# Patient Record
Sex: MALE | Race: WHITE | NOT HISPANIC OR LATINO | Employment: OTHER | URBAN - METROPOLITAN AREA
[De-identification: names, ages, dates, MRNs, and addresses within clinical notes are randomized per-mention and may not be internally consistent; named-entity substitution may affect disease eponyms.]

---

## 2017-03-20 ENCOUNTER — HOSPITAL ENCOUNTER (OUTPATIENT)
Dept: OTHER | Facility: HOSPITAL | Age: 57
Discharge: HOME | End: 2017-03-20
Attending: NURSE PRACTITIONER | Admitting: NURSE PRACTITIONER

## 2018-11-14 ENCOUNTER — HOSPITAL ENCOUNTER (OUTPATIENT)
Dept: CARDIOLOGY | Facility: HOSPITAL | Age: 58
Discharge: HOME | End: 2018-11-14
Attending: NURSE PRACTITIONER | Admitting: NURSE PRACTITIONER

## 2019-10-08 NOTE — PROGRESS NOTES
Cardiology Office Visit      Encounter Date:  10/10/2019    Patient ID:   Chinedu Lopez is a 58 y.o. male.    Reason For Followup:  Paroxysmal atrial fibrillation  Coronary artery disease  Coagulopathy    Brief Clinical History:  Dear Nhung Pillai APRN    I had the pleasure of seeing Chinedu Lopez today. As you are well aware, this is a 58 y.o. male with a known history of ischemic heart disease. He additionally has a history of paroxysmal atrial fibrillation, dyslipidemia, hypertension with hypertensive cardiovascular disease,  tobacco abuse disorder and anti-platelet/anti coagulation therapy. He presents today for followup on the above conditions.    Interval History:  He denies chest pain, pressure, heaviness or tightness.  He denies any shortness of breath out of character.  He denies any PND, orthopnea or palpitations.  He denies any syncope or near-syncope.  He reports feeling quite well from a cardiac perspective.      His only complaint today is bruising and bleeding from scratches.  He works as a  and sustains scratches and abrasions on a regular basis.  We discussed options and he is interested in discussing the watchman further with electrophysiology.  He reports his blood pressure is better controlled at home.  Pressure today is better than it has been on previous visits. Lipids are managed in your office.      Assessment & Plan    Impressions:  Coronary artery disease status post coronary arterial bypass grafting and subsequent PCI and stenting.      Most recent catheterization in May of 2016 demonstrating no disease amenable to percutaneous or surgical revascularization.  Paroxysmal atrial fibrillation  Coagulopathy secondary to warfarin.     High risk employment situation  Hypertension with hypertensive cardiovascular disease.  Dyslipidemia.   Antiplatelet therapy.  Tobacco abuse disorder  Right neck mass       Recommendations:  Continuation of his current medical regimen at  the present time.  Follow-up with EP for further discussions regarding watchman device  Followup in 6 months time sooner should there be difficulties  Smoking cessation.    Objective:    Vitals:  Vitals:    10/10/19 1049   BP: 143/78   Pulse: 67   Resp: 18   SpO2: 97%   Weight: 96.4 kg (212 lb 9.6 oz)       Physical Exam:    General: Alert, cooperative, no distress, appears stated age  Head:  Normocephalic, atraumatic, mucous membranes moist  Eyes:  Conjunctiva/corneas clear, EOM's intact     Neck:  Supple,  no adenopathy;      Lungs: Clear to auscultation bilaterally, no wheezes rhonchi rales are noted  Chest wall: No tenderness  Heart::  Regular rate and rhythm, S1 and S2 normal, no murmur, rub or gallop  Abdomen: Soft, non-tender, nondistended bowel sounds active  Extremities: No cyanosis, clubbing, or edema  Pulses: 2+ and symmetric all extremities  Skin:  Multiple scratches and abrasions and bruises on upper extremities.  Neuro/psych: A&O x3. CN II through XII are grossly intact with appropriate affect      Allergies:  No Known Allergies    Medication Review:     Current Outpatient Medications:   •  amLODIPine (NORVASC) 10 MG tablet, Take 10 mg by mouth every night at bedtime., Disp: , Rfl: 2  •  aspirin 81 MG tablet, Take 1 tablet by mouth Every Other Day., Disp: , Rfl:   •  Cholecalciferol (VITAMIN D3) 5000 units capsule capsule, Take 1 capsule by mouth Daily., Disp: , Rfl:   •  doxazosin (CARDURA) 2 MG tablet, Take 1 tablet by mouth Daily., Disp: , Rfl:   •  isosorbide mononitrate (IMDUR) 60 MG 24 hr tablet, Take 0.5 tablets by mouth Daily., Disp: , Rfl: 6  •  lisinopril (PRINIVIL,ZESTRIL) 20 MG tablet, Take 20 mg by mouth every night at bedtime., Disp: , Rfl: 2  •  nebivolol (BYSTOLIC) 20 MG tablet, Take 1 tablet by mouth Daily., Disp: , Rfl:   •  nitroglycerin (NITROSTAT) 0.4 MG SL tablet, Place 1 tablet under the tongue Take As Directed., Disp: , Rfl:   •  prasugrel (EFFIENT) 10 MG tablet, Take 10 mg by  mouth Every Morning., Disp: , Rfl: 6  •  ranolazine (RANEXA) 500 MG 12 hr tablet, Take 500 mg by mouth 2 (Two) Times a Day., Disp: , Rfl: 6  •  rosuvastatin (CRESTOR) 40 MG tablet, Take 40 mg by mouth every night at bedtime., Disp: , Rfl: 4  •  triamterene-hydrochlorothiazide (MAXZIDE) 75-50 MG per tablet, Take 1 tablet by mouth Every Morning., Disp: , Rfl: 2  •  warfarin (COUMADIN) 1 MG tablet, Take 1 tablet by mouth Take As Directed., Disp: , Rfl:   •  XIGDUO XR 5-1000 MG tablet, Take 1 tablet by mouth 2 (Two) Times a Day., Disp: , Rfl:     Family History:  Family History   Problem Relation Age of Onset   • Diabetes Mother    • Heart attack Father    • Diabetes Father    • Other Sister         MRSA   • Heart disease Other         Positive for Ischemic   • Cancer Other    • Hypertension Other        Past Medical History:  Past Medical History:   Diagnosis Date   • Atrial fibrillation (CMS/[facility])     June, [facility]. Abstracted from [employer].   • CAD (coronary artery disease)     S/P CABG and PCI. Abstracted from [employer].   • Hyperlipidemia    • Hypertension    • Myocardial infarction (CMS/[facility]) 2006    Non-ST elevation MI. Abstracted from [employer].   • Obstructive sleep apnea on CPAP     At . Abstracted from [employer].   • Polycythemia vera (CMS/[facility])    • TIA (transient ischemic attack) 11/19/2013    Possible TIA- MRI shows small tumor/head. Abstracted from [employer].       Past surgical History:  Past Surgical History:   Procedure Laterality Date   • ANGIOPLASTY  2006    3 srents were sequentially placed in promimal and mid body of the saphennous vein graft to obtuse marginal branch. Abstracted from [employer].   • CARDIAC CATHETERIZATION  2000 2003, 2006, 2012. Abstracted from [facility].   • CORONARY ARTERY BYPASS GRAFT  2000    Triple. Abstracted from [facility].   • MUSCLE REPAIR      Tendons and nerves in right lower extremity. Abstracted from [employer].   • OTHER SURGICAL HISTORY  08/2012    RCA mid  and proximal- Xience stent x3. Abstracted from [facility].   • OTHER SURGICAL HISTORY  09/2016    Head trauma/bleed at [facility]. Abstracted from [employer].       Social History:  Social History     Socioeconomic History   • Marital status:      Spouse name: Not on file   • Number of children: Not on file   • Years of education: Not on file   • Highest education level: Not on file   Tobacco Use   • Smoking status: Current Every Day Smoker   • Smokeless tobacco: Never Used   Substance and Sexual Activity   • Alcohol use: Yes     Frequency: 4 or more times a week   • Drug use: No       Review of Systems:  The following systems were reviewed as they relate to the cardiovascular system: Constitutional, Eyes, ENT, Cardiovascular, Respiratory, Gastrointestinal, Integumentary, Neurological, Psychiatric, Hematologic, Endocrine, Musculoskeletal, and Genitourinary. The pertinent cardiovascular findings are reported above with all other cardiovascular points within those systems being negative.    Diagnostic Study Review:     Current Electrocardiogram:    ECG 12 Lead  Date/Time: 10/10/2019 9:59 PM  Performed by: Renny Mosher DO  Authorized by: Renny Mosher DO   Comparison: not compared with previous ECG   Previous ECG: no previous ECG available  Comments: Normal sinus rhythm with a ventricular rate of 76 bpm.  Probable old anterior septal MI.  Normal QT and QTc intervals.  Right axis deviation.              NOTE: The following portions of the patient's history were reviewed and updated this visit as appropriate: allergies, current medications, past family history, past medical history, past social history, past surgical history and problem list.

## 2019-10-10 ENCOUNTER — OFFICE VISIT (OUTPATIENT)
Dept: CARDIOLOGY | Facility: CLINIC | Age: 59
End: 2019-10-10

## 2019-10-10 VITALS
RESPIRATION RATE: 18 BRPM | SYSTOLIC BLOOD PRESSURE: 143 MMHG | DIASTOLIC BLOOD PRESSURE: 78 MMHG | BODY MASS INDEX: 28.83 KG/M2 | OXYGEN SATURATION: 97 % | HEART RATE: 67 BPM | WEIGHT: 212.6 LBS

## 2019-10-10 DIAGNOSIS — I10 ESSENTIAL HYPERTENSION: ICD-10-CM

## 2019-10-10 DIAGNOSIS — Z79.01 LONG TERM CURRENT USE OF ANTICOAGULANT THERAPY: ICD-10-CM

## 2019-10-10 DIAGNOSIS — I25.10 CORONARY ARTERY DISEASE INVOLVING NATIVE CORONARY ARTERY OF NATIVE HEART WITHOUT ANGINA PECTORIS: ICD-10-CM

## 2019-10-10 DIAGNOSIS — I48.0 PAROXYSMAL ATRIAL FIBRILLATION (HCC): Primary | ICD-10-CM

## 2019-10-10 PROCEDURE — 99214 OFFICE O/P EST MOD 30 MIN: CPT | Performed by: INTERNAL MEDICINE

## 2019-10-10 PROCEDURE — 93000 ELECTROCARDIOGRAM COMPLETE: CPT | Performed by: INTERNAL MEDICINE

## 2019-10-10 RX ORDER — DOXAZOSIN 2 MG/1
1 TABLET ORAL DAILY
COMMUNITY
Start: 2019-07-29 | End: 2019-12-16

## 2019-10-10 RX ORDER — AMLODIPINE BESYLATE 10 MG/1
10 TABLET ORAL
Refills: 2 | COMMUNITY
Start: 2019-09-24 | End: 2019-12-16

## 2019-10-10 RX ORDER — LISINOPRIL 20 MG/1
20 TABLET ORAL
Refills: 2 | COMMUNITY
Start: 2019-08-20 | End: 2019-12-16

## 2019-10-10 RX ORDER — NEBIVOLOL 20 MG/1
1 TABLET ORAL 2 TIMES DAILY
COMMUNITY

## 2019-10-10 RX ORDER — PRASUGREL 10 MG/1
10 TABLET, FILM COATED ORAL EVERY MORNING
Refills: 6 | COMMUNITY
Start: 2019-09-24 | End: 2019-12-06

## 2019-10-10 RX ORDER — NITROGLYCERIN 0.4 MG/1
1 TABLET SUBLINGUAL TAKE AS DIRECTED
COMMUNITY
Start: 2016-03-16 | End: 2019-12-16

## 2019-10-10 RX ORDER — WARFARIN SODIUM 1 MG/1
1 TABLET ORAL TAKE AS DIRECTED
COMMUNITY
Start: 2016-09-14 | End: 2019-12-06

## 2019-10-10 RX ORDER — TRIAMTERENE AND HYDROCHLOROTHIAZIDE 75; 50 MG/1; MG/1
1 TABLET ORAL EVERY MORNING
Refills: 2 | COMMUNITY
Start: 2019-09-24 | End: 2019-12-16

## 2019-10-10 RX ORDER — RANOLAZINE 500 MG/1
500 TABLET, EXTENDED RELEASE ORAL 2 TIMES DAILY
Refills: 6 | COMMUNITY
Start: 2019-10-01

## 2019-10-10 RX ORDER — ISOSORBIDE MONONITRATE 60 MG/1
0.5 TABLET, EXTENDED RELEASE ORAL DAILY
Refills: 6 | COMMUNITY
Start: 2019-09-30 | End: 2020-01-31 | Stop reason: HOSPADM

## 2019-10-10 RX ORDER — DAPAGLIFLOZIN AND METFORMIN HYDROCHLORIDE 5; 1000 MG/1; MG/1
1 TABLET, FILM COATED, EXTENDED RELEASE ORAL 2 TIMES DAILY
COMMUNITY
Start: 2019-08-05 | End: 2019-12-16

## 2019-10-10 RX ORDER — ROSUVASTATIN CALCIUM 40 MG/1
40 TABLET, COATED ORAL
Refills: 4 | COMMUNITY
Start: 2019-07-18 | End: 2021-01-25

## 2019-10-10 NOTE — PATIENT INSTRUCTIONS
Dr Willams referral for Watchman evaluation  Follow-up in 6 months time, sooner should there be difficulties.

## 2019-10-12 PROBLEM — I10 HYPERTENSION: Status: ACTIVE | Noted: 2019-10-12

## 2019-10-12 PROBLEM — R22.1 NECK MASS: Status: ACTIVE | Noted: 2019-01-07

## 2019-10-12 PROBLEM — I25.10 CORONARY HEART DISEASE: Status: ACTIVE | Noted: 2019-10-12

## 2019-11-07 ENCOUNTER — LAB REQUISITION (OUTPATIENT)
Dept: LAB | Facility: HOSPITAL | Age: 59
End: 2019-11-07

## 2019-11-07 DIAGNOSIS — Z00.00 ENCOUNTER FOR GENERAL ADULT MEDICAL EXAMINATION WITHOUT ABNORMAL FINDINGS: ICD-10-CM

## 2019-11-07 LAB
ANION GAP SERPL CALCULATED.3IONS-SCNC: 12 MMOL/L (ref 5–15)
BUN BLD-MCNC: 10 MG/DL (ref 6–20)
BUN/CREAT SERPL: 14.7 (ref 7–25)
CALCIUM SPEC-SCNC: 9.1 MG/DL (ref 8.6–10.5)
CHLORIDE SERPL-SCNC: 112 MMOL/L (ref 98–107)
CO2 SERPL-SCNC: 25 MMOL/L (ref 22–29)
CREAT BLD-MCNC: 0.68 MG/DL (ref 0.76–1.27)
DEPRECATED RDW RBC AUTO: 44.2 FL (ref 37–54)
EOSINOPHIL # BLD MANUAL: 0.4 10*3/MM3 (ref 0–0.4)
EOSINOPHIL NFR BLD MANUAL: 6 % (ref 0.3–6.2)
ERYTHROCYTE [DISTWIDTH] IN BLOOD BY AUTOMATED COUNT: 12.6 % (ref 12.3–15.4)
GFR SERPL CREATININE-BSD FRML MDRD: 121 ML/MIN/1.73
GIANT PLATELETS: ABNORMAL
GLUCOSE BLD-MCNC: 133 MG/DL (ref 65–99)
HCT VFR BLD AUTO: 45.4 % (ref 37.5–51)
HGB BLD-MCNC: 14.9 G/DL (ref 13–17.7)
LYMPHOCYTES # BLD MANUAL: 0.94 10*3/MM3 (ref 0.7–3.1)
LYMPHOCYTES NFR BLD MANUAL: 14 % (ref 19.6–45.3)
LYMPHOCYTES NFR BLD MANUAL: 4 % (ref 5–12)
MCH RBC QN AUTO: 32.4 PG (ref 26.6–33)
MCHC RBC AUTO-ENTMCNC: 32.8 G/DL (ref 31.5–35.7)
MCV RBC AUTO: 98.6 FL (ref 79–97)
MONOCYTES # BLD AUTO: 0.27 10*3/MM3 (ref 0.1–0.9)
NEUTROPHILS # BLD AUTO: 5.09 10*3/MM3 (ref 1.7–7)
NEUTROPHILS NFR BLD MANUAL: 75 % (ref 42.7–76)
NEUTS BAND NFR BLD MANUAL: 1 % (ref 0–5)
NEUTS VAC BLD QL SMEAR: ABNORMAL
PLATELET # BLD AUTO: 163 10*3/MM3 (ref 140–450)
PMV BLD AUTO: 10.9 FL (ref 6–12)
POTASSIUM BLD-SCNC: 3 MMOL/L (ref 3.5–5.2)
RBC # BLD AUTO: 4.61 10*6/MM3 (ref 4.14–5.8)
RBC MORPH BLD: NORMAL
SCAN SLIDE: NORMAL
SODIUM BLD-SCNC: 149 MMOL/L (ref 136–145)
WBC NRBC COR # BLD: 6.7 10*3/MM3 (ref 3.4–10.8)

## 2019-11-07 PROCEDURE — 85025 COMPLETE CBC W/AUTO DIFF WBC: CPT | Performed by: PHYSICAL MEDICINE & REHABILITATION

## 2019-11-07 PROCEDURE — 80048 BASIC METABOLIC PNL TOTAL CA: CPT | Performed by: PHYSICAL MEDICINE & REHABILITATION

## 2019-11-08 ENCOUNTER — LAB REQUISITION (OUTPATIENT)
Dept: LAB | Facility: HOSPITAL | Age: 59
End: 2019-11-08

## 2019-11-08 DIAGNOSIS — Z00.00 ENCOUNTER FOR GENERAL ADULT MEDICAL EXAMINATION WITHOUT ABNORMAL FINDINGS: ICD-10-CM

## 2019-11-08 LAB
ANION GAP SERPL CALCULATED.3IONS-SCNC: 11 MMOL/L (ref 5–15)
BUN BLD-MCNC: 14 MG/DL (ref 6–20)
BUN/CREAT SERPL: 17.5 (ref 7–25)
CALCIUM SPEC-SCNC: 9.5 MG/DL (ref 8.6–10.5)
CHLORIDE SERPL-SCNC: 107 MMOL/L (ref 98–107)
CO2 SERPL-SCNC: 29 MMOL/L (ref 22–29)
CREAT BLD-MCNC: 0.8 MG/DL (ref 0.76–1.27)
GFR SERPL CREATININE-BSD FRML MDRD: 100 ML/MIN/1.73
GLUCOSE BLD-MCNC: 66 MG/DL (ref 65–99)
POTASSIUM BLD-SCNC: 3.2 MMOL/L (ref 3.5–5.2)
SODIUM BLD-SCNC: 147 MMOL/L (ref 136–145)

## 2019-11-08 PROCEDURE — 80048 BASIC METABOLIC PNL TOTAL CA: CPT

## 2019-11-09 ENCOUNTER — LAB REQUISITION (OUTPATIENT)
Dept: LAB | Facility: HOSPITAL | Age: 59
End: 2019-11-09

## 2019-11-09 DIAGNOSIS — Z00.00 ENCOUNTER FOR GENERAL ADULT MEDICAL EXAMINATION WITHOUT ABNORMAL FINDINGS: ICD-10-CM

## 2019-11-09 LAB
ANION GAP SERPL CALCULATED.3IONS-SCNC: 11 MMOL/L (ref 5–15)
BUN BLD-MCNC: 11 MG/DL (ref 6–20)
BUN/CREAT SERPL: 19 (ref 7–25)
CALCIUM SPEC-SCNC: 8.9 MG/DL (ref 8.6–10.5)
CHLORIDE SERPL-SCNC: 107 MMOL/L (ref 98–107)
CO2 SERPL-SCNC: 27 MMOL/L (ref 22–29)
CREAT BLD-MCNC: 0.58 MG/DL (ref 0.76–1.27)
GFR SERPL CREATININE-BSD FRML MDRD: 145 ML/MIN/1.73
GLUCOSE BLD-MCNC: 118 MG/DL (ref 65–99)
POTASSIUM BLD-SCNC: 3 MMOL/L (ref 3.5–5.2)
SODIUM BLD-SCNC: 145 MMOL/L (ref 136–145)

## 2019-11-09 PROCEDURE — 80048 BASIC METABOLIC PNL TOTAL CA: CPT | Performed by: PHYSICAL MEDICINE & REHABILITATION

## 2019-11-12 ENCOUNTER — LAB REQUISITION (OUTPATIENT)
Dept: LAB | Facility: HOSPITAL | Age: 59
End: 2019-11-12

## 2019-11-12 DIAGNOSIS — Z00.00 ENCOUNTER FOR GENERAL ADULT MEDICAL EXAMINATION WITHOUT ABNORMAL FINDINGS: ICD-10-CM

## 2019-11-12 LAB
ANION GAP SERPL CALCULATED.3IONS-SCNC: 13 MMOL/L (ref 5–15)
BUN BLD-MCNC: 10 MG/DL (ref 6–20)
BUN/CREAT SERPL: 16.7 (ref 7–25)
CALCIUM SPEC-SCNC: 9.4 MG/DL (ref 8.6–10.5)
CHLORIDE SERPL-SCNC: 101 MMOL/L (ref 98–107)
CO2 SERPL-SCNC: 26 MMOL/L (ref 22–29)
CREAT BLD-MCNC: 0.6 MG/DL (ref 0.76–1.27)
GFR SERPL CREATININE-BSD FRML MDRD: 139 ML/MIN/1.73
GLUCOSE BLD-MCNC: 64 MG/DL (ref 65–99)
MAGNESIUM SERPL-MCNC: 1.9 MG/DL (ref 1.6–2.6)
PHOSPHATE SERPL-MCNC: 2.8 MG/DL (ref 2.5–4.5)
POTASSIUM BLD-SCNC: 4.2 MMOL/L (ref 3.5–5.2)
SODIUM BLD-SCNC: 140 MMOL/L (ref 136–145)

## 2019-11-12 PROCEDURE — 80048 BASIC METABOLIC PNL TOTAL CA: CPT

## 2019-11-12 PROCEDURE — 84100 ASSAY OF PHOSPHORUS: CPT

## 2019-11-12 PROCEDURE — 83735 ASSAY OF MAGNESIUM: CPT

## 2019-11-14 ENCOUNTER — LAB REQUISITION (OUTPATIENT)
Dept: LAB | Facility: HOSPITAL | Age: 59
End: 2019-11-14

## 2019-11-14 DIAGNOSIS — Z00.00 ENCOUNTER FOR GENERAL ADULT MEDICAL EXAMINATION WITHOUT ABNORMAL FINDINGS: ICD-10-CM

## 2019-11-14 LAB
ALBUMIN SERPL-MCNC: 2.9 G/DL (ref 3.5–5.2)
ALBUMIN/GLOB SERPL: 0.9 G/DL
ALP SERPL-CCNC: 39 U/L (ref 39–117)
ALT SERPL W P-5'-P-CCNC: 34 U/L (ref 1–41)
ANION GAP SERPL CALCULATED.3IONS-SCNC: 9 MMOL/L (ref 5–15)
AST SERPL-CCNC: 24 U/L (ref 1–40)
BILIRUB SERPL-MCNC: 0.4 MG/DL (ref 0.2–1.2)
BUN BLD-MCNC: 11 MG/DL (ref 6–20)
BUN/CREAT SERPL: 13.4 (ref 7–25)
CALCIUM SPEC-SCNC: 9.1 MG/DL (ref 8.6–10.5)
CHLORIDE SERPL-SCNC: 101 MMOL/L (ref 98–107)
CO2 SERPL-SCNC: 30 MMOL/L (ref 22–29)
CREAT BLD-MCNC: 0.82 MG/DL (ref 0.76–1.27)
GFR SERPL CREATININE-BSD FRML MDRD: 97 ML/MIN/1.73
GLOBULIN UR ELPH-MCNC: 3.3 GM/DL
GLUCOSE BLD-MCNC: 76 MG/DL (ref 65–99)
POTASSIUM BLD-SCNC: 4.7 MMOL/L (ref 3.5–5.2)
PROT SERPL-MCNC: 6.2 G/DL (ref 6–8.5)
SODIUM BLD-SCNC: 140 MMOL/L (ref 136–145)
VALPROATE SERPL-MCNC: 17.8 MCG/ML (ref 50–125)

## 2019-11-14 PROCEDURE — 80053 COMPREHEN METABOLIC PANEL: CPT

## 2019-11-14 PROCEDURE — 80164 ASSAY DIPROPYLACETIC ACD TOT: CPT

## 2019-11-17 ENCOUNTER — LAB REQUISITION (OUTPATIENT)
Dept: LAB | Facility: HOSPITAL | Age: 59
End: 2019-11-17

## 2019-11-17 DIAGNOSIS — Z00.00 ENCOUNTER FOR GENERAL ADULT MEDICAL EXAMINATION WITHOUT ABNORMAL FINDINGS: ICD-10-CM

## 2019-11-17 LAB
ANION GAP SERPL CALCULATED.3IONS-SCNC: 11 MMOL/L (ref 5–15)
BACTERIA UR QL AUTO: ABNORMAL /HPF
BASOPHILS # BLD AUTO: 0 10*3/MM3 (ref 0–0.2)
BASOPHILS NFR BLD AUTO: 0.4 % (ref 0–1.5)
BILIRUB UR QL STRIP: NEGATIVE
BUN BLD-MCNC: 9 MG/DL (ref 6–20)
BUN/CREAT SERPL: 14.5 (ref 7–25)
CALCIUM SPEC-SCNC: 9.6 MG/DL (ref 8.6–10.5)
CHLORIDE SERPL-SCNC: 98 MMOL/L (ref 98–107)
CLARITY UR: CLEAR
CO2 SERPL-SCNC: 30 MMOL/L (ref 22–29)
COLOR UR: YELLOW
CREAT BLD-MCNC: 0.62 MG/DL (ref 0.76–1.27)
DEPRECATED RDW RBC AUTO: 45.5 FL (ref 37–54)
EOSINOPHIL # BLD AUTO: 0.1 10*3/MM3 (ref 0–0.4)
EOSINOPHIL NFR BLD AUTO: 0.9 % (ref 0.3–6.2)
ERYTHROCYTE [DISTWIDTH] IN BLOOD BY AUTOMATED COUNT: 13.2 % (ref 12.3–15.4)
GFR SERPL CREATININE-BSD FRML MDRD: 134 ML/MIN/1.73
GLUCOSE BLD-MCNC: 103 MG/DL (ref 65–99)
GLUCOSE UR STRIP-MCNC: NEGATIVE MG/DL
HCT VFR BLD AUTO: 38.3 % (ref 37.5–51)
HGB BLD-MCNC: 13.4 G/DL (ref 13–17.7)
HGB UR QL STRIP.AUTO: ABNORMAL
HYALINE CASTS UR QL AUTO: ABNORMAL /LPF
KETONES UR QL STRIP: NEGATIVE
LEUKOCYTE ESTERASE UR QL STRIP.AUTO: ABNORMAL
LYMPHOCYTES # BLD AUTO: 1.2 10*3/MM3 (ref 0.7–3.1)
LYMPHOCYTES NFR BLD AUTO: 11.7 % (ref 19.6–45.3)
MCH RBC QN AUTO: 34 PG (ref 26.6–33)
MCHC RBC AUTO-ENTMCNC: 35.1 G/DL (ref 31.5–35.7)
MCV RBC AUTO: 96.7 FL (ref 79–97)
MONOCYTES # BLD AUTO: 0.7 10*3/MM3 (ref 0.1–0.9)
MONOCYTES NFR BLD AUTO: 7.2 % (ref 5–12)
NEUTROPHILS # BLD AUTO: 7.9 10*3/MM3 (ref 1.7–7)
NEUTROPHILS NFR BLD AUTO: 79.8 % (ref 42.7–76)
NITRITE UR QL STRIP: NEGATIVE
NRBC BLD AUTO-RTO: 0 /100 WBC (ref 0–0.2)
PH UR STRIP.AUTO: 7.5 [PH] (ref 5–8)
PLATELET # BLD AUTO: 257 10*3/MM3 (ref 140–450)
PMV BLD AUTO: 8.7 FL (ref 6–12)
POTASSIUM BLD-SCNC: 4.7 MMOL/L (ref 3.5–5.2)
PROT UR QL STRIP: NEGATIVE
RBC # BLD AUTO: 3.96 10*6/MM3 (ref 4.14–5.8)
RBC # UR: ABNORMAL /HPF
REF LAB TEST METHOD: ABNORMAL
SODIUM BLD-SCNC: 139 MMOL/L (ref 136–145)
SP GR UR STRIP: <=1.005 (ref 1–1.03)
SQUAMOUS #/AREA URNS HPF: ABNORMAL /HPF
TRANS CELLS #/AREA URNS HPF: ABNORMAL /HPF
UROBILINOGEN UR QL STRIP: ABNORMAL
WBC NRBC COR # BLD: 9.9 10*3/MM3 (ref 3.4–10.8)
WBC UR QL AUTO: ABNORMAL /HPF

## 2019-11-17 PROCEDURE — 87086 URINE CULTURE/COLONY COUNT: CPT

## 2019-11-17 PROCEDURE — 80048 BASIC METABOLIC PNL TOTAL CA: CPT

## 2019-11-17 PROCEDURE — 81001 URINALYSIS AUTO W/SCOPE: CPT

## 2019-11-17 PROCEDURE — 87186 SC STD MICRODIL/AGAR DIL: CPT

## 2019-11-17 PROCEDURE — 85025 COMPLETE CBC W/AUTO DIFF WBC: CPT

## 2019-11-19 LAB — BACTERIA SPEC AEROBE CULT: ABNORMAL

## 2019-12-06 ENCOUNTER — CONSULT (OUTPATIENT)
Dept: CARDIOLOGY | Facility: CLINIC | Age: 59
End: 2019-12-06

## 2019-12-06 ENCOUNTER — PREP FOR SURGERY (OUTPATIENT)
Dept: OTHER | Facility: HOSPITAL | Age: 59
End: 2019-12-06

## 2019-12-06 VITALS — WEIGHT: 197 LBS | BODY MASS INDEX: 26.72 KG/M2

## 2019-12-06 DIAGNOSIS — I25.10 CORONARY ARTERY DISEASE INVOLVING NATIVE CORONARY ARTERY OF NATIVE HEART WITHOUT ANGINA PECTORIS: ICD-10-CM

## 2019-12-06 DIAGNOSIS — I48.0 PAROXYSMAL ATRIAL FIBRILLATION (HCC): Primary | ICD-10-CM

## 2019-12-06 DIAGNOSIS — I62.9 INTRACRANIAL BLEED (HCC): ICD-10-CM

## 2019-12-06 DIAGNOSIS — I10 ESSENTIAL HYPERTENSION: ICD-10-CM

## 2019-12-06 PROCEDURE — 99215 OFFICE O/P EST HI 40 MIN: CPT | Performed by: INTERNAL MEDICINE

## 2019-12-06 RX ORDER — SODIUM CHLORIDE 9 MG/ML
80 INJECTION, SOLUTION INTRAVENOUS CONTINUOUS
Status: CANCELLED | OUTPATIENT
Start: 2019-12-06

## 2019-12-06 NOTE — PROGRESS NOTES
CC--stroke with intracranial bleed    Sub--  59-year-old unfortunate male patient on antiplatelet agents and anticoagulation had stroke with intracranial bleed around 6 to 8 weeks ago and was admitted to [facility] and conservative management was done and subsequently was discharged a month ago after prolonged hospitalization and left on aspirin  Patient was seen by Dr. Mosher and referred for evaluation for possible watchman device  Patient has known history of ischemic heart disease with paroxysmal atrial fibrillation, dyslipidemia and hypertension with history of tobacco abuse  He has prior history of bypass surgery with subsequent PCI and stenting and most recent cardiac catheterization was in 2016 left to medical management  Patient is recovering slowly neurologically and is currently in rehab and is awaiting to see the neurologist next week        Past Medical History:   Diagnosis Date   • Atrial fibrillation (CMS/[facility])     June, [facility]. Abstracted from [employer].   • CAD (coronary artery disease)     S/P CABG and PCI. Abstracted from [facility].   • Hyperlipidemia    • Hypertension    • Myocardial infarction (CMS/[facility]) 2006    Non-ST elevation MI. Abstracted from [facility].   • Obstructive sleep apnea on CPAP     At [facility]. Abstracted from [facility].   • Polycythemia vera (CMS/[facility])    • TIA (transient ischemic attack) 11/19/2013    Possible TIA- MRI shows small tumor/head. Abstracted from [facility].     Past Surgical History:   Procedure Laterality Date   • ANGIOPLASTY  2006    3 srents were sequentially placed in promimal and mid body of the saphennous vein graft to obtuse marginal branch. Abstracted from [facility].   • CARDIAC CATHETERIZATION  2000 2003, 2006, 2012. Abstracted from [facility].   • CORONARY ARTERY BYPASS GRAFT  2000    Triple. Abstracted from [facility].   • MUSCLE REPAIR      Tendons and nerves in right lower extremity. Abstracted from [facility].   • OTHER SURGICAL HISTORY   08/2012    RCA mid and proximal- Xience stent x3. Abstracted from [employer].   • OTHER SURGICAL HISTORY  09/2016    Head trauma/bleed at [facility]. Abstracted from [employer].     Family History   Problem Relation Age of Onset   • Diabetes Mother    • Heart attack Father    • Diabetes Father    • Other Sister         MRSA   • Heart disease Other         Positive for Ischemic   • Cancer Other    • Hypertension Other      Social History     Tobacco Use   • Smoking status: Current Every Day Smoker   • Smokeless tobacco: Never Used   Substance Use Topics   • Alcohol use: Yes     Frequency: 4 or more times a week   • Drug use: No       (Not in a hospital admission)  Allergies:  Patient has no known allergies.    Review of Systems   General:  positive for fatigue and tiredness  Eyes: No redness  Cardiovascular: No chest pain, no palpitations  Respiratory: Denies any shortness of breath  Gastrointestinal: No nausea or vomiting, bleeding  Genitourinary: no hematuria or dysuria  Musculoskeletal: No arthralgia or myalgia  Skin: No rash  Neurologic: No numbness, tingling, syncope  Hematologic/Lymphatic: No abnormal bleeding      Physical Exam  VITALS REVIEWED--heart rate of 60 patient is afebrile respiration 12 times a minute blood pressure 140/70    General:      well developed, well nourished, in no acute distress.    Head:      normocephalic and atraumatic.    Eyes:      PERRL/EOM intact, conjunctiva and sclera clear with out nystagmus.    Neck:      no masses, thyromegaly, trachea central with normal respiratory effort  Lungs:      clear bilaterally to auscultation.    Heart:      non-displaced PMI, chest non-tender; regular rate and rhythm, S1, S2 without murmurs, rubs, or gallops  Skin:      intact without lesions or rashes.    Psych:      alert and cooperative; normal mood and affect; normal attention span and concentration.      Extremities with trace ankle edema without any cyanosis or clubbing    Muscular skeletal  patient walks with a normal gait  with mild kyphosis    Neurological no focal deficits and alert oriented x3    Abdomen soft nontender no hepatomegaly       chads vasc score--- hypertension, vascular disease, stroke--4  HASBLED--4    Assessment plan    Unfortunate male patient with high risk for stroke with intracranial bleed needing prolonged hospitalization recently  Records from [facility] are requested and pending  Patient to discuss with the neurologist with the next visit about appropriate short-term usage of anticoagulation for watchman device implantation  WILD scheduled to evaluate left atrial appendage anatomy for  watchman device implantation  Coronary artery disease stable  Hypertension controlled  We will also discuss with neurology of [facility] for shared decision making  Information on watchman given to the patient  Close follow-up    Electronically signed by Angel Willams MD, 12/06/19, 6:14 PM.

## 2019-12-06 NOTE — H&P (VIEW-ONLY)
CC--stroke with intracranial bleed    Sub--  59-year-old unfortunate male patient on antiplatelet agents and anticoagulation had stroke with intracranial bleed around 6 to 8 weeks ago and was admitted to [facility] and conservative management was done and subsequently was discharged a month ago after prolonged hospitalization and left on aspirin  Patient was seen by Dr. Mosher and referred for evaluation for possible watchman device  Patient has known history of ischemic heart disease with paroxysmal atrial fibrillation, dyslipidemia and hypertension with history of tobacco abuse  He has prior history of bypass surgery with subsequent PCI and stenting and most recent cardiac catheterization was in 2016 left to medical management  Patient is recovering slowly neurologically and is currently in rehab and is awaiting to see the neurologist next week        Past Medical History:   Diagnosis Date   • Atrial fibrillation (CMS/[facility])     June, [facility]. Abstracted from [employer].   • CAD (coronary artery disease)     S/P CABG and PCI. Abstracted from [employer].   • Hyperlipidemia    • Hypertension    • Myocardial infarction (CMS/[facility]) 2006    Non-ST elevation MI. Abstracted from [employer].   • Obstructive sleep apnea on CPAP     At [facility]. Abstracted from [employer].   • Polycythemia vera (CMS/[facility])    • TIA (transient ischemic attack) 11/19/2013    Possible TIA- MRI shows small tumor/head. Abstracted from [employer].     Past Surgical History:   Procedure Laterality Date   • ANGIOPLASTY  2006    3 srents were sequentially placed in promimal and mid body of the saphennous vein graft to obtuse marginal branch. Abstracted from [employer].   • CARDIAC CATHETERIZATION  2000 2003, 2006, 2012. Abstracted from [employer].   • CORONARY ARTERY BYPASS GRAFT  2000    Triple. Abstracted from [employer].   • MUSCLE REPAIR      Tendons and nerves in right lower extremity. Abstracted from [employer].   • OTHER SURGICAL HISTORY   08/2012    RCA mid and proximal- Xience stent x3. Abstracted from [facility].   • OTHER SURGICAL HISTORY  09/2016    Head trauma/bleed at [facility]. Abstracted from [facility].     Family History   Problem Relation Age of Onset   • Diabetes Mother    • Heart attack Father    • Diabetes Father    • Other Sister         MRSA   • Heart disease Other         Positive for Ischemic   • Cancer Other    • Hypertension Other      Social History     Tobacco Use   • Smoking status: Current Every Day Smoker   • Smokeless tobacco: Never Used   Substance Use Topics   • Alcohol use: Yes     Frequency: 4 or more times a week   • Drug use: No       (Not in a hospital admission)  Allergies:  Patient has no known allergies.    Review of Systems   General:  positive for fatigue and tiredness  Eyes: No redness  Cardiovascular: No chest pain, no palpitations  Respiratory: Denies any shortness of breath  Gastrointestinal: No nausea or vomiting, bleeding  Genitourinary: no hematuria or dysuria  Musculoskeletal: No arthralgia or myalgia  Skin: No rash  Neurologic: No numbness, tingling, syncope  Hematologic/Lymphatic: No abnormal bleeding      Physical Exam  VITALS REVIEWED--heart rate of 60 patient is afebrile respiration 12 times a minute blood pressure 140/70    General:      well developed, well nourished, in no acute distress.    Head:      normocephalic and atraumatic.    Eyes:      PERRL/EOM intact, conjunctiva and sclera clear with out nystagmus.    Neck:      no masses, thyromegaly, trachea central with normal respiratory effort  Lungs:      clear bilaterally to auscultation.    Heart:      non-displaced PMI, chest non-tender; regular rate and rhythm, S1, S2 without murmurs, rubs, or gallops  Skin:      intact without lesions or rashes.    Psych:      alert and cooperative; normal mood and affect; normal attention span and concentration.      Extremities with trace ankle edema without any cyanosis or clubbing    Muscular skeletal  patient walks with a normal gait  with mild kyphosis    Neurological no focal deficits and alert oriented x3    Abdomen soft nontender no hepatomegaly       chads vasc score--- hypertension, vascular disease, stroke--4  HASBLED--4    Assessment plan    Unfortunate male patient with high risk for stroke with intracranial bleed needing prolonged hospitalization recently  Records from [facility] are requested and pending  Patient to discuss with the neurologist with the next visit about appropriate short-term usage of anticoagulation for watchman device implantation  WILD scheduled to evaluate left atrial appendage anatomy for  watchman device implantation  Coronary artery disease stable  Hypertension controlled  We will also discuss with neurology of [facility] for shared decision making  Information on watchman given to the patient  Close follow-up    Electronically signed by Angel Willams MD, 12/06/19, 6:14 PM.

## 2019-12-09 ENCOUNTER — OFFICE (OUTPATIENT)
Dept: URBAN - METROPOLITAN AREA CLINIC 64 | Facility: CLINIC | Age: 59
End: 2019-12-09

## 2019-12-09 VITALS
HEART RATE: 64 BPM | SYSTOLIC BLOOD PRESSURE: 128 MMHG | HEIGHT: 72 IN | DIASTOLIC BLOOD PRESSURE: 76 MMHG | WEIGHT: 202 LBS

## 2019-12-09 DIAGNOSIS — Z43.1 ENCOUNTER FOR ATTENTION TO GASTROSTOMY: ICD-10-CM

## 2019-12-09 PROCEDURE — 43762 RPLC GTUBE NO REVJ TRC: CPT | Performed by: NURSE PRACTITIONER

## 2019-12-10 ENCOUNTER — TELEPHONE (OUTPATIENT)
Dept: CARDIOLOGY | Facility: CLINIC | Age: 59
End: 2019-12-10

## 2019-12-12 ENCOUNTER — TELEPHONE (OUTPATIENT)
Dept: CARDIOLOGY | Facility: CLINIC | Age: 59
End: 2019-12-12

## 2019-12-16 RX ORDER — VALPROIC ACID 250 MG/1
250 CAPSULE, LIQUID FILLED ORAL 2 TIMES DAILY
COMMUNITY
End: 2020-06-01

## 2019-12-16 RX ORDER — QUETIAPINE FUMARATE 25 MG/1
25 TABLET, FILM COATED ORAL DAILY
COMMUNITY

## 2019-12-16 RX ORDER — BACLOFEN 10 MG/1
5 TABLET ORAL 2 TIMES DAILY
Status: ON HOLD | COMMUNITY
End: 2020-01-30

## 2019-12-16 RX ORDER — FOLIC ACID 1 MG/1
1 TABLET ORAL DAILY
COMMUNITY

## 2019-12-16 RX ORDER — THIAMINE MONONITRATE (VIT B1) 100 MG
100 TABLET ORAL DAILY
COMMUNITY

## 2019-12-16 RX ORDER — TAMSULOSIN HYDROCHLORIDE 0.4 MG/1
2 CAPSULE ORAL DAILY
COMMUNITY
End: 2020-01-17

## 2019-12-16 RX ORDER — AMANTADINE HYDROCHLORIDE 100 MG/1
100 CAPSULE, GELATIN COATED ORAL DAILY
COMMUNITY

## 2019-12-16 RX ORDER — POTASSIUM CHLORIDE 20 MEQ/1
20 TABLET, EXTENDED RELEASE ORAL 2 TIMES DAILY
COMMUNITY

## 2019-12-17 ENCOUNTER — HOSPITAL ENCOUNTER (OUTPATIENT)
Dept: CARDIOLOGY | Facility: HOSPITAL | Age: 59
Discharge: HOME | End: 2019-12-17
Admitting: INTERNAL MEDICINE

## 2019-12-17 ENCOUNTER — ANESTHESIA (OUTPATIENT)
Dept: CARDIOLOGY | Facility: HOSPITAL | Age: 59
End: 2019-12-17

## 2019-12-17 ENCOUNTER — ANESTHESIA EVENT (OUTPATIENT)
Dept: CARDIOLOGY | Facility: HOSPITAL | Age: 59
End: 2019-12-17

## 2019-12-17 VITALS
TEMPERATURE: 99.4 F | BODY MASS INDEX: 27.84 KG/M2 | HEIGHT: 71 IN | OXYGEN SATURATION: 96 % | SYSTOLIC BLOOD PRESSURE: 139 MMHG | RESPIRATION RATE: 16 BRPM | DIASTOLIC BLOOD PRESSURE: 78 MMHG | WEIGHT: 198.85 LBS | HEART RATE: 59 BPM

## 2019-12-17 VITALS — HEART RATE: 62 BPM | OXYGEN SATURATION: 98 % | SYSTOLIC BLOOD PRESSURE: 135 MMHG | DIASTOLIC BLOOD PRESSURE: 76 MMHG

## 2019-12-17 DIAGNOSIS — I62.9 INTRACRANIAL BLEED (HCC): ICD-10-CM

## 2019-12-17 DIAGNOSIS — I48.0 PAROXYSMAL ATRIAL FIBRILLATION (HCC): ICD-10-CM

## 2019-12-17 LAB
BH CV ECHO MEAS - EF(MOD-BP): 60 %
LV EF 2D ECHO EST: 60 %

## 2019-12-17 PROCEDURE — 93325 DOPPLER ECHO COLOR FLOW MAPG: CPT | Performed by: INTERNAL MEDICINE

## 2019-12-17 PROCEDURE — 93312 ECHO TRANSESOPHAGEAL: CPT

## 2019-12-17 PROCEDURE — 93320 DOPPLER ECHO COMPLETE: CPT | Performed by: INTERNAL MEDICINE

## 2019-12-17 PROCEDURE — 93325 DOPPLER ECHO COLOR FLOW MAPG: CPT

## 2019-12-17 PROCEDURE — 93320 DOPPLER ECHO COMPLETE: CPT

## 2019-12-17 PROCEDURE — 93312 ECHO TRANSESOPHAGEAL: CPT | Performed by: INTERNAL MEDICINE

## 2019-12-17 PROCEDURE — 25010000002 PROPOFOL 10 MG/ML EMULSION: Performed by: ANESTHESIOLOGY

## 2019-12-17 RX ORDER — SODIUM CHLORIDE 9 MG/ML
80 INJECTION, SOLUTION INTRAVENOUS CONTINUOUS
Status: DISCONTINUED | OUTPATIENT
Start: 2019-12-17 | End: 2019-12-19 | Stop reason: HOSPADM

## 2019-12-17 RX ORDER — PROPOFOL 10 MG/ML
VIAL (ML) INTRAVENOUS AS NEEDED
Status: DISCONTINUED | OUTPATIENT
Start: 2019-12-17 | End: 2019-12-17 | Stop reason: SURG

## 2019-12-17 RX ADMIN — PROPOFOL 25 MG: 10 INJECTION, EMULSION INTRAVENOUS at 14:08

## 2019-12-17 RX ADMIN — SODIUM CHLORIDE 80 ML/HR: 900 INJECTION, SOLUTION INTRAVENOUS at 13:28

## 2019-12-17 RX ADMIN — PROPOFOL 100 MG: 10 INJECTION, EMULSION INTRAVENOUS at 14:05

## 2019-12-17 NOTE — ANESTHESIA PREPROCEDURE EVALUATION
Anesthesia Evaluation     Patient summary reviewed and Nursing notes reviewed   NPO Solid Status: > 8 hours  NPO Liquid Status: > 8 hours           Airway   Mallampati: II  TM distance: >3 FB  Neck ROM: full  No difficulty expected  Dental - normal exam   (+) poor dentition    Pulmonary - normal exam   (+) a smoker, sleep apnea,   Cardiovascular - normal exam    ECG reviewed    (+) hypertension, past MI , CAD, CABG, dysrhythmias Atrial Fib, hyperlipidemia,       Neuro/Psych  (+) TIA,     GI/Hepatic/Renal/Endo      Musculoskeletal     Abdominal  - normal exam    Bowel sounds: normal.   Substance History      OB/GYN          Other   blood dyscrasia,   history of cancer        Phys Exam Other: Missing no loose               Anesthesia Plan    ASA 4     MAC     intravenous induction     Anesthetic plan, all risks, benefits, and alternatives have been provided, discussed and informed consent has been obtained with: patient.

## 2019-12-17 NOTE — ANESTHESIA POSTPROCEDURE EVALUATION
Patient: Chinedu Lopez    Procedure Summary     Date:  12/17/19 Room / Location:  [facility]    Anesthesia Start:  1359 Anesthesia Stop:  1416    Procedure:  ADULT TRANSESOPHAGEAL ECHO (WILD) W/ CONT IF NECESSARY PER PROTOCOL Diagnosis:       Paroxysmal atrial fibrillation (CMS/HCC)      Intracranial bleed (CMS/HCC)      (Arrhythmia)    Scheduled Providers:  Angel Willams MD Provider:  Ambrosio Bellamy MD    Anesthesia Type:  MAC ASA Status:  4          Anesthesia Type: MAC    Vitals  Vitals Value Taken Time   /87 12/17/2019  3:01 PM   Temp     Pulse 61 12/17/2019  3:11 PM   Resp     SpO2 97 % 12/17/2019  3:11 PM   Vitals shown include unvalidated device data.        Post Anesthesia Care and Evaluation    Patient location during evaluation: PACU  Patient participation: complete - patient participated  Level of consciousness: awake  Pain scale: See nurse's notes for pain score.  Pain management: adequate  Airway patency: patent  Anesthetic complications: No anesthetic complications  PONV Status: none  Cardiovascular status: acceptable  Respiratory status: acceptable  Hydration status: acceptable    Comments: Patient seen and examined postoperatively; vital signs stable; SpO2 greater than or equal to 90%; cardiopulmonary status stable; nausea/vomiting adequately controlled; pain adequately controlled; no apparent anesthesia complications; patient discharged from anesthesia care when discharge criteria were met

## 2019-12-17 NOTE — DISCHARGE INSTR - ACTIVITY
WILD DC Instructions    1) The medication, which was used to put the patient to sleep, will be acting in your body for the next twenty-four (24) hours, so you might feel a little sleepy; this feeling will slowly wear off.  Because the medicine is still in your system for the next twenty-four (24) hours, the adult patient SHOULD NOT:    a. Drive a car, operate machinery, or power tools  b. Drink any alcoholic drinks (not even beer)  c. Make any important decisions such as to sign important papers      2) We strongly suggest that a responsible adult be with the patient the rest of the day.    3) Any problems with:    a. EXCESSIVE MUCOUS  b. SPITTING UP BLOOD  c. SORE THROAT AT MORE THAN 72 HOURS    NOTIFY DR. Willams   [phone]   OR CALL THE [facility] [phone].

## 2019-12-18 ENCOUNTER — TELEPHONE (OUTPATIENT)
Dept: CARDIOLOGY | Facility: CLINIC | Age: 59
End: 2019-12-18

## 2019-12-18 NOTE — TELEPHONE ENCOUNTER
Called to request recent office note and CT report from Dr. Sparkle Patino U [facility] neurology.  I spoke to Pricila and then left a message on Tiffany Rodriguez's voicemail.

## 2019-12-19 ENCOUNTER — TELEPHONE (OUTPATIENT)
Dept: CARDIOLOGY | Facility: CLINIC | Age: 59
End: 2019-12-19

## 2019-12-19 NOTE — TELEPHONE ENCOUNTER
Spoke with Merly at [facility] neuro requested records on pt.  She stated she will send records asap.

## 2019-12-30 ENCOUNTER — TELEPHONE (OUTPATIENT)
Dept: CARDIOLOGY | Facility: CLINIC | Age: 59
End: 2019-12-30

## 2020-01-17 ENCOUNTER — PREP FOR SURGERY (OUTPATIENT)
Dept: OTHER | Facility: HOSPITAL | Age: 60
End: 2020-01-17

## 2020-01-17 ENCOUNTER — OFFICE VISIT (OUTPATIENT)
Dept: CARDIOLOGY | Facility: CLINIC | Age: 60
End: 2020-01-17

## 2020-01-17 VITALS
SYSTOLIC BLOOD PRESSURE: 120 MMHG | BODY MASS INDEX: 29.43 KG/M2 | HEART RATE: 67 BPM | DIASTOLIC BLOOD PRESSURE: 70 MMHG | WEIGHT: 211 LBS

## 2020-01-17 DIAGNOSIS — I10 ESSENTIAL HYPERTENSION: ICD-10-CM

## 2020-01-17 DIAGNOSIS — I48.0 PAROXYSMAL ATRIAL FIBRILLATION (HCC): Primary | ICD-10-CM

## 2020-01-17 DIAGNOSIS — I63.9 CEREBROVASCULAR ACCIDENT (CVA), UNSPECIFIED MECHANISM (HCC): ICD-10-CM

## 2020-01-17 PROCEDURE — 93000 ELECTROCARDIOGRAM COMPLETE: CPT | Performed by: NURSE PRACTITIONER

## 2020-01-17 PROCEDURE — 99215 OFFICE O/P EST HI 40 MIN: CPT | Performed by: NURSE PRACTITIONER

## 2020-01-17 RX ORDER — AMLODIPINE BESYLATE 5 MG/1
5 TABLET ORAL DAILY
COMMUNITY
End: 2020-10-28

## 2020-01-17 RX ORDER — SODIUM CHLORIDE 0.9 % (FLUSH) 0.9 %
10 SYRINGE (ML) INJECTION AS NEEDED
Status: CANCELLED | OUTPATIENT
Start: 2020-01-17

## 2020-01-17 RX ORDER — SODIUM CHLORIDE 0.9 % (FLUSH) 0.9 %
3 SYRINGE (ML) INJECTION EVERY 12 HOURS SCHEDULED
Status: CANCELLED | OUTPATIENT
Start: 2020-01-17

## 2020-01-17 RX ORDER — SODIUM CHLORIDE 9 MG/ML
20 INJECTION, SOLUTION INTRAVENOUS CONTINUOUS
Status: CANCELLED | OUTPATIENT
Start: 2020-01-17

## 2020-01-17 RX ORDER — WARFARIN SODIUM 5 MG/1
5 TABLET ORAL DAILY
COMMUNITY
Start: 2019-12-28 | End: 2020-03-17 | Stop reason: HOSPADM

## 2020-01-17 NOTE — PROGRESS NOTES
Cardiology Office Follow Up Visit      Primary Care Provider:  Nhung Clark APRN    Reason for f/u: Repeat evaluation for watchman       Nhung Clark APRN    History of Present Illness     It was nice to see Chinedu Lopez in follow-up for paroxysmal atrial fibrillation, watchman evaluation originally referred by Dr. Mosher.  He is a 59 y.o. male with a history of stroke with intracranial bleed in October while on antiplatelets and anticoagulation, managed acutely at [facility], currently in [facility] rehab for continued physical, occupational, and cognitive therapy.  Medical history includes ischemic heart disease with prior bypass surgery and subsequent PCI x2, most recent cardiac cath in 2016 indicated medical management, hypertension, dyslipidemia, diabetes mellitus, DEISY- patient states treatment discontinued by sleep physician.  The patient was last evaluated in December by Dr. Willams, interim discussion with his neurosurgeon indicates patient is appropriate for watchman placement, preferably soon in order to ultimately discontinue his anticoagulation with his high risk for repeat intracranial bleeding.  WILD demonstrates appropriate anatomy for watchman OLESYA closure device.  He was recently evaluated by his primary care nurse practitioner, laboratory studies include lipid tests, CMP, and HbA1C are reviewed without significant abnormalities with the exception of low B12 levels at 401.  He comes in today for routine follow-up for discussion and planning for watchman.  He denies shortness of breath, palpitations, chest/back pain, syncopal or near syncopal events, he states that he is getting stronger but is still having cognitive issues and intermittent headaches.      Social history: Smokes 1/2 to 1 pack/day (decreased from 2 packs/day) after a 60-pack-year history, denies EtOH, denies illicits, denies herbal use      Past Medical History:   Diagnosis Date   • Atrial  fibrillation (CMS/HCC)     June, [facility]. Abstracted from [facility].   • Brain bleed (CMS/[facility]) 2019 October 14th fell - Hospital on 17th for 3 weeks   • CAD (coronary artery disease)     S/P CABG and PCI. Abstracted from [facility].   • Hyperlipidemia    • Hypertension    • Myocardial infarction (CMS/[facility]) 2006    Non-ST elevation MI. Abstracted from [facility].   • Obstructive sleep apnea on CPAP     At . Abstracted from [facility].   • Polycythemia vera (CMS/[facility])    • TIA (transient ischemic attack) 11/19/2013    Possible TIA- MRI shows small tumor/head. Abstracted from [facility].       Past Surgical History:   Procedure Laterality Date   • ANGIOPLASTY  2006    3 srents were sequentially placed in promimal and mid body of the saphennous vein graft to obtuse marginal branch. Abstracted from [facility].   • CARDIAC CATHETERIZATION  2000 2003, 2006, 2012. Abstracted from [facility].   • CORONARY ARTERY BYPASS GRAFT  2000    Triple. Abstracted from [facility].   • MUSCLE REPAIR      Tendons and nerves in right lower extremity. Abstracted from [facility].   • OTHER SURGICAL HISTORY  08/2012    RCA mid and proximal- Xience stent x3. Abstracted from [facility].   • OTHER SURGICAL HISTORY  09/2016    Head trauma/bleed at [facility]. Abstracted from [facility].         Current Outpatient Medications:   •  amantadine (SYMMETREL) 100 MG capsule, Take 100 mg by mouth Daily., Disp: , Rfl:   •  amLODIPine (NORVASC) 5 MG tablet, Take 5 mg by mouth Daily., Disp: , Rfl:   •  aspirin 81 MG tablet, Take 1 tablet by mouth Daily., Disp: , Rfl:   •  folic acid (FOLVITE) 1 MG tablet, Take 1 mg by mouth Daily., Disp: , Rfl:   •  metFORMIN (GLUCOPHAGE) 1000 MG tablet, Take 1,000 mg by mouth 2 (Two) Times a Day With Meals., Disp: , Rfl:   •  nebivolol (BYSTOLIC) 20 MG tablet, Take 1 tablet by mouth Daily., Disp: , Rfl:   •  potassium chloride (K-DUR,KLOR-CON) 20 MEQ CR tablet, Take 20 mEq by mouth 2 (Two) Times a Day., Disp: , Rfl:   •   QUEtiapine (SEROquel) 25 MG tablet, Take 25 mg by mouth Daily., Disp: , Rfl:   •  ranolazine (RANEXA) 500 MG 12 hr tablet, Take 500 mg by mouth 2 (Two) Times a Day., Disp: , Rfl: 6  •  rosuvastatin (CRESTOR) 40 MG tablet, Take 40 mg by mouth every night at bedtime., Disp: , Rfl: 4  •  thiamine (VITAMIN B-1) 100 MG tablet, Take 100 mg by mouth Daily., Disp: , Rfl:   •  valproic acid (DEPAKENE) 250 MG capsule, Take 250 mg by mouth 2 (Two) Times a Day., Disp: , Rfl:   •  warfarin (COUMADIN) 5 MG tablet, Take 5 mg by mouth Daily., Disp: , Rfl:   •  baclofen (LIORESAL) 10 MG tablet, Take 5 mg by mouth 2 (Two) Times a Day., Disp: , Rfl:   •  isosorbide mononitrate (IMDUR) 60 MG 24 hr tablet, Take 0.5 tablets by mouth Daily., Disp: , Rfl: 6    Social History     Socioeconomic History   • Marital status:      Spouse name: Not on file   • Number of children: Not on file   • Years of education: Not on file   • Highest education level: Not on file   Tobacco Use   • Smoking status: Current Every Day Smoker     Packs/day: 0.25   • Smokeless tobacco: Never Used   Substance and Sexual Activity   • Alcohol use: Not Currently     Frequency: 4 or more times a week   • Drug use: No   • Sexual activity: Defer       Family History   Problem Relation Age of Onset   • Diabetes Mother    • Heart attack Father    • Diabetes Father    • Other Sister         MRSA   • Heart disease Other         Positive for Ischemic   • Cancer Other    • Hypertension Other        The following portions of the patient's history were reviewed and updated as appropriate: allergies, current medications, past medical history, past social history, past surgical history and problem list.        Review of Systems   Constitution: Negative for diaphoresis, malaise/fatigue, weight gain and weight loss.   Cardiovascular: Negative for chest pain, dyspnea on exertion, leg swelling, near-syncope, orthopnea, palpitations and syncope.   Respiratory: Negative for  hemoptysis, shortness of breath, sputum production and wheezing.    Skin: Negative for rash.   Gastrointestinal: Negative for abdominal pain, hematemesis, hematochezia, nausea and vomiting.   Neurological: Positive for headaches. Negative for dizziness, light-headedness, numbness and seizures.   Psychiatric/Behavioral: Positive for memory loss.   All other systems reviewed and are negative.    /70   Pulse 67   Wt 95.7 kg (211 lb)   BMI 29.43 kg/m² .  Objective     Physical Exam   Constitutional: He is oriented to person, place, and time. He appears well-developed. He is cooperative.   Frail appearing   Neck: Normal carotid pulses and no JVD present. Carotid bruit is not present.   Cardiovascular: Normal rate, regular rhythm, normal heart sounds and intact distal pulses. Exam reveals no gallop and no friction rub.   No murmur heard.  Pulses:       Carotid pulses are 2+ on the right side, and 2+ on the left side.       Radial pulses are 2+ on the right side, and 2+ on the left side.        Posterior tibial pulses are 2+ on the right side, and 2+ on the left side.   Pulmonary/Chest: Effort normal and breath sounds normal. He has no decreased breath sounds. He has no wheezes. He has no rhonchi. He has no rales.   Abdominal: Soft. Bowel sounds are normal. He exhibits no distension and no mass. There is no hepatosplenomegaly. There is no tenderness. There is no guarding.   Musculoskeletal: He exhibits no edema (compression hose in place).   Neurological: He is alert and oriented to person, place, and time.   Skin: Skin is warm and dry. No rash noted. No erythema. No pallor.   Psychiatric: He has a normal mood and affect. His speech is normal and behavior is normal. Judgment and thought content normal.   Appropriate conversation skill           ECG 12 Lead  Date/Time: 1/17/2020 10:09 AM  Performed by: Karlie Palacio APRN  Authorized by: Karlie Palacio APRN   Comparison: not compared with previous ECG   Rhythm:  sinus rhythm  Comments: Artifact on EKG, but no appreciable ST abnormalities        BHARTI-VASC score: 4   HAS-BLED score: 4    Assessment/Plan:    1.  CVA with intracranial bleeding while on anticoagulation and antiplatelets and prolonged hospitalization----currently in rehab  2.  Paroxysmal atrial fibrillation----currently normal sinus rhythm, on Coumadin  3.  CAD, history CABG, subsequent PCI-----on low-dose aspirin  4.  Hypertension-----controlled  5.  Hyperlipidemia----on statin  6.  Diabetes mellitus type 2------ on oral hypoglycemics  7.  Tobacco abuse  8.  G-tube site drainage-----no erythema, foul odor, fluctuance or purulence noted  9.  Headaches-----expected with continued resolution of ICH    Made appointment with Dr. Carrillo for Wednesday, 1/22/2020 for shared decision making, patient to follow-up with me next week for reevaluation of G-tube site.  If no infectious concerns and Dr. Carrillo agrees, tentative date for watchman placement 1/30/2020.  Called patient's spouse and discussed at length watchman procedure, risks, and current plan.  Will call patient's primary care provider and discuss any potential concerns that she may have as well.    AMAYA Cruz  EP cardiology  **All problems new to this practitioner  Note: greater than 40 minutes was spent today evaluating patient including history and physical, hospital records, medications, discussion with spouse and sister, coordinating care and greater than 50% of this time was spent discussing patient's diagnoses, prognoses, and therapeutic plans/options and making recommendations.  Very complex patient justifying [id number] level of service.

## 2020-01-18 PROBLEM — I63.9 CEREBROVASCULAR ACCIDENT (CVA) (HCC): Status: ACTIVE | Noted: 2020-01-18

## 2020-01-22 ENCOUNTER — OFFICE VISIT (OUTPATIENT)
Dept: FAMILY MEDICINE CLINIC | Facility: CLINIC | Age: 60
End: 2020-01-22

## 2020-01-22 VITALS
OXYGEN SATURATION: 96 % | RESPIRATION RATE: 20 BRPM | TEMPERATURE: 98.7 F | BODY MASS INDEX: 32.98 KG/M2 | DIASTOLIC BLOOD PRESSURE: 81 MMHG | SYSTOLIC BLOOD PRESSURE: 144 MMHG | HEIGHT: 71 IN | HEART RATE: 64 BPM | WEIGHT: 235.6 LBS

## 2020-01-22 DIAGNOSIS — I48.0 PAROXYSMAL ATRIAL FIBRILLATION (HCC): Primary | ICD-10-CM

## 2020-01-22 PROBLEM — E78.49 OTHER HYPERLIPIDEMIA: Status: ACTIVE | Noted: 2020-01-22

## 2020-01-22 PROBLEM — E11.9 TYPE 2 DIABETES MELLITUS WITHOUT COMPLICATION, WITHOUT LONG-TERM CURRENT USE OF INSULIN (HCC): Status: ACTIVE | Noted: 2020-01-22

## 2020-01-22 PROCEDURE — 99213 OFFICE O/P EST LOW 20 MIN: CPT | Performed by: FAMILY MEDICINE

## 2020-01-22 NOTE — PROGRESS NOTES
"Subjective   Chinedu Lopez is a 59 y.o. male.     Chief Complaint   Patient presents with   • Atrial Fibrillation     sahred decision making watchmans procedure       HPI  Chief complaint: Shared decision making for watchman procedure    The patient is a 59-year-old white male who has chronic nonvalvular atrial fibrillation who comes in for shared decision making for the watchman procedure.  The patient has chronic nonvalvular atrial fibrillation.  His BHARTI VASC score is 5 (he has hypertension diabetes mellitus previous stroke peripheral vascular disease) and is therefore a candidate for anticoagulation.  Patient however has a blood score of 5 (he has hypertension has had a previous stroke has history of bleeding and has history of alcohol use) which is a high risk for recurrent bleeds.  The patient actually had a cerebral hemorrhage in October 2019 from which she is really covering.    The patient and his wife for educated about the watchman procedure and the advantages of being able to come off of anticoagulation.  They wish to proceed with the watchman procedure.  Patient states that his neurologist has stated that it is safe to proceed with the procedure at this time.    The following portions of the patient's history were reviewed and updated as appropriate: allergies, current medications, past family history, past medical history, past social history, past surgical history and problem list.    Review of Systems    Objective     /81 (BP Location: Left arm, Patient Position: Sitting, Cuff Size: Large Adult)   Pulse 64   Temp 98.7 °F (37.1 °C) (Oral)   Resp 20   Ht 180.3 cm (71\")   Wt 107 kg (235 lb 9.6 oz)   SpO2 96%   BMI 32.86 kg/m²     Physical Exam   Constitutional: He is oriented to person, place, and time.   HENT:   Head: Normocephalic and atraumatic.   Eyes: Pupils are equal, round, and reactive to light. Conjunctivae and EOM are normal.   Neck: Normal range of motion. Neck supple. "   Cardiovascular: Normal rate, normal heart sounds and intact distal pulses. An irregularly irregular rhythm present.   Pulmonary/Chest: Effort normal and breath sounds normal.   Abdominal: Soft. Bowel sounds are normal.   Musculoskeletal: Normal range of motion.   Neurological: He is alert and oriented to person, place, and time.   Skin: Skin is warm and dry.   Psychiatric: He has a normal mood and affect. His behavior is normal.   Nursing note and vitals reviewed.        Assessment/Plan   Chinedu was seen today for atrial fibrillation.    Diagnoses and all orders for this visit:    Paroxysmal atrial fibrillation (CMS/HCC)      Patient Instructions   Continue your current medications and treatment.    Follow-up with your cardiologist to proceed with the watchman procedure.      Edu Carrillo Jr., MD    01/22/20

## 2020-01-22 NOTE — PATIENT INSTRUCTIONS
Continue your current medications and treatment.    Follow-up with your cardiologist to proceed with the watchman procedure.

## 2020-01-24 ENCOUNTER — PREP FOR SURGERY (OUTPATIENT)
Dept: OTHER | Facility: HOSPITAL | Age: 60
End: 2020-01-24

## 2020-01-24 ENCOUNTER — OFFICE VISIT (OUTPATIENT)
Dept: CARDIOLOGY | Facility: CLINIC | Age: 60
End: 2020-01-24

## 2020-01-24 VITALS
SYSTOLIC BLOOD PRESSURE: 141 MMHG | HEART RATE: 83 BPM | BODY MASS INDEX: 30.13 KG/M2 | OXYGEN SATURATION: 98 % | DIASTOLIC BLOOD PRESSURE: 77 MMHG | WEIGHT: 216 LBS

## 2020-01-24 DIAGNOSIS — I10 ESSENTIAL HYPERTENSION: ICD-10-CM

## 2020-01-24 DIAGNOSIS — I48.19 PERSISTENT ATRIAL FIBRILLATION (HCC): ICD-10-CM

## 2020-01-24 DIAGNOSIS — I62.9 INTRACRANIAL BLEED (HCC): Primary | ICD-10-CM

## 2020-01-24 DIAGNOSIS — I63.9 CEREBROVASCULAR ACCIDENT (CVA), UNSPECIFIED MECHANISM (HCC): ICD-10-CM

## 2020-01-24 PROCEDURE — 99214 OFFICE O/P EST MOD 30 MIN: CPT | Performed by: INTERNAL MEDICINE

## 2020-01-24 RX ORDER — SODIUM CHLORIDE 0.9 % (FLUSH) 0.9 %
3 SYRINGE (ML) INJECTION EVERY 12 HOURS SCHEDULED
Status: CANCELLED | OUTPATIENT
Start: 2020-01-24

## 2020-01-24 RX ORDER — SODIUM CHLORIDE 9 MG/ML
80 INJECTION, SOLUTION INTRAVENOUS CONTINUOUS
Status: CANCELLED | OUTPATIENT
Start: 2020-01-24

## 2020-01-24 RX ORDER — SODIUM CHLORIDE 0.9 % (FLUSH) 0.9 %
10 SYRINGE (ML) INJECTION AS NEEDED
Status: CANCELLED | OUTPATIENT
Start: 2020-01-24

## 2020-01-24 NOTE — PROGRESS NOTES
CC--stroke with intracranial bleed    Sub--  59-year-old unfortunate male patient on antiplatelet agents and anticoagulation had stroke with intracranial bleed several weeks ago and was admitted to [facility] and conservative management was done and subsequently was discharged after prolonged hospitalization and left on aspirin--he was evaluated by neurosurgery and restarted back on anticoagulation and underwent WILD to evaluate for watchman device implantation and comes in for evaluation prior to watchman implantation  He has progressively improved with his cognition and main complaint is right lower extremity weakness and denies any chest pain or dyspnea  Patient has known history of ischemic heart disease with paroxysmal atrial fibrillation, dyslipidemia and hypertension with history of tobacco abuse  He has prior history of bypass surgery with subsequent PCI and stenting and most recent cardiac catheterization was in 2016 left to medical management        Past Medical History:   Diagnosis Date   • Atrial fibrillation (CMS/[facility])     June, [facility]. Abstracted from [facility].   • Brain bleed (CMS/[facility]) 2019 October 14th Count includes the [facility] - Hospital on 17th for 3 weeks   • CAD (coronary artery disease)     S/P CABG and PCI. Abstracted from [facility].   • Hyperlipidemia    • Hypertension    • Myocardial infarction (CMS/[facility]) 2006    Non-ST elevation MI. Abstracted from [facility].   • Obstructive sleep apnea on CPAP     At . Abstracted from [facility].   • Polycythemia vera (CMS/[facility])    • TIA (transient ischemic attack) 11/19/2013    Possible TIA- MRI shows small tumor/head. Abstracted from [facility].     Past Surgical History:   Procedure Laterality Date   • ANGIOPLASTY  2006    3 srents were sequentially placed in promimal and mid body of the saphennous vein graft to obtuse marginal branch. Abstracted from [facility].   • CARDIAC CATHETERIZATION  2000 2003, 2006, 2012. Abstracted from [facility].   • CORONARY  ARTERY BYPASS GRAFT  2000    Triple. Abstracted from [facility].   • MUSCLE REPAIR      Tendons and nerves in right lower extremity. Abstracted from [facility].   • OTHER SURGICAL HISTORY  08/2012    RCA mid and proximal- Xience stent x3. Abstracted from [facility].   • OTHER SURGICAL HISTORY  09/2016    Head trauma/bleed at [facility]. Abstracted from [facility].     Family History   Problem Relation Age of Onset   • Diabetes Mother    • Heart attack Father    • Diabetes Father    • Other Sister         MRSA   • Heart disease Other         Positive for Ischemic   • Cancer Other    • Hypertension Other      Social History     Tobacco Use   • Smoking status: Current Every Day Smoker     Packs/day: 0.25   • Smokeless tobacco: Never Used   Substance Use Topics   • Alcohol use: Not Currently     Frequency: 4 or more times a week   • Drug use: No       (Not in a hospital admission)  Allergies:  Patient has no known allergies.    Review of Systems   General:  positive for fatigue and tiredness  Eyes: No redness  Cardiovascular: No chest pain, no palpitations  Respiratory: Denies any shortness of breath  Gastrointestinal: No nausea or vomiting, bleeding  Genitourinary: no hematuria or dysuria  Musculoskeletal: No arthralgia or myalgia  Skin: No rash  Neurologic: No numbness, tingling, syncope  Hematologic/Lymphatic: No abnormal bleeding      Physical Exam  VITALS REVIEWED--heart rate of 83 patient is afebrile respiration 12 times a minute blood pressure 140/77    General:      well developed, well nourished, in no acute distress.    Head:      normocephalic and atraumatic.    Eyes:      PERRL/EOM intact, conjunctiva and sclera clear with out nystagmus.    Neck:      no masses, thyromegaly, trachea central with normal respiratory effort  Lungs:      clear bilaterally to auscultation.    Heart:      non-displaced PMI, chest non-tender; regular rate and rhythm, S1, S2 without murmurs, rubs, or gallops  Skin:      intact without  lesions or rashes.    Psych:      alert and cooperative; normal mood and affect; normal attention span and concentration.      Extremities with trace ankle edema without any cyanosis or clubbing    Muscular skeletal patient walks with a normal gait  with mild kyphosis    Neurological no focal deficits and alert oriented x3    Abdomen soft nontender no hepatomegaly       chads vasc score--- diabetes ,hypertension, vascular disease, stroke--5  HASBLED--4    Assessment plan    Unfortunate male patient with high risk for stroke with intracranial bleed needing prolonged hospitalization recently  Coronary artery disease stable  Hypertension controlled  Diabetes and hyperlipidemia  Patient scheduled for watchman device implantation after  discussing with neurosurgery team and [facility]  Risks and benefits and outcomes educated to the patient and family and scheduled as an outpatient and orders placed    Electronically signed by Angel Willams MD, 01/24/20, 3:14 PM.

## 2020-01-29 ENCOUNTER — LAB (OUTPATIENT)
Dept: LAB | Facility: HOSPITAL | Age: 60
End: 2020-01-29

## 2020-01-29 ENCOUNTER — ANESTHESIA EVENT (OUTPATIENT)
Dept: CARDIOLOGY | Facility: HOSPITAL | Age: 60
End: 2020-01-29

## 2020-01-29 DIAGNOSIS — I48.19 PERSISTENT ATRIAL FIBRILLATION (HCC): ICD-10-CM

## 2020-01-29 DIAGNOSIS — I62.9 INTRACRANIAL BLEED (HCC): ICD-10-CM

## 2020-01-29 LAB
ALBUMIN SERPL-MCNC: 4.6 G/DL (ref 3.5–5.2)
ALBUMIN/GLOB SERPL: 1.6 G/DL
ALP SERPL-CCNC: 34 U/L (ref 39–117)
ALT SERPL W P-5'-P-CCNC: 17 U/L (ref 1–41)
ANION GAP SERPL CALCULATED.3IONS-SCNC: 13.4 MMOL/L (ref 5–15)
AST SERPL-CCNC: 12 U/L (ref 1–40)
BASOPHILS # BLD AUTO: 0.05 10*3/MM3 (ref 0–0.2)
BASOPHILS NFR BLD AUTO: 0.6 % (ref 0–1.5)
BILIRUB SERPL-MCNC: 0.6 MG/DL (ref 0.2–1.2)
BUN BLD-MCNC: 10 MG/DL (ref 6–20)
BUN/CREAT SERPL: 11.8 (ref 7–25)
CALCIUM SPEC-SCNC: 9.6 MG/DL (ref 8.6–10.5)
CHLORIDE SERPL-SCNC: 101 MMOL/L (ref 98–107)
CO2 SERPL-SCNC: 23.6 MMOL/L (ref 22–29)
CREAT BLD-MCNC: 0.85 MG/DL (ref 0.76–1.27)
DEPRECATED RDW RBC AUTO: 40.2 FL (ref 37–54)
EOSINOPHIL # BLD AUTO: 0.25 10*3/MM3 (ref 0–0.4)
EOSINOPHIL NFR BLD AUTO: 3.1 % (ref 0.3–6.2)
ERYTHROCYTE [DISTWIDTH] IN BLOOD BY AUTOMATED COUNT: 11.8 % (ref 12.3–15.4)
GFR SERPL CREATININE-BSD FRML MDRD: 93 ML/MIN/1.73
GLOBULIN UR ELPH-MCNC: 2.8 GM/DL
GLUCOSE BLD-MCNC: 91 MG/DL (ref 65–99)
HCT VFR BLD AUTO: 49.5 % (ref 37.5–51)
HGB BLD-MCNC: 16.7 G/DL (ref 13–17.7)
IMM GRANULOCYTES # BLD AUTO: 0.03 10*3/MM3 (ref 0–0.05)
IMM GRANULOCYTES NFR BLD AUTO: 0.4 % (ref 0–0.5)
INR PPP: 1.71 (ref 2–3)
LYMPHOCYTES # BLD AUTO: 1.66 10*3/MM3 (ref 0.7–3.1)
LYMPHOCYTES NFR BLD AUTO: 20.5 % (ref 19.6–45.3)
MAGNESIUM SERPL-MCNC: 2.1 MG/DL (ref 1.6–2.6)
MCH RBC QN AUTO: 31.2 PG (ref 26.6–33)
MCHC RBC AUTO-ENTMCNC: 33.7 G/DL (ref 31.5–35.7)
MCV RBC AUTO: 92.4 FL (ref 79–97)
MONOCYTES # BLD AUTO: 0.65 10*3/MM3 (ref 0.1–0.9)
MONOCYTES NFR BLD AUTO: 8 % (ref 5–12)
NEUTROPHILS # BLD AUTO: 5.45 10*3/MM3 (ref 1.7–7)
NEUTROPHILS NFR BLD AUTO: 67.4 % (ref 42.7–76)
NRBC BLD AUTO-RTO: 0 /100 WBC (ref 0–0.2)
PLATELET # BLD AUTO: 170 10*3/MM3 (ref 140–450)
PMV BLD AUTO: 9.8 FL (ref 6–12)
POTASSIUM BLD-SCNC: 4.6 MMOL/L (ref 3.5–5.2)
PROT SERPL-MCNC: 7.4 G/DL (ref 6–8.5)
PROTHROMBIN TIME: 16.6 SECONDS (ref 19.4–28.5)
RBC # BLD AUTO: 5.36 10*6/MM3 (ref 4.14–5.8)
SODIUM BLD-SCNC: 138 MMOL/L (ref 136–145)
WBC NRBC COR # BLD: 8.09 10*3/MM3 (ref 3.4–10.8)

## 2020-01-29 PROCEDURE — 85610 PROTHROMBIN TIME: CPT

## 2020-01-29 PROCEDURE — 85025 COMPLETE CBC W/AUTO DIFF WBC: CPT

## 2020-01-29 PROCEDURE — 80053 COMPREHEN METABOLIC PANEL: CPT

## 2020-01-29 PROCEDURE — 36415 COLL VENOUS BLD VENIPUNCTURE: CPT

## 2020-01-29 PROCEDURE — 83735 ASSAY OF MAGNESIUM: CPT

## 2020-01-29 NOTE — ANESTHESIA PREPROCEDURE EVALUATION
Anesthesia Evaluation     Patient summary reviewed and Nursing notes reviewed   NPO Solid Status: > 8 hours  NPO Liquid Status: > 8 hours           Airway   Mallampati: II  TM distance: >3 FB  Neck ROM: full  No difficulty expected  Dental - normal exam   (+) poor dentition and partials    Pulmonary - normal exam   (+) a smoker Current Smoked day of surgery, sleep apnea,   Cardiovascular - normal exam    ECG reviewed    (+) hypertension, past MI , CAD, CABG >6 Months, dysrhythmias Paroxysmal Atrial Fib, hyperlipidemia,       Neuro/Psych  (+) TIA, CVA,     GI/Hepatic/Renal/Endo    (+)   diabetes mellitus,     Musculoskeletal     Abdominal  - normal exam    Bowel sounds: normal.   Substance History      OB/GYN          Other   blood dyscrasia,   history of cancer                  Anesthesia Plan    ASA 4     general     intravenous induction     Anesthetic plan, all risks, benefits, and alternatives have been provided, discussed and informed consent has been obtained with: patient.

## 2020-01-30 ENCOUNTER — HOSPITAL ENCOUNTER (OUTPATIENT)
Dept: CARDIOLOGY | Facility: HOSPITAL | Age: 60
Discharge: HOME | End: 2020-01-30

## 2020-01-30 ENCOUNTER — ANESTHESIA (OUTPATIENT)
Dept: CARDIOLOGY | Facility: HOSPITAL | Age: 60
End: 2020-01-30

## 2020-01-30 ENCOUNTER — HOSPITAL ENCOUNTER (INPATIENT)
Facility: HOSPITAL | Age: 60
LOS: 1 days | Discharge: HOME | End: 2020-01-31
Attending: INTERNAL MEDICINE | Admitting: INTERNAL MEDICINE

## 2020-01-30 DIAGNOSIS — I48.0 PAROXYSMAL ATRIAL FIBRILLATION (HCC): ICD-10-CM

## 2020-01-30 DIAGNOSIS — I48.19 PERSISTENT ATRIAL FIBRILLATION (HCC): ICD-10-CM

## 2020-01-30 DIAGNOSIS — I62.9 INTRACRANIAL BLEED (HCC): ICD-10-CM

## 2020-01-30 LAB
ACT BLD: 142 SECONDS (ref 89–137)
ACT BLD: 279 SECONDS (ref 89–137)
GLUCOSE BLDC GLUCOMTR-MCNC: 103 MG/DL (ref 70–105)
GLUCOSE BLDC GLUCOMTR-MCNC: 119 MG/DL (ref 70–105)
GLUCOSE BLDC GLUCOMTR-MCNC: 131 MG/DL (ref 70–105)
GLUCOSE BLDC GLUCOMTR-MCNC: 252 MG/DL (ref 70–105)
GLUCOSE BLDC GLUCOMTR-MCNC: 262 MG/DL (ref 70–105)

## 2020-01-30 PROCEDURE — 25010000002 MIDAZOLAM PER 1 MG: Performed by: ANESTHESIOLOGY

## 2020-01-30 PROCEDURE — 25010000002 PROTAMINE SULFATE PER 10 MG: Performed by: ANESTHESIOLOGY

## 2020-01-30 PROCEDURE — C1769 GUIDE WIRE: HCPCS | Performed by: INTERNAL MEDICINE

## 2020-01-30 PROCEDURE — 25010000002 DEXAMETHASONE PER 1 MG: Performed by: ANESTHESIOLOGY

## 2020-01-30 PROCEDURE — C1894 INTRO/SHEATH, NON-LASER: HCPCS | Performed by: INTERNAL MEDICINE

## 2020-01-30 PROCEDURE — 93355 ECHO TRANSESOPHAGEAL (TEE): CPT | Performed by: INTERNAL MEDICINE

## 2020-01-30 PROCEDURE — 25010000002 ONDANSETRON PER 1 MG: Performed by: ANESTHESIOLOGY

## 2020-01-30 PROCEDURE — C1893 INTRO/SHEATH, FIXED,NON-PEEL: HCPCS | Performed by: INTERNAL MEDICINE

## 2020-01-30 PROCEDURE — 93325 DOPPLER ECHO COLOR FLOW MAPG: CPT

## 2020-01-30 PROCEDURE — 93355 ECHO TRANSESOPHAGEAL (TEE): CPT

## 2020-01-30 PROCEDURE — 25010000002 PROPOFOL 10 MG/ML EMULSION: Performed by: ANESTHESIOLOGY

## 2020-01-30 PROCEDURE — 33340 PERQ CLSR TCAT L ATR APNDGE: CPT | Performed by: INTERNAL MEDICINE

## 2020-01-30 PROCEDURE — 4A023N7 MEASUREMENT OF CARDIAC SAMPLING AND PRESSURE, LEFT HEART, PERCUTANEOUS APPROACH: ICD-10-PCS | Performed by: INTERNAL MEDICINE

## 2020-01-30 PROCEDURE — 25010000002 HEPARIN (PORCINE) PER 1000 UNITS: Performed by: ANESTHESIOLOGY

## 2020-01-30 PROCEDURE — 82962 GLUCOSE BLOOD TEST: CPT

## 2020-01-30 PROCEDURE — 0 IOPAMIDOL PER 1 ML: Performed by: INTERNAL MEDICINE

## 2020-01-30 PROCEDURE — B245ZZ4 ULTRASONOGRAPHY OF LEFT HEART, TRANSESOPHAGEAL: ICD-10-PCS | Performed by: INTERNAL MEDICINE

## 2020-01-30 PROCEDURE — 93320 DOPPLER ECHO COMPLETE: CPT

## 2020-01-30 PROCEDURE — 25010000002 FENTANYL CITRATE (PF) 100 MCG/2ML SOLUTION: Performed by: ANESTHESIOLOGY

## 2020-01-30 PROCEDURE — 85347 COAGULATION TIME ACTIVATED: CPT

## 2020-01-30 PROCEDURE — 02L73DK OCCLUSION OF LEFT ATRIAL APPENDAGE WITH INTRALUMINAL DEVICE, PERCUTANEOUS APPROACH: ICD-10-PCS | Performed by: INTERNAL MEDICINE

## 2020-01-30 DEVICE — CAP DEV PROC WATCHMAN LAA: Type: IMPLANTABLE DEVICE | Status: FUNCTIONAL

## 2020-01-30 DEVICE — LEFT ATRIAL APPENDAGE CLOSURE DEVICE WITH DELIVERY SYSTEM
Type: IMPLANTABLE DEVICE | Status: FUNCTIONAL
Brand: WATCHMAN®

## 2020-01-30 RX ORDER — GLYCOPYRROLATE 1 MG/5 ML
SYRINGE (ML) INTRAVENOUS AS NEEDED
Status: DISCONTINUED | OUTPATIENT
Start: 2020-01-30 | End: 2020-01-30 | Stop reason: SURG

## 2020-01-30 RX ORDER — QUETIAPINE FUMARATE 25 MG/1
25 TABLET, FILM COATED ORAL NIGHTLY
Status: DISCONTINUED | OUTPATIENT
Start: 2020-01-30 | End: 2020-01-31 | Stop reason: HOSPADM

## 2020-01-30 RX ORDER — AMLODIPINE BESYLATE 5 MG/1
5 TABLET ORAL
Status: DISCONTINUED | OUTPATIENT
Start: 2020-01-31 | End: 2020-01-31 | Stop reason: HOSPADM

## 2020-01-30 RX ORDER — ASPIRIN 81 MG/1
81 TABLET ORAL DAILY
Status: DISCONTINUED | OUTPATIENT
Start: 2020-01-31 | End: 2020-01-31 | Stop reason: HOSPADM

## 2020-01-30 RX ORDER — HYDROCODONE BITARTRATE AND ACETAMINOPHEN 5; 325 MG/1; MG/1
1 TABLET ORAL EVERY 4 HOURS PRN
Status: DISCONTINUED | OUTPATIENT
Start: 2020-01-30 | End: 2020-01-31 | Stop reason: HOSPADM

## 2020-01-30 RX ORDER — NEOSTIGMINE METHYLSULFATE 5 MG/5 ML
SYRINGE (ML) INTRAVENOUS AS NEEDED
Status: DISCONTINUED | OUTPATIENT
Start: 2020-01-30 | End: 2020-01-30 | Stop reason: SURG

## 2020-01-30 RX ORDER — GUAIFENESIN 600 MG/1
600 TABLET, EXTENDED RELEASE ORAL DAILY
Status: DISCONTINUED | OUTPATIENT
Start: 2020-01-31 | End: 2020-01-31 | Stop reason: HOSPADM

## 2020-01-30 RX ORDER — FOLIC ACID 1 MG/1
1 TABLET ORAL DAILY
Status: DISCONTINUED | OUTPATIENT
Start: 2020-01-31 | End: 2020-01-31 | Stop reason: HOSPADM

## 2020-01-30 RX ORDER — MORPHINE SULFATE 4 MG/ML
2 INJECTION, SOLUTION INTRAMUSCULAR; INTRAVENOUS
Status: DISCONTINUED | OUTPATIENT
Start: 2020-01-30 | End: 2020-01-30 | Stop reason: HOSPADM

## 2020-01-30 RX ORDER — SODIUM CHLORIDE 0.9 % (FLUSH) 0.9 %
3 SYRINGE (ML) INJECTION EVERY 12 HOURS SCHEDULED
Status: DISCONTINUED | OUTPATIENT
Start: 2020-01-30 | End: 2020-01-31 | Stop reason: HOSPADM

## 2020-01-30 RX ORDER — SODIUM CHLORIDE 0.9 % (FLUSH) 0.9 %
10 SYRINGE (ML) INJECTION AS NEEDED
Status: DISCONTINUED | OUTPATIENT
Start: 2020-01-30 | End: 2020-01-30 | Stop reason: HOSPADM

## 2020-01-30 RX ORDER — EPHEDRINE SULFATE/0.9% NACL/PF 25 MG/5 ML
SYRINGE (ML) INTRAVENOUS AS NEEDED
Status: DISCONTINUED | OUTPATIENT
Start: 2020-01-30 | End: 2020-01-30 | Stop reason: SURG

## 2020-01-30 RX ORDER — NALOXONE HCL 0.4 MG/ML
0.4 VIAL (ML) INJECTION
Status: DISCONTINUED | OUTPATIENT
Start: 2020-01-30 | End: 2020-01-31 | Stop reason: HOSPADM

## 2020-01-30 RX ORDER — MORPHINE SULFATE 4 MG/ML
1 INJECTION, SOLUTION INTRAMUSCULAR; INTRAVENOUS EVERY 4 HOURS PRN
Status: ACTIVE | OUTPATIENT
Start: 2020-01-30 | End: 2020-01-31

## 2020-01-30 RX ORDER — ONDANSETRON 2 MG/ML
4 INJECTION INTRAMUSCULAR; INTRAVENOUS EVERY 6 HOURS PRN
Status: DISCONTINUED | OUTPATIENT
Start: 2020-01-30 | End: 2020-01-31 | Stop reason: HOSPADM

## 2020-01-30 RX ORDER — MIDAZOLAM HYDROCHLORIDE 1 MG/ML
INJECTION INTRAMUSCULAR; INTRAVENOUS AS NEEDED
Status: DISCONTINUED | OUTPATIENT
Start: 2020-01-30 | End: 2020-01-30 | Stop reason: SURG

## 2020-01-30 RX ORDER — PROPOFOL 10 MG/ML
VIAL (ML) INTRAVENOUS AS NEEDED
Status: DISCONTINUED | OUTPATIENT
Start: 2020-01-30 | End: 2020-01-30 | Stop reason: SURG

## 2020-01-30 RX ORDER — RANOLAZINE 500 MG/1
500 TABLET, EXTENDED RELEASE ORAL 2 TIMES DAILY
Status: DISCONTINUED | OUTPATIENT
Start: 2020-01-30 | End: 2020-01-31 | Stop reason: HOSPADM

## 2020-01-30 RX ORDER — PROTAMINE SULFATE 10 MG/ML
INJECTION, SOLUTION INTRAVENOUS AS NEEDED
Status: DISCONTINUED | OUTPATIENT
Start: 2020-01-30 | End: 2020-01-30 | Stop reason: SURG

## 2020-01-30 RX ORDER — LIDOCAINE HYDROCHLORIDE 20 MG/ML
INJECTION, SOLUTION INFILTRATION; PERINEURAL AS NEEDED
Status: DISCONTINUED | OUTPATIENT
Start: 2020-01-30 | End: 2020-01-30 | Stop reason: HOSPADM

## 2020-01-30 RX ORDER — HEPARIN SODIUM 1000 [USP'U]/ML
INJECTION, SOLUTION INTRAVENOUS; SUBCUTANEOUS AS NEEDED
Status: DISCONTINUED | OUTPATIENT
Start: 2020-01-30 | End: 2020-01-30 | Stop reason: SURG

## 2020-01-30 RX ORDER — DEXAMETHASONE SODIUM PHOSPHATE 4 MG/ML
INJECTION, SOLUTION INTRA-ARTICULAR; INTRALESIONAL; INTRAMUSCULAR; INTRAVENOUS; SOFT TISSUE AS NEEDED
Status: DISCONTINUED | OUTPATIENT
Start: 2020-01-30 | End: 2020-01-30 | Stop reason: SURG

## 2020-01-30 RX ORDER — ACETAMINOPHEN 325 MG/1
650 TABLET ORAL EVERY 4 HOURS PRN
Status: DISCONTINUED | OUTPATIENT
Start: 2020-01-30 | End: 2020-01-31 | Stop reason: HOSPADM

## 2020-01-30 RX ORDER — MEPERIDINE HYDROCHLORIDE 25 MG/ML
12.5 INJECTION INTRAMUSCULAR; INTRAVENOUS; SUBCUTANEOUS
Status: DISCONTINUED | OUTPATIENT
Start: 2020-01-30 | End: 2020-01-30 | Stop reason: HOSPADM

## 2020-01-30 RX ORDER — VALPROIC ACID 250 MG/1
250 CAPSULE, LIQUID FILLED ORAL 2 TIMES DAILY
Status: DISCONTINUED | OUTPATIENT
Start: 2020-01-30 | End: 2020-01-31 | Stop reason: HOSPADM

## 2020-01-30 RX ORDER — ONDANSETRON 2 MG/ML
INJECTION INTRAMUSCULAR; INTRAVENOUS AS NEEDED
Status: DISCONTINUED | OUTPATIENT
Start: 2020-01-30 | End: 2020-01-30 | Stop reason: SURG

## 2020-01-30 RX ORDER — SODIUM CHLORIDE 9 MG/ML
30 INJECTION, SOLUTION INTRAVENOUS CONTINUOUS
Status: DISCONTINUED | OUTPATIENT
Start: 2020-01-30 | End: 2020-01-31 | Stop reason: HOSPADM

## 2020-01-30 RX ORDER — WARFARIN SODIUM 5 MG/1
5 TABLET ORAL
Status: DISCONTINUED | OUTPATIENT
Start: 2020-01-30 | End: 2020-01-31 | Stop reason: HOSPADM

## 2020-01-30 RX ORDER — ISOSORBIDE MONONITRATE 30 MG/1
30 TABLET, EXTENDED RELEASE ORAL DAILY
Status: DISCONTINUED | OUTPATIENT
Start: 2020-01-30 | End: 2020-01-31 | Stop reason: HOSPADM

## 2020-01-30 RX ORDER — LIDOCAINE HYDROCHLORIDE 10 MG/ML
INJECTION, SOLUTION EPIDURAL; INFILTRATION; INTRACAUDAL; PERINEURAL AS NEEDED
Status: DISCONTINUED | OUTPATIENT
Start: 2020-01-30 | End: 2020-01-30 | Stop reason: SURG

## 2020-01-30 RX ORDER — POTASSIUM CHLORIDE 20 MEQ/1
20 TABLET, EXTENDED RELEASE ORAL 2 TIMES DAILY WITH MEALS
Status: DISCONTINUED | OUTPATIENT
Start: 2020-01-30 | End: 2020-01-31 | Stop reason: HOSPADM

## 2020-01-30 RX ORDER — SODIUM CHLORIDE 0.9 % (FLUSH) 0.9 %
10 SYRINGE (ML) INJECTION AS NEEDED
Status: DISCONTINUED | OUTPATIENT
Start: 2020-01-30 | End: 2020-01-31 | Stop reason: HOSPADM

## 2020-01-30 RX ORDER — ACETAMINOPHEN 650 MG/1
650 SUPPOSITORY RECTAL EVERY 4 HOURS PRN
Status: DISCONTINUED | OUTPATIENT
Start: 2020-01-30 | End: 2020-01-31 | Stop reason: HOSPADM

## 2020-01-30 RX ORDER — ROSUVASTATIN CALCIUM 10 MG/1
40 TABLET, COATED ORAL NIGHTLY
Status: DISCONTINUED | OUTPATIENT
Start: 2020-01-30 | End: 2020-01-31 | Stop reason: HOSPADM

## 2020-01-30 RX ORDER — ROCURONIUM BROMIDE 10 MG/ML
INJECTION, SOLUTION INTRAVENOUS AS NEEDED
Status: DISCONTINUED | OUTPATIENT
Start: 2020-01-30 | End: 2020-01-30 | Stop reason: SURG

## 2020-01-30 RX ORDER — NEBIVOLOL 10 MG/1
20 TABLET ORAL DAILY
Status: DISCONTINUED | OUTPATIENT
Start: 2020-01-30 | End: 2020-01-31 | Stop reason: HOSPADM

## 2020-01-30 RX ORDER — HYDROCODONE BITARTRATE AND ACETAMINOPHEN 7.5; 325 MG/1; MG/1
1 TABLET ORAL ONCE AS NEEDED
Status: DISCONTINUED | OUTPATIENT
Start: 2020-01-30 | End: 2020-01-30 | Stop reason: HOSPADM

## 2020-01-30 RX ORDER — SODIUM CHLORIDE 0.9 % (FLUSH) 0.9 %
3 SYRINGE (ML) INJECTION EVERY 12 HOURS SCHEDULED
Status: DISCONTINUED | OUTPATIENT
Start: 2020-01-30 | End: 2020-01-30 | Stop reason: HOSPADM

## 2020-01-30 RX ORDER — THIAMINE HCL 100 MG
100 TABLET ORAL DAILY
Status: DISCONTINUED | OUTPATIENT
Start: 2020-01-31 | End: 2020-01-31 | Stop reason: HOSPADM

## 2020-01-30 RX ORDER — AMANTADINE HYDROCHLORIDE 100 MG/1
100 CAPSULE, GELATIN COATED ORAL DAILY
Status: DISCONTINUED | OUTPATIENT
Start: 2020-01-31 | End: 2020-01-31 | Stop reason: HOSPADM

## 2020-01-30 RX ORDER — FENTANYL CITRATE 50 UG/ML
INJECTION, SOLUTION INTRAMUSCULAR; INTRAVENOUS AS NEEDED
Status: DISCONTINUED | OUTPATIENT
Start: 2020-01-30 | End: 2020-01-30 | Stop reason: SURG

## 2020-01-30 RX ORDER — ONDANSETRON 2 MG/ML
4 INJECTION INTRAMUSCULAR; INTRAVENOUS ONCE AS NEEDED
Status: DISCONTINUED | OUTPATIENT
Start: 2020-01-30 | End: 2020-01-30 | Stop reason: HOSPADM

## 2020-01-30 RX ORDER — GUAIFENESIN 600 MG/1
600 TABLET, EXTENDED RELEASE ORAL DAILY
COMMUNITY

## 2020-01-30 RX ORDER — PHENYLEPHRINE HCL IN 0.9% NACL 0.5 MG/5ML
SYRINGE (ML) INTRAVENOUS AS NEEDED
Status: DISCONTINUED | OUTPATIENT
Start: 2020-01-30 | End: 2020-01-30 | Stop reason: SURG

## 2020-01-30 RX ADMIN — Medication 4 MG: at 09:05

## 2020-01-30 RX ADMIN — PHENYLEPHRINE HYDROCHLORIDE 150 MCG: 10 INJECTION INTRAVENOUS at 08:48

## 2020-01-30 RX ADMIN — SODIUM CHLORIDE: 900 INJECTION, SOLUTION INTRAVENOUS at 09:06

## 2020-01-30 RX ADMIN — Medication 10 MG: at 08:58

## 2020-01-30 RX ADMIN — QUETIAPINE 25 MG: 25 TABLET, FILM COATED ORAL at 20:42

## 2020-01-30 RX ADMIN — Medication 5 MG: at 09:01

## 2020-01-30 RX ADMIN — POTASSIUM CHLORIDE 20 MEQ: 1500 TABLET, EXTENDED RELEASE ORAL at 17:38

## 2020-01-30 RX ADMIN — VALPROIC ACID 250 MG: 250 CAPSULE, LIQUID FILLED ORAL at 20:42

## 2020-01-30 RX ADMIN — Medication 0.4 MG: at 09:05

## 2020-01-30 RX ADMIN — LIDOCAINE HYDROCHLORIDE 40 MG: 10 INJECTION, SOLUTION EPIDURAL; INFILTRATION; INTRACAUDAL; PERINEURAL at 08:28

## 2020-01-30 RX ADMIN — DEXAMETHASONE SODIUM PHOSPHATE 8 MG: 4 INJECTION, SOLUTION INTRAMUSCULAR; INTRAVENOUS at 08:28

## 2020-01-30 RX ADMIN — PROTAMINE SULFATE 70 MG: 10 INJECTION, SOLUTION INTRAVENOUS at 09:02

## 2020-01-30 RX ADMIN — ONDANSETRON 4 MG: 2 INJECTION INTRAMUSCULAR; INTRAVENOUS at 09:05

## 2020-01-30 RX ADMIN — ROCURONIUM BROMIDE 50 MG: 10 INJECTION, SOLUTION INTRAVENOUS at 08:28

## 2020-01-30 RX ADMIN — PHENYLEPHRINE HYDROCHLORIDE 100 MCG: 10 INJECTION INTRAVENOUS at 08:43

## 2020-01-30 RX ADMIN — Medication 3 ML: at 20:41

## 2020-01-30 RX ADMIN — WARFARIN SODIUM 5 MG: 5 TABLET ORAL at 17:38

## 2020-01-30 RX ADMIN — MIDAZOLAM 2 MG: 1 INJECTION INTRAMUSCULAR; INTRAVENOUS at 08:26

## 2020-01-30 RX ADMIN — FENTANYL CITRATE 100 MCG: 50 INJECTION, SOLUTION INTRAMUSCULAR; INTRAVENOUS at 08:28

## 2020-01-30 RX ADMIN — PROPOFOL 180 MG: 10 INJECTION, EMULSION INTRAVENOUS at 08:28

## 2020-01-30 RX ADMIN — PHENYLEPHRINE HYDROCHLORIDE 100 MCG: 10 INJECTION INTRAVENOUS at 08:58

## 2020-01-30 RX ADMIN — ROSUVASTATIN CALCIUM 40 MG: 10 TABLET, FILM COATED ORAL at 20:42

## 2020-01-30 RX ADMIN — HEPARIN SODIUM 5000 UNITS: 1000 INJECTION, SOLUTION INTRAVENOUS; SUBCUTANEOUS at 08:48

## 2020-01-30 RX ADMIN — RANOLAZINE 500 MG: 500 TABLET, FILM COATED, EXTENDED RELEASE ORAL at 20:49

## 2020-01-30 RX ADMIN — SODIUM CHLORIDE 30 ML/HR: 900 INJECTION, SOLUTION INTRAVENOUS at 06:43

## 2020-01-30 RX ADMIN — HEPARIN SODIUM 3000 UNITS: 1000 INJECTION, SOLUTION INTRAVENOUS; SUBCUTANEOUS at 08:50

## 2020-01-30 NOTE — ANESTHESIA PROCEDURE NOTES
Airway  Urgency: elective    Date/Time: 1/30/2020 8:34 AM  Airway not difficult    General Information and Staff    Patient location during procedure: OR  Anesthesiologist: Ambrosio Bellamy MD    Indications and Patient Condition  Indications for airway management: airway protection    Preoxygenated: yes  MILS not maintained throughout  Mask difficulty assessment: 1 - vent by mask    Final Airway Details  Final airway type: endotracheal airway      Successful airway: ETT  Cuffed: yes   Successful intubation technique: direct laryngoscopy  Endotracheal tube insertion site: oral  Blade: Viviana  Blade size: 4  ETT size (mm): 7.0  Cormack-Lehane Classification: grade I - full view of glottis  Placement verified by: capnometry and palpation of cuff   Measured from: lips  ETT/EBT  to lips (cm): 21  Number of attempts at approach: 1  Assessment: lips, teeth, and gum same as pre-op and atraumatic intubation    Additional Comments  ASA monitors applied; preoxygenated with 100% FiO2 via anesthesia face mask; induction of general anesthesia; bag-mask ventilation; patient's position optimized; laryngoscopy; cuffed ETT placed; cuff inflated to seal; atraumatic/dentition in preoperative condition; ETT secured in place; correct placement confirmed by bilateral chest rise, tube condensation, and return of EtCO2 > 30 mmHg x3

## 2020-01-30 NOTE — ANESTHESIA POSTPROCEDURE EVALUATION
Patient: Chinedu Lopez    Procedure Summary     Date:  01/30/20 Room / Location:  [place] CATH LAB 3 / [facility] CATH INVASIVE LOCATION    Anesthesia Start:  0819 Anesthesia Stop:  0930    Procedures:       Atrial Appendage Occlusion (N/A )      Atrial Appendage Occlusion (N/A ) Diagnosis:       Paroxysmal atrial fibrillation (CMS/HCC)      Bleeding, intracranial (CMS/HCC)      (Post watchman device implantation without complications)    Provider:  Angel Willams MD; John Sheridan MD Provider:  Ambrosio Bellamy MD    Anesthesia Type:  general ASA Status:  4          Anesthesia Type: general    Vitals  Vitals Value Taken Time   /59 1/30/2020  9:47 AM   Temp     Pulse 57 1/30/2020  9:50 AM   Resp     SpO2 95 % 1/30/2020  9:50 AM   Vitals shown include unvalidated device data.        Post Anesthesia Care and Evaluation    Patient location during evaluation: PACU  Patient participation: complete - patient participated  Level of consciousness: awake  Pain score: 1 (See nurse's notes for pain score)  Pain management: adequate  Airway patency: patent  Anesthetic complications: No anesthetic complications  PONV Status: none  Cardiovascular status: acceptable  Respiratory status: acceptable  Hydration status: acceptable    Comments: Patient seen and examined postoperatively; vital signs stable; SpO2 greater than or equal to 90%; cardiopulmonary status stable; nausea/vomiting adequately controlled; pain adequately controlled; no apparent anesthesia complications; patient discharged from anesthesia care when discharge criteria were met

## 2020-01-31 VITALS
SYSTOLIC BLOOD PRESSURE: 128 MMHG | DIASTOLIC BLOOD PRESSURE: 70 MMHG | HEART RATE: 73 BPM | WEIGHT: 211.42 LBS | HEIGHT: 71 IN | OXYGEN SATURATION: 99 % | BODY MASS INDEX: 29.6 KG/M2 | RESPIRATION RATE: 16 BRPM | TEMPERATURE: 97.8 F

## 2020-01-31 PROBLEM — Z95.818 PRESENCE OF WATCHMAN LEFT ATRIAL APPENDAGE CLOSURE DEVICE: Status: ACTIVE | Noted: 2020-01-31

## 2020-01-31 PROBLEM — I10 HYPERTENSION: Chronic | Status: ACTIVE | Noted: 2019-10-12

## 2020-01-31 PROBLEM — Z95.818 PRESENCE OF WATCHMAN LEFT ATRIAL APPENDAGE CLOSURE DEVICE: Chronic | Status: ACTIVE | Noted: 2020-01-31

## 2020-01-31 PROBLEM — I25.10 CORONARY HEART DISEASE: Chronic | Status: ACTIVE | Noted: 2019-10-12

## 2020-01-31 PROBLEM — E11.9 TYPE 2 DIABETES MELLITUS WITHOUT COMPLICATION, WITHOUT LONG-TERM CURRENT USE OF INSULIN (HCC): Chronic | Status: ACTIVE | Noted: 2020-01-22

## 2020-01-31 PROBLEM — I63.9 CEREBROVASCULAR ACCIDENT (CVA): Chronic | Status: ACTIVE | Noted: 2020-01-18

## 2020-01-31 LAB
GLUCOSE BLDC GLUCOMTR-MCNC: 157 MG/DL (ref 70–105)
INR PPP: 1.73 (ref 2–3)
PROTHROMBIN TIME: 16.8 SECONDS (ref 19.4–28.5)

## 2020-01-31 PROCEDURE — 85610 PROTHROMBIN TIME: CPT | Performed by: INTERNAL MEDICINE

## 2020-01-31 PROCEDURE — 99238 HOSP IP/OBS DSCHRG MGMT 30/<: CPT | Performed by: INTERNAL MEDICINE

## 2020-01-31 PROCEDURE — 82962 GLUCOSE BLOOD TEST: CPT

## 2020-01-31 RX ORDER — ISOSORBIDE MONONITRATE 30 MG/1
30 TABLET, EXTENDED RELEASE ORAL DAILY
Qty: 30 TABLET | Refills: 3 | Status: SHIPPED | OUTPATIENT
Start: 2020-01-31 | End: 2020-05-29 | Stop reason: SDUPTHER

## 2020-01-31 RX ADMIN — Medication 100 MG: at 09:28

## 2020-01-31 RX ADMIN — AMANTADINE HYDROCHLORIDE 100 MG: 100 CAPSULE ORAL at 09:28

## 2020-01-31 RX ADMIN — ISOSORBIDE MONONITRATE 30 MG: 30 TABLET, EXTENDED RELEASE ORAL at 09:28

## 2020-01-31 RX ADMIN — VALPROIC ACID 250 MG: 250 CAPSULE, LIQUID FILLED ORAL at 09:29

## 2020-01-31 RX ADMIN — FOLIC ACID 1 MG: 1 TABLET ORAL at 09:28

## 2020-01-31 RX ADMIN — ASPIRIN 81 MG: 81 TABLET, DELAYED RELEASE ORAL at 09:28

## 2020-01-31 RX ADMIN — GUAIFENESIN 600 MG: 600 TABLET, EXTENDED RELEASE ORAL at 09:28

## 2020-01-31 RX ADMIN — AMLODIPINE BESYLATE 5 MG: 5 TABLET ORAL at 09:28

## 2020-01-31 RX ADMIN — Medication 3 ML: at 09:29

## 2020-01-31 RX ADMIN — POTASSIUM CHLORIDE 20 MEQ: 1500 TABLET, EXTENDED RELEASE ORAL at 09:28

## 2020-01-31 RX ADMIN — RANOLAZINE 500 MG: 500 TABLET, FILM COATED, EXTENDED RELEASE ORAL at 09:28

## 2020-01-31 RX ADMIN — NEBIVOLOL HYDROCHLORIDE 20 MG: 10 TABLET ORAL at 09:28

## 2020-02-01 ENCOUNTER — READMISSION MANAGEMENT (OUTPATIENT)
Dept: CALL CENTER | Facility: HOSPITAL | Age: 60
End: 2020-02-01

## 2020-02-01 NOTE — OUTREACH NOTE
Prep Survey      Responses   Facility patient discharged from?  [facility]   Is patient eligible?  Yes   Discharge diagnosis  Atrial Appendage Occlusion  Paroxysmal atrial fibrillation    Does the patient have one of the following disease processes/diagnoses(primary or secondary)?  General Surgery   Does the patient have Home health ordered?  No   Is there a DME ordered?  No   Prep survey completed?  Yes          Zahira Jang RN

## 2020-02-03 ENCOUNTER — READMISSION MANAGEMENT (OUTPATIENT)
Dept: CALL CENTER | Facility: HOSPITAL | Age: 60
End: 2020-02-03

## 2020-02-03 NOTE — OUTREACH NOTE
General Surgery Week 1 Survey      Responses   Facility patient discharged from?  [facility]   Does the patient have one of the following disease processes/diagnoses(primary or secondary)?  General Surgery   Is there a successful TCM telephone encounter documented?  No   Week 1 attempt successful?  Yes   Call start time  1734   Call end time  1736   Discharge diagnosis  Atrial Appendage Occlusion  Paroxysmal atrial fibrillation    Is the patient having any side effects they believe may be caused by any medication additions or changes?  No   Does the patient have all medications related to this admission filled (includes all antibiotics, pain medications, etc.)  N/A   Is the patient taking all medications as directed (includes completed medication regime)?  Yes   Does the patient have a follow up appointment scheduled with their surgeon?  Yes   Has the patient kept scheduled appointments due by today?  N/A   Has home health visited the patient within 72 hours of discharge?  N/A   Did the patient receive a copy of their discharge instructions?  Yes   Nursing interventions  Reviewed instructions with patient   What is the patient's perception of their health status since discharge?  Improving   Nursing interventions  Nurse provided patient education   Is the patient /caregiver able to teach back basic post-op care?  Drive as instructed by MD in discharge instructions, Take showers only when approved by MD-sponge bathe until then, Keep incision areas clean,dry and protected, No tub bath, swimming, or hot tub until instructed by MD, Do not remove steri-strips, Lifting as instructed by MD in discharge instructions   Is the patient/caregiver able to teach back signs and symptoms of incisional infection?  Increased redness, swelling or pain at the incisonal site, Increased drainage or bleeding, Incisional warmth, Pus or odor from incision, Fever   Is the patient/caregiver able to teach back steps to recovery at home?  Set small,  achievable goals for return to baseline health, Rest and rebuild strength, gradually increase activity, Weigh daily, Practice good oral hygiene, Eat a well-balance diet, Make a list of questions for surgeon's appointment   Is the patient/caregiver able to teach back the hierarchy of who to call/visit for symptoms/problems? PCP, Specialist, Home health nurse, Urgent Care, ED, 911  Yes   Additional teach back comments  Patient states he went to therapy today and they took it easy on him.  He has appt with the nurse practioner Friday at Dr. Willams's office.  Site looking good    Week 1 call completed?  Yes   Wrap up additional comments  No questions or needs at this time          Yadira Silveira LPN

## 2020-02-05 ENCOUNTER — PREP FOR SURGERY (OUTPATIENT)
Dept: OTHER | Facility: HOSPITAL | Age: 60
End: 2020-02-05

## 2020-02-05 ENCOUNTER — OFFICE VISIT (OUTPATIENT)
Dept: CARDIOLOGY | Facility: CLINIC | Age: 60
End: 2020-02-05

## 2020-02-05 VITALS
SYSTOLIC BLOOD PRESSURE: 112 MMHG | HEART RATE: 60 BPM | BODY MASS INDEX: 30.35 KG/M2 | WEIGHT: 217.6 LBS | DIASTOLIC BLOOD PRESSURE: 68 MMHG

## 2020-02-05 DIAGNOSIS — Z95.818 PRESENCE OF WATCHMAN LEFT ATRIAL APPENDAGE CLOSURE DEVICE: Chronic | ICD-10-CM

## 2020-02-05 DIAGNOSIS — I48.0 PAROXYSMAL ATRIAL FIBRILLATION (HCC): Primary | ICD-10-CM

## 2020-02-05 PROCEDURE — 99213 OFFICE O/P EST LOW 20 MIN: CPT | Performed by: NURSE PRACTITIONER

## 2020-02-05 PROCEDURE — 93000 ELECTROCARDIOGRAM COMPLETE: CPT | Performed by: NURSE PRACTITIONER

## 2020-02-05 RX ORDER — SODIUM CHLORIDE 9 MG/ML
20 INJECTION, SOLUTION INTRAVENOUS CONTINUOUS
Status: CANCELLED | OUTPATIENT
Start: 2020-02-05

## 2020-02-05 NOTE — PROGRESS NOTES
Cardiology Office Follow Up Visit      Primary Care Provider:  Nhung Clark APRN    Reason for f/u: Atrial fibrillation status post watchman placement     Nhung Clark APRN    History of Present Illness     It was nice to see Chinedu Lopez in follow-up for paroxysmal atrial fibrillation status post watchman, originally referred by Dr. Mosher.  He is a 59 y.o. male with a history of stroke with intracranial bleed in October while on antiplatelets and anticoagulation, managed acutely at [facility], currently in [facility] rehab for continued physical, occupational, and cognitive therapy.  Medical history includes ischemic heart disease with prior bypass surgery and subsequent PCI x2, most recent cardiac cath in 2016 indicated medical management, hypertension, dyslipidemia, diabetes mellitus, DEISY- patient states treatment discontinued by sleep physician.  The patient was evaluated by Dr. Willams, interim discussion with his neurosurgeon indicates patient was appropriate for watchman placement, and he underwent successful watchman implant on 1/30/2020.  He comes in today for routine follow-up post procedure.  He denies shortness of breath, palpitations, syncopal or near syncopal events, he states that he did have one fleeting episode of chest discomfort a few days ago but has not had any recurrence.  He also states that he had recently has been having issues with starting his urinary stream, denies any signs or symptoms of urinary tract infection, and denies any previous issues with his prostate.  Otherwise he has been in his usual state of health since October, his right groin has bruising extending mid thigh, nickel sized hematoma under insertion site but no indication of ongoing bleeding.       Social history: Smokes 1/2 to 1 pack/day (decreased from 2 packs/day) after a 60-pack-year history, denies EtOH, denies illicits, denies herbal use     Past Medical History:    Diagnosis Date   • Atrial fibrillation (CMS/[facility])     June, [facility]. Abstracted from [facility].   • Brain bleed (CMS/[facility]ly [facility]) 2019 October 14th fell - Hospital on 17th for 3 weeks   • CAD (coronary artery disease)     S/P CABG and PCI. Abstracted from [facility].   • Hyperlipidemia    • Hypertension    • Myocardial infarction (CMS/[facility]ly [facility]) 2006    Non-ST elevation MI. Abstracted from [facility].   • Obstructive sleep apnea on CPAP     At . Abstracted from [facility].   • Polycythemia vera (CMS/[facility])    • Presence of Watchman left atrial appendage closure device 1/31/2020   • TIA (transient ischemic attack) 11/19/2013    Possible TIA- MRI shows small tumor/head. Abstracted from [facility].       Past Surgical History:   Procedure Laterality Date   • ANGIOPLASTY  2006    3 srents were sequentially placed in promimal and mid body of the saphennous vein graft to obtuse marginal branch. Abstracted from [facility].   • ATRIAL APPENDAGE EXCLUSION LEFT WITH TRANSESOPHAGEAL ECHOCARDIOGRAM N/A 1/30/2020    Procedure: Atrial Appendage Occlusion;  Surgeon: Angel Willams MD;  Location: [facility] CATH INVASIVE LOCATION;  Service: Cardiovascular   • ATRIAL APPENDAGE EXCLUSION LEFT WITH TRANSESOPHAGEAL ECHOCARDIOGRAM N/A 1/30/2020    Procedure: Atrial Appendage Occlusion;  Surgeon: John Sheridan MD;  Location: [facility] CATH INVASIVE LOCATION;  Service: Cardiology   • CARDIAC CATHETERIZATION  2000    2003, 2006, 2012. Abstracted from [facility].   • CORONARY ARTERY BYPASS GRAFT  2000    Triple. Abstracted from [facility].   • MUSCLE REPAIR      Tendons and nerves in right lower extremity. Abstracted from [facility].   • OTHER SURGICAL HISTORY  08/2012    RCA mid and proximal- Xience stent x3. Abstracted from [facility].   • OTHER SURGICAL HISTORY  09/2016    Head trauma/bleed at [facility]. Abstracted from [facility].         Current Outpatient Medications:   •  amantadine (SYMMETREL) 100 MG capsule, Take 100 mg by mouth Daily.,  Disp: , Rfl:   •  amLODIPine (NORVASC) 5 MG tablet, Take 2.5 mg by mouth 2 (Two) Times a Day., Disp: , Rfl:   •  aspirin 81 MG tablet, Take 1 tablet by mouth Daily., Disp: , Rfl:   •  folic acid (FOLVITE) 1 MG tablet, Take 1 mg by mouth Daily., Disp: , Rfl:   •  guaiFENesin (MUCINEX) 600 MG 12 hr tablet, Take 600 mg by mouth Daily., Disp: , Rfl:   •  isosorbide mononitrate (IMDUR) 30 MG 24 hr tablet, Take 1 tablet by mouth Daily., Disp: 30 tablet, Rfl: 3  •  metFORMIN (GLUCOPHAGE) 1000 MG tablet, Take 1 tablet by mouth 2 (Two) Times a Day With Meals. Hold for 48 hours post procedure, Disp: , Rfl:   •  nebivolol (BYSTOLIC) 20 MG tablet, Take 1 tablet by mouth Daily., Disp: , Rfl:   •  potassium chloride (K-DUR,KLOR-CON) 20 MEQ CR tablet, Take 20 mEq by mouth 2 (Two) Times a Day., Disp: , Rfl:   •  QUEtiapine (SEROquel) 25 MG tablet, Take 25 mg by mouth Daily., Disp: , Rfl:   •  ranolazine (RANEXA) 500 MG 12 hr tablet, Take 500 mg by mouth 2 (Two) Times a Day., Disp: , Rfl: 6  •  rosuvastatin (CRESTOR) 40 MG tablet, Take 40 mg by mouth every night at bedtime., Disp: , Rfl: 4  •  thiamine (VITAMIN B-1) 100 MG tablet, Take 100 mg by mouth Daily., Disp: , Rfl:   •  valproic acid (DEPAKENE) 250 MG capsule, Take 250 mg by mouth 2 (Two) Times a Day., Disp: , Rfl:   •  warfarin (COUMADIN) 5 MG tablet, Take 5 mg by mouth Daily., Disp: , Rfl:     Social History     Socioeconomic History   • Marital status:      Spouse name: Not on file   • Number of children: Not on file   • Years of education: Not on file   • Highest education level: Not on file   Tobacco Use   • Smoking status: Current Every Day Smoker     Packs/day: 1.00   • Smokeless tobacco: Never Used   Substance and Sexual Activity   • Alcohol use: Not Currently     Frequency: 4 or more times a week   • Drug use: No   • Sexual activity: Defer       Family History   Problem Relation Age of Onset   • Diabetes Mother    • Heart attack Father    • Diabetes Father     • Other Sister         MRSA   • Heart disease Other         Positive for Ischemic   • Cancer Other    • Hypertension Other        The following portions of the patient's history were reviewed and updated as appropriate: allergies, current medications, past family history, past medical history, past social history, past surgical history and problem list.        Review of Systems   Constitution: Negative for chills, diaphoresis, fever, malaise/fatigue, weight gain and weight loss.   Cardiovascular: Negative for chest pain, dyspnea on exertion, leg swelling, near-syncope, orthopnea, palpitations and syncope.   Respiratory: Negative for hemoptysis, shortness of breath, sputum production and wheezing.    Skin: Negative for rash.   Gastrointestinal: Negative for abdominal pain, hematemesis, hematochezia, nausea and vomiting.   Genitourinary: Positive for hesitancy. Negative for bladder incontinence, dysuria, flank pain, frequency, hematuria, pelvic pain and urgency.   Neurological: Positive for focal weakness and headaches. Negative for dizziness, light-headedness, numbness and seizures.   Psychiatric/Behavioral: Negative.    All other systems reviewed and are negative.    /68   Pulse 60   Wt 98.7 kg (217 lb 9.6 oz)   BMI 30.35 kg/m² .  Objective     Physical Exam   Constitutional: He is oriented to person, place, and time. He appears well-developed and well-nourished. He is cooperative.   Neck: Normal carotid pulses and no JVD present. Carotid bruit is not present.   Cardiovascular: Normal rate, regular rhythm, normal heart sounds and intact distal pulses. Exam reveals no gallop and no friction rub.   No murmur heard.  Pulses:       Carotid pulses are 2+ on the right side, and 2+ on the left side.       Radial pulses are 2+ on the right side, and 2+ on the left side.        Posterior tibial pulses are 2+ on the right side, and 2+ on the left side.   Pulmonary/Chest: Effort normal and breath sounds normal. He  has no decreased breath sounds. He has no wheezes. He has no rhonchi. He has no rales.   Abdominal: Soft. Bowel sounds are normal. He exhibits no distension and no mass. There is no hepatosplenomegaly. There is no tenderness. There is no guarding.   Musculoskeletal: He exhibits no edema.   Neurological: He is alert and oriented to person, place, and time.   Skin: Skin is warm and dry. No rash noted. No erythema. No pallor.   Psychiatric: He has a normal mood and affect. His speech is normal and behavior is normal. Judgment and thought content normal.           ECG 12 Lead  Date/Time: 2/5/2020 1:41 PM  Performed by: Karlie Palacio APRN  Authorized by: Karlie Palacio APRN   Comparison: not compared with previous ECG   Rhythm: sinus rhythm  Conduction: non-specific intraventricular conduction delay  Comments: Single PAC        Assessment/Plan:    1.  CVA with intracranial bleeding while on anticoagulation and antiplatelets with subsequent prolonged hospitalization----right leg weakness, spatial, and cognitive issues, currently in rehab  2.  Paroxysmal atrial fibrillation status post watchman placement 1/30/2020----currently normal sinus rhythm, continue Coumadin  3.  CAD, history CABG, subsequent PCI-----on low-dose aspirin  4.  Hypertension-----controlled  5.  Hyperlipidemia----on statin  6.  Diabetes mellitus type 2------ on oral hypoglycemics  7.  Tobacco abuse  8.  Urinary hesitancy-----no signs or symptoms of urinary tract infection  9.  Headaches-----expected with continued resolution of ICH    45-day WILD ordered and follow-up appointment made for 60 days post implant.  If urinary hesitancy continues he is to follow-up with either his primary care physician or urologist, patient and wife verbalized understanding.    AMAYA Cruz  [facility] cardiology

## 2020-02-06 LAB
BH CV ECHO MEAS - BSA(HAYCOCK): 2.2 M^2
BH CV ECHO MEAS - BSA: 2.2 M^2
BH CV ECHO MEAS - BZI_BMI: 29.6 KILOGRAMS/M^2
BH CV ECHO MEAS - BZI_METRIC_HEIGHT: 180.3 CM
BH CV ECHO MEAS - BZI_METRIC_WEIGHT: 96.2 KG
BH CV ECHO MEAS - LAA 0 DEGREE LENGTH: 13 CM
BH CV ECHO MEAS - LAA 0 DEGREE WIDTH: 20 CM
BH CV ECHO MEAS - LAA 135 DEGREE LENGTH: 15 CM
BH CV ECHO MEAS - LAA 135 DEGREE WIDTH: 25 CM
BH CV ECHO MEAS - LAA 45 DEGREE LENGTH: 10 CM
BH CV ECHO MEAS - LAA 45 DEGREE WIDTH: 18 CM
BH CV ECHO MEAS - LAA 90 DEGREE LENGTH: 12 CM
BH CV ECHO MEAS - LAA 90 DEGREE WIDTH: 23 CM
BH CV ECHO MEAS - MV E MAX VEL: 73.9 CM/SEC
BH CV ECHO MEAS - MV MAX PG: 3 MMHG
BH CV ECHO MEAS - MV MEAN PG: 0.86 MMHG
BH CV ECHO MEAS - MV V2 MAX: 86.7 CM/SEC
BH CV ECHO MEAS - MV V2 MEAN: 38.3 CM/SEC
BH CV ECHO MEAS - MV V2 VTI: 21.6 CM

## 2020-03-16 ENCOUNTER — ANESTHESIA EVENT (OUTPATIENT)
Dept: CARDIOLOGY | Facility: HOSPITAL | Age: 60
End: 2020-03-16

## 2020-03-17 ENCOUNTER — ANESTHESIA (OUTPATIENT)
Dept: CARDIOLOGY | Facility: HOSPITAL | Age: 60
End: 2020-03-17

## 2020-03-17 ENCOUNTER — HOSPITAL ENCOUNTER (OUTPATIENT)
Dept: CARDIOLOGY | Facility: HOSPITAL | Age: 60
Discharge: HOME | End: 2020-03-17
Admitting: INTERNAL MEDICINE

## 2020-03-17 VITALS — SYSTOLIC BLOOD PRESSURE: 126 MMHG | DIASTOLIC BLOOD PRESSURE: 71 MMHG | OXYGEN SATURATION: 95 %

## 2020-03-17 VITALS
BODY MASS INDEX: 31.36 KG/M2 | HEART RATE: 58 BPM | RESPIRATION RATE: 16 BRPM | OXYGEN SATURATION: 97 % | WEIGHT: 223.99 LBS | DIASTOLIC BLOOD PRESSURE: 81 MMHG | TEMPERATURE: 97.9 F | HEIGHT: 71 IN | SYSTOLIC BLOOD PRESSURE: 153 MMHG

## 2020-03-17 DIAGNOSIS — I48.19 PERSISTENT ATRIAL FIBRILLATION (HCC): ICD-10-CM

## 2020-03-17 DIAGNOSIS — I62.9 INTRACRANIAL BLEED (HCC): ICD-10-CM

## 2020-03-17 DIAGNOSIS — I48.0 PAROXYSMAL ATRIAL FIBRILLATION (HCC): ICD-10-CM

## 2020-03-17 LAB
BH CV ECHO MEAS - EF(MOD-BP): 60 %
LV EF 2D ECHO EST: 60 %

## 2020-03-17 PROCEDURE — 93325 DOPPLER ECHO COLOR FLOW MAPG: CPT | Performed by: INTERNAL MEDICINE

## 2020-03-17 PROCEDURE — 93312 ECHO TRANSESOPHAGEAL: CPT

## 2020-03-17 PROCEDURE — 25010000002 PROPOFOL 10 MG/ML EMULSION: Performed by: ANESTHESIOLOGY

## 2020-03-17 PROCEDURE — 93312 ECHO TRANSESOPHAGEAL: CPT | Performed by: INTERNAL MEDICINE

## 2020-03-17 PROCEDURE — 93325 DOPPLER ECHO COLOR FLOW MAPG: CPT

## 2020-03-17 PROCEDURE — 93320 DOPPLER ECHO COMPLETE: CPT

## 2020-03-17 PROCEDURE — 93320 DOPPLER ECHO COMPLETE: CPT | Performed by: INTERNAL MEDICINE

## 2020-03-17 RX ORDER — PROPOFOL 10 MG/ML
VIAL (ML) INTRAVENOUS AS NEEDED
Status: DISCONTINUED | OUTPATIENT
Start: 2020-03-17 | End: 2020-03-17 | Stop reason: SURG

## 2020-03-17 RX ORDER — SODIUM CHLORIDE 0.9 % (FLUSH) 0.9 %
10 SYRINGE (ML) INJECTION AS NEEDED
Status: DISCONTINUED | OUTPATIENT
Start: 2020-03-17 | End: 2020-03-18 | Stop reason: HOSPADM

## 2020-03-17 RX ORDER — PROPOFOL 10 MG/ML
VIAL (ML) INTRAVENOUS AS NEEDED
Status: DISCONTINUED | OUTPATIENT
Start: 2020-03-17 | End: 2020-03-17

## 2020-03-17 RX ORDER — SODIUM CHLORIDE 9 MG/ML
9 INJECTION, SOLUTION INTRAVENOUS CONTINUOUS PRN
Status: DISCONTINUED | OUTPATIENT
Start: 2020-03-17 | End: 2020-03-18 | Stop reason: HOSPADM

## 2020-03-17 RX ORDER — SODIUM CHLORIDE 9 MG/ML
20 INJECTION, SOLUTION INTRAVENOUS CONTINUOUS
Status: DISCONTINUED | OUTPATIENT
Start: 2020-03-17 | End: 2020-03-18 | Stop reason: HOSPADM

## 2020-03-17 RX ORDER — SODIUM CHLORIDE 9 MG/ML
80 INJECTION, SOLUTION INTRAVENOUS CONTINUOUS
Status: DISCONTINUED | OUTPATIENT
Start: 2020-03-17 | End: 2020-03-18 | Stop reason: HOSPADM

## 2020-03-17 RX ORDER — SODIUM CHLORIDE 0.9 % (FLUSH) 0.9 %
10 SYRINGE (ML) INJECTION EVERY 12 HOURS SCHEDULED
Status: DISCONTINUED | OUTPATIENT
Start: 2020-03-17 | End: 2020-03-18 | Stop reason: HOSPADM

## 2020-03-17 RX ORDER — CLOPIDOGREL BISULFATE 75 MG/1
75 TABLET ORAL DAILY
Qty: 30 TABLET | Refills: 4 | Status: SHIPPED | OUTPATIENT
Start: 2020-03-17 | End: 2020-06-01

## 2020-03-17 RX ADMIN — PROPOFOL 100 MG: 10 INJECTION, EMULSION INTRAVENOUS at 10:25

## 2020-03-17 RX ADMIN — PROPOFOL 50 MG: 10 INJECTION, EMULSION INTRAVENOUS at 10:27

## 2020-03-17 RX ADMIN — PROPOFOL 25 MG: 10 INJECTION, EMULSION INTRAVENOUS at 10:30

## 2020-03-17 NOTE — DISCHARGE INSTR - ACTIVITY
WILD DC Instructions    1) The medication, which was used to put the patient to sleep, will be acting in your body for the next twenty-four (24) hours, so you might feel a little sleepy; this feeling will slowly wear off.  Because the medicine is still in your system for the next twenty-four (24) hours, the adult patient SHOULD NOT:    a. Drive a car, operate machinery, or power tools  b. Drink any alcoholic drinks (not even beer)  c. Make any important decisions such as to sign important papers      2) We strongly suggest that a responsible adult be with the patient the rest of the day.    3) Any problems with:    a. EXCESSIVE MUCOUS  b. SPITTING UP BLOOD  c. SORE THROAT AT MORE THAN 72 HOURS    NOTIFY DR. Willams   [phone]   OR CALL THE [facility] -[phone].

## 2020-03-17 NOTE — H&P
Sub--59-year-old unfortunate male patient on antiplatelet agents and anticoagulation had stroke with intracranial bleed several weeks ago and was admitted to [facility] and conservative management was done and subsequently was discharged after prolonged hospitalization and left on aspirin--he was evaluated by neurosurgery and restarted back on anticoagulation and underwent WILD to evaluate for watchman device implantation and comes in for evaluation prior to watchman implantation  He has progressively improved with his cognition and main complaint is right lower extremity weakness and denies any chest pain or dyspnea  Patient has known history of ischemic heart disease with paroxysmal atrial fibrillation, dyslipidemia and hypertension with history of tobacco abuse  He has prior history of bypass surgery with subsequent PCI and stenting and most recent cardiac catheterization was in 2016 left to medical management  And underwent watchman device implantation several weeks ago and came for surveillance WILD to stop anticoagulation as per protocol        Past Medical History:   Diagnosis Date   • Atrial fibrillation (CMS/[facility])     June, H. Abstracted from [facility].   • Brain bleed (CMS/HCC) 2019 October 14th fell - Hospital on 17th for 3 weeks   • CAD (coronary artery disease)     S/P CABG and PCI. Abstracted from [facility].   • Hyperlipidemia    • Hypertension    • Myocardial infarction (CMS/[facility]) 2006    Non-ST elevation MI. Abstracted from [facility].   • Obstructive sleep apnea on CPAP     At . Abstracted from [facility].   • Polycythemia vera (CMS/[facility])    • Presence of Watchman left atrial appendage closure device 1/31/2020   • TIA (transient ischemic attack) 11/19/2013    Possible TIA- MRI shows small tumor/head. Abstracted from [facility].     Past Surgical History:   Procedure Laterality Date   • ANGIOPLASTY  2006    3 srents were sequentially placed in promimal and mid body of the  saphennous vein graft to obtuse marginal branch. Abstracted from [facility].   • ATRIAL APPENDAGE EXCLUSION LEFT WITH TRANSESOPHAGEAL ECHOCARDIOGRAM N/A 1/30/2020    Procedure: Atrial Appendage Occlusion;  Surgeon: Angel Willams MD;  Location: [facility] CATH INVASIVE LOCATION;  Service: Cardiovascular   • ATRIAL APPENDAGE EXCLUSION LEFT WITH TRANSESOPHAGEAL ECHOCARDIOGRAM N/A 1/30/2020    Procedure: Atrial Appendage Occlusion;  Surgeon: John Sheridan MD;  Location: [facility] CATH INVASIVE LOCATION;  Service: Cardiology   • CARDIAC CATHETERIZATION  2000    2003, 2006, 2012. Abstracted from [facility].   • CORONARY ARTERY BYPASS GRAFT  2000    Triple. Abstracted from [email or web address].   • MUSCLE REPAIR      Tendons and nerves in right lower extremity. Abstracted from [email or web address].   • OTHER SURGICAL HISTORY  08/2012    RCA mid and proximal- Xience stent x3. Abstracted from [facility].   • OTHER SURGICAL HISTORY  09/2016    Head trauma/bleed at [facility]. Abstracted from [facility].     Family History   Problem Relation Age of Onset   • Diabetes Mother    • Heart attack Father    • Diabetes Father    • Other Sister         MRSA   • Heart disease Other         Positive for Ischemic   • Cancer Other    • Hypertension Other      Social History     Tobacco Use   • Smoking status: Current Every Day Smoker     Packs/day: 1.00   • Smokeless tobacco: Never Used   Substance Use Topics   • Alcohol use: Not Currently     Frequency: 4 or more times a week   • Drug use: No       (Not in a hospital admission)  Allergies:  Patient has no known allergies.    Review of Systems   General:  positive for fatigue and tiredness  Eyes: No redness  Cardiovascular: No chest pain, no palpitations  Respiratory:   positive for class 2 shortness of breath  Gastrointestinal: No nausea or vomiting, bleeding  Genitourinary: no hematuria or dysuria  Musculoskeletal: No arthralgia or myalgia  Skin: No rash  Neurologic: No numbness, tingling,  syncope  Hematologic/Lymphatic: No abnormal bleeding      Physical Exam  VITALS REVIEWED--reviewed    General:      well developed, well nourished, in no acute distress.    Head:      normocephalic and atraumatic.    Eyes:      PERRL/EOM intact, conjunctiva and sclera clear with out nystagmus.    Neck:      no masses, thyromegaly,  trachea central with normal respiratory effort   Lungs:      clear bilaterally to auscultation.    Heart:       underlying  atrial fibrillation with irregularly irregular rhythm and  without any murmurs gallops or rubs  Msk:      no deformity or scoliosis noted of thoracic or lumbar spine.    Pulses:      pulses normal in all 4 extremities.    Extremities:       no cyanosis or clubbing--trace left pedal edema and trace right pedal edema.    Neurologic:      no focal deficits.   alert oriented x3  Skin:      intact without lesions or rashes.    Psych:      alert and cooperative; normal mood and affect; normal attention span and concentration.          Assessment and plan  Post watchman WILD today and recent benefits educated    Electronically signed by Angel Willams MD, 03/17/20, 9:11 AM.

## 2020-03-17 NOTE — ANESTHESIA PREPROCEDURE EVALUATION
Anesthesia Evaluation     Patient summary reviewed and Nursing notes reviewed   NPO Solid Status: > 8 hours  NPO Liquid Status: > 8 hours           Airway   Mallampati: II  TM distance: >3 FB  Neck ROM: full  No difficulty expected  Dental - normal exam     Pulmonary - normal exam   (+) sleep apnea,   Cardiovascular - normal exam    ECG reviewed    (+) hypertension, past MI , CAD, CABG, dysrhythmias, hyperlipidemia,       Neuro/Psych  (+) TIA, CVA,     GI/Hepatic/Renal/Endo    (+) obesity,   diabetes mellitus,     Musculoskeletal     Abdominal  - normal exam    Bowel sounds: normal.   Substance History      OB/GYN          Other   blood dyscrasia,   history of cancer                    Anesthesia Plan    ASA 4     MAC     intravenous induction     Anesthetic plan, all risks, benefits, and alternatives have been provided, discussed and informed consent has been obtained with: patient.

## 2020-03-20 ENCOUNTER — TELEPHONE (OUTPATIENT)
Dept: CARDIOLOGY | Facility: CLINIC | Age: 60
End: 2020-03-20

## 2020-03-20 NOTE — TELEPHONE ENCOUNTER
Attempted to call patient for 45 day WILD telephone follow up. Patient did not answer. Left V/M for patient to call back.

## 2020-04-20 ENCOUNTER — OFFICE VISIT (OUTPATIENT)
Dept: WOUND CARE | Facility: HOSPITAL | Age: 60
End: 2020-04-20

## 2020-04-20 PROCEDURE — G0463 HOSPITAL OUTPT CLINIC VISIT: HCPCS

## 2020-04-27 ENCOUNTER — OFFICE VISIT (OUTPATIENT)
Dept: WOUND CARE | Facility: HOSPITAL | Age: 60
End: 2020-04-27

## 2020-04-27 PROCEDURE — G0463 HOSPITAL OUTPT CLINIC VISIT: HCPCS

## 2020-04-28 ENCOUNTER — OFFICE VISIT (OUTPATIENT)
Dept: CARDIOLOGY | Facility: CLINIC | Age: 60
End: 2020-04-28

## 2020-04-28 VITALS
WEIGHT: 200 LBS | SYSTOLIC BLOOD PRESSURE: 134 MMHG | HEART RATE: 60 BPM | DIASTOLIC BLOOD PRESSURE: 83 MMHG | HEIGHT: 70 IN | BODY MASS INDEX: 28.63 KG/M2

## 2020-04-28 DIAGNOSIS — I25.10 CORONARY ARTERY DISEASE INVOLVING NATIVE CORONARY ARTERY OF NATIVE HEART WITHOUT ANGINA PECTORIS: Primary | Chronic | ICD-10-CM

## 2020-04-28 DIAGNOSIS — E11.9 TYPE 2 DIABETES MELLITUS WITHOUT COMPLICATION, WITHOUT LONG-TERM CURRENT USE OF INSULIN (HCC): Chronic | ICD-10-CM

## 2020-04-28 DIAGNOSIS — I10 ESSENTIAL HYPERTENSION: Chronic | ICD-10-CM

## 2020-04-28 DIAGNOSIS — E78.49 OTHER HYPERLIPIDEMIA: ICD-10-CM

## 2020-04-28 DIAGNOSIS — Z95.818 PRESENCE OF WATCHMAN LEFT ATRIAL APPENDAGE CLOSURE DEVICE: Chronic | ICD-10-CM

## 2020-04-28 DIAGNOSIS — Z79.01 LONG TERM CURRENT USE OF ANTICOAGULANT THERAPY: Chronic | ICD-10-CM

## 2020-04-28 DIAGNOSIS — I48.0 PAROXYSMAL ATRIAL FIBRILLATION (HCC): Chronic | ICD-10-CM

## 2020-04-28 PROCEDURE — 99442 PR PHYS/QHP TELEPHONE EVALUATION 11-20 MIN: CPT | Performed by: INTERNAL MEDICINE

## 2020-04-28 NOTE — PROGRESS NOTES
Cardiology Telephone Visit    Encounter Date:  04/28/2020    Patient ID:   Chinedu Lopez is a 59 y.o. male.    Reason For Followup:  Coronary artery disease  Atrial fibrillation    Brief Clinical History:  Dear Dr. Clark, AMAYA Shah    I had the pleasure of performing a telephone visit with Chinedu Lopez today. As you are well aware, this is a 59 y.o. male with a known history of ischemic heart disease. He additionally has a history of paroxysmal atrial fibrillation, dyslipidemia, hypertension with hypertensive cardiovascular disease,  tobacco abuse disorder and anti-platelet/anti coagulation therapy. He presents today for followup on the above conditions.     Interval History:  He denies chest pain, pressure, heaviness or tightness.  He denies any shortness of breath out of character.  He denies any PND, orthopnea or palpitations.  He denies any syncope or near-syncope.  He reports feeling quite well from a cardiac perspective.     He was able to get the watchman device placed.  He had his post procedure WILD which demonstrated good seal.  His anticoagulation has stopped.  He reports that he can now play with his dog without worrying about bleeding from scratches.  His biggest problem is the nonhealing site at the place of his feeding tube.  There is some supply chain issues with his Silvadene cream.  Wound care is working on this.     Assessment & Plan     Impressions:  Coronary artery disease status post coronary arterial bypass grafting and subsequent PCI and stenting.      Most recent catheterization in May of 2016 demonstrating no disease amenable to percutaneous or surgical revascularization.  Paroxysmal atrial fibrillation status post watchman implantation  Hypertension with hypertensive cardiovascular disease.  Dyslipidemia.   Antiplatelet therapy.  Tobacco abuse disorder     Recommendations:  Continuation of his current medical regimen at the present time.  Follow-up with EP for further  "discussions regarding watchman device  Followup in 6 months time sooner should there be difficulties  Smoking cessation.    Vitals:  Vitals:    04/28/20 0910   BP: 134/83   Pulse: 60   Weight: 90.7 kg (200 lb)   Height: 177.8 cm (70\")       Allergies:  No Known Allergies    Medication Review:     Current Outpatient Medications:   •  amantadine (SYMMETREL) 100 MG capsule, Take 100 mg by mouth Daily., Disp: , Rfl:   •  amLODIPine (NORVASC) 5 MG tablet, Take 5 mg by mouth Daily., Disp: , Rfl:   •  aspirin 81 MG tablet, Take 1 tablet by mouth Daily., Disp: , Rfl:   •  clopidogrel (PLAVIX) 75 MG tablet, Take 1 tablet by mouth Daily., Disp: 30 tablet, Rfl: 4  •  folic acid (FOLVITE) 1 MG tablet, Take 1 mg by mouth Daily., Disp: , Rfl:   •  guaiFENesin (MUCINEX) 600 MG 12 hr tablet, Take 600 mg by mouth Daily., Disp: , Rfl:   •  isosorbide mononitrate (IMDUR) 30 MG 24 hr tablet, Take 1 tablet by mouth Daily., Disp: 30 tablet, Rfl: 3  •  metFORMIN (GLUCOPHAGE) 1000 MG tablet, Take 1 tablet by mouth 2 (Two) Times a Day With Meals. Hold for 48 hours post procedure, Disp: , Rfl:   •  nebivolol (BYSTOLIC) 20 MG tablet, Take 1 tablet by mouth Daily., Disp: , Rfl:   •  potassium chloride (K-DUR,KLOR-CON) 20 MEQ CR tablet, Take 20 mEq by mouth 2 (Two) Times a Day., Disp: , Rfl:   •  QUEtiapine (SEROquel) 25 MG tablet, Take 25 mg by mouth Daily., Disp: , Rfl:   •  ranolazine (RANEXA) 500 MG 12 hr tablet, Take 500 mg by mouth 2 (Two) Times a Day., Disp: , Rfl: 6  •  rosuvastatin (CRESTOR) 40 MG tablet, Take 40 mg by mouth every night at bedtime., Disp: , Rfl: 4  •  thiamine (VITAMIN B-1) 100 MG tablet, Take 100 mg by mouth Daily., Disp: , Rfl:   •  valproic acid (DEPAKENE) 250 MG capsule, Take 250 mg by mouth 2 (Two) Times a Day., Disp: , Rfl:     Family History:  Family History   Problem Relation Age of Onset   • Diabetes Mother    • Heart attack Father    • Diabetes Father    • Other Sister         MRSA   • Heart disease Other     "     Positive for Ischemic   • Cancer Other    • Hypertension Other        Past Medical History:  Past Medical History:   Diagnosis Date   • Atrial fibrillation (CMS/[facility])     June, [facility]. Abstracted from [facility].   • Brain bleed (CMS/[facility]) 2019 October 14th fell - Hospital on 17th for 3 weeks   • CAD (coronary artery disease)     S/P CABG and PCI. Abstracted from [facility].   • Hyperlipidemia    • Hypertension    • Myocardial infarction (CMS/[facility]) 2006    Non-ST elevation MI. Abstracted from [facility].   • Obstructive sleep apnea on CPAP     At . Abstracted from [facility].   • Polycythemia vera (CMS/[facility])    • Presence of Watchman left atrial appendage closure device 1/31/2020   • TIA (transient ischemic attack) 11/19/2013    Possible TIA- MRI shows small tumor/head. Abstracted from [facility].       Past surgical History:  Past Surgical History:   Procedure Laterality Date   • ANGIOPLASTY  2006    3 srents were sequentially placed in promimal and mid body of the saphennous vein graft to obtuse marginal branch. Abstracted from [facility] [facility].   • ATRIAL APPENDAGE EXCLUSION LEFT WITH TRANSESOPHAGEAL ECHOCARDIOGRAM N/A 1/30/2020    Procedure: Atrial Appendage Occlusion;  Surgeon: Angel Willams MD;  Location: [facility] CATH INVASIVE LOCATION;  Service: Cardiovascular   • ATRIAL APPENDAGE EXCLUSION LEFT WITH TRANSESOPHAGEAL ECHOCARDIOGRAM N/A 1/30/2020    Procedure: Atrial Appendage Occlusion;  Surgeon: John Sheridan MD;  Location: [facility] CATH INVASIVE LOCATION;  Service: Cardiology   • CARDIAC CATHETERIZATION  2000 2003, 2006, 2012. Abstracted from [facility].   • CORONARY ARTERY BYPASS GRAFT  2000    Triple. Abstracted from [facility].   • MUSCLE REPAIR      Tendons and nerves in right lower extremity. Abstracted from [facility].   • OTHER SURGICAL HISTORY  08/2012    RCA mid and proximal- Xience stent x3. Abstracted from [facility].   • OTHER SURGICAL HISTORY  09/2016    [facility]ble[facility].  [facility]       Social History:  Social History     Socioeconomic History   • Marital status:      Spouse name: Not on file   • Number of children: Not on file   • Years of education: Not on file   • Highest education level: Not on file   Tobacco Use   • Smoking status: Current Every Day Smoker     Packs/day: 1.00   • Smokeless tobacco: Never Used   Substance and Sexual Activity   • Alcohol use: Not Currently     Frequency: 4 or more times a week   • Drug use: No   • Sexual activity: Defer       Review of Systems:  The following systems were reviewed as they relate to the cardiovascular system: Constitutional, Eyes, ENT, Cardiovascular, Respiratory, Gastrointestinal, Integumentary, Neurological, Psychiatric, Hematologic, Endocrine, Musculoskeletal, and Genitourinary. The pertinent cardiovascular findings are reported above with all other cardiovascular points within those systems being negative.    Diagnostic Study Review:     Current Electrocardiogram:  Procedures      NOTE: The following portions of the patient's history were reviewed and updated this visit as appropriate: allergies, current medications, past family history, past medical history, past social history, past surgical history and problem list.    A total of 15 minutes of medical discussion occurred during this encounter.      Novel Coronavirus (COVID-19) Disclaimer:     A proclamation declaring a national emergency concerning the Novel Coronavirus Disease (COVID-19) Outbreak was issued on March 13, 2020 at the direction of the .    This virtual visit was performed with the patient's consent in lieu of the patient's regularly scheduled appointment in order to provide the patient with the opportunity to maintain contact with their healthcare provider while also adhering to social distancing guidelines set forth by the CDC to reduce exposure to the Novel Coronavirus (COVID-19).  Any vital signs  obtained during this visit were provided by the patient.

## 2020-05-11 ENCOUNTER — OFFICE VISIT (OUTPATIENT)
Dept: WOUND CARE | Facility: HOSPITAL | Age: 60
End: 2020-05-11

## 2020-05-11 PROCEDURE — G0463 HOSPITAL OUTPT CLINIC VISIT: HCPCS

## 2020-05-29 RX ORDER — ISOSORBIDE MONONITRATE 30 MG/1
30 TABLET, EXTENDED RELEASE ORAL DAILY
Qty: 30 TABLET | Refills: 3 | Status: SHIPPED | OUTPATIENT
Start: 2020-05-29 | End: 2021-01-25

## 2020-06-01 ENCOUNTER — OFFICE VISIT (OUTPATIENT)
Dept: CARDIOLOGY | Facility: CLINIC | Age: 60
End: 2020-06-01

## 2020-06-01 ENCOUNTER — CLINICAL SUPPORT NO REQUIREMENTS (OUTPATIENT)
Dept: CARDIOLOGY | Facility: CLINIC | Age: 60
End: 2020-06-01

## 2020-06-01 VITALS
OXYGEN SATURATION: 96 % | HEART RATE: 68 BPM | BODY MASS INDEX: 34.01 KG/M2 | DIASTOLIC BLOOD PRESSURE: 99 MMHG | WEIGHT: 237 LBS | SYSTOLIC BLOOD PRESSURE: 165 MMHG

## 2020-06-01 DIAGNOSIS — I48.0 PAROXYSMAL ATRIAL FIBRILLATION (HCC): Primary | Chronic | ICD-10-CM

## 2020-06-01 DIAGNOSIS — I48.0 PAROXYSMAL ATRIAL FIBRILLATION (HCC): ICD-10-CM

## 2020-06-01 DIAGNOSIS — I10 ESSENTIAL HYPERTENSION: ICD-10-CM

## 2020-06-01 DIAGNOSIS — I25.10 CORONARY ARTERY DISEASE INVOLVING NATIVE CORONARY ARTERY OF NATIVE HEART WITHOUT ANGINA PECTORIS: ICD-10-CM

## 2020-06-01 DIAGNOSIS — Z95.818 PRESENCE OF WATCHMAN LEFT ATRIAL APPENDAGE CLOSURE DEVICE: Primary | ICD-10-CM

## 2020-06-01 PROCEDURE — 99214 OFFICE O/P EST MOD 30 MIN: CPT | Performed by: INTERNAL MEDICINE

## 2020-06-01 RX ORDER — PRASUGREL 10 MG/1
10 TABLET, FILM COATED ORAL DAILY
COMMUNITY
End: 2020-10-28

## 2020-06-01 RX ORDER — OLMESARTAN MEDOXOMIL 40 MG/1
40 TABLET ORAL DAILY
COMMUNITY
End: 2020-10-28

## 2020-06-01 RX ORDER — CYANOCOBALAMIN (VITAMIN B-12) 500 MCG
500 TABLET ORAL DAILY
COMMUNITY

## 2020-06-01 NOTE — PROGRESS NOTES
CC--stroke with intracranial bleed    Sub--59-year-old unfortunate male patient on antiplatelet agents and anticoagulation had stroke with intracranial bleed and further underwent watchman implantation and post watchman implantation a WILD was done with complete seal of the appendage and anticoagulation has been stopped and currently on dual antiplatelet agents doing well .  He continues to struggle with his nicotine dependence and currently not consuming any alcohol .He has progressively improved with his cognition and DENIES any chest pain or dyspnea  Patient has known history of ischemic heart disease with paroxysmal atrial fibrillation, dyslipidemia and hypertension with history of tobacco abuse  He has prior history of bypass surgery with subsequent PCI and stenting and most recent cardiac catheterization was in 2016 left to medical management    Past history is reviewed below and verified    Past Medical History:   Diagnosis Date   • Atrial fibrillation (CMS/[facility])     June, [facility]. Abstracted from [employer] [facility] [facility].   • Brain bleed (CMS/[facility]) 2019 October 14th fell - Hospital on 17th for 3 weeks   • CAD (coronary artery disease)     S/P CABG and PCI. Abstracted from [employer].   • Hyperlipidemia    • Hypertension    • Myocardial infarction (CMS/[facility]) 2006    Non-ST elevation MI. Abstracted from [employer].   • Obstructive sleep apnea on CPAP     At . Abstracted from [employer].   • Polycythemia vera (CMS/[facility])    • Presence of Watchman left atrial appendage closure device 1/31/2020   • TIA (transient ischemic attack) 11/19/2013    Possible TIA- MRI shows small tumor/head. Abstracted from [facility] [facility] [facility].     Past Surgical History:   Procedure Laterality Date   • ANGIOPLASTY  2006    3 srents were sequentially placed in promimal and mid body of the saphennous vein graft to obtuse marginal branch. Abstracted from [employer] [facility].   • ATRIAL APPENDAGE EXCLUSION LEFT WITH TRANSESOPHAGEAL ECHOCARDIOGRAM N/A 1/30/2020    Procedure:  Atrial Appendage Occlusion;  Surgeon: Angel Willams MD;  Location: [facility] CATH INVASIVE LOCATION;  Service: Cardiovascular   • ATRIAL APPENDAGE EXCLUSION LEFT WITH TRANSESOPHAGEAL ECHOCARDIOGRAM N/A 1/30/2020    Procedure: Atrial Appendage Occlusion;  Surgeon: John Sheridan MD;  Location: [facility] CATH INVASIVE LOCATION;  Service: Cardiology   • CARDIAC CATHETERIZATION  2000 2003, 2006, 2012. Abstracted from [facility].   • CORONARY ARTERY BYPASS GRAFT  2000    Triple. Abstracted from [facility] [facility].   • MUSCLE REPAIR      Tendons and nerves in right lower extremity. Abstracted from [employer] [facility].   • OTHER SURGICAL HISTORY  08/2012    RCA mid and proximal- Xience stent x3. Abstracted from [facility].   • OTHER SURGICAL HISTORY  09/2016    Head trauma/bleed at [facility]. Abstracted from [facility].     Family History   Problem Relation Age of Onset   • Diabetes Mother    • Heart attack Father    • Diabetes Father    • Other Sister         MRSA   • Heart disease Other         Positive for Ischemic   • Cancer Other    • Hypertension Other      Social History     Tobacco Use   • Smoking status: Current Every Day Smoker     Packs/day: 1.00   • Smokeless tobacco: Never Used   Substance Use Topics   • Alcohol use: Not Currently     Frequency: 4 or more times a week   • Drug use: No     Allergies:  Patient has no known allergies.    Review of Systems   General: NO fatigue and tiredness  Eyes: No redness  Cardiovascular: No chest pain, no palpitations  Respiratory: Denies any shortness of breath  Gastrointestinal: No nausea or vomiting, bleeding  Genitourinary: no hematuria or dysuria  Musculoskeletal: No arthralgia or myalgia  Skin: No rash  Neurologic: No numbness, tingling, syncope  Hematologic/Lymphatic: No abnormal bleeding      Physical Exam  VITALS REVIEWED--heart rate of 68 patient is afebrile respiration 12 times a minute blood pressure 165/99    General:      well developed, well nourished, in no acute  distress.    Head:      normocephalic and atraumatic.    Eyes:      PERRL/EOM intact, conjunctiva and sclera clear with out nystagmus.    Neck:      no masses, thyromegaly, trachea central with normal respiratory effort  Lungs:      clear bilaterally to auscultation.    Heart:      non-displaced PMI, chest non-tender; regular rate and rhythm, S1, S2 without murmurs, rubs, or gallops  Skin:      intact without lesions or rashes.    Psych:      alert and cooperative; normal mood and affect; normal attention span and concentration.      Extremities with trace ankle edema without any cyanosis or clubbing    Muscular skeletal patient walks with a normal gait  with mild kyphosis    Neurological no focal deficits and alert oriented x3    Abdomen soft nontender no hepatomegaly       chads vasc score--- diabetes ,hypertension, vascular disease, stroke--5  HASBLED--4    Assessment plan    Unfortunate male patient with high risk for stroke with intracranial bleed --- post watchman implantation doing well--- feels much better without anticoagulation without any further problems with bleeding--- patient currently on Prasugrel--- stop aspirin  Coronary artery disease stable  Hypertension controlled at home  Diabetes and hyperlipidemia  Follow-up after few months  Paroxysmal atrial fibrillation currently in sinus rhythm    Electronically signed by Angel Willams MD, 06/01/20, 4:19 PM.

## 2020-06-09 NOTE — PROGRESS NOTES
6 month watchman follow up with patient. See scanned reports. Patient off anticoagulation and aspirin now and confirmed only taking effient, Patient doing very well.

## 2020-08-26 RX ORDER — ISOSORBIDE MONONITRATE 30 MG/1
TABLET, EXTENDED RELEASE ORAL
Qty: 90 TABLET | Refills: 0 | OUTPATIENT
Start: 2020-08-26

## 2020-10-27 NOTE — PROGRESS NOTES
"Cardiology Office Visit      Encounter Date:  10/28/2020    Patient ID:   Chinedu Lopez is a 59 y.o. male.    Reason For Followup:  Coronary artery disease  Atrial fibrillation      Brief Clinical History:  Dear Nhung Pillai APRN    I had the pleasure of seeing Chinedu Lopez today. As you are well aware, this is a 59 y.o. male with a known history of ischemic heart disease. He additionally has a history of paroxysmal atrial fibrillation status post watchman placement, dyslipidemia, hypertension with hypertensive cardiovascular disease,  tobacco abuse disorder and antiplatelet therapy. He presents today for followup on the above conditions.     Interval History:  He denies chest pain, pressure, heaviness or tightness.  He denies any shortness of breath out of character.  He denies any PND, orthopnea or palpitations.  He denies any syncope or near-syncope.  He reports feeling quite well from a cardiac perspective.     He continues on Effient and lieu of any other anticoagulant or antiplatelet therapy due to the presence of coronary occlusive disease.  However, in review of his history it does appear that he has had a stroke in the past.  As such this may not be the best choice and antiplatelet therapy.  We will have him discontinue his Effient and go to a low-dose aspirin. He also reports that his PEG tube is now removed and his site has healed quite well.  He does have a \"funny feeling\" that occurs from time to time.  It does not last very long and goes away quickly.  It happens about once every 4 to 5 months.  He reports his legs are weak because of all the illnesses that he has had and he continues to struggle with conditioning.  His blood pressure is elevated today however he reports at home it was 134/78.    We were able to review his most recent laboratory data from June of this year.  No significant abnormalities were noted.  Renal function appeared normal.  Basic lipid panel was actually quite " "good.     Assessment & Plan     Impressions:  Coronary artery disease status post coronary arterial bypass grafting and subsequent PCI and stenting.      Most recent catheterization in May of 2016 demonstrating no disease amenable to percutaneous or surgical revascularization.  Paroxysmal atrial fibrillation status post watchman implantation  Hypertension with hypertensive cardiovascular disease.  Dyslipidemia.   Antiplatelet therapy.  Tobacco abuse disorder     Recommendations:  Discontinue Effient  Start low-dose aspirin daily  Followup in 6 months time sooner should there be difficulties  Smoking cessation.    Objective:    Vitals:  Vitals:    10/28/20 0917   BP: 147/87   BP Location: Left arm   Patient Position: Sitting   Cuff Size: Large Adult   Pulse: 61   Resp: 18   SpO2: 99%   Weight: 111 kg (244 lb)   Height: 182.9 cm (72\")       Physical Exam:    General: Alert, cooperative, no distress, appears stated age  Head:  Normocephalic, atraumatic, mucous membranes moist  Eyes:  Conjunctiva/corneas clear, EOM's intact     Neck:  Supple,  no bruit  Lungs: Clear to auscultation bilaterally, no wheezes rhonchi rales are noted  Chest wall: No tenderness  Heart::  Regular rate and rhythm, S1 and S2 normal, 1/6 holosystolic murmur.  No rub or gallop  Abdomen: Soft, non-tender, nondistended bowel sounds active  Extremities: No cyanosis, clubbing, or edema  Pulses: 2+ and symmetric all extremities  Skin:  No rashes or lesions  Neuro/psych: A&O x3. CN II through XII are grossly intact with appropriate affect      Allergies:  No Known Allergies    Medication Review:     Current Outpatient Medications:   •  amantadine (SYMMETREL) 100 MG capsule, Take 100 mg by mouth Daily., Disp: , Rfl:   •  amlodipine-olmesartan (BLAIR) 10-40 MG per tablet, Take 1 tablet by mouth every night at bedtime., Disp: , Rfl:   •  Cholecalciferol (VITAMIN D3) 125 MCG (5000 UT) capsule capsule, Take 5,000 Units by mouth Daily., Disp: , Rfl:   •  " Cyanocobalamin (VITAMIN B 12) 500 MCG tablet, Take 500 mcg by mouth Daily., Disp: , Rfl:   •  folic acid (FOLVITE) 1 MG tablet, Take 1 mg by mouth Daily., Disp: , Rfl:   •  guaiFENesin (MUCINEX) 600 MG 12 hr tablet, Take 600 mg by mouth Daily., Disp: , Rfl:   •  isosorbide mononitrate (IMDUR) 30 MG 24 hr tablet, Take 1 tablet by mouth Daily., Disp: 30 tablet, Rfl: 3  •  metFORMIN (GLUCOPHAGE) 1000 MG tablet, Take 1 tablet by mouth 2 (Two) Times a Day With Meals. Hold for 48 hours post procedure, Disp: , Rfl:   •  nebivolol (BYSTOLIC) 20 MG tablet, Take 1 tablet by mouth Daily., Disp: , Rfl:   •  potassium chloride (K-DUR,KLOR-CON) 20 MEQ CR tablet, Take 20 mEq by mouth 2 (Two) Times a Day., Disp: , Rfl:   •  prasugrel (EFFIENT) 10 MG tablet, Take 10 mg by mouth Daily., Disp: , Rfl:   •  QUEtiapine (SEROquel) 25 MG tablet, Take 25 mg by mouth Daily., Disp: , Rfl:   •  ranolazine (RANEXA) 500 MG 12 hr tablet, Take 500 mg by mouth 2 (Two) Times a Day., Disp: , Rfl: 6  •  rosuvastatin (CRESTOR) 40 MG tablet, Take 40 mg by mouth every night at bedtime., Disp: , Rfl: 4  •  thiamine (VITAMIN B-1) 100 MG tablet, Take 100 mg by mouth Daily., Disp: , Rfl:     Family History:  Family History   Problem Relation Age of Onset   • Diabetes Mother    • Heart attack Father    • Diabetes Father    • Other Sister         MRSA   • Heart disease Other         Positive for Ischemic   • Cancer Other    • Hypertension Other        Past Medical History:  Past Medical History:   Diagnosis Date   • Atrial fibrillation (CMS/[facility])     June, [facility]. Abstracted from [employer] [facility].   • Brain bleed (CMS/HCC) 2019 October 14th fell - Hospital on 17th for 3 weeks   • CAD (coronary artery disease)     S/P CABG and PCI. Abstracted from [employer].   • Hyperlipidemia    • Hypertension    • Myocardial infarction (CMS/HCC) 2006    Non-ST elevation MI. Abstracted from [employer].   • Obstructive sleep apnea on CPAP     At . Abstracted from [employer].   •  Polycythemia vera (CMS/HCC)    • Presence of Watchman left atrial appendage closure device 1/31/2020   • TIA (transient ischemic attack) 11/19/2013    Possible TIA- MRI shows small tumor/head. Abstracted from [facility].       Past surgical History:  Past Surgical History:   Procedure Laterality Date   • ANGIOPLASTY  2006    3 srents were sequentially placed in promimal and mid body of the saphennous vein graft to obtuse marginal branch. Abstracted from [facility].   • ATRIAL APPENDAGE EXCLUSION LEFT WITH TRANSESOPHAGEAL ECHOCARDIOGRAM N/A 1/30/2020    Procedure: Atrial Appendage Occlusion;  Surgeon: Angel Willams MD;  Location: [facility] CATH INVASIVE LOCATION;  Service: Cardiovascular   • ATRIAL APPENDAGE EXCLUSION LEFT WITH TRANSESOPHAGEAL ECHOCARDIOGRAM N/A 1/30/2020    Procedure: Atrial Appendage Occlusion;  Surgeon: John Sheridan MD;  Location: [facility] CATH INVASIVE LOCATION;  Service: Cardiology   • CARDIAC CATHETERIZATION  2000 2003, 2006, 2012. Abstracted from [facility].   • CORONARY ARTERY BYPASS GRAFT  2000    Triple. Abstracted from [facility].   • MUSCLE REPAIR      Tendons and nerves in right lower extremity. Abstracted from [facility].   • OTHER SURGICAL HISTORY  08/2012    RCA mid and proximal- Xience stent x3. Abstracted from [facility].   • OTHER SURGICAL HISTORY  09/2016    Head trauma/bleed at [facility]. Abstracted from [facility].       Social History:  Social History     Socioeconomic History   • Marital status:      Spouse name: Not on file   • Number of children: Not on file   • Years of education: Not on file   • Highest education level: Not on file   Tobacco Use   • Smoking status: Current Every Day Smoker     Packs/day: 1.00   • Smokeless tobacco: Never Used   Substance and Sexual Activity   • Alcohol use: Not Currently     Frequency: 4 or more times a week   • Drug use: No   • Sexual activity: Defer       Review of Systems:  The following systems were reviewed as they  relate to the cardiovascular system: Constitutional, Eyes, ENT, Cardiovascular, Respiratory, Gastrointestinal, Integumentary, Neurological, Psychiatric, Hematologic, Endocrine, Musculoskeletal, and Genitourinary. The pertinent cardiovascular findings are reported above with all other cardiovascular points within those systems being negative.    Diagnostic Study Review:     Current Electrocardiogram:    ECG 12 Lead    Date/Time: 10/28/2020 6:16 PM  Performed by: Renny Mosher DO  Authorized by: Renny Mosher DO   Comparison: not compared with previous ECG   Previous ECG: no previous ECG available  Comments: Normal sinus rhythm with a ventricular rate of 61 bpm.  Nonspecific interventricular conduction delay.  Consider old anteroseptal MI.  Normal QT and QTc intervals.              NOTE: The following portions of the patient's history were reviewed and updated this visit as appropriate: allergies, current medications, past family history, past medical history, past social history, past surgical history and problem list.

## 2020-10-28 ENCOUNTER — OFFICE VISIT (OUTPATIENT)
Dept: CARDIOLOGY | Facility: CLINIC | Age: 60
End: 2020-10-28

## 2020-10-28 VITALS
RESPIRATION RATE: 18 BRPM | SYSTOLIC BLOOD PRESSURE: 147 MMHG | HEART RATE: 61 BPM | HEIGHT: 72 IN | BODY MASS INDEX: 33.05 KG/M2 | OXYGEN SATURATION: 99 % | DIASTOLIC BLOOD PRESSURE: 87 MMHG | WEIGHT: 244 LBS

## 2020-10-28 DIAGNOSIS — Z95.818 PRESENCE OF WATCHMAN LEFT ATRIAL APPENDAGE CLOSURE DEVICE: Chronic | ICD-10-CM

## 2020-10-28 DIAGNOSIS — I63.9 CEREBROVASCULAR ACCIDENT (CVA), UNSPECIFIED MECHANISM (HCC): Chronic | ICD-10-CM

## 2020-10-28 DIAGNOSIS — I25.10 CORONARY ARTERY DISEASE INVOLVING NATIVE CORONARY ARTERY OF NATIVE HEART WITHOUT ANGINA PECTORIS: Primary | Chronic | ICD-10-CM

## 2020-10-28 DIAGNOSIS — Z79.02 LONG TERM (CURRENT) USE OF ANTITHROMBOTICS/ANTIPLATELETS: ICD-10-CM

## 2020-10-28 DIAGNOSIS — E78.49 OTHER HYPERLIPIDEMIA: ICD-10-CM

## 2020-10-28 DIAGNOSIS — E11.9 TYPE 2 DIABETES MELLITUS WITHOUT COMPLICATION, WITHOUT LONG-TERM CURRENT USE OF INSULIN (HCC): Chronic | ICD-10-CM

## 2020-10-28 DIAGNOSIS — I10 ESSENTIAL HYPERTENSION: Chronic | ICD-10-CM

## 2020-10-28 DIAGNOSIS — I48.0 PAROXYSMAL ATRIAL FIBRILLATION (HCC): Chronic | ICD-10-CM

## 2020-10-28 PROCEDURE — 99214 OFFICE O/P EST MOD 30 MIN: CPT | Performed by: INTERNAL MEDICINE

## 2020-10-28 PROCEDURE — 93000 ELECTROCARDIOGRAM COMPLETE: CPT | Performed by: INTERNAL MEDICINE

## 2020-10-28 RX ORDER — DOXAZOSIN MESYLATE 1 MG/1
TABLET ORAL
COMMUNITY
Start: 2020-10-04 | End: 2020-10-28

## 2020-10-28 RX ORDER — ASPIRIN 81 MG/1
81 TABLET, CHEWABLE ORAL ONCE
Status: DISCONTINUED | OUTPATIENT
Start: 2020-10-28 | End: 2021-01-25

## 2020-10-28 RX ORDER — AMLODIPINE AND OLMESARTAN MEDOXOMIL 10; 40 MG/1; MG/1
1 TABLET ORAL
COMMUNITY
Start: 2020-08-25 | End: 2022-04-28 | Stop reason: ALTCHOICE

## 2020-11-30 ENCOUNTER — OFFICE VISIT (OUTPATIENT)
Dept: CARDIOLOGY | Facility: CLINIC | Age: 60
End: 2020-11-30

## 2020-11-30 ENCOUNTER — PREP FOR SURGERY (OUTPATIENT)
Dept: OTHER | Facility: HOSPITAL | Age: 60
End: 2020-11-30

## 2020-11-30 VITALS
OXYGEN SATURATION: 96 % | HEART RATE: 54 BPM | WEIGHT: 244 LBS | BODY MASS INDEX: 33.09 KG/M2 | SYSTOLIC BLOOD PRESSURE: 161 MMHG | DIASTOLIC BLOOD PRESSURE: 95 MMHG

## 2020-11-30 DIAGNOSIS — I48.0 PAROXYSMAL ATRIAL FIBRILLATION (HCC): ICD-10-CM

## 2020-11-30 DIAGNOSIS — I62.9 INTRACRANIAL BLEED (HCC): Primary | ICD-10-CM

## 2020-11-30 DIAGNOSIS — Z95.818 PRESENCE OF WATCHMAN LEFT ATRIAL APPENDAGE CLOSURE DEVICE: ICD-10-CM

## 2020-11-30 DIAGNOSIS — I63.9 CEREBROVASCULAR ACCIDENT (CVA), UNSPECIFIED MECHANISM (HCC): ICD-10-CM

## 2020-11-30 PROCEDURE — 99214 OFFICE O/P EST MOD 30 MIN: CPT | Performed by: INTERNAL MEDICINE

## 2020-11-30 RX ORDER — ASPIRIN 81 MG/1
81 TABLET, CHEWABLE ORAL DAILY
COMMUNITY

## 2020-11-30 RX ORDER — SODIUM CHLORIDE 9 MG/ML
80 INJECTION, SOLUTION INTRAVENOUS CONTINUOUS
Status: CANCELLED | OUTPATIENT
Start: 2020-11-30

## 2020-11-30 NOTE — PROGRESS NOTES
CC--stroke with intracranial bleed    Sub--60-year-old unfortunate male patient on antiplatelet agents and anticoagulation had stroke with intracranial bleed and further underwent watchman implantation and post watchman implantation a WILD was done with complete seal of the appendage and anticoagulation has been stopped and currently on aspirin .  He continues to struggle with his nicotine dependence and currently not consuming any alcohol .He has progressively improved with his cognition and DENIES any chest pain or dyspnea  Patient has known history of ischemic heart disease with paroxysmal atrial fibrillation, dyslipidemia and hypertension with history of tobacco abuse  He has prior history of bypass surgery with subsequent PCI and stenting and most recent cardiac catheterization was in 2016 left to medical management    Past history is reviewed below and verified    Past Medical History:   Diagnosis Date   • Atrial fibrillation (CMS/[facility])     June, [facility]. Abstracted from [facility].   • Brain bleed (CMS/[facility]) 2019 October 14th fell - Hospital on 17th for 3 weeks   • CAD (coronary artery disease)     S/P CABG and PCI. Abstracted from [facility].   • Hyperlipidemia    • Hypertension    • Myocardial infarction (CMS/[facility]) 2006    Non-ST elevation MI. Abstracted from [facility].   • Obstructive sleep apnea on CPAP     At . Abstracted from [facility].   • Polycythemia vera (CMS/[facility])    • Presence of Watchman left atrial appendage closure device 1/31/2020   • TIA (transient ischemic attack) 11/19/2013    Possible TIA- MRI shows small tumor/head. Abstracted from [facility].     Past Surgical History:   Procedure Laterality Date   • ANGIOPLASTY  2006    3 srents were sequentially placed in promimal and mid body of the saphennous vein graft to obtuse marginal branch. Abstracted from [facility].   • ATRIAL APPENDAGE EXCLUSION LEFT WITH TRANSESOPHAGEAL ECHOCARDIOGRAM N/A 1/30/2020    Procedure: Atrial Appendage Occlusion;   Surgeon: Angel Willams MD;  Location: [facility] CATH INVASIVE LOCATION;  Service: Cardiovascular   • ATRIAL APPENDAGE EXCLUSION LEFT WITH TRANSESOPHAGEAL ECHOCARDIOGRAM N/A 1/30/2020    Procedure: Atrial Appendage Occlusion;  Surgeon: John Sheridan MD;  Location: [facility] CATH INVASIVE LOCATION;  Service: Cardiology   • CARDIAC CATHETERIZATION  2000 2003, 2006, 2012. Abstracted from [facility].   • CORONARY ARTERY BYPASS GRAFT  2000    Triple. Abstracted from [facility].   • MUSCLE REPAIR      Tendons and nerves in right lower extremity. Abstracted from [facility].   • OTHER SURGICAL HISTORY  08/2012    RCA mid and proximal- Xience stent x3. Abstracted from [facility].   • OTHER SURGICAL HISTORY  09/2016    Head trauma/bleed at [facility]. Abstracted from [facility].     Review of Systems   General: NO fatigue and tiredness  Eyes: No redness  Cardiovascular: No chest pain, no palpitations  Respiratory: Denies any shortness of breath  Gastrointestinal: No nausea or vomiting, bleeding  Genitourinary: no hematuria or dysuria  Musculoskeletal: No arthralgia or myalgia  Skin: No rash  Neurologic: No numbness, tingling, syncope  Hematologic/Lymphatic: No abnormal bleeding      Physical Exam  VITALS REVIEWED--heart rate of 68 patient is afebrile respiration 12 times a minute blood pressure 161/95    General:      well developed, well nourished, in no acute distress.    Head:      normocephalic and atraumatic.    Eyes:      PERRL/EOM intact, conjunctiva and sclera clear with out nystagmus.    Neck:      no masses, thyromegaly, trachea central with normal respiratory effort  Lungs:      clear bilaterally to auscultation.    Heart:      non-displaced PMI, chest non-tender; regular rate and rhythm, S1, S2 without murmurs, rubs, or gallops  Skin:      intact without lesions or rashes.    Psych:      alert and cooperative; normal mood and affect; normal attention span and concentration.      Extremities with trace  ankle edema without any cyanosis or clubbing    Muscular skeletal patient walks with a normal gait  with mild kyphosis    Neurological no focal deficits and alert oriented x3    Abdomen soft nontender no hepatomegaly       chads vasc score--- diabetes ,hypertension, vascular disease, stroke--5  HASBLED--4    Assessment plan    Unfortunate male patient with high risk for stroke with intracranial bleed --- post watchman implantation in 1/2020, doing  well--- feels much better without anticoagulation without any further problems with bleeding--- patient currently on  Aspirin--- surveillance WILD  ordered at 1 year as per protocol in January 2021  Coronary artery disease stable  Hypertension controlled at home  Diabetes and hyperlipidemia  Follow-up after few months  Paroxysmal atrial fibrillation currently in sinus rhythm      Electronically signed by Angel Willams MD, 11/30/20, 8:49 AM EST.

## 2021-01-23 ENCOUNTER — LAB (OUTPATIENT)
Dept: LAB | Facility: HOSPITAL | Age: 61
End: 2021-01-23

## 2021-01-23 DIAGNOSIS — Z01.818 PRE-OP EXAM: Primary | ICD-10-CM

## 2021-01-23 LAB — SARS-COV-2 ORF1AB RESP QL NAA+PROBE: NOT DETECTED

## 2021-01-23 PROCEDURE — C9803 HOPD COVID-19 SPEC COLLECT: HCPCS

## 2021-01-23 PROCEDURE — U0004 COV-19 TEST NON-CDC HGH THRU: HCPCS

## 2021-01-25 ENCOUNTER — ANESTHESIA EVENT (OUTPATIENT)
Dept: CARDIOLOGY | Facility: HOSPITAL | Age: 61
End: 2021-01-25

## 2021-01-25 RX ORDER — LIDOCAINE HYDROCHLORIDE 10 MG/ML
0.5 INJECTION, SOLUTION EPIDURAL; INFILTRATION; INTRACAUDAL; PERINEURAL ONCE AS NEEDED
Status: CANCELLED | OUTPATIENT
Start: 2021-01-25

## 2021-01-25 RX ORDER — ISOSORBIDE MONONITRATE 30 MG/1
30 TABLET, EXTENDED RELEASE ORAL DAILY
COMMUNITY

## 2021-01-25 RX ORDER — SODIUM CHLORIDE 0.9 % (FLUSH) 0.9 %
3-10 SYRINGE (ML) INJECTION AS NEEDED
Status: CANCELLED | OUTPATIENT
Start: 2021-01-25

## 2021-01-25 RX ORDER — SODIUM CHLORIDE 0.9 % (FLUSH) 0.9 %
3 SYRINGE (ML) INJECTION EVERY 12 HOURS SCHEDULED
Status: CANCELLED | OUTPATIENT
Start: 2021-01-25

## 2021-01-25 RX ORDER — ROSUVASTATIN CALCIUM 20 MG/1
40 TABLET, COATED ORAL NIGHTLY
COMMUNITY

## 2021-01-25 RX ORDER — SODIUM CHLORIDE 9 MG/ML
9 INJECTION, SOLUTION INTRAVENOUS CONTINUOUS PRN
Status: CANCELLED | OUTPATIENT
Start: 2021-01-25

## 2021-01-25 RX ORDER — NITROGLYCERIN 400 UG/1
1 SPRAY ORAL
COMMUNITY

## 2021-01-26 ENCOUNTER — HOSPITAL ENCOUNTER (OUTPATIENT)
Dept: CARDIOLOGY | Facility: HOSPITAL | Age: 61
Discharge: HOME | End: 2021-01-26

## 2021-01-26 ENCOUNTER — ANESTHESIA (OUTPATIENT)
Dept: CARDIOLOGY | Facility: HOSPITAL | Age: 61
End: 2021-01-26

## 2021-01-26 VITALS
OXYGEN SATURATION: 92 % | HEIGHT: 71 IN | HEART RATE: 61 BPM | RESPIRATION RATE: 15 BRPM | SYSTOLIC BLOOD PRESSURE: 147 MMHG | DIASTOLIC BLOOD PRESSURE: 77 MMHG | BODY MASS INDEX: 33.8 KG/M2 | WEIGHT: 241.4 LBS | TEMPERATURE: 97.3 F

## 2021-01-26 VITALS — SYSTOLIC BLOOD PRESSURE: 151 MMHG | DIASTOLIC BLOOD PRESSURE: 117 MMHG | OXYGEN SATURATION: 98 %

## 2021-01-26 DIAGNOSIS — I62.9 INTRACRANIAL BLEED (HCC): ICD-10-CM

## 2021-01-26 DIAGNOSIS — Z95.818 PRESENCE OF WATCHMAN LEFT ATRIAL APPENDAGE CLOSURE DEVICE: ICD-10-CM

## 2021-01-26 DIAGNOSIS — I48.0 PAROXYSMAL ATRIAL FIBRILLATION (HCC): ICD-10-CM

## 2021-01-26 LAB
BH CV ECHO MEAS - EF(MOD-BP): 60 %
GLUCOSE BLDC GLUCOMTR-MCNC: 96 MG/DL (ref 70–105)
LV EF 2D ECHO EST: 60 %

## 2021-01-26 PROCEDURE — 25010000002 PROPOFOL 10 MG/ML EMULSION: Performed by: ANESTHESIOLOGIST ASSISTANT

## 2021-01-26 PROCEDURE — 93325 DOPPLER ECHO COLOR FLOW MAPG: CPT | Performed by: INTERNAL MEDICINE

## 2021-01-26 PROCEDURE — 93312 ECHO TRANSESOPHAGEAL: CPT | Performed by: INTERNAL MEDICINE

## 2021-01-26 PROCEDURE — 93320 DOPPLER ECHO COMPLETE: CPT | Performed by: INTERNAL MEDICINE

## 2021-01-26 PROCEDURE — 82962 GLUCOSE BLOOD TEST: CPT

## 2021-01-26 PROCEDURE — 93312 ECHO TRANSESOPHAGEAL: CPT

## 2021-01-26 PROCEDURE — 93325 DOPPLER ECHO COLOR FLOW MAPG: CPT

## 2021-01-26 PROCEDURE — 93320 DOPPLER ECHO COMPLETE: CPT

## 2021-01-26 RX ORDER — IPRATROPIUM BROMIDE AND ALBUTEROL SULFATE 2.5; .5 MG/3ML; MG/3ML
3 SOLUTION RESPIRATORY (INHALATION) ONCE AS NEEDED
Status: CANCELLED | OUTPATIENT
Start: 2021-01-26

## 2021-01-26 RX ORDER — TRIAMTERENE AND HYDROCHLOROTHIAZIDE 37.5; 25 MG/1; MG/1
1 TABLET ORAL DAILY
COMMUNITY
End: 2021-12-06

## 2021-01-26 RX ORDER — PROPOFOL 10 MG/ML
VIAL (ML) INTRAVENOUS AS NEEDED
Status: DISCONTINUED | OUTPATIENT
Start: 2021-01-26 | End: 2021-01-26 | Stop reason: SURG

## 2021-01-26 RX ORDER — FAMOTIDINE 40 MG/1
40 TABLET, FILM COATED ORAL DAILY
COMMUNITY

## 2021-01-26 RX ORDER — SODIUM CHLORIDE 9 MG/ML
80 INJECTION, SOLUTION INTRAVENOUS CONTINUOUS
Status: DISCONTINUED | OUTPATIENT
Start: 2021-01-26 | End: 2021-01-27 | Stop reason: HOSPADM

## 2021-01-26 RX ORDER — PANTOPRAZOLE SODIUM 40 MG/1
40 TABLET, DELAYED RELEASE ORAL DAILY
COMMUNITY

## 2021-01-26 RX ADMIN — SODIUM CHLORIDE 80 ML/HR: 9 INJECTION, SOLUTION INTRAVENOUS at 11:35

## 2021-01-26 RX ADMIN — PROPOFOL 250 MG: 10 INJECTION, EMULSION INTRAVENOUS at 13:17

## 2021-01-26 NOTE — DISCHARGE INSTRUCTIONS
WILD DC Instructions    1) The medication, which was used to put the patient to sleep, will be acting in your body for the next twenty-four (24) hours, so you might feel a little sleepy; this feeling will slowly wear off.  Because the medicine is still in your system for the next twenty-four (24) hours, the adult patient SHOULD NOT:    a. Drive a car, operate machinery, or power tools  b. Drink any alcoholic drinks (not even beer)  c. Make any important decisions such as to sign important papers  2) We strongly suggest that a responsible adult be with the patient the rest of the day.    3) Any problems with:    a. EXCESSIVE MUCOUS  b. SPITTING UP BLOOD  c. SORE THROAT AT MORE THAN 72 HOURS    NOTIFY DR. Willams -[phone] OR CALL THE [facility] EMERGENCY [facility]ENTER -[phone].

## 2021-01-26 NOTE — ANESTHESIA PREPROCEDURE EVALUATION
Anesthesia Evaluation     Patient summary reviewed and Nursing notes reviewed   NPO Solid Status: > 8 hours  NPO Liquid Status: > 8 hours           Airway   Mallampati: II  TM distance: >3 FB  Neck ROM: full  No difficulty expected  Dental    (+) edentulous    Pulmonary    (+) a smoker Current Smoked day of surgery, sleep apnea,   Cardiovascular     (+) hypertension, CAD, CABG, dysrhythmias Atrial Fib, hyperlipidemia,       Neuro/Psych  (+) CVA residual symptoms,     GI/Hepatic/Renal/Endo    (+)   diabetes mellitus,     Musculoskeletal     Abdominal    Substance History      OB/GYN          Other        ROS/Med Hx Other: S/p watchman    · Left ventricular wall thickness is consistent with mild-to-moderate concentric hypertrophy.  · Estimated EF = 60%.  · Left ventricular systolic function is normal.  · Left atrial cavity size is moderately dilated.  · Mild mitral valve regurgitation is present                   Anesthesia Plan    ASA 3     MAC     intravenous induction     Anesthetic plan, all risks, benefits, and alternatives have been provided, discussed and informed consent has been obtained with: patient.    Plan discussed with CAA and CRNA.

## 2021-01-26 NOTE — ANESTHESIA POSTPROCEDURE EVALUATION
Patient: Chinedu Lopez    Procedure Summary     Date: 01/26/21 Room / Location: [facility]    Anesthesia Start: 1307 Anesthesia Stop: 1337    Procedure: ADULT TRANSESOPHAGEAL ECHO (WILD) W/ CONT IF NECESSARY PER PROTOCOL Diagnosis:       Intracranial bleed (CMS/HCC)      Presence of Watchman left atrial appendage closure device      Paroxysmal atrial fibrillation (CMS/HCC)      (Arrhythmia)    Scheduled Providers: Angel Willams MD Provider: Iftikhar Ramsey MD    Anesthesia Type: MAC ASA Status: 3          Anesthesia Type: MAC    Vitals  Vitals Value Taken Time   /79 01/26/21 1516   Temp     Pulse 61 01/26/21 1527   Resp     SpO2 93 % 01/26/21 1527   Vitals shown include unvalidated device data.        Post Anesthesia Care and Evaluation    Patient location during evaluation: bedside  Patient participation: complete - patient participated  Level of consciousness: awake  Pain scale: See nurse's notes for pain score.  Pain management: adequate  Airway patency: patent  Anesthetic complications: No anesthetic complications  PONV Status: none  Cardiovascular status: acceptable  Respiratory status: acceptable  Hydration status: acceptable    Comments: Patient seen and examined postoperatively; vital signs stable; SpO2 greater than or equal to 90%; cardiopulmonary status stable; nausea/vomiting adequately controlled; pain adequately controlled; no apparent anesthesia complications; patient discharged from anesthesia care when discharge criteria were met

## 2021-01-26 NOTE — H&P
CC--stroke with intracranial bleed     Sub--60-year-old unfortunate male patient on antiplatelet agents and anticoagulation had stroke with intracranial bleed and further underwent watchman implantation and post watchman implantation a WILD was done with complete seal of the appendage and anticoagulation has been stopped and currently on aspirin .  He continues to struggle with his nicotine dependence and currently not consuming any alcohol .He has progressively improved with his cognition and DENIES any chest pain or dyspnea  Patient has known history of ischemic heart disease with paroxysmal atrial fibrillation, dyslipidemia and hypertension with history of tobacco abuse  He has prior history of bypass surgery with subsequent PCI and stenting and most recent cardiac catheterization was in 2016 left to medical management     Past history is reviewed below and verified          Past Medical History:   Diagnosis Date   • Atrial fibrillation (CMS/[facility])       June, [facility]. Abstracted from [facility].   • Brain bleed (CMS/[facility]) 2019 October 14th fell - Hospital on 17th for 3 weeks   • CAD (coronary artery disease)       S/P CABG and PCI. Abstracted from [facility].   • Hyperlipidemia     • Hypertension     • Myocardial infarction (CMS/[facility]) 2006     Non-ST elevation MI. Abstracted from [facility].   • Obstructive sleep apnea on CPAP       At . Abstracted from [facility].   • Polycythemia vera (CMS/[facility])     • Presence of Watchman left atrial appendage closure device 1/31/2020   • TIA (transient ischemic attack) 11/19/2013     Possible TIA- MRI shows small tumor/head. Abstracted from [facility].            Past Surgical History:   Procedure Laterality Date   • ANGIOPLASTY   2006     3 srents were sequentially placed in promimal and mid body of the saphennous vein graft to obtuse marginal branch. Abstracted from [facility].   • ATRIAL APPENDAGE EXCLUSION LEFT WITH TRANSESOPHAGEAL ECHOCARDIOGRAM N/A 1/30/2020     Procedure:  Atrial Appendage Occlusion;  Surgeon: Angel Willams MD;  Location: [facility] CATH INVASIVE LOCATION;  Service: Cardiovascular   • ATRIAL APPENDAGE EXCLUSION LEFT WITH TRANSESOPHAGEAL ECHOCARDIOGRAM N/A 1/30/2020     Procedure: Atrial Appendage Occlusion;  Surgeon: John Sheridan MD;  Location: [facility] CATH INVASIVE LOCATION;  Service: Cardiology   • CARDIAC CATHETERIZATION   2000     2003, 2006, 2012. Abstracted from [facility].   • CORONARY ARTERY BYPASS GRAFT   2000     Triple. Abstracted from [facility].   • MUSCLE REPAIR         Tendons and nerves in right lower extremity. Abstracted from [facility].   • OTHER SURGICAL HISTORY   08/2012     RCA mid and proximal- Xience stent x3. Abstracted from [facility].   • OTHER SURGICAL HISTORY   09/2016     Head trauma/bleed at [facility]. Abstracted from [facility].      Review of Systems   General: NO fatigue and tiredness  Eyes: No redness  Cardiovascular: No chest pain, no palpitations  Respiratory: Denies any shortness of breath  Gastrointestinal: No nausea or vomiting, bleeding  Genitourinary: no hematuria or dysuria  Musculoskeletal: No arthralgia or myalgia  Skin: No rash  Neurologic: No numbness, tingling, syncope  Hematologic/Lymphatic: No abnormal bleeding        Physical Exam  VITALS REVIEWED--heart rate of 68 patient is afebrile respiration 12 times a minute blood pressure 161/95     General:      well developed, well nourished, in no acute distress.    Head:      normocephalic and atraumatic.    Eyes:      PERRL/EOM intact, conjunctiva and sclera clear with out nystagmus.    Neck:      no masses, thyromegaly, trachea central with normal respiratory effort  Lungs:      clear bilaterally to auscultation.    Heart:      non-displaced PMI, chest non-tender; regular rate and rhythm, S1, S2 without murmurs, rubs, or gallops  Skin:      intact without lesions or rashes.    Psych:      alert and cooperative; normal mood and affect; normal attention span and  concentration.       Extremities with trace ankle edema without any cyanosis or clubbing     Muscular skeletal patient walks with a normal gait  with mild kyphosis     Neurological no focal deficits and alert oriented x3     Abdomen soft nontender no hepatomegaly         chads vasc score--- diabetes ,hypertension, vascular disease, stroke--5  HASBLED--4     Assessment plan     Unfortunate male patient with high risk for stroke with intracranial bleed --- post watchman implantation in 1/2020, doing  well--- feels much better without anticoagulation without any further problems with bleeding--- patient currently on  Aspirin--- surveillance WILD  ordered at 1 year as per protocol in January 2021  Coronary artery disease stable  Hypertension controlled at home  Diabetes and hyperlipidemia  Follow-up after few months  Paroxysmal atrial fibrillation currently in sinus rhythm        Electronically signed by Angel Willams MD, 01/26/21, 1:31 PM EST.

## 2021-04-08 ENCOUNTER — ON CAMPUS - OUTPATIENT (OUTPATIENT)
Dept: URBAN - METROPOLITAN AREA HOSPITAL 77 | Facility: HOSPITAL | Age: 61
End: 2021-04-08

## 2021-04-08 DIAGNOSIS — K29.50 UNSPECIFIED CHRONIC GASTRITIS WITHOUT BLEEDING: ICD-10-CM

## 2021-04-08 DIAGNOSIS — K64.0 FIRST DEGREE HEMORRHOIDS: ICD-10-CM

## 2021-04-08 DIAGNOSIS — K22.70 BARRETT'S ESOPHAGUS WITHOUT DYSPLASIA: ICD-10-CM

## 2021-04-08 DIAGNOSIS — K63.5 POLYP OF COLON: ICD-10-CM

## 2021-04-08 DIAGNOSIS — K29.80 DUODENITIS WITHOUT BLEEDING: ICD-10-CM

## 2021-04-08 DIAGNOSIS — Z12.11 ENCOUNTER FOR SCREENING FOR MALIGNANT NEOPLASM OF COLON: ICD-10-CM

## 2021-04-08 DIAGNOSIS — K21.9 GASTRO-ESOPHAGEAL REFLUX DISEASE WITHOUT ESOPHAGITIS: ICD-10-CM

## 2021-04-08 PROCEDURE — 45385 COLONOSCOPY W/LESION REMOVAL: CPT | Mod: 33 | Performed by: INTERNAL MEDICINE

## 2021-04-08 PROCEDURE — 43239 EGD BIOPSY SINGLE/MULTIPLE: CPT | Performed by: INTERNAL MEDICINE

## 2021-04-27 NOTE — PROGRESS NOTES
Cardiology Office Visit      Encounter Date:  04/28/2021    Patient ID:   Chinedu Lopez is a 60 y.o. male.    Reason For Followup:  Coronary artery disease  Atrial fibrillation      Brief Clinical History:  Dear Nhung Pillai APRN    I had the pleasure of seeing Chinedu Lopez today. As you are well aware, this is a 60 y.o. male with a known history of ischemic heart disease. He additionally has a history of paroxysmal atrial fibrillation status post watchman placement, dyslipidemia, hypertension with hypertensive cardiovascular disease,  tobacco abuse disorder and antiplatelet therapy. He presents today for followup on the above conditions.     Interval History:  He denies chest pain, pressure, heaviness or tightness.  He denies any shortness of breath out of character.  He denies any PND, orthopnea or palpitations.  He denies any syncope or near-syncope.  He does report some lightheadedness from time to time but otherwise reports feeling quite well from a cardiac perspective.    He reports that there were some changes made to his antihypertensive regimen.  He has started Maxide.  His blood pressure is 101/77 but reports that it was 97/61 at home.  He reports that there is significant variations in his blood pressure and you are currently working to level this out.    He did have some questions regarding a supplement called Omega XL.  I advised him that substances such as these do not have any FDA indications or recommendations.    His most recent laboratory data on file is from June 2020.  He reports that he had some recent lab work performed.  Nonetheless review of his labs from June 2020 demonstrate normal hemoglobin and hematocrit, normal renal function, normal hepatic function, and normal cholesterol values.     Assessment & Plan     Impressions:  Coronary artery disease status post coronary arterial bypass grafting and subsequent PCI and stenting.      Most recent catheterization in May of 2016  "demonstrating no disease amenable to percutaneous or surgical revascularization.  Paroxysmal atrial fibrillation status post watchman implantation  Hypertension with hypertensive cardiovascular disease.  Dyslipidemia.   Antiplatelet therapy.  Tobacco abuse disorder     Recommendations:  Continuation of his current cardiovascular regimen at present time.  Blood pressure as per your discretion  We will obtain labs from your office for review  Follow-up in 6 months time sooner should there be difficulties  Smoking cessation.    Objective:    Vitals:  Vitals:    04/28/21 1007   BP: 101/77   BP Location: Left arm   Patient Position: Sitting   Cuff Size: Large Adult   Pulse: 92   Resp: 18   SpO2: 94%   Weight: 108 kg (239 lb)   Height: 180.3 cm (71\")       Physical Exam:    General: Alert, cooperative, no distress, appears stated age  Head:  Normocephalic, atraumatic, mucous membranes moist  Eyes:  Conjunctiva/corneas clear, EOM's intact     Neck:  Supple,  no bruit  Lungs: Clear to auscultation bilaterally, no wheezes rhonchi rales are noted  Chest wall: No tenderness  Heart::  Regular rate and rhythm, S1 and S2 normal, 1/6 holosystolic murmur.  No rub or gallop  Abdomen: Soft, non-tender, nondistended bowel sounds active  Extremities: No cyanosis, clubbing, or edema  Pulses: 2+ and symmetric all extremities  Skin:  No rashes or lesions  Neuro/psych: A&O x3. CN II through XII are grossly intact with appropriate affect      Allergies:  No Known Allergies    Medication Review:     Current Outpatient Medications:   •  amantadine (SYMMETREL) 100 MG capsule, Take 100 mg by mouth Daily., Disp: , Rfl:   •  amlodipine-olmesartan (BLAIR) 10-40 MG per tablet, Take 1 tablet by mouth every night at bedtime., Disp: , Rfl:   •  aspirin 81 MG chewable tablet, Chew 81 mg Daily., Disp: , Rfl:   •  Cholecalciferol (VITAMIN D3) 125 MCG (5000 UT) capsule capsule, Take 5,000 Units by mouth Daily., Disp: , Rfl:   •  Cyanocobalamin (VITAMIN B " 12) 500 MCG tablet, Take 500 mcg by mouth Daily., Disp: , Rfl:   •  famotidine (PEPCID) 40 MG tablet, Take 40 mg by mouth Daily., Disp: , Rfl:   •  folic acid (FOLVITE) 1 MG tablet, Take 1 mg by mouth Daily., Disp: , Rfl:   •  guaiFENesin (MUCINEX) 600 MG 12 hr tablet, Take 600 mg by mouth Daily., Disp: , Rfl:   •  isosorbide mononitrate (IMDUR) 30 MG 24 hr tablet, Take 30 mg by mouth Daily., Disp: , Rfl:   •  metFORMIN (GLUCOPHAGE) 1000 MG tablet, Take 1,000 mg by mouth 2 (Two) Times a Day With Meals., Disp: , Rfl:   •  nebivolol (BYSTOLIC) 20 MG tablet, Take 1 tablet by mouth Daily., Disp: , Rfl:   •  nitroglycerin (NITROLINGUAL) 0.4 MG/SPRAY spray, Place 1 spray under the tongue Every 5 (Five) Minutes As Needed for Chest Pain., Disp: , Rfl:   •  pantoprazole (PROTONIX) 40 MG EC tablet, Take 40 mg by mouth Daily., Disp: , Rfl:   •  potassium chloride (K-DUR,KLOR-CON) 20 MEQ CR tablet, Take 20 mEq by mouth 2 (Two) Times a Day., Disp: , Rfl:   •  QUEtiapine (SEROquel) 25 MG tablet, Take 25 mg by mouth Daily., Disp: , Rfl:   •  ranolazine (RANEXA) 500 MG 12 hr tablet, Take 500 mg by mouth 2 (Two) Times a Day., Disp: , Rfl: 6  •  rosuvastatin (CRESTOR) 20 MG tablet, Take 40 mg by mouth Every Night., Disp: , Rfl:   •  thiamine (VITAMIN B-1) 100 MG tablet, Take 100 mg by mouth Daily., Disp: , Rfl:   •  triamterene-hydrochlorothiazide (MAXZIDE-25) 37.5-25 MG per tablet, Take 1 tablet by mouth Daily., Disp: , Rfl:     Family History:  Family History   Problem Relation Age of Onset   • Diabetes Mother    • Heart attack Father    • Diabetes Father    • Other Sister         MRSA   • Heart disease Other         Positive for Ischemic   • Cancer Other    • Hypertension Other        Past Medical History:  Past Medical History:   Diagnosis Date   • Atrial fibrillation (CMS/HCC)     June, [facility]. Abstracted from [facility].   • Brain bleed (CMS/HCC) 2019 October 14th fell - Hospital on 17th for 3 weeks   • CAD (coronary artery  disease)     S/P CABG and PCI. Abstracted from [facility].   • Hyperlipidemia    • Hypertension    • Myocardial infarction (CMS/[facility]) 2006    Non-ST elevation MI. Abstracted from [facility].   • Obstructive sleep apnea on CPAP     At [facility]. Abstracted from [facility].   • Presence of Watchman left atrial appendage closure device 1/31/2020   • Stroke (CMS/[facility]) 2019   • TIA (transient ischemic attack) 11/19/2013    Possible TIA- MRI shows small tumor/head. Abstracted from "[facility].       Past surgical History:  Past Surgical History:   Procedure Laterality Date   • ANGIOPLASTY  2006    3 srents were sequentially placed in promimal and mid body of the saphennous vein graft to obtuse marginal branch. Abstracted from [facility].   • ATRIAL APPENDAGE EXCLUSION LEFT WITH TRANSESOPHAGEAL ECHOCARDIOGRAM N/A 1/30/2020    Procedure: Atrial Appendage Occlusion;  Surgeon: Angel Willams MD;  Location: [facility] CATH INVASIVE LOCATION;  Service: Cardiovascular   • ATRIAL APPENDAGE EXCLUSION LEFT WITH TRANSESOPHAGEAL ECHOCARDIOGRAM N/A 1/30/2020    Procedure: Atrial Appendage Occlusion;  Surgeon: John Sheridan MD;  Location: [facility] CATH INVASIVE LOCATION;  Service: Cardiology   • CARDIAC CATHETERIZATION  2000    2003, 2006, 2012. Abstracted from [facility].   • CORONARY ARTERY BYPASS GRAFT  2000    Triple. Abstracted from [facility].   • MUSCLE REPAIR      Tendons and nerves in right lower extremity. Abstracted from [facility].   • OTHER SURGICAL HISTORY  08/2012    RCA mid and proximal- Xience stent x3. Abstracted from "[facility].   • OTHER SURGICAL HISTORY  09/2016    Head trauma/bleed at [facility]. Abstracted from [facility].       Social History:  Social History     Socioeconomic History   • Marital status:      Spouse name: Not on file   • Number of children: Not on file   • Years of education: Not on file   • Highest education level: Not on file   Tobacco Use   • Smoking status: Current Every Day Smoker      Packs/day: 1.00   • Smokeless tobacco: Never Used   Vaping Use   • Vaping Use: Never used   Substance and Sexual Activity   • Alcohol use: Not Currently   • Drug use: No   • Sexual activity: Defer       Review of Systems:  The following systems were reviewed as they relate to the cardiovascular system: Constitutional, Eyes, ENT, Cardiovascular, Respiratory, Gastrointestinal, Integumentary, Neurological, Psychiatric, Hematologic, Endocrine, Musculoskeletal, and Genitourinary. The pertinent cardiovascular findings are reported above with all other cardiovascular points within those systems being negative.    Diagnostic Study Review:     Current Electrocardiogram:    ECG 12 Lead    Date/Time: 4/28/2021 1:04 PM  Performed by: Renny Mosher DO  Authorized by: Renny Mosher DO   Comparison: not compared with previous ECG   Previous ECG: no previous ECG available  Comments: Atrial fibrillation with a ventricular rate of 116 bpm.  Borderline right axis deviation.  Nonspecific repolarization changes consider ischemia.  Normal QT and QTc intervals.              NOTE: The following portions of the patient's note were reviewed, confirmed and/or updated this visit as appropriate: History of present illness/Interval history, physical examination, assessment & plan, allergies, current medications, past family history, past medical history, past social history, past surgical history and problem list.

## 2021-04-28 ENCOUNTER — OFFICE VISIT (OUTPATIENT)
Dept: CARDIOLOGY | Facility: CLINIC | Age: 61
End: 2021-04-28

## 2021-04-28 VITALS
OXYGEN SATURATION: 94 % | WEIGHT: 239 LBS | SYSTOLIC BLOOD PRESSURE: 101 MMHG | BODY MASS INDEX: 33.46 KG/M2 | HEIGHT: 71 IN | RESPIRATION RATE: 18 BRPM | HEART RATE: 92 BPM | DIASTOLIC BLOOD PRESSURE: 77 MMHG

## 2021-04-28 DIAGNOSIS — Z95.818 PRESENCE OF WATCHMAN LEFT ATRIAL APPENDAGE CLOSURE DEVICE: Chronic | ICD-10-CM

## 2021-04-28 DIAGNOSIS — I48.0 PAROXYSMAL ATRIAL FIBRILLATION (HCC): Chronic | ICD-10-CM

## 2021-04-28 DIAGNOSIS — Z79.02 LONG TERM (CURRENT) USE OF ANTITHROMBOTICS/ANTIPLATELETS: ICD-10-CM

## 2021-04-28 DIAGNOSIS — I10 ESSENTIAL HYPERTENSION: Chronic | ICD-10-CM

## 2021-04-28 DIAGNOSIS — I25.10 CORONARY ARTERY DISEASE INVOLVING NATIVE CORONARY ARTERY OF NATIVE HEART WITHOUT ANGINA PECTORIS: Primary | Chronic | ICD-10-CM

## 2021-04-28 DIAGNOSIS — E11.9 TYPE 2 DIABETES MELLITUS WITHOUT COMPLICATION, WITHOUT LONG-TERM CURRENT USE OF INSULIN (HCC): Chronic | ICD-10-CM

## 2021-04-28 PROCEDURE — 99214 OFFICE O/P EST MOD 30 MIN: CPT | Performed by: INTERNAL MEDICINE

## 2021-04-28 PROCEDURE — 93000 ELECTROCARDIOGRAM COMPLETE: CPT | Performed by: INTERNAL MEDICINE

## 2021-04-28 NOTE — PATIENT INSTRUCTIONS
Discuss BP medications and fatigue with Nhung at next visit  Get labs from Nhung's office for review  Follow-up in 6 months

## 2021-06-04 ENCOUNTER — OFFICE VISIT (OUTPATIENT)
Dept: CARDIOLOGY | Facility: CLINIC | Age: 61
End: 2021-06-04

## 2021-06-04 VITALS
OXYGEN SATURATION: 94 % | BODY MASS INDEX: 34.3 KG/M2 | HEART RATE: 96 BPM | SYSTOLIC BLOOD PRESSURE: 134 MMHG | WEIGHT: 245 LBS | HEIGHT: 71 IN | RESPIRATION RATE: 18 BRPM | DIASTOLIC BLOOD PRESSURE: 77 MMHG

## 2021-06-04 DIAGNOSIS — I62.9 INTRACRANIAL BLEED (HCC): ICD-10-CM

## 2021-06-04 DIAGNOSIS — I48.19 PERSISTENT ATRIAL FIBRILLATION (HCC): ICD-10-CM

## 2021-06-04 DIAGNOSIS — Z95.818 PRESENCE OF WATCHMAN LEFT ATRIAL APPENDAGE CLOSURE DEVICE: Primary | ICD-10-CM

## 2021-06-04 DIAGNOSIS — I63.9 CEREBROVASCULAR ACCIDENT (CVA), UNSPECIFIED MECHANISM (HCC): ICD-10-CM

## 2021-06-04 DIAGNOSIS — I10 ESSENTIAL HYPERTENSION: ICD-10-CM

## 2021-06-04 PROCEDURE — 99213 OFFICE O/P EST LOW 20 MIN: CPT | Performed by: INTERNAL MEDICINE

## 2021-06-04 RX ORDER — DOXAZOSIN MESYLATE 1 MG/1
1 TABLET ORAL
COMMUNITY
Start: 2021-05-26

## 2021-06-04 NOTE — PROGRESS NOTES
CC--stroke with intracranial bleed    Sub--60-year-old unfortunate male patient on antiplatelet agents and anticoagulation had stroke with intracranial bleed and further underwent watchman implantation and post watchman implantation a WILD was done with complete seal of the appendage and anticoagulation has been stopped and currently on aspirin .  He continues to struggle with his nicotine dependence and currently not consuming any alcohol .He has progressively improved with his cognition and DENIES any chest pain or dyspnea  Patient has known history of ischemic heart disease with paroxysmal atrial fibrillation, dyslipidemia and hypertension with history of tobacco abuse  He has prior history of bypass surgery with subsequent PCI and stenting and most recent cardiac catheterization was in 2016 left to medical management  Came for follow up without any symptoms  Complaints of intermittent dizziness        Past Medical History:   Diagnosis Date   • Atrial fibrillation (CMS/[facility])     June, [facility]. Abstracted from [facility].   • Brain bleed (CMS/[facility]) 2019 October 14th fell - Hospital on 17th for 3 weeks   • CAD (coronary artery disease)     S/P CABG and PCI. Abstracted from [facility].   • Hyperlipidemia    • Hypertension    • Myocardial infarction (CMS/[facility]) 2006    Non-ST elevation MI. Abstracted from [facility].   • Obstructive sleep apnea on CPAP     At . Abstracted from [facility].   • Polycythemia vera (CMS/[facility])    • Presence of Watchman left atrial appendage closure device 1/31/2020   • TIA (transient ischemic attack) 11/19/2013    Possible TIA- MRI shows small tumor/head. Abstracted from [facility].     Past Surgical History:   Procedure Laterality Date   • ANGIOPLASTY  2006    3 srents were sequentially placed in promimal and mid body of the saphennous vein graft to obtuse marginal branch. Abstracted from [facility].   • ATRIAL APPENDAGE EXCLUSION LEFT WITH TRANSESOPHAGEAL ECHOCARDIOGRAM N/A 1/30/2020     Procedure: Atrial Appendage Occlusion;  Surgeon: Angel Willams MD;  Location: [facility] CATH INVASIVE LOCATION;  Service: Cardiovascular   • ATRIAL APPENDAGE EXCLUSION LEFT WITH TRANSESOPHAGEAL ECHOCARDIOGRAM N/A 1/30/2020    Procedure: Atrial Appendage Occlusion;  Surgeon: John Sheridan MD;  Location: [facility] CATH INVASIVE LOCATION;  Service: Cardiology   • CARDIAC CATHETERIZATION  2000    2003, 2006, 2012. Abstracted from [facility].   • CORONARY ARTERY BYPASS GRAFT  2000    Triple. Abstracted from [employer].   • MUSCLE REPAIR      Tendons and nerves in right lower extremity. Abstracted from [facility].   • OTHER SURGICAL HISTORY  08/2012    RCA mid and proximal- Xience stent x3. Abstracted from [facility].   • OTHER SURGICAL HISTORY  09/2016    Head trauma/bleed at [facility]. Abstracted from [facility].     Review of Systems   General: NO fatigue and tiredness  Eyes: No redness  Cardiovascular: No chest pain, no palpitations      Physical Exam  VITALS REVIEWED    General:      well developed, well nourished, in no acute distress.    Head:      normocephalic and atraumatic.    Eyes:      PERRL/EOM intact, conjunctiva and sclera clear with out nystagmus.    Neck:      no masses, thyromegaly, trachea central with normal respiratory effort  Lungs:      clear bilaterally to auscultation.    Heart:       regular rate and rhythm, S1, S2 without murmurs, rubs, or gallops        Assessment plan  Last CR--1.1, k-4.4    Unfortunate male patient with high risk for stroke with intracranial bleed --- post watchman implantation in 1/2020, doing  well--- feels much better without anticoagulation without any further problems with bleeding--- patient currently on  Aspirin  Coronary artery disease stable  Hypertension controlled at home  Diabetes and hyperlipidemia  Follow-up after 6 months  Persistent AF--EKG in 2020 in sinus rhythm and currently in persistent AF and atrial arrhythmia can be contributing to dizziness and  will follow in 6 months and decide on therapy for AF which was discussed with the patient      Electronically signed by Angel Willams MD, 06/04/21, 8:44 AM EDT.  .

## 2021-06-04 NOTE — PROGRESS NOTES
CC--stroke with intracranial bleed    Sub--60-year-old unfortunate male patient on antiplatelet agents and anticoagulation had stroke with intracranial bleed and further underwent watchman implantation and post watchman implantation a WILD was done with complete seal of the appendage and anticoagulation has been stopped and currently on aspirin .  He continues to struggle with his nicotine dependence and currently not consuming any alcohol .He has progressively improved with his cognition and DENIES any chest pain or dyspnea  Patient has known history of ischemic heart disease with paroxysmal atrial fibrillation, dyslipidemia and hypertension with history of tobacco abuse  He has prior history of bypass surgery with subsequent PCI and stenting and most recent cardiac catheterization was in 2016 left to medical management    Past history is reviewed below and verified    Past Medical History:   Diagnosis Date   • Atrial fibrillation (CMS/[facility])     June, [facility]. Abstracted from [employer].   • Brain bleed (CMS/[facility]) 2019 October 14th fell - Hospital on 17th for 3 weeks   • CAD (coronary artery disease)     S/P CABG and PCI. Abstracted from [employer].   • Hyperlipidemia    • Hypertension    • Myocardial infarction (CMS/[facility]) 2006    Non-ST elevation MI. Abstracted from [employer].   • Obstructive sleep apnea on CPAP     At . Abstracted from [employer].   • Polycythemia vera (CMS/[facility])    • Presence of Watchman left atrial appendage closure device 1/31/2020   • TIA (transient ischemic attack) 11/19/2013    Possible TIA- MRI shows small tumor/head. Abstracted from [employer].     Past Surgical History:   Procedure Laterality Date   • ANGIOPLASTY  2006    3 srents were sequentially placed in promimal and mid body of the saphennous vein graft to obtuse marginal branch. Abstracted from [employer].   • ATRIAL APPENDAGE EXCLUSION LEFT WITH TRANSESOPHAGEAL ECHOCARDIOGRAM N/A 1/30/2020    Procedure: Atrial Appendage Occlusion;   Surgeon: Angel Willams MD;  Location: [facility] CATH INVASIVE LOCATION;  Service: Cardiovascular   • ATRIAL APPENDAGE EXCLUSION LEFT WITH TRANSESOPHAGEAL ECHOCARDIOGRAM N/A 1/30/2020    Procedure: Atrial Appendage Occlusion;  Surgeon: John Sheridan MD;  Location: [facility] CATH INVASIVE LOCATION;  Service: Cardiology   • CARDIAC CATHETERIZATION  2000 2003, 2006, 2012. Abstracted from [employer].   • CORONARY ARTERY BYPASS GRAFT  2000    Triple. Abstracted from [employer].   • MUSCLE REPAIR      Tendons and nerves in right lower extremity. Abstracted from [facility] [employer].   • OTHER SURGICAL HISTORY  08/2012    RCA mid and proximal- Xience stent x3. Abstracted from [employer].   • OTHER SURGICAL HISTORY  09/2016    Head trauma/bleed at [facility]. Abstracted from [employer].     Review of Systems   General: NO fatigue and tiredness  Eyes: No redness  Cardiovascular: No chest pain, no palpitations  Respiratory: Denies any shortness of breath  Gastrointestinal: No nausea or vomiting, bleeding  Genitourinary: no hematuria or dysuria  Musculoskeletal: No arthralgia or myalgia  Skin: No rash  Neurologic: No numbness, tingling, syncope  Hematologic/Lymphatic: No abnormal bleeding      Physical Exam  VITALS REVIEWED--heart rate of 68 patient is afebrile respiration 12 times a minute blood pressure 161/95    General:      well developed, well nourished, in no acute distress.    Head:      normocephalic and atraumatic.    Eyes:      PERRL/EOM intact, conjunctiva and sclera clear with out nystagmus.    Neck:      no masses, thyromegaly, trachea central with normal respiratory effort  Lungs:      clear bilaterally to auscultation.    Heart:      non-displaced PMI, chest non-tender; regular rate and rhythm, S1, S2 without murmurs, rubs, or gallops  Skin:      intact without lesions or rashes.    Psych:      alert and cooperative; normal mood and affect; normal attention span and concentration.      Extremities with trace  ankle edema without any cyanosis or clubbing    Muscular skeletal patient walks with a normal gait  with mild kyphosis    Neurological no focal deficits and alert oriented x3    Abdomen soft nontender no hepatomegaly       chads vasc score--- diabetes ,hypertension, vascular disease, stroke--5  HASBLED--4    Assessment plan    Unfortunate male patient with high risk for stroke with intracranial bleed --- post watchman implantation in 1/2020, doing  well--- feels much better without anticoagulation without any further problems with bleeding--- patient currently on  Aspirin--- surveillance WILD  ordered at 1 year as per protocol in January 2021  Coronary artery disease stable  Hypertension controlled at home  Diabetes and hyperlipidemia  Follow-up after few months  Paroxysmal atrial fibrillation currently in sinus rhythm      Electronically signed by Angel Willams MD, 11/30/20, 8:49 AM EST.

## 2021-08-31 NOTE — TELEPHONE ENCOUNTER
Attempted to contact patient in regards to scheduling shared decision making appointment for the Watchman LAAC procedure; left message.   Xeljanz Counseling: I discussed with the patient the risks of Xeljanz therapy including increased risk of infection, liver issues, headache, diarrhea, or cold symptoms. Live vaccines should be avoided. They were instructed to call if they have any problems.

## 2021-10-28 ENCOUNTER — OFFICE VISIT (OUTPATIENT)
Dept: CARDIOLOGY | Facility: CLINIC | Age: 61
End: 2021-10-28

## 2021-10-28 VITALS
WEIGHT: 243 LBS | BODY MASS INDEX: 34.02 KG/M2 | SYSTOLIC BLOOD PRESSURE: 142 MMHG | HEART RATE: 91 BPM | DIASTOLIC BLOOD PRESSURE: 92 MMHG | OXYGEN SATURATION: 95 % | HEIGHT: 71 IN

## 2021-10-28 DIAGNOSIS — I48.0 PAROXYSMAL ATRIAL FIBRILLATION (HCC): Chronic | ICD-10-CM

## 2021-10-28 DIAGNOSIS — Z95.818 PRESENCE OF WATCHMAN LEFT ATRIAL APPENDAGE CLOSURE DEVICE: Chronic | ICD-10-CM

## 2021-10-28 DIAGNOSIS — I25.10 CORONARY ARTERY DISEASE INVOLVING NATIVE CORONARY ARTERY OF NATIVE HEART WITHOUT ANGINA PECTORIS: Primary | ICD-10-CM

## 2021-10-28 DIAGNOSIS — I10 PRIMARY HYPERTENSION: Chronic | ICD-10-CM

## 2021-10-28 PROCEDURE — 93000 ELECTROCARDIOGRAM COMPLETE: CPT | Performed by: INTERNAL MEDICINE

## 2021-10-28 PROCEDURE — 99214 OFFICE O/P EST MOD 30 MIN: CPT | Performed by: INTERNAL MEDICINE

## 2021-10-28 NOTE — PROGRESS NOTES
Cardiology Office Visit      Encounter Date:  10/28/2021    Patient ID:   Chinedu Lopez is a 60 y.o. male.    Reason For Followup:  Coronary artery disease  Atrial fibrillation      Brief Clinical History:  Dear Dr. Clark, AMAYA Shah    I had the pleasure of seeing Chinedu Lopez today. As you are well aware, this is a 60 y.o. male with a known history of ischemic heart disease. He additionally has a history of paroxysmal atrial fibrillation status post watchman placement, dyslipidemia, hypertension with hypertensive cardiovascular disease,  tobacco abuse disorder and antiplatelet therapy. He presents today for followup on the above conditions.     Interval History:  He denies chest pain, pressure, heaviness or tightness.  He denies any shortness of breath out of character.  He denies any PND, orthopnea or palpitations.  He denies any syncope or near-syncope.  Reports feeling in his usual state of health.    He was started on Cardizem  mg daily because of poorly controlled atrial fibrillation.  This is done a great job of controlling his rate.  The patient reported that he had some dizziness and lightheadedness for about 3 days after initiation of the medicine but this has subsided.    His blood pressure is elevated today however he reports that his blood pressure was 119/81 with a pulse of 74 at home before coming to the office today.    His most recent laboratory data on file is from June 2020.  He reports that he had some recent lab work performed.  Nonetheless review of his labs from June 2020 demonstrate normal hemoglobin and hematocrit, normal renal function, normal hepatic function, and normal cholesterol values.     Assessment & Plan     Impressions:  Coronary artery disease status post coronary arterial bypass grafting and subsequent PCI and stenting.      Most recent catheterization in May of 2016 demonstrating no disease amenable to percutaneous or surgical revascularization.  Paroxysmal  "atrial fibrillation status post watchman implantation  Hypertension with hypertensive cardiovascular disease.  Dyslipidemia.   Antiplatelet therapy.  Tobacco abuse disorder     Recommendations:  Continuation of his current cardiovascular regimen at present time.  Blood pressure as per your discretion  We will obtain labs from your office for review  Follow-up in 6 months time sooner should there be difficulties  Smoking cessation.    Objective:    Vitals:  Vitals:    10/28/21 1051   BP: 142/92   BP Location: Left arm   Patient Position: Sitting   Cuff Size: Large Adult   Pulse: 91   SpO2: 95%   Weight: 110 kg (243 lb)   Height: 180.3 cm (71\")       Physical Exam:    General: Alert, cooperative, no distress, appears stated age  Head:  Normocephalic, atraumatic, mucous membranes moist  Eyes:  Conjunctiva/corneas clear, EOM's intact     Neck:  Supple,  no bruit  Lungs: Clear to auscultation bilaterally, no wheezes rhonchi rales are noted  Chest wall: No tenderness  Heart::  Regular rate and rhythm, S1 and S2 normal, 1/6 holosystolic murmur.  No rub or gallop  Abdomen: Soft, non-tender, nondistended bowel sounds active  Extremities: No cyanosis, clubbing, or edema  Pulses: 2+ and symmetric all extremities  Skin:  No rashes or lesions  Neuro/psych: A&O x3. CN II through XII are grossly intact with appropriate affect      Allergies:  No Known Allergies    Medication Review:     Current Outpatient Medications:   •  amantadine (SYMMETREL) 100 MG capsule, Take 100 mg by mouth Daily., Disp: , Rfl:   •  amlodipine-olmesartan (BLAIR) 10-40 MG per tablet, Take 1 tablet by mouth every night at bedtime., Disp: , Rfl:   •  aspirin 81 MG chewable tablet, Chew 81 mg Daily., Disp: , Rfl:   •  Cholecalciferol (VITAMIN D3) 125 MCG (5000 UT) capsule capsule, Take 5,000 Units by mouth Daily., Disp: , Rfl:   •  Cyanocobalamin (VITAMIN B 12) 500 MCG tablet, Take 500 mcg by mouth Daily., Disp: , Rfl:   •  doxazosin (CARDURA) 1 MG tablet, Take " 1 mg by mouth every night at bedtime., Disp: , Rfl:   •  famotidine (PEPCID) 40 MG tablet, Take 40 mg by mouth Daily., Disp: , Rfl:   •  folic acid (FOLVITE) 1 MG tablet, Take 1 mg by mouth Daily., Disp: , Rfl:   •  guaiFENesin (MUCINEX) 600 MG 12 hr tablet, Take 600 mg by mouth Daily., Disp: , Rfl:   •  isosorbide mononitrate (IMDUR) 30 MG 24 hr tablet, Take 30 mg by mouth Daily., Disp: , Rfl:   •  metFORMIN (GLUCOPHAGE) 1000 MG tablet, Take 1,000 mg by mouth 2 (Two) Times a Day With Meals., Disp: , Rfl:   •  nebivolol (BYSTOLIC) 20 MG tablet, Take 1 tablet by mouth Daily., Disp: , Rfl:   •  nitroglycerin (NITROLINGUAL) 0.4 MG/SPRAY spray, Place 1 spray under the tongue Every 5 (Five) Minutes As Needed for Chest Pain., Disp: , Rfl:   •  pantoprazole (PROTONIX) 40 MG EC tablet, Take 40 mg by mouth Daily., Disp: , Rfl:   •  potassium chloride (K-DUR,KLOR-CON) 20 MEQ CR tablet, Take 20 mEq by mouth 2 (Two) Times a Day., Disp: , Rfl:   •  QUEtiapine (SEROquel) 25 MG tablet, Take 25 mg by mouth Daily., Disp: , Rfl:   •  ranolazine (RANEXA) 500 MG 12 hr tablet, Take 500 mg by mouth 2 (Two) Times a Day., Disp: , Rfl: 6  •  rosuvastatin (CRESTOR) 20 MG tablet, Take 40 mg by mouth Every Night., Disp: , Rfl:   •  thiamine (VITAMIN B-1) 100 MG tablet, Take 100 mg by mouth Daily., Disp: , Rfl:   •  triamterene-hydrochlorothiazide (MAXZIDE-25) 37.5-25 MG per tablet, Take 1 tablet by mouth Daily., Disp: , Rfl:     Family History:  Family History   Problem Relation Age of Onset   • Diabetes Mother    • Heart attack Father    • Diabetes Father    • Other Sister         MRSA   • Heart disease Other         Positive for Ischemic   • Cancer Other    • Hypertension Other        Past Medical History:  Past Medical History:   Diagnosis Date   • Atrial fibrillation (HCC)     June, [facility]. Abstracted from [facility].   • Brain bleed (HCC) 2019 October 14th fell - Hospital on 17th for 3 weeks   • CAD (coronary artery disease)     S/P CABG  and PCI. Abstracted from [employer].   • Hyperlipidemia    • Hypertension    • Myocardial infarction (HCC) 2006    Non-ST elevation MI. Abstracted from [employer].   • Obstructive sleep apnea on CPAP     At [facility]. Abstracted from [facility].   • Presence of Watchman left atrial appendage closure device 1/31/2020   • Stroke (HCC) 2019   • TIA (transient ischemic attack) 11/19/2013    Possible TIA- MRI shows small tumor/head. Abstracted from [employer].       Past surgical History:  Past Surgical History:   Procedure Laterality Date   • ANGIOPLASTY  2006    3 srents were sequentially placed in promimal and mid body of the saphennous vein graft to obtuse marginal branch. Abstracted from [employer].   • ATRIAL APPENDAGE EXCLUSION LEFT WITH TRANSESOPHAGEAL ECHOCARDIOGRAM N/A 1/30/2020    Procedure: Atrial Appendage Occlusion;  Surgeon: Angel Willams MD;  Location: [facility] CATH INVASIVE LOCATION;  Service: Cardiovascular   • ATRIAL APPENDAGE EXCLUSION LEFT WITH TRANSESOPHAGEAL ECHOCARDIOGRAM N/A 1/30/2020    Procedure: Atrial Appendage Occlusion;  Surgeon: John Sheridan MD;  Location: [facility] CATH INVASIVE LOCATION;  Service: Cardiology   • CARDIAC CATHETERIZATION  2000    2003, 2006, 2012. Abstracted from [facility].   • CORONARY ARTERY BYPASS GRAFT  2000    Triple. Abstracted from [employer].   • MUSCLE REPAIR      Tendons and nerves in right lower extremity. Abstracted from [employer].   • OTHER SURGICAL HISTORY  08/2012    RCA mid and proximal- Xience stent x3. Abstracted from [employer].   • OTHER SURGICAL HISTORY  09/2016    Head trauma/bleed at [facility]. Abstracted from [employer].       Social History:  Social History     Socioeconomic History   • Marital status:    Tobacco Use   • Smoking status: Current Every Day Smoker     Packs/day: 1.00   • Smokeless tobacco: Never Used   Vaping Use   • Vaping Use: Never used   Substance and Sexual Activity   • Alcohol use: Not Currently   • Drug use: No   •  Sexual activity: Defer       Review of Systems:  The following systems were reviewed as they relate to the cardiovascular system: Constitutional, Eyes, ENT, Cardiovascular, Respiratory, Gastrointestinal, Integumentary, Neurological, Psychiatric, Hematologic, Endocrine, Musculoskeletal, and Genitourinary. The pertinent cardiovascular findings are reported above with all other cardiovascular points within those systems being negative.    Diagnostic Study Review:     Current Electrocardiogram:    ECG 12 Lead    Date/Time: 10/28/2021 5:33 PM  Performed by: Renny Mosher DO  Authorized by: Renny Mosher DO   Comparison: not compared with previous ECG   Previous ECG: no previous ECG available  Comments: Atrial flutter with a ventricular rate of 89 bpm.  Normal QT and QTc intervals.              NOTE: The following portions of the patient's note were reviewed, confirmed and/or updated this visit as appropriate: History of present illness/Interval history, physical examination, assessment & plan, allergies, current medications, past family history, past medical history, past social history, past surgical history and problem list.

## 2021-12-06 ENCOUNTER — OFFICE VISIT (OUTPATIENT)
Dept: CARDIOLOGY | Facility: CLINIC | Age: 61
End: 2021-12-06

## 2021-12-06 VITALS
WEIGHT: 233 LBS | BODY MASS INDEX: 32.62 KG/M2 | HEART RATE: 75 BPM | DIASTOLIC BLOOD PRESSURE: 80 MMHG | OXYGEN SATURATION: 98 % | SYSTOLIC BLOOD PRESSURE: 137 MMHG | HEIGHT: 71 IN

## 2021-12-06 DIAGNOSIS — I10 ESSENTIAL HYPERTENSION: ICD-10-CM

## 2021-12-06 DIAGNOSIS — I62.9 INTRACRANIAL BLEED (HCC): ICD-10-CM

## 2021-12-06 DIAGNOSIS — I48.19 PERSISTENT ATRIAL FIBRILLATION (HCC): ICD-10-CM

## 2021-12-06 DIAGNOSIS — Z95.818 PRESENCE OF WATCHMAN LEFT ATRIAL APPENDAGE CLOSURE DEVICE: Primary | ICD-10-CM

## 2021-12-06 PROCEDURE — 99213 OFFICE O/P EST LOW 20 MIN: CPT | Performed by: INTERNAL MEDICINE

## 2021-12-06 RX ORDER — DILTIAZEM HYDROCHLORIDE 240 MG/1
240 CAPSULE, COATED, EXTENDED RELEASE ORAL DAILY
COMMUNITY

## 2021-12-06 NOTE — PROGRESS NOTES
CC--stroke with intracranial bleed    Sub--60-year-old unfortunate male patient on antiplatelet agents and anticoagulation had stroke with intracranial bleed and further underwent watchman implantation and post watchman implantation a WILD was done with complete seal of the appendage and anticoagulation has been stopped and currently on aspirin .  He continues to struggle with his nicotine dependence and currently not consuming any alcohol .He has progressively improved with his cognition and DENIES any chest pain or dyspnea  Patient has known history of ischemic heart disease with paroxysmal atrial fibrillation, dyslipidemia and hypertension with history of tobacco abuse  He has prior history of bypass surgery with subsequent PCI and stenting and most recent cardiac catheterization was in 2016 left to medical management  Came for follow up without any symptoms  Had increased HR with AF and started on cardizem with resolution of symptoms        Past Medical History:   Diagnosis Date   • Atrial fibrillation (CMS/[facility])     June, [facility]. Abstracted from [facility] [employer].   • Brain bleed (CMS/[facility]) 2019 October 14th fell - Hospital on 17th for 3 weeks   • CAD (coronary artery disease)     S/P CABG and PCI. Abstracted from [facility].   • Hyperlipidemia    • Hypertension    • Myocardial infarction (CMS/[facility]) 2006    Non-ST elevation MI. Abstracted from [facility].   • Obstructive sleep apnea on CPAP     At [facility]. Abstracted from [employer].   • Polycythemia vera (CMS/[facility])    • Presence of Watchman left atrial appendage closure device 1/31/2020   • TIA (transient ischemic attack) 11/19/2013    Possible TIA- MRI shows small tumor/head. Abstracted from [employer].     Past Surgical History:   Procedure Laterality Date   • ANGIOPLASTY  2006    3 srents were sequentially placed in promimal and mid body of the saphennous vein graft to obtuse marginal branch. Abstracted from [employer]   • ATRIAL APPENDAGE EXCLUSION LEFT WITH  TRANSESOPHAGEAL ECHOCARDIOGRAM N/A 1/30/2020    Procedure: Atrial Appendage Occlusion;  Surgeon: Angel Willams MD;  Location: [facility] CATH INVASIVE LOCATION;  Service: Cardiovascular   • ATRIAL APPENDAGE EXCLUSION LEFT WITH TRANSESOPHAGEAL ECHOCARDIOGRAM N/A 1/30/2020    Procedure: Atrial Appendage Occlusion;  Surgeon: John Sheridan MD;  Location: [facility] CATH INVASIVE LOCATION;  Service: Cardiology   • CARDIAC CATHETERIZATION  2000 2003, 2006, 2012. Abstracted from [facility].   • CORONARY ARTERY BYPASS GRAFT  2000    Triple. Abstracted from [employer].   • MUSCLE REPAIR      Tendons and nerves in right lower extremity. Abstracted from [employer].   • OTHER SURGICAL HISTORY  08/2012    RCA mid and proximal- Xience stent x3. Abstracted from [employer]   • OTHER SURGICAL HISTORY  09/2016    Head trauma/bleed at [facility]. Abstracted from [employer].     Review of Systems   General: NO fatigue and tiredness  Eyes: No redness  Cardiovascular: No chest pain, no palpitations      Physical Exam  VITALS REVIEWED    General:      well developed, well nourished, in no acute distress.    Head:      normocephalic and atraumatic.    Eyes:      PERRL/EOM intact, conjunctiva and sclera clear with out nystagmus.    Neck:      no masses, thyromegaly, trachea central with normal respiratory effort  Lungs:      clear bilaterally to auscultation.    Heart:       regular rate and rhythm, S1, S2 without murmurs, rubs, or gallops        Assessment plan    Unfortunate male patient with high risk for stroke with intracranial bleed --- post watchman implantation in 1/2020, doing  well--- feels much better without anticoagulation without any further problems with bleeding--- patient currently on  Aspirin  Coronary artery disease stable  Hypertension controlled   Diabetes and hyperlipidemia--followed by PCP  Follow-up after 6 months  Persistent AF--EKG in 2020 in sinus rhythm and currently in persistent AF and atrial arrhythmia  can be contributing to dizziness and will follow in 6 months and decide on therapy for AF which was discussed with the patient---rate control only after discussing options        Electronically signed by Angel Willams MD, 12/06/21, 8:33 AM EST.

## 2022-04-28 ENCOUNTER — OFFICE VISIT (OUTPATIENT)
Dept: CARDIOLOGY | Facility: CLINIC | Age: 62
End: 2022-04-28

## 2022-04-28 VITALS
BODY MASS INDEX: 32.9 KG/M2 | SYSTOLIC BLOOD PRESSURE: 160 MMHG | HEIGHT: 71 IN | HEART RATE: 102 BPM | DIASTOLIC BLOOD PRESSURE: 82 MMHG | WEIGHT: 235 LBS | OXYGEN SATURATION: 96 %

## 2022-04-28 DIAGNOSIS — I10 PRIMARY HYPERTENSION: ICD-10-CM

## 2022-04-28 DIAGNOSIS — E78.49 OTHER HYPERLIPIDEMIA: ICD-10-CM

## 2022-04-28 DIAGNOSIS — Z95.818 PRESENCE OF WATCHMAN LEFT ATRIAL APPENDAGE CLOSURE DEVICE: ICD-10-CM

## 2022-04-28 DIAGNOSIS — I48.0 PAROXYSMAL ATRIAL FIBRILLATION: ICD-10-CM

## 2022-04-28 DIAGNOSIS — I25.10 CORONARY ARTERY DISEASE INVOLVING NATIVE CORONARY ARTERY OF NATIVE HEART WITHOUT ANGINA PECTORIS: Primary | ICD-10-CM

## 2022-04-28 PROCEDURE — 99214 OFFICE O/P EST MOD 30 MIN: CPT | Performed by: INTERNAL MEDICINE

## 2022-04-28 PROCEDURE — 93000 ELECTROCARDIOGRAM COMPLETE: CPT | Performed by: INTERNAL MEDICINE

## 2022-04-28 RX ORDER — OLMESARTAN MEDOXOMIL 20 MG/1
20 TABLET ORAL DAILY
COMMUNITY

## 2022-04-28 NOTE — PROGRESS NOTES
Cardiology Office Visit      Encounter Date:  04/28/2022    Patient ID:   Chinedu Lopez is a 61 y.o. male.    Reason For Followup:  Coronary artery disease  Atrial fibrillation    Brief Clinical History:  Dear Nhung Pillai APRN    I had the pleasure of seeing Chinedu Lopez today. As you are well aware, this is a 61 y.o. male with a known history of ischemic heart disease. He additionally has a history of paroxysmal atrial fibrillation status post watchman placement, dyslipidemia, hypertension with hypertensive cardiovascular disease,  tobacco abuse disorder and antiplatelet therapy. He presents today for followup on the above conditions.     Interval History:  He denies chest pain, pressure, heaviness or tightness.  He denies any shortness of breath out of character.  He denies any PND, orthopnea or palpitations.  He denies any syncope or near-syncope.  Reports feeling in his usual state of health.    Although his blood pressure is elevated today, he reports excellent control at home.    He is reporting some problems with short-term memory.  He states he is working with you on this.    His most recent laboratory data is from February 2021.  BUN and creatinine are normal.     Assessment & Plan     Impressions:  Coronary artery disease status post coronary arterial bypass grafting and subsequent PCI and stenting.      Most recent catheterization in May of 2016 demonstrating no disease amenable to percutaneous or surgical revascularization.  Paroxysmal atrial fibrillation status post watchman implantation  Hypertension with hypertensive cardiovascular disease.  Dyslipidemia.   Antiplatelet therapy.  Tobacco abuse disorder     Recommendations:  Continuation of his current cardiovascular regimen at present time.     This includes antiplatelet, antihypertensive, statin, and antianginals.  Obtain labs from your office for review  Follow-up in 6 months time sooner should there be difficulties  Smoking  "cessation.    Diagnoses and all orders for this visit:    1. Coronary artery disease involving native coronary artery of native heart without angina pectoris (Primary)  -     ECG 12 Lead    2. Paroxysmal atrial fibrillation (HCC)  -     ECG 12 Lead    3. Primary hypertension  -     ECG 12 Lead    4. Presence of Watchman left atrial appendage closure device  -     ECG 12 Lead    5. Other hyperlipidemia  -     ECG 12 Lead          Objective:    Vitals:  Vitals:    04/28/22 1036   BP: 160/82   Pulse: 102   SpO2: 96%   Weight: 107 kg (235 lb)   Height: 180.3 cm (71\")     Body mass index is 32.78 kg/m².      Physical Exam:    General: Alert, cooperative, no distress, appears stated age  Head:  Normocephalic, atraumatic, mucous membranes moist  Eyes:  Conjunctiva/corneas clear, EOM's intact     Neck:  Supple,  no bruit    Lungs: Clear to auscultation bilaterally, no wheezes rhonchi rales are noted  Chest wall: No tenderness  Heart::  Regular rate and rhythm, S1 and S2 normal, 1/6 holosystolic murmur.  No rub or gallop  Abdomen: Soft, non-tender, nondistended bowel sounds active  Extremities: No cyanosis, clubbing, or edema  Pulses: 2+ and symmetric all extremities  Skin:  No rashes or lesions  Neuro/psych: A&O x3. CN II through XII are grossly intact with appropriate affect      Allergies:  No Known Allergies    Medication Review:     Current Outpatient Medications:   •  amantadine (SYMMETREL) 100 MG capsule, Take 100 mg by mouth Daily., Disp: , Rfl:   •  aspirin 81 MG chewable tablet, Chew 81 mg Daily., Disp: , Rfl:   •  Cholecalciferol (VITAMIN D3) 125 MCG (5000 UT) capsule capsule, Take 5,000 Units by mouth Daily., Disp: , Rfl:   •  Cyanocobalamin (VITAMIN B 12) 500 MCG tablet, Take 500 mcg by mouth Daily., Disp: , Rfl:   •  dilTIAZem CD (CARDIZEM CD) 240 MG 24 hr capsule, Take 240 mg by mouth Daily., Disp: , Rfl:   •  doxazosin (CARDURA) 1 MG tablet, Take 1 mg by mouth every night at bedtime., Disp: , Rfl:   •  " famotidine (PEPCID) 40 MG tablet, Take 40 mg by mouth Daily., Disp: , Rfl:   •  folic acid (FOLVITE) 1 MG tablet, Take 1 mg by mouth Daily., Disp: , Rfl:   •  guaiFENesin (MUCINEX) 600 MG 12 hr tablet, Take 600 mg by mouth Daily., Disp: , Rfl:   •  isosorbide mononitrate (IMDUR) 30 MG 24 hr tablet, Take 30 mg by mouth Daily., Disp: , Rfl:   •  metFORMIN (GLUCOPHAGE) 1000 MG tablet, Take 1,000 mg by mouth 2 (Two) Times a Day With Meals., Disp: , Rfl:   •  nebivolol (BYSTOLIC) 20 MG tablet, Take 1 tablet by mouth 2 (Two) Times a Day., Disp: , Rfl:   •  nitroglycerin (NITROLINGUAL) 0.4 MG/SPRAY spray, Place 1 spray under the tongue Every 5 (Five) Minutes As Needed for Chest Pain., Disp: , Rfl:   •  olmesartan (Benicar) 20 MG tablet, Take 20 mg by mouth Daily., Disp: , Rfl:   •  pantoprazole (PROTONIX) 40 MG EC tablet, Take 40 mg by mouth Daily., Disp: , Rfl:   •  potassium chloride (K-DUR,KLOR-CON) 20 MEQ CR tablet, Take 20 mEq by mouth 2 (Two) Times a Day., Disp: , Rfl:   •  QUEtiapine (SEROquel) 25 MG tablet, Take 25 mg by mouth Daily., Disp: , Rfl:   •  ranolazine (RANEXA) 500 MG 12 hr tablet, Take 500 mg by mouth 2 (Two) Times a Day., Disp: , Rfl: 6  •  rosuvastatin (CRESTOR) 20 MG tablet, Take 40 mg by mouth Every Night., Disp: , Rfl:   •  thiamine (VITAMIN B-1) 100 MG tablet, Take 100 mg by mouth Daily., Disp: , Rfl:     Family History:  Family History   Problem Relation Age of Onset   • Diabetes Mother    • Heart attack Father    • Diabetes Father    • Other Sister         MRSA   • Heart disease Other         Positive for Ischemic   • Cancer Other    • Hypertension Other        Past Medical History:  Past Medical History:   Diagnosis Date   • Atrial fibrillation (HCC)     June, [facility]. Abstracted from [facility].   • Brain bleed (HCC) 2019 October 14th Counts include 234 beds at the [facility] - [facility] on [facility] for 3 weeks   • CAD (coronary artery disease)     S/P CABG and PCI. Abstracted from [facility].   • Hyperlipidemia    • Hypertension    •  Myocardial infarction (HCC) 2006    Non-ST elevation MI. Abstracted from [facility].   • Obstructive sleep apnea on CPAP     At . Abstracted from [facility].   • Presence of Watchman left atrial appendage closure device 1/31/2020   • Stroke (HCC) 2019   • TIA (transient ischemic attack) 11/19/2013    Possible TIA- MRI shows small tumor/head. Abstracted from [facility].       Past Surgical History:  Past Surgical History:   Procedure Laterality Date   • ANGIOPLASTY  2006    3 srents were sequentially placed in promimal and mid body of the saphennous vein graft to obtuse marginal branch. Abstracted from [facility].   • ATRIAL APPENDAGE EXCLUSION LEFT WITH TRANSESOPHAGEAL ECHOCARDIOGRAM N/A 1/30/2020    Procedure: Atrial Appendage Occlusion;  Surgeon: Angel Willams MD;  Location: [facility] CATH INVASIVE LOCATION;  Service: Cardiovascular   • ATRIAL APPENDAGE EXCLUSION LEFT WITH TRANSESOPHAGEAL ECHOCARDIOGRAM N/A 1/30/2020    Procedure: Atrial Appendage Occlusion;  Surgeon: John Sheridan MD;  Location: [facility] CATH INVASIVE LOCATION;  Service: Cardiology   • CARDIAC CATHETERIZATION  2000 2003, 2006, 2012. Abstracted from [facility].   • CORONARY ARTERY BYPASS GRAFT  2000    Triple. Abstracted from [facility].   • MUSCLE REPAIR      Tendons and nerves in right lower extremity. Abstracted from [facility].   • OTHER SURGICAL HISTORY  08/2012    RCA mid and proximal- Xience stent x3. Abstracted from [facility].   • OTHER SURGICAL HISTORY  09/2016    Head trauma/bleed at [facility]. Abstracted from [facility].       Social History:  Social History     Socioeconomic History   • Marital status:    Tobacco Use   • Smoking status: Current Every Day Smoker     Packs/day: 1.00   • Smokeless tobacco: Never Used   Vaping Use   • Vaping Use: Never used   Substance and Sexual Activity   • Alcohol use: Not Currently   • Drug use: No   • Sexual activity: Defer       Review of Systems:  The following systems were  reviewed as they relate to the cardiovascular system: Constitutional, Eyes, ENT, Cardiovascular, Respiratory, Gastrointestinal, Integumentary, Neurological, Psychiatric, Hematologic, Endocrine, Musculoskeletal, and Genitourinary. The pertinent cardiovascular findings are reported above with all other cardiovascular points within those systems being negative.    Diagnostic Study Review:     Current Electrocardiogram:    ECG 12 Lead    Date/Time: 4/28/2022 11:38 PM  Performed by: Renny Mosher DO  Authorized by: Renny Mosher DO   Comparison: not compared with previous ECG   Previous ECG: no previous ECG available  Comments: Atrial fibrillation with a ventricular rate of 93 bpm.  Normal QT and prolonged QTC intervals of 383 and 40 at 77 ms respectively.  Normal QRS axis.            Laboratory Data:  Lab Results   Component Value Date    GLUCOSE 91 01/29/2020    BUN 10 01/29/2020    CREATININE 0.85 01/29/2020    EGFRIFNONA 93 01/29/2020    BCR 11.8 01/29/2020    K 4.6 01/29/2020    CO2 23.6 01/29/2020    CALCIUM 9.6 01/29/2020    ALBUMIN 4.60 01/29/2020    AST 12 01/29/2020    ALT 17 01/29/2020     Lab Results   Component Value Date    GLUCOSE 91 01/29/2020    CALCIUM 9.6 01/29/2020     01/29/2020    K 4.6 01/29/2020    CO2 23.6 01/29/2020     01/29/2020    BUN 10 01/29/2020    CREATININE 0.85 01/29/2020    EGFRIFNONA 93 01/29/2020    BCR 11.8 01/29/2020    ANIONGAP 13.4 01/29/2020     Lab Results   Component Value Date    WBC 8.09 01/29/2020    HGB 16.7 01/29/2020    HCT 49.5 01/29/2020    MCV 92.4 01/29/2020     01/29/2020     No results found for: CHOL, CHLPL, TRIG, HDL, LDL, LDLDIRECT  No results found for: HGBA1C  Lab Results   Component Value Date    INR 1.73 (L) 01/31/2020    INR 1.71 (L) 01/29/2020    PROTIME 16.8 (L) 01/31/2020    PROTIME 16.6 (L) 01/29/2020       Most Recent Echo:  Results for orders placed during the hospital encounter of 01/26/21    Adult  Transesophageal Echo (WILD) W/ Cont if Necessary Per Protocol    Interpretation Summary  · The left ventricular cavity is borderline dilated.  · Left ventricular wall thickness is consistent with concentric hypertrophy.  · Estimated left ventricular EF = 60% Estimated left ventricular EF was in agreement with the calculated left ventricular EF. Left ventricular ejection fraction appears to be 56 - 60%. Left ventricular systolic function is normal.  · The right ventricular cavity is mildly dilated.  · The right atrial cavity is borderline dilated.  · With a centrally-directed jet noted.  · Estimated right ventricular systolic pressure from tricuspid regurgitation is normal (<35 mmHg).    Procedure indication    Paroxysmal atrial fibrillation/watchman device in situ    conscious sedation administered by anesthesia    consent obtained before procedure      Procedure note  after obtaining a valid consent patient was sedated by Anesthesia and a WILD probe was easily placed into esophagus with multiplane imaging with 2D, color and Doppler followed by bubble study with agitated saline without any complications    WILD  Findings    The watchman device is well-seated in the appendage with complete seal without any clot and there is no leak  Moderate left atrial enlargement without any shunt or clot  LV ejection fraction is normal with EF of 60% with mild to moderate LVH  Mild MR and TR noted without pulmonary hypertension  No effusion      Procedures done  Transesophageal echocardiography      Electronically signed by Angel Willams MD, 01/26/21, 5:45 PM EST.       Most Recent Stress Test:       Most Recent Cardiac Catheterization:   No results found for this or any previous visit.       NOTE: The following portions of the patient's note were reviewed, confirmed and/or updated this visit as appropriate: History of present illness/Interval history, physical examination, assessment & plan, allergies, current medications,  past family history, past medical history, past social history, past surgical history and problem list.

## 2022-07-01 ENCOUNTER — OFFICE VISIT (OUTPATIENT)
Dept: CARDIOLOGY | Facility: CLINIC | Age: 62
End: 2022-07-01

## 2022-07-01 VITALS
DIASTOLIC BLOOD PRESSURE: 111 MMHG | BODY MASS INDEX: 31.78 KG/M2 | WEIGHT: 227 LBS | HEART RATE: 82 BPM | HEIGHT: 71 IN | SYSTOLIC BLOOD PRESSURE: 164 MMHG | OXYGEN SATURATION: 99 %

## 2022-07-01 DIAGNOSIS — Z95.818 PRESENCE OF WATCHMAN LEFT ATRIAL APPENDAGE CLOSURE DEVICE: ICD-10-CM

## 2022-07-01 DIAGNOSIS — I48.19 PERSISTENT ATRIAL FIBRILLATION: ICD-10-CM

## 2022-07-01 DIAGNOSIS — I62.9 INTRACRANIAL BLEED: ICD-10-CM

## 2022-07-01 DIAGNOSIS — I10 ESSENTIAL HYPERTENSION: ICD-10-CM

## 2022-07-01 DIAGNOSIS — I25.10 CORONARY ARTERY DISEASE INVOLVING NATIVE CORONARY ARTERY OF NATIVE HEART WITHOUT ANGINA PECTORIS: Primary | ICD-10-CM

## 2022-07-01 PROCEDURE — 99214 OFFICE O/P EST MOD 30 MIN: CPT | Performed by: INTERNAL MEDICINE

## 2022-07-01 NOTE — PROGRESS NOTES
CC--stroke with intracranial bleed    Sub--61-year-old unfortunate male patient on antiplatelet agents and anticoagulation had stroke with intracranial bleed and further underwent watchman implantation and post watchman implantation a WILD was done with complete seal of the appendage and anticoagulation has been stopped and currently on aspirin .  He continues to struggle with his nicotine dependence and currently not consuming any alcohol .He has progressively improved with his cognition and DENIES any chest pain or dyspnea  Patient has known history of ischemic heart disease with paroxysmal atrial fibrillation, dyslipidemia and hypertension with history of tobacco abuse  He has prior history of bypass surgery with subsequent PCI and stenting and most recent cardiac catheterization was in 2016 left to medical management  Came for follow up without any symptoms  Had increased HR with AF and started on cardizem with resolution of symptoms  No new sx        Past Medical History:   Diagnosis Date   • Atrial fibrillation (CMS/[facility])     June, [facility]. Abstracted from [facility].   • Brain bleed (CMS/[facility]) 2019 October 14th fell - [facility] for 3 weeks   • CAD (coronary artery disease)     S/P CABG and PCI. Abstracted from [facility].   • Hyperlipidemia    • Hypertension    • Myocardial infarction (CMS/[facility]) 2006    Non-ST elevation MI. Abstracted from [facility].   • Obstructive sleep apnea on CPAP     At . Abstracted from [facility].   • Polycythemia vera (CMS/[facility])    • Presence of Watchman left atrial appendage closure device 1/31/2020   • TIA (transient ischemic attack) 11/19/2013    Possible TIA- MRI shows small tumor/head. Abstracted from [facility].     Past Surgical History:   Procedure Laterality Date   • ANGIOPLASTY  2006    3 srents were sequentially placed in promimal and mid body of the saphennous vein graft to obtuse marginal branch. Abstracted from [facility].   • ATRIAL APPENDAGE EXCLUSION LEFT WITH  TRANSESOPHAGEAL ECHOCARDIOGRAM N/A 1/30/2020    Procedure: Atrial Appendage Occlusion;  Surgeon: Angel Willams MD;  Location: [facility] CATH INVASIVE LOCATION;  Service: Cardiovascular   • ATRIAL APPENDAGE EXCLUSION LEFT WITH TRANSESOPHAGEAL ECHOCARDIOGRAM N/A 1/30/2020    Procedure: Atrial Appendage Occlusion;  Surgeon: John Sheridan MD;  Location: [facility] CATH INVASIVE LOCATION;  Service: Cardiology   • CARDIAC CATHETERIZATION  2000 2003, 2006, 2012. Abstracted from [facility].   • CORONARY ARTERY BYPASS GRAFT  2000    Triple. Abstracted from [facility].   • MUSCLE REPAIR      Tendons and nerves in right lower extremity. Abstracted from [facility].   • OTHER SURGICAL HISTORY  08/2012    RCA mid and proximal- Xience stent x3. Abstracted from [facility].   • OTHER SURGICAL HISTORY  09/2016    Head trauma/bleed at [facility]. Abstracted from [facility].     Review of Systems   General: NO fatigue and tiredness  Eyes: No redness  Cardiovascular: No chest pain, no palpitations      Physical Exam  VITALS REVIEWED    General:      well developed, well nourished, in no acute distress.    Head:      normocephalic and atraumatic.    Eyes:      PERRL/EOM intact, conjunctiva and sclera clear with out nystagmus.    Neck:      no masses, thyromegaly, trachea central with normal respiratory effort  Lungs:      clear bilaterally to auscultation.    Heart:       irregular rate and rhythm, S1, S2 without murmurs, rubs, or gallops        Assessment plan    Unfortunate male patient with high risk for stroke with intracranial bleed --- post watchman implantation in 1/2020, doing  well--- feels much better without anticoagulation without any further problems with bleeding--- patient currently on  Aspirin  Coronary artery disease stable  Hypertension controlled at home recordings  Diabetes and hyperlipidemia--followed by PCP  Follow-up after 6 months  Persistent AF--EKG in 2020 in sinus rhythm and currently in persistent AF  and atrial arrhythmia can be contributing to dizziness and will follow in 6 months and decide on therapy for AF which was discussed with the patient---rate control only after discussing options  meds reviewed      Electronically signed by Angel Willams MD, 07/01/22, 10:03 AM EDT.

## 2023-01-12 ENCOUNTER — OFFICE VISIT (OUTPATIENT)
Dept: CARDIOLOGY | Facility: CLINIC | Age: 63
End: 2023-01-12
Payer: OTHER GOVERNMENT

## 2023-01-12 VITALS
BODY MASS INDEX: 32.76 KG/M2 | SYSTOLIC BLOOD PRESSURE: 127 MMHG | DIASTOLIC BLOOD PRESSURE: 76 MMHG | HEIGHT: 71 IN | WEIGHT: 234 LBS | HEART RATE: 112 BPM

## 2023-01-12 DIAGNOSIS — Z95.818 PRESENCE OF WATCHMAN LEFT ATRIAL APPENDAGE CLOSURE DEVICE: ICD-10-CM

## 2023-01-12 DIAGNOSIS — I48.19 PERSISTENT ATRIAL FIBRILLATION: ICD-10-CM

## 2023-01-12 DIAGNOSIS — I62.9 INTRACRANIAL BLEED: ICD-10-CM

## 2023-01-12 DIAGNOSIS — R09.89 BILATERAL CAROTID BRUITS: ICD-10-CM

## 2023-01-12 DIAGNOSIS — E11.9 TYPE 2 DIABETES MELLITUS WITHOUT COMPLICATION, WITHOUT LONG-TERM CURRENT USE OF INSULIN: ICD-10-CM

## 2023-01-12 DIAGNOSIS — I10 ESSENTIAL HYPERTENSION: ICD-10-CM

## 2023-01-12 DIAGNOSIS — I63.9 CEREBROVASCULAR ACCIDENT (CVA), UNSPECIFIED MECHANISM: ICD-10-CM

## 2023-01-12 DIAGNOSIS — I25.10 CORONARY ARTERY DISEASE INVOLVING NATIVE CORONARY ARTERY OF NATIVE HEART WITHOUT ANGINA PECTORIS: Primary | ICD-10-CM

## 2023-01-12 PROCEDURE — 93000 ELECTROCARDIOGRAM COMPLETE: CPT | Performed by: INTERNAL MEDICINE

## 2023-01-12 PROCEDURE — 99214 OFFICE O/P EST MOD 30 MIN: CPT | Performed by: INTERNAL MEDICINE

## 2023-01-12 NOTE — PROGRESS NOTES
CC--stroke with intracranial bleed    Sub--62-year-old male patient had a watchman implantation in the past which was performed in 2020.  Patient had intracranial bleed and could not take long-term anticoagulation and henceforth watchman was implanted.  He has prior history of bypass surgery with subsequent PCI and stenting and most recent cardiac catheterization was performed 2016 left on medical management.  He has developed persistent AF and when seen last the last clinical visit patient wanted only rate control and refused rhythm control.  He additionally has history of hypertension and diabetes and hyperlipidemia.        Past Medical History:   Diagnosis Date   • Atrial fibrillation (CMS/[facility])     June, [facility]. Abstracted from [facility].   • Brain bleed (CMS/HCC) 2019 October 14th fell - Hospital on 17[facility] for 3 weeks   • CAD (coronary artery disease)     S/P CABG and PCI. Abstracted from [facility].   • Hyperlipidemia    • Hypertension    • Myocardial infarction (CMS/[facility]) 2006    Non-ST elevation MI. Abstracted from [facility].   • Obstructive sleep apnea on CPAP     At . Abstracted from [facility].   • Polycythemia vera (CMS/[facility])    • Presence of Watchman left atrial appendage closure device 1/31/2020   • TIA (transient ischemic attack) 11/19/2013    Possible TIA- MRI shows small tumor/head. Abstracted from [facility].     Past Surgical History:   Procedure Laterality Date   • ANGIOPLASTY  2006    3 srents were sequentially placed in promimal and mid body of the saphennous vein graft to obtuse marginal branch. Abstracted from [facility].   • ATRIAL APPENDAGE EXCLUSION LEFT WITH TRANSESOPHAGEAL ECHOCARDIOGRAM N/A 1/30/2020    Procedure: Atrial Appendage Occlusion;  Surgeon: Angel Willams MD;  Location: [facility] CATH INVASIVE LOCATION;  Service: Cardiovascular   • ATRIAL APPENDAGE EXCLUSION LEFT WITH TRANSESOPHAGEAL ECHOCARDIOGRAM N/A 1/30/2020    Procedure: Atrial Appendage Occlusion;  Surgeon: Arvin  John Hastings MD;  Location: [facility] CATH INVASIVE LOCATION;  Service: Cardiology   • CARDIAC CATHETERIZATION  2000    2003, 2006, 2012. Abstracted from [facility].   • CORONARY ARTERY BYPASS GRAFT  2000    Triple. Abstracted from [facility].   • MUSCLE REPAIR      Tendons and nerves in right lower extremity. Abstracted from [facility].   • OTHER SURGICAL HISTORY  08/2012    RCA mid and proximal- Xience stent x3. Abstracted from [facility].   • OTHER SURGICAL HISTORY  09/2016    Head trauma/bleed at [facility]. Abstracted from [facility].         Physical Exam    General:      well developed, well nourished, in no acute distress.    Head:      normocephalic and atraumatic.    Eyes:      PERRL/EOM intact, conjunctivae and sclerae clear without nystagmus.    Neck:      no  thyromegaly, trachea central with normal respiratory effort  Lungs:      clear bilaterally to auscultation.    Heart:       irregular rate and rhythm, S1, S2 without murmurs, rubs, or gallops  Skin:      intact without lesions or rashes.    Psych:      alert and cooperative; normal mood and affect; normal attention span and concentration.        Carotid bruit positive      Assessment plan    Post watchman implantation with prior history of intracranial bleed in 2020  Persistent AF  Coronary artery disease with prior bypass surgery and stenting stable  Hypertension well-controlled on home recordings  Diabetes and for hyperlipidemia followed by primary care physician and patient currently on statins  Medications reviewed and follow-up appointments made    Check carotid duplex scan        ECG 12 Lead    Date/Time: 1/12/2023 3:11 PM  Performed by: Angel Willams MD  Authorized by: Angel Willams MD   Comparison: compared with previous ECG   Similar to previous ECG  Rhythm: atrial fibrillation  Rate: tachycardic  Conduction: conduction normal  Other findings: non-specific ST-T wave changes          Electronically signed by Angel Willams MD,  01/12/23, 3:11 PM EST.

## 2023-01-17 NOTE — PROGRESS NOTES
Cardiology Office Visit      Encounter Date:  01/18/2023    Patient ID:   Chinedu Lopez is a 62 y.o. male.    Reason For Followup:  Coronary artery disease  Atrial fibrillation    Brief Clinical History:  Dear Dr. Clark, AMAYA Shah    I had the pleasure of seeing Chinedu Lopez today. As you are well aware, this is a 62 y.o. male with a known history of ischemic heart disease. He additionally has a history of paroxysmal atrial fibrillation status post watchman placement, dyslipidemia, hypertension with hypertensive cardiovascular disease,  tobacco abuse disorder and antiplatelet therapy. He presents today for followup on the above conditions.     Interval History:  He denies chest pain, pressure, heaviness or tightness.  He denies any shortness of breath out of character.  He denies any PND, orthopnea or palpitations.  He denies any syncope or near-syncope.  Reports feeling in his usual state of health.    Although his blood pressure is elevated today, he reports excellent control at home.  In fact his blood pressure last night was 100/79.    He reports that he put his own backyard fence and did well with this.    We were able to review his most recent laboratory data from October 2022.  Triglycerides 76, LDL 29, CRP 0.4.  White blood cell count 6 hemoglobin 14 hematocrit 43 platelets 151,000.  TSH 1.65.  Hemoglobin A1c 5.8%.     Assessment & Plan     Impressions:  Coronary artery disease status post coronary arterial bypass grafting and subsequent PCI and stenting.      Most recent catheterization in May of 2016 demonstrating no disease amenable to percutaneous or surgical revascularization.  Paroxysmal atrial fibrillation status post watchman implantation  Hypertension with hypertensive cardiovascular disease.  Dyslipidemia.   Antiplatelet therapy.  Tobacco abuse disorder     Recommendations:  Continuation of his current cardiovascular regimen at present time.     This includes antiplatelet,  "antihypertensive, statin, and antianginals.  Obtain labs from your office for review  Follow-up in 6 months time sooner should there be difficulties  Smoking cessation.    Diagnoses and all orders for this visit:    1. Coronary artery disease involving native coronary artery of native heart without angina pectoris (Primary)    2. Primary hypertension    3. Paroxysmal atrial fibrillation (HCC)    4. Presence of Watchman left atrial appendage closure device          Objective:    Vitals:  Vitals:    01/18/23 1030 01/18/23 1859   BP: 160/88 100/79   BP Location: Left arm    Patient Position: Sitting    Cuff Size: Large Adult    Pulse: 105    SpO2: 98%    Weight: 106 kg (234 lb)    Height: 180.3 cm (71\")      Body mass index is 32.64 kg/m².      Physical Exam:    General: Alert, cooperative, no distress, appears stated age  Head:  Normocephalic, atraumatic, mucous membranes moist  Eyes:  Conjunctiva/corneas clear, EOM's intact     Neck:  Supple,  no bruit    Lungs: Clear to auscultation bilaterally, no wheezes rhonchi rales are noted  Chest wall: No tenderness  Heart::  Regular rate and rhythm, S1 and S2 normal, 1/6 holosystolic murmur.  No rub or gallop  Abdomen: Soft, non-tender, nondistended bowel sounds active  Extremities: No cyanosis, clubbing, or edema  Pulses: 2+ and symmetric all extremities  Skin:  No rashes or lesions  Neuro/psych: A&O x3. CN II through XII are grossly intact with appropriate affect      Allergies:  No Known Allergies    Medication Review:     Current Outpatient Medications:   •  amantadine (SYMMETREL) 100 MG capsule, Take 100 mg by mouth Daily., Disp: , Rfl:   •  aspirin 81 MG chewable tablet, Chew 81 mg Daily., Disp: , Rfl:   •  Cholecalciferol (VITAMIN D3) 125 MCG (5000 UT) capsule capsule, Take 5,000 Units by mouth Daily., Disp: , Rfl:   •  Cyanocobalamin (VITAMIN B 12) 500 MCG tablet, Take 500 mcg by mouth Daily., Disp: , Rfl:   •  dilTIAZem CD (CARDIZEM CD) 240 MG 24 hr capsule, Take " 240 mg by mouth Daily., Disp: , Rfl:   •  doxazosin (CARDURA) 1 MG tablet, Take 1 mg by mouth every night at bedtime., Disp: , Rfl:   •  famotidine (PEPCID) 40 MG tablet, Take 40 mg by mouth Daily., Disp: , Rfl:   •  folic acid (FOLVITE) 1 MG tablet, Take 1 mg by mouth Daily., Disp: , Rfl:   •  guaiFENesin (MUCINEX) 600 MG 12 hr tablet, Take 600 mg by mouth Daily., Disp: , Rfl:   •  isosorbide mononitrate (IMDUR) 30 MG 24 hr tablet, Take 30 mg by mouth Daily., Disp: , Rfl:   •  metFORMIN (GLUCOPHAGE) 1000 MG tablet, Take 1,000 mg by mouth 2 (Two) Times a Day With Meals., Disp: , Rfl:   •  nebivolol (BYSTOLIC) 20 MG tablet, Take 1 tablet by mouth 2 (Two) Times a Day., Disp: , Rfl:   •  nitroglycerin (NITROLINGUAL) 0.4 MG/SPRAY spray, Place 1 spray under the tongue Every 5 (Five) Minutes As Needed for Chest Pain., Disp: , Rfl:   •  olmesartan (BENICAR) 20 MG tablet, Take 20 mg by mouth Daily., Disp: , Rfl:   •  pantoprazole (PROTONIX) 40 MG EC tablet, Take 40 mg by mouth Daily., Disp: , Rfl:   •  potassium chloride (K-DUR,KLOR-CON) 20 MEQ CR tablet, Take 20 mEq by mouth 2 (Two) Times a Day., Disp: , Rfl:   •  QUEtiapine (SEROquel) 25 MG tablet, Take 25 mg by mouth Daily., Disp: , Rfl:   •  ranolazine (RANEXA) 500 MG 12 hr tablet, Take 500 mg by mouth 2 (Two) Times a Day., Disp: , Rfl: 6  •  rosuvastatin (CRESTOR) 20 MG tablet, Take 40 mg by mouth Every Night., Disp: , Rfl:   •  thiamine (VITAMIN B-1) 100 MG tablet, Take 100 mg by mouth Daily., Disp: , Rfl:     Family History:  Family History   Problem Relation Age of Onset   • Diabetes Mother    • Heart attack Father    • Diabetes Father    • Other Sister         MRSA   • Heart disease Other         Positive for Ischemic   • Cancer Other    • Hypertension Other        Past Medical History:  Past Medical History:   Diagnosis Date   • Atrial fibrillation (HCC)     June, [facility]. Abstracted from [facility].   • Brain bleed (HCC) 2019 October 14th [facility] on [facility]  for 3 weeks   • CAD (coronary artery disease)     S/P CABG and PCI. [facility] from [facility]   • Hyperlipidemia    • Hypertension    • Myocardial infarction (HCC) 2006    Non-ST elevation MI. [facility]ed from [facility]   • Obstructive sleep apnea on CPAP     At . Abstracted from [facility].   • Presence of Watchman left atrial appendage closure device 1/31/2020   • Stroke (HCC) 2019   • TIA (transient ischemic attack) 11/19/2013    Possible TIA- MRI shows small tumor/head. [facility]ed from [facility]       Past Surgical History:  Past Surgical History:   Procedure Laterality Date   • ANGIOPLASTY  2006    3 srents were sequentially placed in promimal and mid body of the saphennous vein graft to obtuse marginal branch. Abstracted from [facility]   • ATRIAL APPENDAGE EXCLUSION LEFT WITH TRANSESOPHAGEAL ECHOCARDIOGRAM N/A 1/30/2020    Procedure: Atrial Appendage Occlusion;  Surgeon: Angel Willams MD;  Location: [facility] CATH INVASIVE LOCATION;  Service: Cardiovascular   • ATRIAL APPENDAGE EXCLUSION LEFT WITH TRANSESOPHAGEAL ECHOCARDIOGRAM N/A 1/30/2020    Procedure: Atrial Appendage Occlusion;  Surgeon: John Sheridan MD;  Location: [facility] CATH INVASIVE LOCATION;  Service: Cardiology   • CARDIAC CATHETERIZATION  2000    2003, 2006, 2012. [facility]ed from [facility]   • CORONARY ARTERY BYPASS GRAFT  2000    Triple. Abstracted from [facility]   • MUSCLE REPAIR      Tendons and nerves in right lower extremity. Abstracted from [facility]   • OTHER SURGICAL HISTORY  08/2012    RCA mid and proximal- Xience stent x3. Abstracted from [facility]   • OTHER SURGICAL HISTORY  09/2016    Head trauma/bleed at [facility]. [facility]ed from [facility]       Social History:  Social History     Socioeconomic History   • Marital status:    Tobacco Use   • Smoking status: Every Day     Packs/day: 1.00     Types: Cigarettes   • Smokeless tobacco: Never   • Tobacco comments:     Smoking  cessation--encouraged---refuses patch, gum or chantix   Vaping Use   • Vaping Use: Never used   Substance and Sexual Activity   • Alcohol use: Not Currently   • Drug use: No   • Sexual activity: Defer       Review of Systems:  The following systems were reviewed as they relate to the cardiovascular system: Constitutional, Eyes, ENT, Cardiovascular, Respiratory, Gastrointestinal, Integumentary, Neurological, Psychiatric, Hematologic, Endocrine, Musculoskeletal, and Genitourinary. The pertinent cardiovascular findings are reported above with all other cardiovascular points within those systems being negative.    Diagnostic Study Review:     Current Electrocardiogram:  Procedures no new EKG.  EKG dated 1/12/2023 demonstrates atrial fibrillation with a ventricular rate of 112 bpm.    Laboratory Data:  Lab Results   Component Value Date    GLUCOSE 91 01/29/2020    BUN 10 01/29/2020    CREATININE 0.85 01/29/2020    EGFRIFNONA 93 01/29/2020    BCR 11.8 01/29/2020    K 4.6 01/29/2020    CO2 23.6 01/29/2020    CALCIUM 9.6 01/29/2020    ALBUMIN 4.60 01/29/2020    AST 12 01/29/2020    ALT 17 01/29/2020     Lab Results   Component Value Date    GLUCOSE 91 01/29/2020    CALCIUM 9.6 01/29/2020     01/29/2020    K 4.6 01/29/2020    CO2 23.6 01/29/2020     01/29/2020    BUN 10 01/29/2020    CREATININE 0.85 01/29/2020    EGFRIFNONA 93 01/29/2020    BCR 11.8 01/29/2020    ANIONGAP 13.4 01/29/2020     Lab Results   Component Value Date    WBC 8.09 01/29/2020    HGB 16.7 01/29/2020    HCT 49.5 01/29/2020    MCV 92.4 01/29/2020     01/29/2020     No results found for: CHOL, CHLPL, TRIG, HDL, LDL, LDLDIRECT  No results found for: HGBA1C  Lab Results   Component Value Date    INR 1.73 (L) 01/31/2020    INR 1.71 (L) 01/29/2020    PROTIME 16.8 (L) 01/31/2020    PROTIME 16.6 (L) 01/29/2020       Most Recent Echo:  Results for orders placed during the hospital encounter of 01/26/21    Adult Transesophageal Echo (WILD) W/  Cont if Necessary Per Protocol    Interpretation Summary  · The left ventricular cavity is borderline dilated.  · Left ventricular wall thickness is consistent with concentric hypertrophy.  · Estimated left ventricular EF = 60% Estimated left ventricular EF was in agreement with the calculated left ventricular EF. Left ventricular ejection fraction appears to be 56 - 60%. Left ventricular systolic function is normal.  · The right ventricular cavity is mildly dilated.  · The right atrial cavity is borderline dilated.  · With a centrally-directed jet noted.  · Estimated right ventricular systolic pressure from tricuspid regurgitation is normal (<35 mmHg).    Procedure indication    Paroxysmal atrial fibrillation/watchman device in situ    conscious sedation administered by anesthesia    consent obtained before procedure      Procedure note  after obtaining a valid consent patient was sedated by Anesthesia and a WILD probe was easily placed into esophagus with multiplane imaging with 2D, color and Doppler followed by bubble study with agitated saline without any complications    WILD  Findings    The watchman device is well-seated in the appendage with complete seal without any clot and there is no leak  Moderate left atrial enlargement without any shunt or clot  LV ejection fraction is normal with EF of 60% with mild to moderate LVH  Mild MR and TR noted without pulmonary hypertension  No effusion      Procedures done  Transesophageal echocardiography      Electronically signed by Angel Willams MD, 01/26/21, 5:45 PM EST.       Most Recent Stress Test:       Most Recent Cardiac Catheterization:   No results found for this or any previous visit.       NOTE: The following portions of the patient's note were reviewed, confirmed and/or updated this visit as appropriate: History of present illness/Interval history, physical examination, assessment & plan, allergies, current medications, past family history, past medical  history, past social history, past surgical history and problem list.

## 2023-01-18 ENCOUNTER — OFFICE VISIT (OUTPATIENT)
Dept: CARDIOLOGY | Facility: CLINIC | Age: 63
End: 2023-01-18
Payer: OTHER GOVERNMENT

## 2023-01-18 VITALS
SYSTOLIC BLOOD PRESSURE: 100 MMHG | HEIGHT: 71 IN | HEART RATE: 105 BPM | OXYGEN SATURATION: 98 % | WEIGHT: 234 LBS | BODY MASS INDEX: 32.76 KG/M2 | DIASTOLIC BLOOD PRESSURE: 79 MMHG

## 2023-01-18 DIAGNOSIS — I10 PRIMARY HYPERTENSION: Chronic | ICD-10-CM

## 2023-01-18 DIAGNOSIS — I48.0 PAROXYSMAL ATRIAL FIBRILLATION: Chronic | ICD-10-CM

## 2023-01-18 DIAGNOSIS — I25.10 CORONARY ARTERY DISEASE INVOLVING NATIVE CORONARY ARTERY OF NATIVE HEART WITHOUT ANGINA PECTORIS: Primary | Chronic | ICD-10-CM

## 2023-01-18 DIAGNOSIS — Z95.818 PRESENCE OF WATCHMAN LEFT ATRIAL APPENDAGE CLOSURE DEVICE: Chronic | ICD-10-CM

## 2023-01-18 PROCEDURE — 99214 OFFICE O/P EST MOD 30 MIN: CPT | Performed by: INTERNAL MEDICINE

## 2023-01-20 ENCOUNTER — HOSPITAL ENCOUNTER (OUTPATIENT)
Dept: CARDIOLOGY | Facility: HOSPITAL | Age: 63
Discharge: HOME | End: 2023-01-20
Admitting: INTERNAL MEDICINE
Payer: OTHER GOVERNMENT

## 2023-01-20 DIAGNOSIS — R09.89 BILATERAL CAROTID BRUITS: ICD-10-CM

## 2023-01-20 LAB
BH CV XLRA MEAS LEFT DIST CCA EDV: -14.5 CM/SEC
BH CV XLRA MEAS LEFT DIST CCA PSV: -75.1 CM/SEC
BH CV XLRA MEAS LEFT DIST ICA EDV: -17.2 CM/SEC
BH CV XLRA MEAS LEFT DIST ICA PSV: -56.1 CM/SEC
BH CV XLRA MEAS LEFT ICA/CCA RATIO: 1.85
BH CV XLRA MEAS LEFT PROX CCA EDV: 14.9 CM/SEC
BH CV XLRA MEAS LEFT PROX CCA PSV: 119 CM/SEC
BH CV XLRA MEAS LEFT PROX ECA EDV: 12.1 CM/SEC
BH CV XLRA MEAS LEFT PROX ECA PSV: 97.1 CM/SEC
BH CV XLRA MEAS LEFT PROX ICA EDV: -18.2 CM/SEC
BH CV XLRA MEAS LEFT PROX ICA PSV: -139 CM/SEC
BH CV XLRA MEAS LEFT PROX SCLA PSV: 179 CM/SEC
BH CV XLRA MEAS LEFT VERTEBRAL A EDV: -15.4 CM/SEC
BH CV XLRA MEAS LEFT VERTEBRAL A PSV: -44.8 CM/SEC
BH CV XLRA MEAS RIGHT DIST CCA EDV: -13.3 CM/SEC
BH CV XLRA MEAS RIGHT DIST CCA PSV: -59.2 CM/SEC
BH CV XLRA MEAS RIGHT DIST ICA EDV: -21.5 CM/SEC
BH CV XLRA MEAS RIGHT DIST ICA PSV: -72.9 CM/SEC
BH CV XLRA MEAS RIGHT ICA/CCA RATIO: 1.35
BH CV XLRA MEAS RIGHT PROX CCA EDV: -11.4 CM/SEC
BH CV XLRA MEAS RIGHT PROX CCA PSV: -61.5 CM/SEC
BH CV XLRA MEAS RIGHT PROX ECA EDV: 6.6 CM/SEC
BH CV XLRA MEAS RIGHT PROX ECA PSV: 65.9 CM/SEC
BH CV XLRA MEAS RIGHT PROX ICA EDV: -9.7 CM/SEC
BH CV XLRA MEAS RIGHT PROX ICA PSV: -79.9 CM/SEC
BH CV XLRA MEAS RIGHT PROX SCLA PSV: -110 CM/SEC
BH CV XLRA MEAS RIGHT VERTEBRAL A EDV: -9.7 CM/SEC
BH CV XLRA MEAS RIGHT VERTEBRAL A PSV: -40.8 CM/SEC
LEFT ARM BP: NORMAL MMHG
MAXIMAL PREDICTED HEART RATE: 160 BPM
RIGHT ARM BP: NORMAL MMHG
STRESS TARGET HR: 136 BPM

## 2023-01-20 PROCEDURE — 93880 EXTRACRANIAL BILAT STUDY: CPT

## 2023-08-17 ENCOUNTER — OFFICE VISIT (OUTPATIENT)
Dept: CARDIOLOGY | Facility: CLINIC | Age: 63
End: 2023-08-17
Payer: OTHER GOVERNMENT

## 2023-08-17 VITALS
HEIGHT: 71 IN | SYSTOLIC BLOOD PRESSURE: 120 MMHG | OXYGEN SATURATION: 96 % | BODY MASS INDEX: 32.2 KG/M2 | WEIGHT: 230 LBS | HEART RATE: 65 BPM | DIASTOLIC BLOOD PRESSURE: 68 MMHG

## 2023-08-17 DIAGNOSIS — I10 PRIMARY HYPERTENSION: Chronic | ICD-10-CM

## 2023-08-17 DIAGNOSIS — I25.10 CORONARY ARTERY DISEASE INVOLVING NATIVE CORONARY ARTERY OF NATIVE HEART WITHOUT ANGINA PECTORIS: Primary | Chronic | ICD-10-CM

## 2023-08-17 DIAGNOSIS — Z95.818 PRESENCE OF WATCHMAN LEFT ATRIAL APPENDAGE CLOSURE DEVICE: Chronic | ICD-10-CM

## 2023-08-17 DIAGNOSIS — I48.0 PAROXYSMAL ATRIAL FIBRILLATION: Chronic | ICD-10-CM

## 2023-08-17 DIAGNOSIS — E78.49 OTHER HYPERLIPIDEMIA: ICD-10-CM

## 2023-08-17 PROCEDURE — 99214 OFFICE O/P EST MOD 30 MIN: CPT | Performed by: INTERNAL MEDICINE

## 2023-08-17 RX ORDER — ROSUVASTATIN CALCIUM 40 MG/1
TABLET, COATED ORAL
COMMUNITY
Start: 2023-05-21

## 2023-08-17 NOTE — PROGRESS NOTES
Cardiology Office Visit      Encounter Date:  08/17/2023    Patient ID:   Chinedu Lopez is a 62 y.o. male.    Reason For Followup:  Coronary artery disease  Atrial fibrillation    Brief Clinical History:  Dear Dr. Clark, AMAYA Shah    I had the pleasure of seeing Chinedu Lopez today. As you are well aware, this is a 62 y.o. male with a known history of ischemic heart disease. He additionally has a history of paroxysmal atrial fibrillation status post watchman placement, dyslipidemia, hypertension with hypertensive cardiovascular disease,  tobacco abuse disorder and antiplatelet therapy. He presents today for followup on the above conditions.     Interval History:  He denies chest pain, pressure, heaviness or tightness.  He denies any shortness of breath out of character.  He denies any PND, orthopnea or palpitations.  He denies any syncope or near-syncope.  He reports feeling well from a cardiac perspective.     Assessment & Plan     Impressions:  Coronary artery disease status post coronary arterial bypass grafting and subsequent PCI and stenting.      Most recent catheterization in May of 2016 demonstrating no disease amenable to percutaneous or surgical revascularization.  Paroxysmal atrial fibrillation status post watchman implantation  Hypertension with hypertensive cardiovascular disease.  Dyslipidemia.   Antiplatelet therapy.  Tobacco abuse disorder     Recommendations:  Continuation of his current cardiovascular regimen at present time.     This includes antiplatelet, antihypertensive, statin, and antianginals.  Obtain labs from your office for review  Stress testing prior to next visit due to age of grafts  Follow-up in 6 months time sooner should there be difficulties  Smoking cessation.    Diagnoses and all orders for this visit:    1. Coronary artery disease involving native coronary artery of native heart without angina pectoris (Primary)  -     Stress Test With Myocardial Perfusion One  "Day; Future    2. Primary hypertension    3. Other hyperlipidemia    4. Paroxysmal atrial fibrillation    5. Presence of Watchman left atrial appendage closure device          Objective:    Vitals:  Vitals:    08/17/23 1003   BP: 120/68   BP Location: Left arm   Patient Position: Sitting   Pulse: 65   SpO2: 96%   Weight: 104 kg (230 lb)   Height: 180.3 cm (71\")     Body mass index is 32.08 kg/mý.      Physical Exam:    General: Alert, cooperative, no distress, appears stated age  Head:  Normocephalic, atraumatic, mucous membranes moist  Eyes:  Conjunctiva/corneas clear, EOM's intact     Neck:  Supple,  no bruit    Lungs: Clear to auscultation bilaterally, no wheezes rhonchi rales are noted  Chest wall: No tenderness  Heart::  Regular rate and rhythm, S1 and S2 normal, 1/6 holosystolic murmur.  No rub or gallop  Abdomen: Soft, non-tender, nondistended bowel sounds active  Extremities: No cyanosis, clubbing, or edema  Pulses: 2+ and symmetric all extremities  Skin:  No rashes or lesions  Neuro/psych: A&O x3. CN II through XII are grossly intact with appropriate affect      Allergies:  No Known Allergies    Medication Review:     Current Outpatient Medications:     amantadine (SYMMETREL) 100 MG capsule, Take 1 capsule by mouth Daily., Disp: , Rfl:     aspirin 81 MG chewable tablet, Chew 1 tablet Daily., Disp: , Rfl:     Cholecalciferol (VITAMIN D3) 125 MCG (5000 UT) capsule capsule, Take 1 capsule by mouth Daily., Disp: , Rfl:     Cyanocobalamin (VITAMIN B 12) 500 MCG tablet, Take 1 tablet by mouth Daily., Disp: , Rfl:     doxazosin (CARDURA) 1 MG tablet, Take 1 tablet by mouth every night at bedtime., Disp: , Rfl:     famotidine (PEPCID) 40 MG tablet, Take 1 tablet by mouth Daily., Disp: , Rfl:     folic acid (FOLVITE) 1 MG tablet, Take 1 tablet by mouth Daily., Disp: , Rfl:     guaiFENesin (MUCINEX) 600 MG 12 hr tablet, Take 1 tablet by mouth Daily., Disp: , Rfl:     isosorbide mononitrate (IMDUR) 30 MG 24 hr tablet, " Take 1 tablet by mouth Daily., Disp: , Rfl:     metFORMIN (GLUCOPHAGE) 1000 MG tablet, Take 1 tablet by mouth 2 (Two) Times a Day With Meals., Disp: , Rfl:     nebivolol (BYSTOLIC) 20 MG tablet, Take 1 tablet by mouth 2 (Two) Times a Day., Disp: , Rfl:     nitroglycerin (NITROLINGUAL) 0.4 MG/SPRAY spray, Place 1 spray under the tongue Every 5 (Five) Minutes As Needed for Chest Pain., Disp: , Rfl:     olmesartan (BENICAR) 20 MG tablet, Take 1 tablet by mouth Daily., Disp: , Rfl:     pantoprazole (PROTONIX) 40 MG EC tablet, Take 1 tablet by mouth Daily., Disp: , Rfl:     potassium chloride (K-DUR,KLOR-CON) 20 MEQ CR tablet, Take 1 tablet by mouth 2 (Two) Times a Day., Disp: , Rfl:     QUEtiapine (SEROquel) 25 MG tablet, Take 1 tablet by mouth Daily., Disp: , Rfl:     ranolazine (RANEXA) 500 MG 12 hr tablet, Take 1 tablet by mouth 2 (Two) Times a Day., Disp: , Rfl: 6    rosuvastatin (CRESTOR) 40 MG tablet, , Disp: , Rfl:     thiamine (VITAMIN B-1) 100 MG tablet, Take 1 tablet by mouth Daily., Disp: , Rfl:     Family History:  Family History   Problem Relation Age of Onset    Diabetes Mother     Heart attack Father     Diabetes Father     Other Sister         MRSA    Heart disease Other         Positive for Ischemic    Cancer Other     Hypertension Other        Past Medical History:  Past Medical History:   Diagnosis Date    Atrial fibrillation     June, [facility]. Abstracted from [facility].    Brain bleed 2019 October 14th [facility] on [facility] for 3 weeks    CAD (coronary artery disease)     S/P CABG and PCI. Abstracted from [facility].    Hyperlipidemia     Hypertension     Myocardial infarction 2006    Non-ST elevation MI. Abstracted from [facility].    Obstructive sleep apnea on CPAP     At . Abstracted from [facility].    Presence of Watchman left atrial appendage closure device 1/31/2020    Stroke 2019    TIA (transient ischemic attack) 11/19/2013    Possible TIA- MRI shows small tumor/head. Abstracted from  [facility].       Past Surgical History:  Past Surgical History:   Procedure Laterality Date    ANGIOPLASTY  2006    3 srents were sequentially placed in promimal and mid body of the saphennous vein graft to obtuse marginal branch. Abstracted from [facility].    ATRIAL APPENDAGE EXCLUSION LEFT WITH TRANSESOPHAGEAL ECHOCARDIOGRAM N/A 1/30/2020    Procedure: Atrial Appendage Occlusion;  Surgeon: Angel Willams MD;  Location: [facility] CATH INVASIVE LOCATION;  Service: Cardiovascular    ATRIAL APPENDAGE EXCLUSION LEFT WITH TRANSESOPHAGEAL ECHOCARDIOGRAM N/A 1/30/2020    Procedure: Atrial Appendage Occlusion;  Surgeon: John Sheridan MD;  Location: [facility] CATH INVASIVE LOCATION;  Service: Cardiology    CARDIAC CATHETERIZATION  2000 2003, 2006, 2012. Abstracted from [facility].    CORONARY ARTERY BYPASS GRAFT  2000    Triple. Abstracted from [facility].    MUSCLE REPAIR      Tendons and nerves in right lower extremity. Abstracted from [facility].    OTHER SURGICAL HISTORY  08/2012    RCA mid and proximal- Xience stent x3. Abstracted from [facility].    OTHER SURGICAL HISTORY  09/2016    Head trauma/bleed at [facility]. Abstracted from [facility].       Social History:  Social History     Socioeconomic History    Marital status:    Tobacco Use    Smoking status: Every Day     Packs/day: 1.00     Types: Cigarettes    Smokeless tobacco: Never    Tobacco comments:     Smoking cessation--encouraged---refuses patch, gum or chantix   Vaping Use    Vaping Use: Never used   Substance and Sexual Activity    Alcohol use: Not Currently    Drug use: No    Sexual activity: Defer       Review of Systems:  The following systems were reviewed as they relate to the cardiovascular system: Constitutional, Eyes, ENT, Cardiovascular, Respiratory, Gastrointestinal, Integumentary, Neurological, Psychiatric, Hematologic, Endocrine, Musculoskeletal, and Genitourinary. The pertinent cardiovascular findings are reported above with  all other cardiovascular points within those systems being negative.    Diagnostic Study Review:     Current Electrocardiogram:  Procedures no new EKG.  EKG dated 7/20/2023 demonstrates atrial fibrillation with a ventricular rate of 79 bpm.  Laboratory Data:  Lab Results   Component Value Date    GLUCOSE 91 01/29/2020    BUN 10 01/29/2020    CREATININE 0.85 01/29/2020    EGFRIFNONA 93 01/29/2020    BCR 11.8 01/29/2020    K 4.6 01/29/2020    CO2 23.6 01/29/2020    CALCIUM 9.6 01/29/2020    ALBUMIN 4.60 01/29/2020    AST 12 01/29/2020    ALT 17 01/29/2020     Lab Results   Component Value Date    GLUCOSE 91 01/29/2020    CALCIUM 9.6 01/29/2020     01/29/2020    K 4.6 01/29/2020    CO2 23.6 01/29/2020     01/29/2020    BUN 10 01/29/2020    CREATININE 0.85 01/29/2020    EGFRIFNONA 93 01/29/2020    BCR 11.8 01/29/2020    ANIONGAP 13.4 01/29/2020     Lab Results   Component Value Date    WBC 8.09 01/29/2020    HGB 16.7 01/29/2020    HCT 49.5 01/29/2020    MCV 92.4 01/29/2020     01/29/2020     No results found for: CHOL, CHLPL, TRIG, HDL, LDL, LDLDIRECT  No results found for: HGBA1C  Lab Results   Component Value Date    INR 1.73 (L) 01/31/2020    INR 1.71 (L) 01/29/2020    PROTIME 16.8 (L) 01/31/2020    PROTIME 16.6 (L) 01/29/2020       Most Recent Echo:  Results for orders placed during the hospital encounter of 01/26/21    Adult Transesophageal Echo (WILD) W/ Cont if Necessary Per Protocol    Interpretation Summary  ú The left ventricular cavity is borderline dilated.  ú Left ventricular wall thickness is consistent with concentric hypertrophy.  ú Estimated left ventricular EF = 60% Estimated left ventricular EF was in agreement with the calculated left ventricular EF. Left ventricular ejection fraction appears to be 56 - 60%. Left ventricular systolic function is normal.  ú The right ventricular cavity is mildly dilated.  ú The right atrial cavity is borderline dilated.  ú With a centrally-directed  jet noted.  ú Estimated right ventricular systolic pressure from tricuspid regurgitation is normal (<35 mmHg).    Procedure indication    Paroxysmal atrial fibrillation/watchman device in situ    conscious sedation administered by anesthesia    consent obtained before procedure      Procedure note  after obtaining a valid consent patient was sedated by Anesthesia and a WILD probe was easily placed into esophagus with multiplane imaging with 2D, color and Doppler followed by bubble study with agitated saline without any complications    WILD  Findings    The watchman device is well-seated in the appendage with complete seal without any clot and there is no leak  Moderate left atrial enlargement without any shunt or clot  LV ejection fraction is normal with EF of 60% with mild to moderate LVH  Mild MR and TR noted without pulmonary hypertension  No effusion      Procedures done  Transesophageal echocardiography      Electronically signed by Angel Willams MD, 01/26/21, 5:45 PM EST.       Most Recent Stress Test:       Most Recent Cardiac Catheterization:   No results found for this or any previous visit.       NOTE: The following portions of the patient's note were reviewed, confirmed and/or updated this visit as appropriate: History of present illness/Interval history, physical examination, assessment & plan, allergies, current medications, past family history, past medical history, past social history, past surgical history and problem list.

## 2024-01-23 ENCOUNTER — OFFICE (OUTPATIENT)
Dept: URBAN - METROPOLITAN AREA CLINIC 64 | Facility: CLINIC | Age: 64
End: 2024-01-23

## 2024-01-23 VITALS — HEIGHT: 72 IN | WEIGHT: 231 LBS

## 2024-01-23 DIAGNOSIS — Z86.010 PERSONAL HISTORY OF COLONIC POLYPS: ICD-10-CM

## 2024-01-23 DIAGNOSIS — K22.70 BARRETT'S ESOPHAGUS WITHOUT DYSPLASIA: ICD-10-CM

## 2024-01-23 DIAGNOSIS — I48.91 UNSPECIFIED ATRIAL FIBRILLATION: ICD-10-CM

## 2024-01-23 PROCEDURE — 99214 OFFICE O/P EST MOD 30 MIN: CPT

## 2024-02-26 NOTE — PROGRESS NOTES
Cardiology Office Visit      Encounter Date:  02/28/2024    Patient ID:   Chinedu Lopez is a 63 y.o. male.    Reason For Followup:  Coronary artery disease  Atrial fibrillation    Brief Clinical History:  Dear Nhung Pillai APRN    I had the pleasure of seeing Chinedu Lopez today. As you are well aware, this is a 63 y.o. male with a known history of ischemic heart disease. He additionally has a history of paroxysmal atrial fibrillation status post watchman placement, dyslipidemia, hypertension with hypertensive cardiovascular disease,  tobacco abuse disorder and antiplatelet therapy. He presents today for followup on the above conditions.     Interval History:  He denies chest pain, pressure, heaviness or tightness.  He denies any shortness of breath out of character.  He denies any PND, orthopnea or palpitations.  He denies any syncope or near-syncope.  He reports feeling well from a cardiac perspective.      02/28/2024        He did not get stress test done because he had a GI procedure very close to the stress so he cancelled. Now his GI procedure is scheduled out May. Will reschedule stress test. He continues to have issues with his hands being sore and stiff.     Assessment & Plan     Impressions:  Coronary artery disease status post coronary arterial bypass grafting and subsequent PCI and stenting.      Most recent catheterization in May of 2016 demonstrating no disease amenable to percutaneous or surgical revascularization.  Paroxysmal atrial fibrillation status post watchman implantation  Hypertension with hypertensive cardiovascular disease.  Dyslipidemia.   Antiplatelet therapy.  Tobacco abuse disorder     Recommendations:  Continuation of his current cardiovascular regimen at present time.     This includes antiplatelet, antihypertensive, statin, and antianginals.  Stress testing prior to next visit due to age of grafts  Follow-up in 6 months time sooner should there be  "difficulties  Smoking cessation.    Diagnoses and all orders for this visit:    1. Coronary artery disease involving native coronary artery of native heart without angina pectoris (Primary)  -     ECG 12 Lead    2. Primary hypertension  -     ECG 12 Lead    3. Other hyperlipidemia  -     ECG 12 Lead    4. Paroxysmal atrial fibrillation  -     ECG 12 Lead    5. Presence of Watchman left atrial appendage closure device  -     ECG 12 Lead    6. Long term (current) use of antithrombotics/antiplatelets  -     ECG 12 Lead            Objective:    Vitals:  Vitals:    02/28/24 1123   BP: 129/84   Pulse: 118   SpO2: 97%   Weight: 104 kg (230 lb)   Height: 180.3 cm (71\")       Body mass index is 32.08 kg/m².      Physical Exam:    General: Alert, cooperative, no distress, appears stated age  Head:  Normocephalic, atraumatic, mucous membranes moist  Eyes:  Conjunctiva/corneas clear, EOM's intact     Neck:  Supple,  no bruit    Lungs: Clear to auscultation bilaterally, no wheezes rhonchi rales are noted  Chest wall: No tenderness  Heart::  Regular rate and rhythm, S1 and S2 normal, 1/6 holosystolic murmur.  No rub or gallop  Abdomen: Soft, non-tender, nondistended bowel sounds active  Extremities: No cyanosis, clubbing, or edema  Pulses: 2+ and symmetric all extremities  Skin:  No rashes or lesions  Neuro/psych: A&O x3. CN II through XII are grossly intact with appropriate affect      Allergies:  No Known Allergies    Medication Review:     Current Outpatient Medications:     amantadine (SYMMETREL) 100 MG capsule, Take 1 capsule by mouth Daily., Disp: , Rfl:     aspirin 81 MG chewable tablet, Chew 1 tablet Daily., Disp: , Rfl:     Cholecalciferol (VITAMIN D3) 125 MCG (5000 UT) capsule capsule, Take 1 capsule by mouth Daily., Disp: , Rfl:     Dilt- MG 24 hr capsule, , Disp: , Rfl:     doxazosin (CARDURA) 1 MG tablet, Take 1 tablet by mouth every night at bedtime., Disp: , Rfl:     famotidine (PEPCID) 40 MG tablet, Take 1 " tablet by mouth Daily., Disp: , Rfl:     folic acid (FOLVITE) 1 MG tablet, Take 1 tablet by mouth Daily., Disp: , Rfl:     guaiFENesin (MUCINEX) 600 MG 12 hr tablet, Take 1 tablet by mouth Daily., Disp: , Rfl:     isosorbide mononitrate (IMDUR) 30 MG 24 hr tablet, Take 1 tablet by mouth Daily., Disp: , Rfl:     metFORMIN (GLUCOPHAGE) 1000 MG tablet, Take 1 tablet by mouth 2 (Two) Times a Day With Meals., Disp: , Rfl:     nebivolol (BYSTOLIC) 20 MG tablet, Take 1 tablet by mouth 2 (Two) Times a Day., Disp: , Rfl:     nitroglycerin (NITROLINGUAL) 0.4 MG/SPRAY spray, Place 1 spray under the tongue Every 5 (Five) Minutes As Needed for Chest Pain., Disp: , Rfl:     olmesartan (BENICAR) 20 MG tablet, Take 1 tablet by mouth Daily., Disp: , Rfl:     pantoprazole (PROTONIX) 40 MG EC tablet, Take 1 tablet by mouth Daily., Disp: , Rfl:     potassium chloride (K-DUR,KLOR-CON) 20 MEQ CR tablet, Take 1 tablet by mouth 2 (Two) Times a Day., Disp: , Rfl:     QUEtiapine (SEROquel) 25 MG tablet, Take 1 tablet by mouth Daily., Disp: , Rfl:     ranolazine (RANEXA) 500 MG 12 hr tablet, Take 1 tablet by mouth 2 (Two) Times a Day., Disp: , Rfl: 6    rosuvastatin (CRESTOR) 40 MG tablet, , Disp: , Rfl:     thiamine (VITAMIN B-1) 100 MG tablet, Take 1 tablet by mouth Daily., Disp: , Rfl:     Cyanocobalamin (VITAMIN B 12) 500 MCG tablet, Take 1 tablet by mouth Daily., Disp: , Rfl:     Family History:  Family History   Problem Relation Age of Onset    Diabetes Mother     Heart attack Father     Diabetes Father     Other Sister         MRSA    Heart disease Other         Positive for Ischemic    Cancer Other     Hypertension Other        Past Medical History:  Past Medical History:   Diagnosis Date    Atrial fibrillation     June, [facility]. Abstracted from [facility].    Brain bleed 2019 October 14th fell - Hospital on 17th for 3 weeks    CAD (coronary artery disease)     S/P CABG and PCI. Abstracted from [facility].    Hyperlipidemia      Hypertension     Myocardial infarction 2006    Non-ST elevation MI. Abstracted from [facility].    Obstructive sleep apnea on CPAP     At [facility]. Abstracted from [facility].    Presence of Watchman left atrial appendage closure device 1/31/2020    Stroke 2019    TIA (transient ischemic attack) 11/19/2013    Possible TIA- MRI shows small tumor/head. Abstracted from [facility].       Past Surgical History:  Past Surgical History:   Procedure Laterality Date    ANGIOPLASTY  2006    3 srents were sequentially placed in promimal and mid body of the saphennous vein graft to obtuse marginal branch. Abstracted from [facility].    ATRIAL APPENDAGE EXCLUSION LEFT WITH TRANSESOPHAGEAL ECHOCARDIOGRAM N/A 1/30/2020    Procedure: Atrial Appendage Occlusion;  Surgeon: Angel Willams MD;  Location: [facility] CATH INVASIVE LOCATION;  Service: Cardiovascular    ATRIAL APPENDAGE EXCLUSION LEFT WITH TRANSESOPHAGEAL ECHOCARDIOGRAM N/A 1/30/2020    Procedure: Atrial Appendage Occlusion;  Surgeon: John Sheridan MD;  Location: [facility] CATH INVASIVE LOCATION;  Service: Cardiology    CARDIAC CATHETERIZATION  2000 2003, 2006, 2012. Abstracted from [facility].    CORONARY ARTERY BYPASS GRAFT  2000    Triple. Abstracted from [facility].    MUSCLE REPAIR      Tendons and nerves in right lower extremity. Abstracted from [facility].    OTHER SURGICAL HISTORY  08/2012    RCA mid and proximal- Xience stent x3. Abstracted from [facility].    OTHER SURGICAL HISTORY  09/2016    Head trauma/bleed at [facility]. Abstracted from [facility].       Social History:  Social History     Socioeconomic History    Marital status:    Tobacco Use    Smoking status: Every Day     Packs/day: 1     Types: Cigarettes    Smokeless tobacco: Never    Tobacco comments:     Smoking cessation--encouraged---refuses patch, gum or chantix   Vaping Use    Vaping Use: Never used   Substance and Sexual Activity    Alcohol use: Not Currently    Drug use: No    Sexual  "activity: Defer       Review of Systems:  The following systems were reviewed as they relate to the cardiovascular system: Constitutional, Eyes, ENT, Cardiovascular, Respiratory, Gastrointestinal, Integumentary, Neurological, Psychiatric, Hematologic, Endocrine, Musculoskeletal, and Genitourinary. The pertinent cardiovascular findings are reported above with all other cardiovascular points within those systems being negative.    Diagnostic Study Review:     Current Electrocardiogram:    ECG 12 Lead    Date/Time: 2/28/2024 12:27 PM  Performed by: Renny Mosher DO    Authorized by: Renny Mosher DO  Comparison: not compared with previous ECG   Previous ECG: no previous ECG available  Comments: Atrial fibrillation with a ventricular rate of 118 bpm.  Ventricular premature complex.  Borderline right axis deviation.  Repolarization changes consider ischemia.  Short QT and prolonged QTc intervals of 335 and 470 ms respectively.           Laboratory Data:  Lab Results   Component Value Date    GLUCOSE 91 01/29/2020    BUN 10 01/29/2020    CREATININE 0.85 01/29/2020    EGFRIFNONA 93 01/29/2020    BCR 11.8 01/29/2020    K 4.6 01/29/2020    CO2 23.6 01/29/2020    CALCIUM 9.6 01/29/2020    ALBUMIN 4.60 01/29/2020    AST 12 01/29/2020    ALT 17 01/29/2020     Lab Results   Component Value Date    GLUCOSE 91 01/29/2020    CALCIUM 9.6 01/29/2020     01/29/2020    K 4.6 01/29/2020    CO2 23.6 01/29/2020     01/29/2020    BUN 10 01/29/2020    CREATININE 0.85 01/29/2020    EGFRIFNONA 93 01/29/2020    BCR 11.8 01/29/2020    ANIONGAP 13.4 01/29/2020     Lab Results   Component Value Date    WBC 8.09 01/29/2020    HGB 16.7 01/29/2020    HCT 49.5 01/29/2020    MCV 92.4 01/29/2020     01/29/2020     No results found for: \"CHOL\", \"CHLPL\", \"TRIG\", \"HDL\", \"LDL\", \"LDLDIRECT\"  No results found for: \"HGBA1C\"  Lab Results   Component Value Date    INR 1.73 (L) 01/31/2020    INR 1.71 (L) " 01/29/2020    PROTIME 16.8 (L) 01/31/2020    PROTIME 16.6 (L) 01/29/2020       Most Recent Echo:  Results for orders placed during the hospital encounter of 01/26/21    Adult Transesophageal Echo (WILD) W/ Cont if Necessary Per Protocol    Interpretation Summary  · The left ventricular cavity is borderline dilated.  · Left ventricular wall thickness is consistent with concentric hypertrophy.  · Estimated left ventricular EF = 60% Estimated left ventricular EF was in agreement with the calculated left ventricular EF. Left ventricular ejection fraction appears to be 56 - 60%. Left ventricular systolic function is normal.  · The right ventricular cavity is mildly dilated.  · The right atrial cavity is borderline dilated.  · With a centrally-directed jet noted.  · Estimated right ventricular systolic pressure from tricuspid regurgitation is normal (<35 mmHg).    Procedure indication    Paroxysmal atrial fibrillation/watchman device in situ    conscious sedation administered by anesthesia    consent obtained before procedure      Procedure note  after obtaining a valid consent patient was sedated by Anesthesia and a WILD probe was easily placed into esophagus with multiplane imaging with 2D, color and Doppler followed by bubble study with agitated saline without any complications    WILD  Findings    The watchman device is well-seated in the appendage with complete seal without any clot and there is no leak  Moderate left atrial enlargement without any shunt or clot  LV ejection fraction is normal with EF of 60% with mild to moderate LVH  Mild MR and TR noted without pulmonary hypertension  No effusion      Procedures done  Transesophageal echocardiography      Electronically signed by Angel Willams MD, 01/26/21, 5:45 PM EST.       Most Recent Stress Test:       Most Recent Cardiac Catheterization:   No results found for this or any previous visit.       NOTE:     Today,02/28/2024 ,the following portions of the  "patient's note were reviewed, confirmed and/or updated as appropriate: History of present illness/Interval history, physical examination, assessment & plan, allergies, current medications, past family history, past medical history, past social history, past surgical history and problem list.    Labs pertinent to today's visit on 02/28/2024 (including but not limited to CBC, CMP, and lipid profiles) were requested from the patient's primary care provider/hospital/clinical laboratory.  If the labs were available for the visit, they were reviewed with the patient.  If they were not available, when received, special interest will be made to the labs pertinent to this visit.  The patient's most recent \"in-house\" labs are noted below and have been reviewed.  Outside labs pertinent to this visit are scanned into the record and have been reviewed.    Discussions held today, 02/28/2024,regarding procedures included risk, benefits, and options including but not limited to: Death, MI, stroke, pain, bleeding, infection, and possible need for vascular/thoracic/cardiothoracic surgery.    Copied information within this note was reviewed and is current as of 02/28/2024.    Assessment and plan noted herein represents the current plan of care as of 02/28/2024.    Significant resources from our office and staff are inherent in engaging this patient today,02/28/2024,and in a continuous and active collaborative plan of care related to their chronic cardiovascular conditions outlined below.  The management of these conditions requires the direction of our service with specialized clinical knowledge, skills, and experience.  This collaborative care includes but is not limited to patient education, expectations and responsibilities, shared decision making around therapeutic goals, and shared commitments to achieve those goals.  "

## 2024-02-28 ENCOUNTER — OFFICE VISIT (OUTPATIENT)
Dept: CARDIOLOGY | Facility: CLINIC | Age: 64
End: 2024-02-28
Payer: OTHER GOVERNMENT

## 2024-02-28 VITALS
WEIGHT: 230 LBS | HEART RATE: 118 BPM | SYSTOLIC BLOOD PRESSURE: 129 MMHG | HEIGHT: 71 IN | DIASTOLIC BLOOD PRESSURE: 84 MMHG | BODY MASS INDEX: 32.2 KG/M2 | OXYGEN SATURATION: 97 %

## 2024-02-28 DIAGNOSIS — Z95.818 PRESENCE OF WATCHMAN LEFT ATRIAL APPENDAGE CLOSURE DEVICE: Chronic | ICD-10-CM

## 2024-02-28 DIAGNOSIS — I10 PRIMARY HYPERTENSION: Chronic | ICD-10-CM

## 2024-02-28 DIAGNOSIS — I25.10 CORONARY ARTERY DISEASE INVOLVING NATIVE CORONARY ARTERY OF NATIVE HEART WITHOUT ANGINA PECTORIS: Primary | Chronic | ICD-10-CM

## 2024-02-28 DIAGNOSIS — E78.49 OTHER HYPERLIPIDEMIA: ICD-10-CM

## 2024-02-28 DIAGNOSIS — I48.0 PAROXYSMAL ATRIAL FIBRILLATION: Chronic | ICD-10-CM

## 2024-02-28 DIAGNOSIS — Z79.02 LONG TERM (CURRENT) USE OF ANTITHROMBOTICS/ANTIPLATELETS: ICD-10-CM

## 2024-02-28 RX ORDER — DILTIAZEM HYDROCHLORIDE 240 MG/1
CAPSULE, EXTENDED RELEASE ORAL
COMMUNITY
Start: 2024-01-20

## 2024-03-14 ENCOUNTER — HOSPITAL ENCOUNTER (OUTPATIENT)
Dept: CARDIOLOGY | Facility: HOSPITAL | Age: 64
Discharge: HOME | End: 2024-03-14
Payer: OTHER GOVERNMENT

## 2024-03-14 DIAGNOSIS — I25.10 CORONARY ARTERY DISEASE INVOLVING NATIVE CORONARY ARTERY OF NATIVE HEART WITHOUT ANGINA PECTORIS: Chronic | ICD-10-CM

## 2024-03-14 LAB
BH CV REST NUCLEAR ISOTOPE DOSE: 11.9 MCI
BH CV STRESS COMMENTS STAGE 1: NORMAL
BH CV STRESS DOSE REGADENOSON STAGE 1: 0.4
BH CV STRESS DURATION MIN STAGE 1: 0
BH CV STRESS DURATION SEC STAGE 1: 10
BH CV STRESS NUCLEAR ISOTOPE DOSE: 33.7 MCI
BH CV STRESS PROTOCOL 1: NORMAL
BH CV STRESS RECOVERY BP: NORMAL MMHG
BH CV STRESS RECOVERY HR: 129 BPM
BH CV STRESS STAGE 1: 1
LV EF NUC BP: 40 %
MAXIMAL PREDICTED HEART RATE: 159 BPM
PERCENT MAX PREDICTED HR: 73.58 %
STRESS BASELINE BP: NORMAL MMHG
STRESS BASELINE HR: 108 BPM
STRESS PERCENT HR: 87 %
STRESS POST PEAK BP: NORMAL MMHG
STRESS POST PEAK HR: 117 BPM
STRESS TARGET HR: 135 BPM

## 2024-03-14 PROCEDURE — 0 TECHNETIUM SESTAMIBI: Performed by: INTERNAL MEDICINE

## 2024-03-14 PROCEDURE — A9500 TC99M SESTAMIBI: HCPCS | Performed by: INTERNAL MEDICINE

## 2024-03-14 PROCEDURE — 78452 HT MUSCLE IMAGE SPECT MULT: CPT

## 2024-03-14 PROCEDURE — 25010000002 REGADENOSON 0.4 MG/5ML SOLUTION: Performed by: INTERNAL MEDICINE

## 2024-03-14 PROCEDURE — 93017 CV STRESS TEST TRACING ONLY: CPT

## 2024-03-14 RX ORDER — REGADENOSON 0.08 MG/ML
0.4 INJECTION, SOLUTION INTRAVENOUS
Status: COMPLETED | OUTPATIENT
Start: 2024-03-14 | End: 2024-03-14

## 2024-03-14 RX ADMIN — TECHNETIUM TC 99M SESTAMIBI 1 DOSE: 1 INJECTION INTRAVENOUS at 11:27

## 2024-03-14 RX ADMIN — TECHNETIUM TC 99M SESTAMIBI 1 DOSE: 1 INJECTION INTRAVENOUS at 09:05

## 2024-03-14 RX ADMIN — REGADENOSON 0.4 MG: 0.08 INJECTION, SOLUTION INTRAVENOUS at 11:28

## 2024-04-29 ENCOUNTER — OFFICE (OUTPATIENT)
Dept: URBAN - METROPOLITAN AREA CLINIC 64 | Facility: CLINIC | Age: 64
End: 2024-04-29
Payer: COMMERCIAL

## 2024-04-29 VITALS
WEIGHT: 238 LBS | HEIGHT: 72 IN | SYSTOLIC BLOOD PRESSURE: 135 MMHG | HEART RATE: 105 BPM | DIASTOLIC BLOOD PRESSURE: 87 MMHG

## 2024-04-29 DIAGNOSIS — I48.91 UNSPECIFIED ATRIAL FIBRILLATION: ICD-10-CM

## 2024-04-29 DIAGNOSIS — D64.9 ANEMIA, UNSPECIFIED: ICD-10-CM

## 2024-04-29 DIAGNOSIS — K22.70 BARRETT'S ESOPHAGUS WITHOUT DYSPLASIA: ICD-10-CM

## 2024-04-29 DIAGNOSIS — Z86.010 PERSONAL HISTORY OF COLONIC POLYPS: ICD-10-CM

## 2024-04-29 PROCEDURE — 99212 OFFICE O/P EST SF 10 MIN: CPT | Performed by: NURSE PRACTITIONER

## 2024-05-06 ENCOUNTER — ON CAMPUS - OUTPATIENT (OUTPATIENT)
Dept: URBAN - METROPOLITAN AREA HOSPITAL 77 | Facility: HOSPITAL | Age: 64
End: 2024-05-06
Payer: COMMERCIAL

## 2024-05-06 DIAGNOSIS — Z09 ENCOUNTER FOR FOLLOW-UP EXAMINATION AFTER COMPLETED TREATMEN: ICD-10-CM

## 2024-05-06 DIAGNOSIS — D12.2 BENIGN NEOPLASM OF ASCENDING COLON: ICD-10-CM

## 2024-05-06 DIAGNOSIS — Z86.010 PERSONAL HISTORY OF COLONIC POLYPS: ICD-10-CM

## 2024-05-06 DIAGNOSIS — K64.1 SECOND DEGREE HEMORRHOIDS: ICD-10-CM

## 2024-05-06 DIAGNOSIS — K22.70 BARRETT'S ESOPHAGUS WITHOUT DYSPLASIA: ICD-10-CM

## 2024-05-06 DIAGNOSIS — K57.30 DIVERTICULOSIS OF LARGE INTESTINE WITHOUT PERFORATION OR ABS: ICD-10-CM

## 2024-05-06 DIAGNOSIS — D12.4 BENIGN NEOPLASM OF DESCENDING COLON: ICD-10-CM

## 2024-05-06 DIAGNOSIS — D12.5 BENIGN NEOPLASM OF SIGMOID COLON: ICD-10-CM

## 2024-05-06 PROCEDURE — 45385 COLONOSCOPY W/LESION REMOVAL: CPT | Mod: 33 | Performed by: INTERNAL MEDICINE

## 2024-05-06 PROCEDURE — 43239 EGD BIOPSY SINGLE/MULTIPLE: CPT | Performed by: INTERNAL MEDICINE

## 2024-05-06 PROCEDURE — 45380 COLONOSCOPY AND BIOPSY: CPT | Mod: 33,59 | Performed by: INTERNAL MEDICINE

## 2024-07-30 ENCOUNTER — APPOINTMENT (OUTPATIENT)
Dept: CARDIOLOGY | Facility: HOSPITAL | Age: 64
End: 2024-07-30
Payer: OTHER GOVERNMENT

## 2024-07-30 ENCOUNTER — HOSPITAL ENCOUNTER (INPATIENT)
Facility: HOSPITAL | Age: 64
LOS: 4 days | Discharge: HOME | End: 2024-08-03
Attending: FAMILY MEDICINE | Admitting: FAMILY MEDICINE
Payer: OTHER GOVERNMENT

## 2024-07-30 ENCOUNTER — APPOINTMENT (OUTPATIENT)
Dept: ULTRASOUND IMAGING | Facility: HOSPITAL | Age: 64
End: 2024-07-30
Payer: OTHER GOVERNMENT

## 2024-07-30 DIAGNOSIS — N18.2 CKD (CHRONIC KIDNEY DISEASE) STAGE 2, GFR 60-89 ML/MIN: ICD-10-CM

## 2024-07-30 DIAGNOSIS — I50.23 ACUTE ON CHRONIC SYSTOLIC CHF (CONGESTIVE HEART FAILURE): ICD-10-CM

## 2024-07-30 DIAGNOSIS — I21.4 NSTEMI, INITIAL EPISODE OF CARE: Primary | ICD-10-CM

## 2024-07-30 PROBLEM — I48.91 ATRIAL FIBRILLATION WITH RVR: Status: ACTIVE | Noted: 2024-07-30

## 2024-07-30 LAB
ABO GROUP BLD: NORMAL
ANION GAP SERPL CALCULATED.3IONS-SCNC: 9.9 MMOL/L (ref 5–15)
AORTIC DIMENSIONLESS INDEX: 0.74 (DI)
APTT PPP: 36 SECONDS (ref 61–76.5)
APTT PPP: 40.4 SECONDS (ref 61–76.5)
BASOPHILS # BLD AUTO: 0.04 10*3/MM3 (ref 0–0.2)
BASOPHILS NFR BLD AUTO: 0.9 % (ref 0–1.5)
BH CV ECHO MEAS - ACS: 2.4 CM
BH CV ECHO MEAS - AO MAX PG: 5.5 MMHG
BH CV ECHO MEAS - AO MEAN PG: 3 MMHG
BH CV ECHO MEAS - AO V2 MAX: 117 CM/SEC
BH CV ECHO MEAS - AO V2 VTI: 20.5 CM
BH CV ECHO MEAS - AVA(I,D): 3 CM2
BH CV ECHO MEAS - EDV(CUBED): 262.1 ML
BH CV ECHO MEAS - EDV(MOD-SP4): 135 ML
BH CV ECHO MEAS - EF(MOD-SP4): 47 %
BH CV ECHO MEAS - ESV(CUBED): 91.1 ML
BH CV ECHO MEAS - ESV(MOD-SP4): 71.5 ML
BH CV ECHO MEAS - FS: 29.7 %
BH CV ECHO MEAS - IVS/LVPW: 0.92 CM
BH CV ECHO MEAS - IVSD: 1.2 CM
BH CV ECHO MEAS - LA DIMENSION: 5.4 CM
BH CV ECHO MEAS - LV MASS(C)D: 369 GRAMS
BH CV ECHO MEAS - LV MAX PG: 3 MMHG
BH CV ECHO MEAS - LV MEAN PG: 2 MMHG
BH CV ECHO MEAS - LV V1 MAX: 87 CM/SEC
BH CV ECHO MEAS - LV V1 VTI: 17.8 CM
BH CV ECHO MEAS - LVIDD: 6.4 CM
BH CV ECHO MEAS - LVIDS: 4.5 CM
BH CV ECHO MEAS - LVOT AREA: 3.5 CM2
BH CV ECHO MEAS - LVOT DIAM: 2.1 CM
BH CV ECHO MEAS - LVPWD: 1.3 CM
BH CV ECHO MEAS - MR MAX PG: 63.7 MMHG
BH CV ECHO MEAS - MR MAX VEL: 399 CM/SEC
BH CV ECHO MEAS - MV DEC SLOPE: 382 CM/SEC2
BH CV ECHO MEAS - MV MAX PG: 5.5 MMHG
BH CV ECHO MEAS - MV MEAN PG: 2 MMHG
BH CV ECHO MEAS - MV P1/2T: 98.1 MSEC
BH CV ECHO MEAS - MV V2 VTI: 30.3 CM
BH CV ECHO MEAS - MVA(P1/2T): 2.24 CM2
BH CV ECHO MEAS - MVA(VTI): 2.03 CM2
BH CV ECHO MEAS - PA V2 MAX: 99.6 CM/SEC
BH CV ECHO MEAS - QP/QS: 1.03
BH CV ECHO MEAS - RV MAX PG: 2.45 MMHG
BH CV ECHO MEAS - RV V1 MAX: 78.2 CM/SEC
BH CV ECHO MEAS - RV V1 VTI: 16.7 CM
BH CV ECHO MEAS - RVDD: 3.1 CM
BH CV ECHO MEAS - RVOT DIAM: 2.2 CM
BH CV ECHO MEAS - SV(LVOT): 61.7 ML
BH CV ECHO MEAS - SV(MOD-SP4): 63.5 ML
BH CV ECHO MEAS - SV(RVOT): 63.5 ML
BH CV ECHO MEAS - TR MAX PG: 20.6 MMHG
BH CV ECHO MEAS - TR MAX VEL: 227 CM/SEC
BILIRUB UR QL STRIP: NEGATIVE
BLD GP AB SCN SERPL QL: NEGATIVE
BUN SERPL-MCNC: 10 MG/DL (ref 8–23)
BUN/CREAT SERPL: 8.1 (ref 7–25)
CALCIUM SPEC-SCNC: 9.5 MG/DL (ref 8.6–10.5)
CHLORIDE SERPL-SCNC: 102 MMOL/L (ref 98–107)
CK SERPL-CCNC: 112 U/L (ref 20–200)
CLARITY UR: CLEAR
CO2 SERPL-SCNC: 28.1 MMOL/L (ref 22–29)
COLOR UR: YELLOW
CREAT SERPL-MCNC: 1.23 MG/DL (ref 0.76–1.27)
DEPRECATED RDW RBC AUTO: 55.1 FL (ref 37–54)
EGFRCR SERPLBLD CKD-EPI 2021: 66.8 ML/MIN/1.73
EOSINOPHIL # BLD AUTO: 0.09 10*3/MM3 (ref 0–0.4)
EOSINOPHIL NFR BLD AUTO: 1.9 % (ref 0.3–6.2)
EOSINOPHIL SPEC QL MICRO: 0 % EOS/100 CELLS (ref 0–0)
ERYTHROCYTE [DISTWIDTH] IN BLOOD BY AUTOMATED COUNT: 21.1 % (ref 12.3–15.4)
FERRITIN SERPL-MCNC: 22.8 NG/ML (ref 30–400)
FOLATE SERPL-MCNC: >20 NG/ML (ref 4.78–24.2)
GEN 5 2HR TROPONIN T REFLEX: 313 NG/L
GLUCOSE SERPL-MCNC: 96 MG/DL (ref 65–99)
GLUCOSE UR STRIP-MCNC: NEGATIVE MG/DL
HAPTOGLOB SERPL-MCNC: 241 MG/DL (ref 30–200)
HCT VFR BLD AUTO: 28.5 % (ref 37.5–51)
HGB BLD-MCNC: 7.5 G/DL (ref 13–17.7)
HGB UR QL STRIP.AUTO: NEGATIVE
IMM GRANULOCYTES # BLD AUTO: 0.1 10*3/MM3 (ref 0–0.05)
IMM GRANULOCYTES NFR BLD AUTO: 2.2 % (ref 0–0.5)
IRON 24H UR-MRATE: 13 MCG/DL (ref 59–158)
IRON SATN MFR SERPL: 3 % (ref 20–50)
KETONES UR QL STRIP: NEGATIVE
LDH SERPL-CCNC: 150 U/L (ref 135–225)
LEUKOCYTE ESTERASE UR QL STRIP.AUTO: NEGATIVE
LYMPHOCYTES # BLD AUTO: 0.92 10*3/MM3 (ref 0.7–3.1)
LYMPHOCYTES NFR BLD AUTO: 19.9 % (ref 19.6–45.3)
MCH RBC QN AUTO: 19.2 PG (ref 26.6–33)
MCHC RBC AUTO-ENTMCNC: 26.3 G/DL (ref 31.5–35.7)
MCV RBC AUTO: 73.1 FL (ref 79–97)
MONOCYTES # BLD AUTO: 0.42 10*3/MM3 (ref 0.1–0.9)
MONOCYTES NFR BLD AUTO: 9.1 % (ref 5–12)
NEUTROPHILS NFR BLD AUTO: 3.05 10*3/MM3 (ref 1.7–7)
NEUTROPHILS NFR BLD AUTO: 66 % (ref 42.7–76)
NITRITE UR QL STRIP: NEGATIVE
NRBC BLD AUTO-RTO: 0.4 /100 WBC (ref 0–0.2)
NT-PROBNP SERPL-MCNC: ABNORMAL PG/ML (ref 0–900)
PH UR STRIP.AUTO: <=5 [PH] (ref 5–8)
PLATELET # BLD AUTO: 226 10*3/MM3 (ref 140–450)
PMV BLD AUTO: 10 FL (ref 6–12)
POTASSIUM SERPL-SCNC: 3.9 MMOL/L (ref 3.5–5.2)
PROT UR QL STRIP: NEGATIVE
PTH-INTACT SERPL-MCNC: 46.6 PG/ML (ref 15–65)
RBC # BLD AUTO: 3.9 10*6/MM3 (ref 4.14–5.8)
RETICS # AUTO: 0.08 10*6/MM3 (ref 0.02–0.13)
RETICS/RBC NFR AUTO: 2.07 % (ref 0.7–1.9)
RH BLD: POSITIVE
SINUS: 3.3 CM
SODIUM SERPL-SCNC: 140 MMOL/L (ref 136–145)
SODIUM UR-SCNC: 111 MMOL/L
SP GR UR STRIP: 1.01 (ref 1–1.03)
T&S EXPIRATION DATE: NORMAL
TIBC SERPL-MCNC: 416 MCG/DL (ref 298–536)
TRANSFERRIN SERPL-MCNC: 279 MG/DL (ref 200–360)
TROPONIN T DELTA: 14 NG/L
TROPONIN T SERPL HS-MCNC: 299 NG/L
TSH SERPL DL<=0.05 MIU/L-ACNC: 3.51 UIU/ML (ref 0.27–4.2)
URATE SERPL-MCNC: 5.7 MG/DL (ref 3.4–7)
UROBILINOGEN UR QL STRIP: NORMAL
VIT B12 BLD-MCNC: 569 PG/ML (ref 211–946)
WBC NRBC COR # BLD AUTO: 4.62 10*3/MM3 (ref 3.4–10.8)

## 2024-07-30 PROCEDURE — 85025 COMPLETE CBC W/AUTO DIFF WBC: CPT | Performed by: INTERNAL MEDICINE

## 2024-07-30 PROCEDURE — 84155 ASSAY OF PROTEIN SERUM: CPT | Performed by: INTERNAL MEDICINE

## 2024-07-30 PROCEDURE — 85045 AUTOMATED RETICULOCYTE COUNT: CPT | Performed by: NURSE PRACTITIONER

## 2024-07-30 PROCEDURE — 93306 TTE W/DOPPLER COMPLETE: CPT

## 2024-07-30 PROCEDURE — 84300 ASSAY OF URINE SODIUM: CPT | Performed by: INTERNAL MEDICINE

## 2024-07-30 PROCEDURE — 82607 VITAMIN B-12: CPT | Performed by: NURSE PRACTITIONER

## 2024-07-30 PROCEDURE — 85730 THROMBOPLASTIN TIME PARTIAL: CPT | Performed by: INTERNAL MEDICINE

## 2024-07-30 PROCEDURE — 83010 ASSAY OF HAPTOGLOBIN QUANT: CPT | Performed by: NURSE PRACTITIONER

## 2024-07-30 PROCEDURE — 82728 ASSAY OF FERRITIN: CPT | Performed by: NURSE PRACTITIONER

## 2024-07-30 PROCEDURE — 84165 PROTEIN E-PHORESIS SERUM: CPT | Performed by: INTERNAL MEDICINE

## 2024-07-30 PROCEDURE — 86900 BLOOD TYPING SEROLOGIC ABO: CPT | Performed by: INTERNAL MEDICINE

## 2024-07-30 PROCEDURE — 84443 ASSAY THYROID STIM HORMONE: CPT | Performed by: INTERNAL MEDICINE

## 2024-07-30 PROCEDURE — 25010000002 AMIODARONE IN DEXTROSE 5% 360-4.14 MG/200ML-% SOLUTION: Performed by: NURSE PRACTITIONER

## 2024-07-30 PROCEDURE — 83540 ASSAY OF IRON: CPT | Performed by: INTERNAL MEDICINE

## 2024-07-30 PROCEDURE — 93005 ELECTROCARDIOGRAM TRACING: CPT | Performed by: NURSE PRACTITIONER

## 2024-07-30 PROCEDURE — 76775 US EXAM ABDO BACK WALL LIM: CPT

## 2024-07-30 PROCEDURE — 87205 SMEAR GRAM STAIN: CPT | Performed by: INTERNAL MEDICINE

## 2024-07-30 PROCEDURE — 25010000002 NA FERRIC GLUC CPLX PER 12.5 MG: Performed by: NURSE PRACTITIONER

## 2024-07-30 PROCEDURE — 36430 TRANSFUSION BLD/BLD COMPNT: CPT

## 2024-07-30 PROCEDURE — 83970 ASSAY OF PARATHORMONE: CPT | Performed by: INTERNAL MEDICINE

## 2024-07-30 PROCEDURE — 86901 BLOOD TYPING SEROLOGIC RH(D): CPT

## 2024-07-30 PROCEDURE — 84484 ASSAY OF TROPONIN QUANT: CPT | Performed by: INTERNAL MEDICINE

## 2024-07-30 PROCEDURE — 93010 ELECTROCARDIOGRAM REPORT: CPT | Performed by: INTERNAL MEDICINE

## 2024-07-30 PROCEDURE — 82746 ASSAY OF FOLIC ACID SERUM: CPT | Performed by: NURSE PRACTITIONER

## 2024-07-30 PROCEDURE — 86901 BLOOD TYPING SEROLOGIC RH(D): CPT | Performed by: INTERNAL MEDICINE

## 2024-07-30 PROCEDURE — 93306 TTE W/DOPPLER COMPLETE: CPT | Performed by: INTERNAL MEDICINE

## 2024-07-30 PROCEDURE — 25010000002 BUMETANIDE PER 0.5 MG: Performed by: INTERNAL MEDICINE

## 2024-07-30 PROCEDURE — 83615 LACTATE (LD) (LDH) ENZYME: CPT | Performed by: NURSE PRACTITIONER

## 2024-07-30 PROCEDURE — 86900 BLOOD TYPING SEROLOGIC ABO: CPT

## 2024-07-30 PROCEDURE — 99222 1ST HOSP IP/OBS MODERATE 55: CPT | Performed by: INTERNAL MEDICINE

## 2024-07-30 PROCEDURE — 83880 ASSAY OF NATRIURETIC PEPTIDE: CPT | Performed by: INTERNAL MEDICINE

## 2024-07-30 PROCEDURE — 86923 COMPATIBILITY TEST ELECTRIC: CPT

## 2024-07-30 PROCEDURE — 82550 ASSAY OF CK (CPK): CPT | Performed by: INTERNAL MEDICINE

## 2024-07-30 PROCEDURE — 80048 BASIC METABOLIC PNL TOTAL CA: CPT | Performed by: INTERNAL MEDICINE

## 2024-07-30 PROCEDURE — 84550 ASSAY OF BLOOD/URIC ACID: CPT | Performed by: INTERNAL MEDICINE

## 2024-07-30 PROCEDURE — 84466 ASSAY OF TRANSFERRIN: CPT | Performed by: INTERNAL MEDICINE

## 2024-07-30 PROCEDURE — 81003 URINALYSIS AUTO W/O SCOPE: CPT | Performed by: INTERNAL MEDICINE

## 2024-07-30 PROCEDURE — 93005 ELECTROCARDIOGRAM TRACING: CPT | Performed by: INTERNAL MEDICINE

## 2024-07-30 PROCEDURE — 25010000002 HEPARIN (PORCINE) 25000-0.45 UT/250ML-% SOLUTION: Performed by: INTERNAL MEDICINE

## 2024-07-30 PROCEDURE — P9016 RBC LEUKOCYTES REDUCED: HCPCS

## 2024-07-30 PROCEDURE — 86850 RBC ANTIBODY SCREEN: CPT | Performed by: INTERNAL MEDICINE

## 2024-07-30 RX ORDER — ACETAMINOPHEN 325 MG/1
650 TABLET ORAL EVERY 4 HOURS PRN
Status: DISCONTINUED | OUTPATIENT
Start: 2024-07-30 | End: 2024-08-03 | Stop reason: HOSPADM

## 2024-07-30 RX ORDER — ASPIRIN 81 MG/1
81 TABLET ORAL DAILY
Status: DISCONTINUED | OUTPATIENT
Start: 2024-07-30 | End: 2024-07-31 | Stop reason: SDUPTHER

## 2024-07-30 RX ORDER — HEPARIN SODIUM 10000 [USP'U]/100ML
9.01 INJECTION, SOLUTION INTRAVENOUS
Status: DISCONTINUED | OUTPATIENT
Start: 2024-07-30 | End: 2024-07-31

## 2024-07-30 RX ORDER — SODIUM CHLORIDE 0.9 % (FLUSH) 0.9 %
10 SYRINGE (ML) INJECTION AS NEEDED
Status: DISCONTINUED | OUTPATIENT
Start: 2024-07-30 | End: 2024-08-03 | Stop reason: HOSPADM

## 2024-07-30 RX ORDER — FOLIC ACID 1 MG/1
1 TABLET ORAL DAILY
Status: DISCONTINUED | OUTPATIENT
Start: 2024-07-31 | End: 2024-08-03 | Stop reason: HOSPADM

## 2024-07-30 RX ORDER — ENOXAPARIN SODIUM 100 MG/ML
40 INJECTION SUBCUTANEOUS DAILY
Status: DISCONTINUED | OUTPATIENT
Start: 2024-07-30 | End: 2024-07-30

## 2024-07-30 RX ORDER — ONDANSETRON 2 MG/ML
4 INJECTION INTRAMUSCULAR; INTRAVENOUS EVERY 6 HOURS PRN
Status: DISCONTINUED | OUTPATIENT
Start: 2024-07-30 | End: 2024-07-31 | Stop reason: SDUPTHER

## 2024-07-30 RX ORDER — FUROSEMIDE 10 MG/ML
20 INJECTION INTRAMUSCULAR; INTRAVENOUS ONCE
Status: DISCONTINUED | OUTPATIENT
Start: 2024-07-30 | End: 2024-07-30

## 2024-07-30 RX ORDER — ACETAMINOPHEN 650 MG/1
650 SUPPOSITORY RECTAL EVERY 4 HOURS PRN
Status: DISCONTINUED | OUTPATIENT
Start: 2024-07-30 | End: 2024-08-03 | Stop reason: HOSPADM

## 2024-07-30 RX ORDER — BUTYROSPERMUM PARKII(SHEA BUTTER), SIMMONDSIA CHINENSIS (JOJOBA) SEED OIL, ALOE BARBADENSIS LEAF EXTRACT .01; 1; 3.5 G/100G; G/100G; G/100G
500 LIQUID TOPICAL DAILY
COMMUNITY

## 2024-07-30 RX ORDER — NITROGLYCERIN 0.4 MG/1
0.4 TABLET SUBLINGUAL
Status: DISCONTINUED | OUTPATIENT
Start: 2024-07-30 | End: 2024-07-31 | Stop reason: SDUPTHER

## 2024-07-30 RX ORDER — POLYETHYLENE GLYCOL 3350 17 G/17G
17 POWDER, FOR SOLUTION ORAL DAILY PRN
Status: DISCONTINUED | OUTPATIENT
Start: 2024-07-30 | End: 2024-08-03 | Stop reason: HOSPADM

## 2024-07-30 RX ORDER — AMIODARONE HYDROCHLORIDE 200 MG/1
200 TABLET ORAL DAILY
COMMUNITY
Start: 2024-07-24 | End: 2024-08-03 | Stop reason: HOSPADM

## 2024-07-30 RX ORDER — BUMETANIDE 1 MG/1
1 TABLET ORAL DAILY
COMMUNITY
Start: 2024-07-22

## 2024-07-30 RX ORDER — BUMETANIDE 0.25 MG/ML
1 INJECTION INTRAMUSCULAR; INTRAVENOUS ONCE
Status: COMPLETED | OUTPATIENT
Start: 2024-07-30 | End: 2024-07-30

## 2024-07-30 RX ORDER — TERAZOSIN 1 MG/1
1 CAPSULE ORAL NIGHTLY
Status: DISCONTINUED | OUTPATIENT
Start: 2024-07-30 | End: 2024-07-30

## 2024-07-30 RX ORDER — SODIUM CHLORIDE 9 MG/ML
40 INJECTION, SOLUTION INTRAVENOUS AS NEEDED
Status: DISCONTINUED | OUTPATIENT
Start: 2024-07-30 | End: 2024-08-03 | Stop reason: HOSPADM

## 2024-07-30 RX ORDER — BISACODYL 10 MG
10 SUPPOSITORY, RECTAL RECTAL DAILY PRN
Status: DISCONTINUED | OUTPATIENT
Start: 2024-07-30 | End: 2024-08-03 | Stop reason: HOSPADM

## 2024-07-30 RX ORDER — AMANTADINE HYDROCHLORIDE 100 MG/1
100 TABLET ORAL ONCE
COMMUNITY

## 2024-07-30 RX ORDER — SODIUM CHLORIDE 0.9 % (FLUSH) 0.9 %
10 SYRINGE (ML) INJECTION EVERY 12 HOURS SCHEDULED
Status: DISCONTINUED | OUTPATIENT
Start: 2024-07-30 | End: 2024-08-03 | Stop reason: HOSPADM

## 2024-07-30 RX ORDER — LANOLIN ALCOHOL/MO/W.PET/CERES
100 CREAM (GRAM) TOPICAL DAILY
COMMUNITY
End: 2024-08-03 | Stop reason: HOSPADM

## 2024-07-30 RX ORDER — ACETAMINOPHEN 160 MG/5ML
650 SOLUTION ORAL EVERY 4 HOURS PRN
Status: DISCONTINUED | OUTPATIENT
Start: 2024-07-30 | End: 2024-08-03 | Stop reason: HOSPADM

## 2024-07-30 RX ORDER — BISACODYL 5 MG/1
5 TABLET, DELAYED RELEASE ORAL DAILY PRN
Status: DISCONTINUED | OUTPATIENT
Start: 2024-07-30 | End: 2024-08-03 | Stop reason: HOSPADM

## 2024-07-30 RX ORDER — PANTOPRAZOLE SODIUM 40 MG/1
40 TABLET, DELAYED RELEASE ORAL
Status: DISCONTINUED | OUTPATIENT
Start: 2024-07-30 | End: 2024-08-03 | Stop reason: HOSPADM

## 2024-07-30 RX ORDER — AMANTADINE HYDROCHLORIDE 100 MG/1
100 CAPSULE, GELATIN COATED ORAL DAILY
Status: DISCONTINUED | OUTPATIENT
Start: 2024-07-30 | End: 2024-08-03 | Stop reason: HOSPADM

## 2024-07-30 RX ORDER — SODIUM BICARBONATE 650 MG/1
1300 TABLET ORAL EVERY 4 HOURS
Status: DISCONTINUED | OUTPATIENT
Start: 2024-07-31 | End: 2024-07-31

## 2024-07-30 RX ORDER — RANOLAZINE 500 MG/1
500 TABLET, EXTENDED RELEASE ORAL 2 TIMES DAILY
Status: DISCONTINUED | OUTPATIENT
Start: 2024-07-30 | End: 2024-08-03 | Stop reason: HOSPADM

## 2024-07-30 RX ORDER — AMIODARONE HYDROCHLORIDE 200 MG/1
200 TABLET ORAL DAILY
Status: DISCONTINUED | OUTPATIENT
Start: 2024-07-30 | End: 2024-07-30

## 2024-07-30 RX ORDER — AMOXICILLIN 250 MG
2 CAPSULE ORAL 2 TIMES DAILY PRN
Status: DISCONTINUED | OUTPATIENT
Start: 2024-07-30 | End: 2024-08-03 | Stop reason: HOSPADM

## 2024-07-30 RX ORDER — DILTIAZEM HCL/D5W 125 MG/125
5-15 PLASTIC BAG, INJECTION (ML) INTRAVENOUS
Status: DISCONTINUED | OUTPATIENT
Start: 2024-07-30 | End: 2024-07-30

## 2024-07-30 RX ORDER — GUAIFENESIN 600 MG/1
600 TABLET, EXTENDED RELEASE ORAL DAILY
Status: DISCONTINUED | OUTPATIENT
Start: 2024-07-30 | End: 2024-08-03 | Stop reason: HOSPADM

## 2024-07-30 RX ORDER — ISOSORBIDE MONONITRATE 30 MG/1
30 TABLET, EXTENDED RELEASE ORAL DAILY
Status: DISCONTINUED | OUTPATIENT
Start: 2024-07-31 | End: 2024-08-03 | Stop reason: HOSPADM

## 2024-07-30 RX ORDER — ONDANSETRON 4 MG/1
4 TABLET, ORALLY DISINTEGRATING ORAL EVERY 6 HOURS PRN
Status: DISCONTINUED | OUTPATIENT
Start: 2024-07-30 | End: 2024-07-31 | Stop reason: SDUPTHER

## 2024-07-30 RX ADMIN — SODIUM CHLORIDE 125 MG: 9 INJECTION, SOLUTION INTRAVENOUS at 19:55

## 2024-07-30 RX ADMIN — AMIODARONE HYDROCHLORIDE 0.5 MG/MIN: 1.8 INJECTION, SOLUTION INTRAVENOUS at 20:55

## 2024-07-30 RX ADMIN — GUAIFENESIN 600 MG: 600 TABLET, EXTENDED RELEASE ORAL at 15:34

## 2024-07-30 RX ADMIN — ASPIRIN 81 MG: 81 TABLET, COATED ORAL at 15:34

## 2024-07-30 RX ADMIN — HEPARIN SODIUM 14.21 UNITS/KG/HR: 10000 INJECTION, SOLUTION INTRAVENOUS at 16:35

## 2024-07-30 RX ADMIN — SODIUM CHLORIDE 40 ML: 9 INJECTION, SOLUTION INTRAVENOUS at 19:45

## 2024-07-30 RX ADMIN — Medication 10 ML: at 20:24

## 2024-07-30 RX ADMIN — Medication 1200 MG: at 20:21

## 2024-07-30 RX ADMIN — AMIODARONE HYDROCHLORIDE 1 MG/MIN: 1.8 INJECTION, SOLUTION INTRAVENOUS at 15:35

## 2024-07-30 RX ADMIN — PANTOPRAZOLE SODIUM 40 MG: 40 TABLET, DELAYED RELEASE ORAL at 15:44

## 2024-07-30 RX ADMIN — BUMETANIDE 1 MG: 0.25 INJECTION INTRAMUSCULAR; INTRAVENOUS at 18:24

## 2024-07-30 RX ADMIN — AMANTADINE HYDROCHLORIDE 100 MG: 100 CAPSULE ORAL at 15:34

## 2024-07-30 RX ADMIN — Medication 10 ML: at 13:00

## 2024-07-30 RX ADMIN — RANOLAZINE 500 MG: 500 TABLET, EXTENDED RELEASE ORAL at 20:21

## 2024-07-30 NOTE — PLAN OF CARE
Goal Outcome Evaluation:  Plan of Care Reviewed With: patient                   Patient arrived from [facility] by EMS for Afib RVR. HR: currently 95. No complaints of chest pain at this time. Nephrology consulted for renal failure and cardiac cath clearance. (Dr Do). Hematology consulted for Iron Deficiency Anemia (Dr Mcfarland). Gastroenterology consulted for Iron Deficiency Anemia (Dr. Way). Heparin and Amio infusing. Iron level 13, Iron sat (tsat) 3. 1 Unit of PRBC given. Troponins trending up. Last result was greater than 313. Plan for cardiac catherization tomorrow. NPO after midnight. On 3 liters nasal cannula, some expiratory wheezes present on the left side. According to the patient his LBM was yesterday. Urine appears concentrated, will receive diuretics. Pro BNP 15,763. Plan of care ongoing.

## 2024-07-30 NOTE — H&P
Hospitalist Service  History and Physical      Patient Name: Chinedu Lopez  : 1960  MRN: [id number]  Primary Care Physician:  Nhung Clark APRN  Date of admission: 2024      Subjective      Chief Complaint: Shortness of breath     History of Present Illness: Chinedu Lopez is a 63 y.o. male with past medical history of A-fib status post Watchman device, hypertension hyperlipidemia, ischemic cardiomyopathy, CAD status post CABG who presented to [facility] on 2024 as a transfer from outside facility with complaints of acute onset chest pain that started last night.  Patient states he was preparing to go to bed after brushing his teeth and started having left-sided sudden onset chest pain, felt like his previous heart attack.  It started to radiate to his left arm which made him concerned and come to the ER.  He was noted to be in A-fib RVR with elevated troponin.  Started on Cardizem drip and heparin drip and cardiology was consulted recommended transfer to our facility for further management.  Patient admits to having symptoms of chest pain intermittently and shortness of breath over the past 2 weeks.  He went to see his PCP and had his meds adjusted and was started on diuretics due to swelling of his both legs and abdomen area.  He has also gained about 30 pounds of weight over the past few days.  Denies eating extra salty foods, canned foods or frozen foods.  Recent cardiology office note reviewed.  Had stress test in March which was okay.  He also has been found to be anemic on his labs recently.  Had EGD and colonoscopy done in May of this year which was normal according to him.  He was supposed to see hematologist today as an outpatient.    ROS: Pertinent positives as noted in HPI/subjective.  All other systems were reviewed and are negative.      Personal History     Past Medical History:   Diagnosis Date    Atrial fibrillation     . Abstracted from  [facility].    Brain bleed 2019 October 14th fell - Hospital on 17th for 3 weeks    CAD (coronary artery disease)     S/P CABG and PCI. [facility]ed from [facility].    Hyperlipidemia     Hypertension     Myocardial infarction 2006    Non-ST elevation MI. Abstracted from [facility].    Obstructive sleep apnea on CPAP     At [facility]. Abstracted from [facility].    Presence of Watchman left atrial appendage closure device 1/31/2020    Stroke 2019    TIA (transient ischemic attack) 11/19/2013    Possible TIA- MRI shows small tumor/head. Abstracted from [facility].       Past Surgical History:   Procedure Laterality Date    ANGIOPLASTY  2006    3 srents were sequentially placed in promimal and mid body of the saphennous vein graft to obtuse marginal branch. Abstracted from [facility].    ATRIAL APPENDAGE EXCLUSION LEFT WITH TRANSESOPHAGEAL ECHOCARDIOGRAM N/A 1/30/2020    Procedure: Atrial Appendage Occlusion;  Surgeon: Angel Willams MD;  Location: [facility] CATH INVASIVE LOCATION;  Service: Cardiovascular    ATRIAL APPENDAGE EXCLUSION LEFT WITH TRANSESOPHAGEAL ECHOCARDIOGRAM N/A 1/30/2020    Procedure: Atrial Appendage Occlusion;  Surgeon: John Sheridan MD;  Location: [facility] CATH INVASIVE LOCATION;  Service: Cardiology    CARDIAC CATHETERIZATION  2000 2003, 2006, 2012. Abstracted from [facility].    CORONARY ARTERY BYPASS GRAFT  2000    Triple. Abstracted from [facility].    MUSCLE REPAIR      Tendons and nerves in right lower extremity. Abstracted from [facility].    OTHER SURGICAL HISTORY  08/2012    RCA mid and proximal- Xience stent x3. Abstracted from [facility].    OTHER SURGICAL HISTORY  09/2016    Head trauma/bleed at [facility]. Abstracted from [facility].       Family History: family history includes Cancer in an other family member; Diabetes in his father and mother; Heart attack in his father; Heart disease in an other family member; Hypertension in an other family member; Other in his sister.  Otherwise pertinent FHx was reviewed and not pertinent to current issue.    Social History:  reports that he has been smoking cigarettes. He has never used smokeless tobacco. He reports that he does not currently use alcohol. He reports that he does not use drugs.    Home Medications:  Prior to Admission Medications       Prescriptions Last Dose Informant Patient Reported? Taking?    amantadine (SYMMETREL) 100 MG capsule  Self Yes No    Take 1 capsule by mouth Daily.    aspirin 81 MG chewable tablet  Self Yes No    Chew 1 tablet Daily.    Cholecalciferol (VITAMIN D3) 125 MCG (5000 UT) capsule capsule  Self Yes No    Take 1 capsule by mouth Daily.    Cyanocobalamin (VITAMIN B 12) 500 MCG tablet  Self Yes No    Take 1 tablet by mouth Daily.    Dilt- MG 24 hr capsule   Yes No    doxazosin (CARDURA) 1 MG tablet  Self Yes No    Take 1 tablet by mouth every night at bedtime.    famotidine (PEPCID) 40 MG tablet  Self Yes No    Take 1 tablet by mouth Daily.    folic acid (FOLVITE) 1 MG tablet  Self Yes No    Take 1 tablet by mouth Daily.    guaiFENesin (MUCINEX) 600 MG 12 hr tablet  Self Yes No    Take 1 tablet by mouth Daily.    isosorbide mononitrate (IMDUR) 30 MG 24 hr tablet  Self Yes No    Take 1 tablet by mouth Daily.    metFORMIN (GLUCOPHAGE) 1000 MG tablet  Self Yes No    Take 1 tablet by mouth 2 (Two) Times a Day With Meals.    nebivolol (BYSTOLIC) 20 MG tablet  Self Yes No    Take 1 tablet by mouth 2 (Two) Times a Day.    nitroglycerin (NITROLINGUAL) 0.4 MG/SPRAY spray  Self Yes No    Place 1 spray under the tongue Every 5 (Five) Minutes As Needed for Chest Pain.    olmesartan (BENICAR) 20 MG tablet  Self Yes No    Take 1 tablet by mouth Daily.    pantoprazole (PROTONIX) 40 MG EC tablet  Self Yes No    Take 1 tablet by mouth Daily.    potassium chloride (K-DUR,KLOR-CON) 20 MEQ CR tablet  Self Yes No    Take 1 tablet by mouth 2 (Two) Times a Day.    QUEtiapine (SEROquel) 25 MG tablet  Self Yes No    Take 1  tablet by mouth Daily.    ranolazine (RANEXA) 500 MG 12 hr tablet  Self Yes No    Take 1 tablet by mouth 2 (Two) Times a Day.    rosuvastatin (CRESTOR) 40 MG tablet   Yes No    thiamine (VITAMIN B-1) 100 MG tablet  Self Yes No    Take 1 tablet by mouth Daily.              I have utilized all available, immediate resources to obtain, update, or review the patient's current medications including all prescriptions, over-the-counter products, herbals, cannabis/cannabidiol products, and vitamin.mineral/dietary (nutritional) supplements.    Allergies:  No Known Allergies    Objective      Vitals:   Temp:  [98.5 °F (36.9 °C)] 98.5 °F (36.9 °C)  Resp:  [14] 14  BP: (118)/(81) 118/81  Flow (L/min):  [2] 2    Physical Exam:    General: Awake, alert, NAD  HEENT: NC/AT, PERRL, EOMI, conjunctivae are clear, mucous membrane moist, oropharynx clear, Trachea midline   Cardiovascular: Regular rate and irregularly irregular rhythm, no murmurs  Respiratory: Decreased breath sounds at the bases bilaterally, no wheezing or rales, unlabored breathing  Abdomen: Soft, nontender, positive bowel sounds, no guarding  Neurologic: A&O, CN grossly intact, moves all extremities spontaneously  Musculoskeletal: Normal range of motion, no other gross deformities  Skin: Warm, mild to moderate bilateral lower extremity pitting edema noted      Result Review    Result Review:  I have personally reviewed the results from the time of this admission to 7/30/2024 10:01 EDT and agree with these findings:  [x]  Laboratory  [x]  Microbiology  [x]  Radiology  [x]  EKG/Telemetry   [x]  Cardiology/Vascular   []  Pathology  [x]  Old records  []  Other:      Assessment & Plan        Active Hospital Problems:  Active Hospital Problems    Diagnosis     **Atrial fibrillation with RVR        Assessment/Plan:     A-fib with RVR  -Known history of A-fib and patient is status post Watchman device previously  -Continue Cardizem drip, wean off as tolerated  -Resume home  meds once reconciled  -Cardiology consulted on admission    NSTEMI  CAD s/p CABG  -EKG with A-fib RVR noted  -Presented with chest pain, elevated troponin noted on admission, continue to trend  -Last stress test from March 2024 showed low risk study with small size infarct in the apex without signs of any ischemia noted  -Continue on heparin drip, resume home meds once reconciled  -Cardiology planning possible [facility] either today or tomorrow    LUDWIN vs CKD  -Unknown baseline creatinine but creatinine noted to be at 1.5 on admission  -Denies any history of CKD but may have underlying CKD due to chronic hypertension  -Check UA and renal ultrasound  -Nephrology consulted as patient will likely need cardiac cath    Acute on chronic heart failure  -Patient appears to be volume overloaded with chest x-ray also showing bilateral congestion  -Check proBNP  -Was given IV Bumex in the ER  -Defer further diuresis to cardiology and nephrology in setting of elevated creatinine  -Previous echo from 2021 had preserved EF, echo ordered during this admission    Microcytic anemia  -Hemoglobin of around 8 on admission with low MCV  -Had previous GI workup with EGD and colonoscopy in May which was normal according to him  -Iron panel ordered  -Heme-onc consulted for further evaluation    Hypertension  Hyperlipidemia  -Resume home meds once reconciled    VTE Prophylaxis:  Pharmacologic VTE prophylaxis orders are present.    CODE STATUS:    Code Status (Patient has no pulse and is not breathing): CPR (Attempt to Resuscitate)  Medical Interventions (Patient has pulse or is breathing): Full Support      Admission Status:  I believe this patient meets inpt status.    Signature:   Electronically signed by Jaquelin Hernandez DO, 07/30/24, 10:01 AM EDT.    Part of this note may be an electronic transcription/translation of spoken language to printed text using the Dragon Dictation System.

## 2024-07-30 NOTE — Clinical Note
First balloon inflation max pressure = 14 keven. First balloon inflation duration = 15 seconds. Second inflation of balloon - Max pressure = 20 keven. 2nd Inflation of balloon - Duration = 30 seconds.

## 2024-07-30 NOTE — ACP (ADVANCE CARE PLANNING)
Spouse of patient requested general information about AD. Requested copies of HCR and POST form. Answered questions in regards to documents. Patient reports to have no AD completed. Next of kin would be spouse. Spouse's contact info on file.    Chaplain Jc Dumont

## 2024-07-30 NOTE — CONSULTS
Initial spiritual care visit. Pt in room with no family but staff present. Staff invited  but doctor arrived to talk to pt. Doctor informed pt about a probable cath procedure in the morning. After he left,  engaged pt how he felt about that but he was busy with nurses.  offered to follow up tomorrow after procedure and pt was OK wit that. There were no other needs at this time.  will follow up tomorrow to assess spiritual need.

## 2024-07-30 NOTE — CONSULTS
[facility] CARDIOLOGY CONSULT  [facility]        Cardiology assessment and plan    Congestive heart failure  Acute on chronic CHF exacerbation  Acute systolic dysfunction  Severe LV dysfunction  Volume overload  Congestive heart failure NYHA class IV  Chest discomfort  Abnormal elevated troponin consistent with non-ST elevation myocardial infarction  Atrial fibrillation with rapid antler response  Worsening cardiomyopathy  Abnormal elevated proBNP secondary to congestive heart failure  Pulmonary edema and bilateral pleural effusions  Significant volume overload  Atrial fibrillation and with prior history of watchman implantation  Acute on chronic kidney injury  Coronary artery disease prior coronary artery bypass surgery prior PCI and stenting  Hypertension  Hyperlipidemia  Diabetes mellitus  History of TIA  History of intracranial hemorrhage status post fall in 2019  Anemia/iron deficiency anemia with a significant worsening of the hemoglobin    Patient is currently chest pain-free  Abnormal elevated troponin concern with non-ST ovation myocardial infarction  Elevated abnormal proBNP secondary congestive heart failure and worsening cardiomyopathy  Sodium is 140 potassium is 3.9 creatinine is 1.2  Anemia with hemoglobin of 7.5 and iron deficiency  Echocardiogram with LV ejection fraction of 25 to 30%  Twelve-lead EKG with atrial fibrillation with T wave inversions involving anterolateral leads  Tmax is 99.8 pulse is 98 respirations are 19 blood pressure is 95/55 to 118/80 sats are 89 to 93%  Current medications include aspirin 81 mg p.o. once a day patient is on amiodarone 200 mg p.o. once a day Imdur 30 mg p.o. once a day patient is on IV Cardizem drip for rate control patient is on Ranexa 5 mg p.o. twice daily and patient is on IV heparin  Nephrology and hematology evaluation  Agree with the diuresis and close monitoring of renal function  High risk for contrast-induced  nephropathy  Likely will benefit from cardiac catheterization when hemodynamically stable  Close monitoring of renal function and also troponin levels  If the hemodynamics are stable consider starting patient on beta-blockers and wean off the Cardizem drip  Diagnosis and treatment options reviewed and discussed with patient and family  Further recommendation based on patient course                  Subjective:     Encounter Date:07/30/2024      Patient ID: Chinedu Lopez is a 63 y.o. male.    Chief Complaint: Chest Pain      HPI:  Chinedu Lopez is a 63 y.o. male known to Dr. Mosher and Dr. Willams with a past cardiac history of paroxysmal atrial fibrillation status post watchman implant in 2020 and CAD status post CABG in 2000 and PCI in 2006.  Last cath in 2016 showed no vessels amenable to PCI.  Last WILD in 2021 showed an EF of 60% with mild MR/TR and good seal of the OLESYA.  Patient recently underwent nuclear stress testing 3/2024 which was equivocal and he was continued on medical therapy.  PMH includes HTN, HLD, DM, TIA in 2013, and intracranial hemorrhage status post fall in 2019.  He was also recently referred to heme oncology for outpatient evaluation of anemia.      Patient presents to outside ER with complaints of chest pain and ruled in for non-STEMI and found with rapid atrial fibrillation and acute CHF.  He was transferred to [facility] for further evaluation and management.    Patient reports that last night he developed left-sided chest pain radiating to his left arm and accompanied by shortness of breath.  He took 2 sublingual nitroglycerin which helped but the chest discomfort quickly returned.  He states this feels similar to his prior angina.  He tells me that recently he has been going in and out of atrial fibrillation.  He has also had a 32 pound weight gain in the last several days with abdominal distention and SOA.  He reports he was recently started on diuretics for this.  Prior to  yesterday he had been able to perform activities such as push mowing the yard without unusual difficulty.  He denies any palpitations and cannot tell when he is in A-fib.  He is currently chest pain free.      Past Medical History:   Diagnosis Date    Atrial fibrillation     June, [facility]. Abstracted from [facility].    Brain bleed 2019 October 14th fell - Hospital on 17th for 3 weeks    CAD (coronary artery disease)     S/P CABG and PCI. Abstracted from [facility].    Hyperlipidemia     Hypertension     Myocardial infarction 2006    Non-ST elevation MI. Abstracted from [facility].    Obstructive sleep apnea on CPAP     At . Abstracted from [facility].    Presence of Watchman left atrial appendage closure device 1/31/2020    Stroke 2019    TIA (transient ischemic attack) 11/19/2013    Possible TIA- MRI shows small tumor/head. Abstracted from [facility].         Past Surgical History:   Procedure Laterality Date    ANGIOPLASTY  2006    3 srents were sequentially placed in promimal and mid body of the saphennous vein graft to obtuse marginal branch. Abstracted from [facility].    ATRIAL APPENDAGE EXCLUSION LEFT WITH TRANSESOPHAGEAL ECHOCARDIOGRAM N/A 1/30/2020    Procedure: Atrial Appendage Occlusion;  Surgeon: Angel Willams MD;  Location: [facility] CATH INVASIVE LOCATION;  Service: Cardiovascular    ATRIAL APPENDAGE EXCLUSION LEFT WITH TRANSESOPHAGEAL ECHOCARDIOGRAM N/A 1/30/2020    Procedure: Atrial Appendage Occlusion;  Surgeon: John Sheridan MD;  Location: [facility] CATH INVASIVE LOCATION;  Service: Cardiology    CARDIAC CATHETERIZATION  2000 2003, 2006, 2012. Abstracted from [facility].    CORONARY ARTERY BYPASS GRAFT  2000    Triple. Abstracted from [facility].    MUSCLE REPAIR      Tendons and nerves in right lower extremity. Abstracted from [facility].    OTHER SURGICAL HISTORY  08/2012    RCA mid and proximal- Xience stent x3. Abstracted from [facility].    OTHER SURGICAL HISTORY  09/2016     [facility]         Social History     Socioeconomic History    Marital status:    Tobacco Use    Smoking status: Every Day     Current packs/day: 1.00     Types: Cigarettes    Smokeless tobacco: Never    Tobacco comments:     Smoking cessation--encouraged---refuses patch, gum or chantix   Vaping Use    Vaping status: Never Used   Substance and Sexual Activity    Alcohol use: Not Currently    Drug use: No    Sexual activity: Defer     Continues to smoke 1 pack/day.    Family History   Problem Relation Age of Onset    Diabetes Mother     Heart attack Father     Diabetes Father     Other Sister         MRSA    Heart disease Other         Positive for Ischemic    Cancer Other     Hypertension Other          No Known Allergies    Current Medications:   Scheduled Meds:amantadine, 100 mg, Oral, Daily  amiodarone, 200 mg, Oral, Daily  aspirin, 81 mg, Oral, Daily  folic acid, 1 mg, Oral, Daily  guaiFENesin, 600 mg, Oral, Daily  isosorbide mononitrate, 30 mg, Oral, Daily  pantoprazole, 40 mg, Oral, Daily  ranolazine, 500 mg, Oral, BID  sodium chloride, 10 mL, Intravenous, Q12H      Continuous Infusions:heparin, 9.01 Units/kg/hr        Review of Systems   Constitutional: Negative for chills, decreased appetite and malaise/fatigue.   HENT:  Negative for congestion and nosebleeds.    Eyes:  Negative for blurred vision and double vision.   Cardiovascular:  Positive for chest pain, dyspnea on exertion, irregular heartbeat, leg swelling, orthopnea and paroxysmal nocturnal dyspnea.   Respiratory:  Positive for shortness of breath. Negative for cough.    Hematologic/Lymphatic: Negative for adenopathy. Does not bruise/bleed easily.   Skin:  Negative for color change and rash.   Musculoskeletal:  Negative for back pain and joint pain.   Gastrointestinal:  Negative for bloating, abdominal pain, hematemesis and hematochezia.   Genitourinary:  Negative for flank pain and hematuria.  "  Neurological:  Negative for dizziness and focal weakness.   Psychiatric/Behavioral:  Negative for altered mental status. The patient does not have insomnia.      All other systems reviewed and are negative.       Objective:         BP 95/55 (BP Location: Right arm, Patient Position: Lying)   Pulse 98   Temp 97.8 °F (36.6 °C) (Oral)   Resp 19   Ht 182.9 cm (72\")   Wt 111 kg (245 lb)   SpO2 (!) 89%   BMI 33.23 kg/m²       General: Well-developed in NAD.  Neuro: AAOx3. No gross deficits.  HEENT: Sclerae clear, no xanthelasmas.  CV: S1S2, RRR. No murmurs or gallops.  Neck thick.  Unable to appreciate JVD.  Resp: Breathing is unlabored. Lungs CTA throughout.  GI: BS+. Abdominal ascites.  Ext: Pedal pulses are palpable. Extremities are nonedematous.  MS: moves all extremities, no weakness.  Skin: warm, dry.  Psych: calm and cooperative.            Lab Review:     Results from last 7 days   Lab Units 07/30/24  1029   SODIUM mmol/L 140   POTASSIUM mmol/L 3.9   CHLORIDE mmol/L 102   CO2 mmol/L 28.1   BUN mg/dL 10   CREATININE mg/dL 1.23   GLUCOSE mg/dL 96   CALCIUM mg/dL 9.5     Results from last 7 days   Lab Units 07/30/24  1029   HSTROP T ng/L 299*     Results from last 7 days   Lab Units 07/30/24  1029   WBC 10*3/mm3 4.62   HEMOGLOBIN g/dL 7.5*   HEMATOCRIT % 28.5*   PLATELETS 10*3/mm3 226                   Invalid input(s): \"LDLCALC\"  Results from last 7 days   Lab Units 07/30/24  1029   PROBNP pg/mL 15,763.0*           Recent Radiology:  Imaging Results (Most Recent)       None              ECHOCARDIOGRAM:    Results for orders placed during the hospital encounter of 07/30/24    Adult Transthoracic Echo Complete W/ Cont if Necessary Per Protocol    Interpretation Summary    Left ventricular systolic function is severely decreased. Left ventricular ejection fraction appears to be 26 - 30%.    The left ventricular cavity is moderately dilated.    Left ventricular wall thickness is consistent with mild concentric " hypertrophy.    Left ventricular diastolic function is consistent with (grade I) impaired relaxation.    Moderately reduced right ventricular systolic function noted.    The right ventricular cavity is moderately dilated.    The left atrial cavity is moderately dilated.    Estimated right ventricular systolic pressure from tricuspid regurgitation is normal (<35 mmHg).            Assessment:         Active Hospital Problems    Diagnosis  POA    **Atrial fibrillation with RVR [I48.91]  Yes     1) NSTEMI  -High-sensitivity troponin 83, 299  -Initial EKG shows rapid A-fib with ST depression inferior, anterior, lateral  -CXR showed small bilateral pleural effusions at [facility]  - started on heparin gtt    2) Acute Systolic heart failure  - BNP 55389  - CXR showed small bilateral pleural effusions at [facility]  - received IV diuretics at [facility]  - Last WILD in 2021 showed an EF of 60% with mild MR/TR and good seal of the OLESYA.   - prelim 2D echo 7/2024 shows severe LV dysfunction    3) atrial fibrillation with RVR --> CVR  - started on diltiazem gtt  - K 4.2  - not on anticoagulation s/p Watchman implant; good seal OLESYA per WILD 2021    5) LUDWIN  - Cr 1.5 --> 1.23  - nephrology consulted    4) CAD status post CABG in 2000 and PCI in 2006.    - Last cath in 2016 showed no vessels amenable to PCI.     - nuclear stress testing 3/2024 which was equivocal     5) HTN    6) HLD    7) DM    8) TIA in 2013    9) intracranial hemorrhage status post fall in 2019    10) anemia  - Hgb 7.5  - had an upcoming outpatient appt with hemeoncology    11) tobacco dependence           Plan:   Follow serial cardiac enzymes and EKG.  Continue on heparin gtt.  Patient will be transfused with PRBC and hemoncology consulted.  Nephrology consulted for preoperative cath clearance and assistance with diuresis.  Tele shows rate controlled afib on diltiazem gtt.  Will eventually need to transition him off this given severe LV dysfunction prelim reading on 2D echo.  Will  d/w attending cardiologist for further recommendations.         Electronically signed by AMAYA Gracia, 07/30/24, 12:42 PM EDT.

## 2024-07-30 NOTE — Clinical Note
First balloon inflation max pressure = 14 keven. First balloon inflation duration = 15 seconds. Second inflation of balloon - Max pressure = 14 keven. 2nd Inflation of balloon - Duration = 10 seconds.

## 2024-07-30 NOTE — Clinical Note
First balloon inflation max pressure = 10 keven. First balloon inflation duration = 90 seconds. Second inflation of balloon - Max pressure = 10 keven. 2nd Inflation of balloon - Duration = 20 seconds. Third inflation of balloon - Max pressure = 10 keven. 3rd Inflation of balloon - Duration = 30 seconds.

## 2024-07-30 NOTE — Clinical Note
First balloon inflation max pressure = 20 keven. First balloon inflation duration = 10 seconds. Second inflation of balloon - Max pressure = 20 keven. 2nd Inflation of balloon - Duration = 10 seconds.

## 2024-07-30 NOTE — CONSULTS
Hematology/Oncology Inpatient Consultation    Patient name: Chinedu Lopez  : 1960  MRN: [id number]  Referring Provider: Dr. Hernandez  Reason for Consultation: Iron deficiency anemia    Chief complaint: Shortness of breath    History of present illness:    Chinedu Lopez is a 63 y.o. male who presented to [facility] on 2024 with complaints of shortness of breath.  Past medical history of A-fib status post Watchman procedure, hypertension, hyperlipidemia, ischemic cardiomyopathy, CAD status post CABG and on low-dose aspirin, Thorpe's esophagus.  Presented as a transfer from an outside facility with complaints of acute onset chest pain that started the prior evening.  He stated he was preparing to go back to bed after brushing his teeth and started having left-sided sudden onset chest pain, felt like his previous heart attack.  Started to radiate to his left arm which made him concerned.  He was noted to be in A-fib RVR with elevated troponin.  He was started on a Cardizem drip and heparin drip and cardiology was consulted and recommended transfer to our facility for further management.  Patient admitted to having symptoms of chest pain intermittently and shortness of breath intermittently over the past 2 weeks.  He went to see his PCP and he had his medications adjusted and was started on diuretics due to swelling of his bilateral lower extremities and abdominal area.  He also gained approximately 30 pounds of weight over the past few days.  He denied any increased sodium intake.  He had a stress test in 2024 which was okay.  He had also been found to be anemic on recent lab work for which she had underwent an EGD and colonoscopy in May 2024 which was normal according to him.  He reported that he was supposed to see an hematologist as an outpatient the day of admission.    24  Hematology/Oncology was consulted for iron deficiency anemia.  At the time of the consultation  patient with a WBC of 4.62, hemoglobin 7.5 g/dL, MCV 73.1, platelets 226,000, iron 13, iron sat 3%, TIBC 416.  Review of the chart shows labs from his PCP office dated 1/3/2024 and showing a WBC 6.2, hemoglobin 10.1 g/dL, MCV 75.2, platelets 171,000, 1+ anisocytosis, 1+ microcytes, 2+ hypochromia, 1+ elliptocytes, 1+ teardrop cells.  A stool for occult blood dated 12/20/2023 was negative.  A baseline CBC from 2020 showed normal hemoglobin.    He/She  has a past medical history of Atrial fibrillation, Brain bleed (2019), CAD (coronary artery disease), Hyperlipidemia, Hypertension, Myocardial infarction (2006), Obstructive sleep apnea on CPAP, Presence of Watchman left atrial appendage closure device (1/31/2020), Stroke (2019), and TIA (transient ischemic attack) (11/19/2013).    PCP: Nhung Clark APRN    History:  Past Medical History:   Diagnosis Date    Atrial fibrillation     June, [facility]. Abstracted from [facility].    Brain bleed 2019 October 14th fell - Hospital on 17th for 3 weeks    CAD (coronary artery disease)     S/P CABG and PCI. Abstracted from [facility].    Hyperlipidemia     Hypertension     Myocardial infarction 2006    Non-ST elevation MI. Abstracted from [facility].    Obstructive sleep apnea on CPAP     At . Abstracted from [facility].    Presence of Watchman left atrial appendage closure device 1/31/2020    Stroke 2019    TIA (transient ischemic attack) 11/19/2013    Possible TIA- MRI shows small tumor/head. Abstracted from [facility].   ,   Past Surgical History:   Procedure Laterality Date    ANGIOPLASTY  2006    3 srents were sequentially placed in promimal and mid body of the saphennous vein graft to obtuse marginal branch. Abstracted from [facility].    ATRIAL APPENDAGE EXCLUSION LEFT WITH TRANSESOPHAGEAL ECHOCARDIOGRAM N/A 1/30/2020    Procedure: Atrial Appendage Occlusion;  Surgeon: Angel Willams MD;  Location: [facility] CATH INVASIVE LOC[facility];  Service: Cardiovascular     ATRIAL APPENDAGE EXCLUSION LEFT WITH TRANSESOPHAGEAL ECHOCARDIOGRAM N/A 1/30/2020    Procedure: Atrial Appendage Occlusion;  Surgeon: John Sheridan MD;  Location: [facility] CATH INVASIVE LOCATION;  Service: Cardiology    CARDIAC CATHETERIZATION  2000 2003, 2006, 2012. Abstracted from [facility].    CORONARY ARTERY BYPASS GRAFT  2000    Triple. Abstracted from [facility].    MUSCLE REPAIR      Tendons and nerves in right lower extremity. Abstracted from [facility].    OTHER SURGICAL HISTORY  08/2012    RCA mid and proximal- Xience stent x3. Abstracted from [facility].    OTHER SURGICAL HISTORY  09/2016    Head trauma/bleed at [facility]. Abstracted from [facility].   ,   Family History   Problem Relation Age of Onset    Diabetes Mother     Heart attack Father     Diabetes Father     Other Sister         MRSA    Heart disease Other         Positive for Ischemic    Cancer Other     Hypertension Other    ,   Social History     Tobacco Use    Smoking status: Every Day     Current packs/day: 1.00     Average packs/day: 1 pack/day for 40.6 years (40.6 ttl pk-yrs)     Types: Cigarettes     Start date: 1984    Smokeless tobacco: Never    Tobacco comments:     Smoking cessation--encouraged---refuses patch, gum or chantix   Vaping Use    Vaping status: Never Used   Substance Use Topics    Alcohol use: Not Currently    Drug use: No   ,   Medications Prior to Admission   Medication Sig Dispense Refill Last Dose    amiodarone (PACERONE) 200 MG tablet Take 1 tablet by mouth Daily.       bumetanide (BUMEX) 1 MG tablet Take 1 tablet by mouth Daily.       amantadine (SYMMETREL) 100 MG capsule Take 1 capsule by mouth Daily.       amantadine (SYMMETREL) 100 MG tablet Take 1 tablet by mouth 1 (One) Time.       aspirin 81 MG chewable tablet Chew 1 tablet Daily.       Cholecalciferol (VITAMIN D3) 125 MCG (5000 UT) capsule capsule Take 1 capsule by mouth Daily.       Cyanocobalamin (VITAMIN B 12) 500 MCG tablet Take 1 tablet by mouth  Daily.       doxazosin (CARDURA) 1 MG tablet Take 1 tablet by mouth every night at bedtime.       famotidine (PEPCID) 40 MG tablet Take 1 tablet by mouth Daily.       folic acid (FOLVITE) 1 MG tablet Take 1 tablet by mouth Daily.       guaiFENesin (MUCINEX) 600 MG 12 hr tablet Take 1 tablet by mouth Daily.       isosorbide mononitrate (IMDUR) 30 MG 24 hr tablet Take 1 tablet by mouth Daily.       metFORMIN (GLUCOPHAGE) 1000 MG tablet Take 1 tablet by mouth 2 (Two) Times a Day With Meals.       Methylcobalamin 5000 MCG tablet dispersible Place 500 mcg on the tongue Daily.       nitroglycerin (NITROLINGUAL) 0.4 MG/SPRAY spray Place 1 spray under the tongue Every 5 (Five) Minutes As Needed for Chest Pain.       pantoprazole (PROTONIX) 40 MG EC tablet Take 1 tablet by mouth 2 (Two) Times a Day.       potassium chloride (K-DUR,KLOR-CON) 20 MEQ CR tablet Take 1 tablet by mouth 2 (Two) Times a Day.       QUEtiapine (SEROquel) 25 MG tablet Take 1 tablet by mouth Daily.       ranolazine (RANEXA) 500 MG 12 hr tablet Take 1 tablet by mouth 2 (Two) Times a Day.  6     thiamine (VITAMIN B-1) 100 MG tablet Take 1 tablet by mouth Daily.       thiamine (VITAMIN B1) 100 MG tablet Take 1 tablet by mouth Daily.      , Scheduled Meds:  Acetylcysteine, 1,200 mg, Oral, BID  amantadine, 100 mg, Oral, Daily  aspirin, 81 mg, Oral, Daily  bumetanide, 1 mg, Intravenous, Once  ferric gluconate, 125 mg, Intravenous, Once  [START ON 7/31/2024] folic acid, 1 mg, Oral, Daily  guaiFENesin, 600 mg, Oral, Daily  [START ON 7/31/2024] isosorbide mononitrate, 30 mg, Oral, Daily  pantoprazole, 40 mg, Oral, BID AC  ranolazine, 500 mg, Oral, BID  [START ON 7/31/2024] sodium bicarbonate, 1,300 mg, Oral, Q4H  sodium chloride, 10 mL, Intravenous, Q12H    , Continuous Infusions:  amiodarone, 1 mg/min, Last Rate: 1 mg/min (07/30/24 1539)   Followed by  amiodarone, 0.5 mg/min  heparin, 9.01 Units/kg/hr, Last Rate: 14.21 Units/kg/hr (07/30/24 5595)    , PRN Meds:  "   acetaminophen **OR** acetaminophen **OR** acetaminophen    senna-docusate sodium **AND** polyethylene glycol **AND** bisacodyl **AND** bisacodyl    Calcium Replacement - Follow Nurse / BPA Driven Protocol    heparin    heparin    Magnesium Standard Dose Replacement - Follow Nurse / BPA Driven Protocol    nitroglycerin    ondansetron ODT **OR** ondansetron    Phosphorus Replacement - Follow Nurse / BPA Driven Protocol    Potassium Replacement - Follow Nurse / BPA Driven Protocol    sodium chloride    sodium chloride   Allergies:  Patient has no known allergies.    Subjective     ROS:  Review of Systems   Constitutional:  Positive for fatigue. Negative for chills and fever.   HENT:  Negative for congestion, drooling, ear discharge, rhinorrhea, sinus pressure and tinnitus.    Eyes:  Negative for photophobia, pain and discharge.   Respiratory:  Negative for apnea, choking and stridor.    Cardiovascular:  Negative for palpitations.   Gastrointestinal:  Negative for abdominal distention, abdominal pain and anal bleeding.   Endocrine: Negative for polydipsia and polyphagia.   Genitourinary:  Negative for decreased urine volume, flank pain and genital sores.   Musculoskeletal:  Negative for gait problem, neck pain and neck stiffness.   Skin:  Negative for color change, rash and wound.   Neurological:  Positive for weakness. Negative for tremors, seizures, syncope, facial asymmetry and speech difficulty.   Hematological:  Negative for adenopathy.   Psychiatric/Behavioral:  Negative for agitation, confusion, hallucinations and self-injury. The patient is not hyperactive.         Objective   Vital Signs:   /71   Pulse 92   Temp 98.5 °F (36.9 °C) (Oral)   Resp 18   Ht 182.9 cm (72\")   Wt 111 kg (245 lb)   SpO2 94%   BMI 33.23 kg/m²     Physical Exam: (performed by MD)  Physical Exam  Vitals and nursing note reviewed.   Constitutional:       General: He is not in acute distress.     Appearance: He is not " diaphoretic.   HENT:      Head: Normocephalic and atraumatic.   Eyes:      General: No scleral icterus.        Right eye: No discharge.         Left eye: No discharge.      Conjunctiva/sclera: Conjunctivae normal.   Neck:      Thyroid: No thyromegaly.   Cardiovascular:      Rate and Rhythm: Normal rate and regular rhythm.      Heart sounds: Normal heart sounds.      No friction rub. No gallop.   Pulmonary:      Effort: Pulmonary effort is normal. No respiratory distress.      Breath sounds: No stridor. No wheezing.   Abdominal:      General: Bowel sounds are normal.      Palpations: Abdomen is soft. There is no mass.      Tenderness: There is no abdominal tenderness. There is no guarding or rebound.   Musculoskeletal:         General: No tenderness. Normal range of motion.      Cervical back: Normal range of motion and neck supple.   Lymphadenopathy:      Cervical: No cervical adenopathy.   Skin:     General: Skin is warm.      Findings: No erythema or rash.   Neurological:      Mental Status: He is alert and oriented to person, place, and time.      Motor: No abnormal muscle tone.   Psychiatric:         Behavior: Behavior normal.         Results Review:  Lab Results (last 48 hours)       Procedure Component Value Units Date/Time    Basic Metabolic Panel [[id number]]  (Normal) Collected: 07/30/24 1029    Specimen: Blood from Arm, Right Updated: 07/30/24 1157     Glucose 96 mg/dL      BUN 10 mg/dL      Creatinine 1.23 mg/dL      Sodium 140 mmol/L      Potassium 3.9 mmol/L      Chloride 102 mmol/L      CO2 28.1 mmol/L      Calcium 9.5 mg/dL      BUN/Creatinine Ratio 8.1     Anion Gap 9.9 mmol/L      eGFR 66.8 mL/min/1.73     Narrative:      GFR Normal >60  Chronic Kidney Disease <60  Kidney Failure <15      High Sensitivity Troponin T [[id number]]  (Abnormal) Collected: 07/30/24 1029    Specimen: Blood from Arm, Right Updated: 07/30/24 1111     HS Troponin T 299 ng/L     Narrative:      High Sensitive Troponin T  Reference Range:  <14.0 ng/L- Negative Female for AMI  <22.0 ng/L- Negative Male for AMI  >=14 - Abnormal Female indicating possible myocardial injury.  >=22 - Abnormal Male indicating possible myocardial injury.   Clinicians would have to utilize clinical acumen, EKG, Troponin, and serial changes to determine if it is an Acute Myocardial Infarction or myocardial injury due to an underlying chronic condition.         Iron Profile [[id number]]  (Abnormal) Collected: 07/30/24 1029    Specimen: Blood from Arm, Right Updated: 07/30/24 1102     Iron 13 mcg/dL      Iron Saturation (TSAT) 3 %      Transferrin 279 mg/dL      TIBC 416 mcg/dL     BNP [[id number]]  (Abnormal) Collected: 07/30/24 1029    Specimen: Blood from Arm, Right Updated: 07/30/24 1102     proBNP 15,763.0 pg/mL     Narrative:      This assay is used as an aid in the diagnosis of individuals suspected of having heart failure. It can be used as an aid in the diagnosis of acute decompensated heart failure (ADHF) in patients presenting with signs and symptoms of ADHF to the emergency department (ED). In addition, NT-proBNP of <300 pg/mL indicates ADHF is not likely.    Age Range Result Interpretation  NT-proBNP Concentration (pg/mL:      <50             Positive            >450                   Gray                 300-450                    Negative             <300    50-75           Positive            >900                  Gray                300-900                  Negative            <300      >75             Positive            >1800                  Gray                300-1800                  Negative            <300    CBC & Differential [[id number]]  (Abnormal) Collected: 07/30/24 1029    Specimen: Blood Updated: 07/30/24 1042    Narrative:      The following orders were created for panel order CBC & Differential.  Procedure                               Abnormality         Status                     ---------                                -----------         ------                     CBC Auto Differential[[id number]]        Abnormal            Final result               Scan Slide[[id number]]                                                                    Please view results for these tests on the individual orders.    CBC Auto Differential [[id number]]  (Abnormal) Collected: 07/30/24 1029    Specimen: Blood Updated: 07/30/24 1042     WBC 4.62 10*3/mm3      RBC 3.90 10*6/mm3      Hemoglobin 7.5 g/dL      Hematocrit 28.5 %      MCV 73.1 fL      MCH 19.2 pg      MCHC 26.3 g/dL      RDW 21.1 %      RDW-SD 55.1 fl      MPV 10.0 fL      Platelets 226 10*3/mm3      Neutrophil % 66.0 %      Lymphocyte % 19.9 %      Monocyte % 9.1 %      Eosinophil % 1.9 %      Basophil % 0.9 %      Immature Grans % 2.2 %      Neutrophils, Absolute 3.05 10*3/mm3      Lymphocytes, Absolute 0.92 10*3/mm3      Monocytes, Absolute 0.42 10*3/mm3      Eosinophils, Absolute 0.09 10*3/mm3      Basophils, Absolute 0.04 10*3/mm3      Immature Grans, Absolute 0.10 10*3/mm3      nRBC 0.4 /100 WBC              Pending Results:     Imaging Reviewed:   US Renal Bilateral    Result Date: 7/30/2024  Impression: No hydronephrosis. Increased renal cortical echogenicity which can be seen in medical renal disease. Small volume ascites. Electronically Signed: Gildardo Springer  7/30/2024 3:01 PM EDT  Workstation ID: [id number]          Assessment & Plan   ASSESSMENT  Iron deficiency anemia: Workup thus far consistent with iron deficiency anemia.  Per patient report he had an EGD and colonoscopy in May 2024 that was normal.  There are no records for review from the procedure.  There is a stool for occult blood on the chart from his PCP in early 2024 that was negative. UA negative for blood.     PLAN  Complete anemia workup with additional labs to evaluate for hemolysis and nutritional deficiencies  Monitor CBC and transfuse for hemoglobin less than 7.0 g/dL or less than 8.0 g/dL if symptomatic or to  optimize cardiac function.  Monitor closely due to anticoagulation with heparin drip currently.  Replace iron  Consult GI    Electronically signed by AMAYA Jean, 07/30/24, 12:44 PM EDT.    Thank you for this consult. We will be happy to follow along with you.     This is a 63-year-old male with iron deficiency anemia.  Patient had iron studies which confirmed the above.  He had an EGD and colonoscopy which were reportedly normal.  There is no obvious source of iron deficiency so far identified    His physical exam today is unremarkable  Patient has been initiated on IV iron.  Will continue the same  Will monitor his CBCs closely  Will check his urine, look for any other additional deficiencies that can contribute to his anemia  Discussed with patient  Will continue to follow      Electronically signed by Mell Mcfarland MD, 08/01/24, 8:10 PM EDT.

## 2024-07-30 NOTE — CONSULTS
NEPHROLOGY CONSULTATION-----[facility]    [facility]  [phone]  Demar Do MD    Patient Care Team:  Nhung Clark APRN as PCP - General (Family Medicine)  Ranjan Do MD as Consulting Physician (Nephrology)    CC/REASON FOR CONSULTATION: RENAL FAILURE/ELEVATED SERUM CREATININE    PHYSICIAN REQUESTING CONSULTATION:     History of Present Illness    HPI    Patient is a 63 y.o. WM whom I was asked to see in consultation for evaluation and management of renal failure/elevated serum creatinine. Patient was admitted after presenting with c/o SOB and CP.    Patient denies prior knowledge of functional kidney disease, proteinuria, or hematuria. No NSAIDs or recent IV dye exposure. No known h/o hepatitis, TB, rheumatic fever, jaundice, SLE, bleeding/bruising disorders.  +Urinary frequeny. No edema or fluid retention.  +Compliance with home meds. Was on diuretics in the form of  Bumex prior to admission. Was not on ACE-I/ARB prior to admission. No herbal med use.    Review of Systems   Constitutional:  Positive for activity change, appetite change and fatigue. Negative for chills, diaphoresis, fever and unexpected weight change.   HENT:  Negative for congestion, dental problem, drooling, ear discharge, ear pain, facial swelling, hearing loss, mouth sores, nosebleeds, postnasal drip, rhinorrhea, sinus pressure, sinus pain, sneezing, sore throat, tinnitus, trouble swallowing and voice change.    Eyes:  Negative for photophobia, pain, discharge, redness, itching and visual disturbance.   Respiratory:  Positive for shortness of breath. Negative for apnea, cough, choking, chest tightness, wheezing and stridor.    Cardiovascular:  Positive for chest pain. Negative for palpitations and leg swelling.   Gastrointestinal:  Positive for abdominal distention and abdominal pain. Negative for anal bleeding, blood in stool,  constipation, diarrhea, nausea, rectal pain and vomiting.   Endocrine: Negative for cold intolerance, heat intolerance, polydipsia, polyphagia and polyuria.   Genitourinary:  Positive for frequency. Negative for decreased urine volume, difficulty urinating, dysuria, enuresis, flank pain, genital sores, hematuria and urgency.   Musculoskeletal:  Positive for arthralgias and back pain. Negative for gait problem, joint swelling, myalgias, neck pain and neck stiffness.   Skin:  Negative for color change, pallor, rash and wound.   Allergic/Immunologic: Negative for environmental allergies, food allergies and immunocompromised state.   Neurological:  Positive for weakness. Negative for dizziness, tremors, seizures, syncope, facial asymmetry, speech difficulty, light-headedness, numbness and headaches.   Hematological:  Negative for adenopathy. Does not bruise/bleed easily.   Psychiatric/Behavioral:  Negative for agitation, behavioral problems, confusion, decreased concentration, dysphoric mood, hallucinations, self-injury, sleep disturbance and suicidal ideas. The patient is not nervous/anxious and is not hyperactive.           Past Medical History:   Diagnosis Date    Atrial fibrillation     June, [facility]. Abstracted from [facility].    Brain bleed 2019 October 14th [facility] on 17th for 3 weeks    CAD (coronary artery disease)     S/P CABG and PCI. Abstracted from [facility].    Hyperlipidemia     Hypertension     Myocardial infarction 2006    Non-ST elevation MI. Abstracted from [facility].    Obstructive sleep apnea on CPAP     At . Abstracted from [facility].    Presence of Watchman left atrial appendage closure device 1/31/2020    Stroke 2019    TIA (transient ischemic attack) 11/19/2013    Possible TIA- MRI shows small tumor/head. Abstracted from [facility].       Past Surgical History:   Procedure Laterality Date    ANGIOPLASTY  2006    3 srents were sequentially placed in promimal and mid body of the  saphennous vein graft to obtuse marginal branch. Abstracted from [facility].    ATRIAL APPENDAGE EXCLUSION LEFT WITH TRANSESOPHAGEAL ECHOCARDIOGRAM N/A 1/30/2020    Procedure: Atrial Appendage Occlusion;  Surgeon: Angel Willams MD;  Location: [facility] CATH INVASIVE LOCATION;  Service: Cardiovascular    ATRIAL APPENDAGE EXCLUSION LEFT WITH TRANSESOPHAGEAL ECHOCARDIOGRAM N/A 1/30/2020    Procedure: Atrial Appendage Occlusion;  Surgeon: John Sheridan MD;  Location: [facility] CATH INVASIVE LOCATION;  Service: Cardiology    CARDIAC CATHETERIZATION  2000 2003, 2006, 2012. Abstracted from [facility].    CORONARY ARTERY BYPASS GRAFT  2000    Triple. Abstracted from [facility].    MUSCLE REPAIR      Tendons and nerves in right lower extremity. Abstracted from [facility].    OTHER SURGICAL HISTORY  08/2012    RCA mid and proximal- Xience stent x3. Abstracted from [facility].    OTHER SURGICAL HISTORY  09/2016    Head trauma/bleed at [facility]. Abstracted from [facility].       Family History   Problem Relation Age of Onset    Diabetes Mother     Heart attack Father     Diabetes Father     Other Sister         MRSA    Heart disease Other         Positive for Ischemic    Cancer Other     Hypertension Other        Social History     Tobacco Use    Smoking status: Every Day     Current packs/day: 1.00     Average packs/day: 1 pack/day for 40.6 years (40.6 ttl pk-yrs)     Types: Cigarettes     Start date: 1984    Smokeless tobacco: Never    Tobacco comments:     Smoking cessation--encouraged---refuses patch, gum or chantix   Vaping Use    Vaping status: Never Used   Substance Use Topics    Alcohol use: Not Currently    Drug use: No       Home Meds:   Medications Prior to Admission   Medication Sig Dispense Refill Last Dose    amiodarone (PACERONE) 200 MG tablet Take 1 tablet by mouth Daily.       bumetanide (BUMEX) 1 MG tablet Take 1 tablet by mouth Daily.       amantadine (SYMMETREL) 100 MG capsule Take 1 capsule by  mouth Daily.       amantadine (SYMMETREL) 100 MG tablet Take 1 tablet by mouth 1 (One) Time.       aspirin 81 MG chewable tablet Chew 1 tablet Daily.       Cholecalciferol (VITAMIN D3) 125 MCG (5000 UT) capsule capsule Take 1 capsule by mouth Daily.       Cyanocobalamin (VITAMIN B 12) 500 MCG tablet Take 1 tablet by mouth Daily.       doxazosin (CARDURA) 1 MG tablet Take 1 tablet by mouth every night at bedtime.       famotidine (PEPCID) 40 MG tablet Take 1 tablet by mouth Daily.       folic acid (FOLVITE) 1 MG tablet Take 1 tablet by mouth Daily.       guaiFENesin (MUCINEX) 600 MG 12 hr tablet Take 1 tablet by mouth Daily.       isosorbide mononitrate (IMDUR) 30 MG 24 hr tablet Take 1 tablet by mouth Daily.       metFORMIN (GLUCOPHAGE) 1000 MG tablet Take 1 tablet by mouth 2 (Two) Times a Day With Meals.       Methylcobalamin 5000 MCG tablet dispersible Place 500 mcg on the tongue Daily.       nitroglycerin (NITROLINGUAL) 0.4 MG/SPRAY spray Place 1 spray under the tongue Every 5 (Five) Minutes As Needed for Chest Pain.       pantoprazole (PROTONIX) 40 MG EC tablet Take 1 tablet by mouth 2 (Two) Times a Day.       potassium chloride (K-DUR,KLOR-CON) 20 MEQ CR tablet Take 1 tablet by mouth 2 (Two) Times a Day.       QUEtiapine (SEROquel) 25 MG tablet Take 1 tablet by mouth Daily.       ranolazine (RANEXA) 500 MG 12 hr tablet Take 1 tablet by mouth 2 (Two) Times a Day.  6     thiamine (VITAMIN B-1) 100 MG tablet Take 1 tablet by mouth Daily.       thiamine (VITAMIN B1) 100 MG tablet Take 1 tablet by mouth Daily.          Scheduled Meds:  amantadine, 100 mg, Oral, Daily  aspirin, 81 mg, Oral, Daily  [START ON 7/31/2024] folic acid, 1 mg, Oral, Daily  guaiFENesin, 600 mg, Oral, Daily  [START ON 7/31/2024] isosorbide mononitrate, 30 mg, Oral, Daily  pantoprazole, 40 mg, Oral, BID AC  ranolazine, 500 mg, Oral, BID  sodium chloride, 10 mL, Intravenous, Q12H        Continuous Infusions:  amiodarone, 1 mg/min, Last Rate: 1  mg/min (07/30/24 1539)   Followed by  amiodarone, 0.5 mg/min  heparin, 9.01 Units/kg/hr, Last Rate: 14.21 Units/kg/hr (07/30/24 1635)        PRN Meds:    acetaminophen **OR** acetaminophen **OR** acetaminophen    senna-docusate sodium **AND** polyethylene glycol **AND** bisacodyl **AND** bisacodyl    Calcium Replacement - Follow Nurse / BPA Driven Protocol    heparin    heparin    Magnesium Standard Dose Replacement - Follow Nurse / BPA Driven Protocol    nitroglycerin    ondansetron ODT **OR** ondansetron    Phosphorus Replacement - Follow Nurse / BPA Driven Protocol    Potassium Replacement - Follow Nurse / BPA Driven Protocol    sodium chloride    sodium chloride    Allergies:  Patient has no known allergies.    OBJECTIVE    Vital Signs  Temp:  [97.5 °F (36.4 °C)-98.5 °F (36.9 °C)] 98.5 °F (36.9 °C)  Heart Rate:  [86-98] 92  Resp:  [14-19] 18  BP: ()/(55-81) 119/71    I/O this shift:  In: 415 [P.O.:240; I.V.:175]  Out: 1100 [Urine:1100]  No intake/output data recorded.    Physical Exam:  General Appearance: alert, appears stated age and cooperative  Head: normocephalic, without obvious abnormality and atraumatic  Eyes: conjunctivae and sclerae normal and no icterus  Neck: supple and +MILD JVD  Lungs: +FINE LEFT CRACKLES  Heart: IRREG IRREG +MEDARDO. NO GALLOP, RUB, OR S3  Chest Wall: no abnormalities observed  Abdomen: normal bowel sounds. +OBESITY +VERY MILD ASCITES  Extremities: moves extremities well, +TRACE BILATERAL PRETIBIAL EDEMA, no cyanosis  Skin: no bleeding, bruising or rash. +PALLOR  Neurologic: Alert, and oriented. No focal deficits    Results Review:    I reviewed the patient's new clinical results.    WBC WBC   Date Value Ref Range Status   07/30/2024 4.62 3.40 - 10.80 10*3/mm3 Final      HGB Hemoglobin   Date Value Ref Range Status   07/30/2024 7.5 (L) 13.0 - 17.7 g/dL Final      HCT Hematocrit   Date Value Ref Range Status   07/30/2024 28.5 (L) 37.5 - 51.0 % Final      Platelets No results  "found for: \"LABPLAT\"   MCV MCV   Date Value Ref Range Status   07/30/2024 73.1 (L) 79.0 - 97.0 fL Final          Sodium Sodium   Date Value Ref Range Status   07/30/2024 140 136 - 145 mmol/L Final      Potassium Potassium   Date Value Ref Range Status   07/30/2024 3.9 3.5 - 5.2 mmol/L Final      Chloride Chloride   Date Value Ref Range Status   07/30/2024 102 98 - 107 mmol/L Final      CO2 CO2   Date Value Ref Range Status   07/30/2024 28.1 22.0 - 29.0 mmol/L Final      BUN BUN   Date Value Ref Range Status   07/30/2024 10 8 - 23 mg/dL Final      Creatinine Creatinine   Date Value Ref Range Status   07/30/2024 1.23 0.76 - 1.27 mg/dL Final      Calcium Calcium   Date Value Ref Range Status   07/30/2024 9.5 8.6 - 10.5 mg/dL Final      PO4 No results found for: \"CAPO4\"   Albumin No results found for: \"ALBUMIN\"   Magnesium No results found for: \"MG\"   Uric Acid No results found for: \"URICACID\"       Imaging Results (Last 72 Hours)       Procedure Component Value Units Date/Time    US Renal Bilateral [[id number]] Collected: 07/30/24 1453     Updated: 07/30/24 1503    Narrative:      Date of Exam: 7/30/2024 2:05 PM EDT    Indication: LUDWIN/CKD.    Comparison: No comparisons available.    Technique: Grayscale and color Doppler ultrasound evaluation of the kidneys and urinary bladder was performed.      Findings:  Right Kidney: Right kidney measures 11.6 cm in long axis. Likely benign cyst in the right kidney measuring up to 2.2 cm. Increased renal cortical echogenicity.  No hydronephrosis.. No sonographically evident nephrolithiasis.    Left Kidney:  Left kidney measures 9.3 cm in long axis. Mildly increased renal cortical echogenicity. No suspicious appearing lesions.No hydronephrosis.No sonographically evident nephrolithiasis.    Bladder: The bladder appears unremarkable.    Additional findings: Mild perihepatic and pelvic ascites.      Impression:      Impression:  No hydronephrosis.    Increased renal cortical " echogenicity which can be seen in medical renal disease.    Small volume ascites.          Electronically Signed: Gildardo Springer    7/30/2024 3:01 PM EDT    Workstation ID: [id number]                    Results for orders placed during the hospital encounter of 01/20/23    Duplex Carotid Ultrasound CAR    Interpretation Summary    Right internal carotid artery plaque without significant stenosis.    Left internal carotid artery moderate stenosis.      ASSESSMENT / PLAN      Atrial fibrillation with RVR    RENAL FAILURE------Nonoliguric. Appears to have CRF/CKD STG 2.   Unknown if patient has baseline proteinuria or hematuria. CRF/CKD STG 2 most likely secondary to HTN NS vs. Early DM nephropathy. Follow with gentle diuresis. Will check urine and serum studies and renal US to further characterize CKD and to assess for secondary diseases associated with CKD. Will premedicate for cardiac cath with  Mucomyst and bicarbonate. Patient has been explained and understands the risks of IV dye exposure. No IVFs given volume overload. Getting transfusion which should help renal perfusion.  No NSAIDs    2. ANEMIA------PRBCs per Hem/Onc    3. VOLUME OVERLOAD/EDEMA/MILD ASCITES-----IV Bumex x one this evening after current unit of PRBCs infused. Then will hold further diuretics until post cardiac cath tomorrow    4. ATRIAL FIBRILLATION WITH RVR------Watchman in place. Per , Cardiology    5. CAD WITH H/O CABG/PCI AND WITH CURRENT ANGINA------Cardiac cath in AM per , Cardiology    6. H/O CVA/TIA/HEAD TRAUMA WITH BRAIN BLEED    7. HYPERLIPIDEMIA----Not on Statin. Check CK, TSH    8. GERD/PUD PROPHYLAXIS-----On PPI. Benefits outweigh risks despite CKD    9. OBESITY/DEISY      I discussed the patient's findings and my recommendations with patient and nursing staff    Will follow along closely. Thank you for allowing us to see this patient in renal consultation.    [facility]  [phone]  MD Demar Bull MD  07/30/24  16:48 EDT

## 2024-07-30 NOTE — Clinical Note
First balloon inflation max pressure = 20 keven. First balloon inflation duration = 10 seconds. Second inflation of balloon - Max pressure = 20 keven. 2nd Inflation of balloon - Duration = 10 seconds. Third inflation of balloon - Max pressure = 20 keven. 3rd Inflation of balloon - Duration = 10 seconds.

## 2024-07-31 ENCOUNTER — INPATIENT HOSPITAL (OUTPATIENT)
Dept: URBAN - METROPOLITAN AREA HOSPITAL 84 | Facility: HOSPITAL | Age: 64
End: 2024-07-31
Payer: COMMERCIAL

## 2024-07-31 DIAGNOSIS — K22.70 BARRETT'S ESOPHAGUS WITHOUT DYSPLASIA: ICD-10-CM

## 2024-07-31 DIAGNOSIS — D50.9 IRON DEFICIENCY ANEMIA, UNSPECIFIED: ICD-10-CM

## 2024-07-31 LAB
ACT BLD: 146 SECONDS (ref 89–137)
ALBUMIN SERPL-MCNC: 3.7 G/DL (ref 3.5–5.2)
ALBUMIN/GLOB SERPL: 1.4 G/DL
ALP SERPL-CCNC: 36 U/L (ref 39–117)
ALT SERPL W P-5'-P-CCNC: 5 U/L (ref 1–41)
ANION GAP SERPL CALCULATED.3IONS-SCNC: 15 MMOL/L (ref 5–15)
ANION GAP SERPL CALCULATED.3IONS-SCNC: 8 MMOL/L (ref 5–15)
APTT PPP: 69.2 SECONDS (ref 61–76.5)
APTT PPP: 80.2 SECONDS (ref 61–76.5)
AST SERPL-CCNC: 19 U/L (ref 1–40)
BILIRUB SERPL-MCNC: 0.5 MG/DL (ref 0–1.2)
BUN SERPL-MCNC: 8 MG/DL (ref 8–23)
BUN SERPL-MCNC: 9 MG/DL (ref 8–23)
BUN/CREAT SERPL: 8.2 (ref 7–25)
BUN/CREAT SERPL: 8.2 (ref 7–25)
CA-I SERPL ISE-MCNC: 1.22 MMOL/L (ref 1.2–1.3)
CALCIUM SPEC-SCNC: 9.4 MG/DL (ref 8.6–10.5)
CALCIUM SPEC-SCNC: 9.5 MG/DL (ref 8.6–10.5)
CHLORIDE SERPL-SCNC: 100 MMOL/L (ref 98–107)
CHLORIDE SERPL-SCNC: 99 MMOL/L (ref 98–107)
CHOLEST SERPL-MCNC: 114 MG/DL (ref 0–200)
CO2 SERPL-SCNC: 26 MMOL/L (ref 22–29)
CO2 SERPL-SCNC: 31 MMOL/L (ref 22–29)
CREAT SERPL-MCNC: 0.97 MG/DL (ref 0.76–1.27)
CREAT SERPL-MCNC: 1.1 MG/DL (ref 0.76–1.27)
DEPRECATED RDW RBC AUTO: 59 FL (ref 37–54)
EGFRCR SERPLBLD CKD-EPI 2021: 76.4 ML/MIN/1.73
EGFRCR SERPLBLD CKD-EPI 2021: 88.8 ML/MIN/1.73
ERYTHROCYTE [DISTWIDTH] IN BLOOD BY AUTOMATED COUNT: 22.4 % (ref 12.3–15.4)
GLOBULIN UR ELPH-MCNC: 2.6 GM/DL
GLUCOSE BLDC GLUCOMTR-MCNC: 191 MG/DL (ref 70–105)
GLUCOSE BLDC GLUCOMTR-MCNC: 222 MG/DL (ref 70–105)
GLUCOSE SERPL-MCNC: 118 MG/DL (ref 65–99)
GLUCOSE SERPL-MCNC: 98 MG/DL (ref 65–99)
HCT VFR BLD AUTO: 32.1 % (ref 37.5–51)
HDLC SERPL-MCNC: 46 MG/DL (ref 40–60)
HGB BLD-MCNC: 8.6 G/DL (ref 13–17.7)
LDLC SERPL CALC-MCNC: 51 MG/DL (ref 0–100)
LDLC/HDLC SERPL: 1.1 {RATIO}
MAGNESIUM SERPL-MCNC: 1.8 MG/DL (ref 1.6–2.4)
MCH RBC QN AUTO: 20 PG (ref 26.6–33)
MCHC RBC AUTO-ENTMCNC: 26.8 G/DL (ref 31.5–35.7)
MCV RBC AUTO: 74.7 FL (ref 79–97)
PHOSPHATE SERPL-MCNC: 3.8 MG/DL (ref 2.5–4.5)
PLATELET # BLD AUTO: 213 10*3/MM3 (ref 140–450)
PMV BLD AUTO: 10 FL (ref 6–12)
POTASSIUM SERPL-SCNC: 3.6 MMOL/L (ref 3.5–5.2)
POTASSIUM SERPL-SCNC: 4 MMOL/L (ref 3.5–5.2)
PROT SERPL-MCNC: 6.3 G/DL (ref 6–8.5)
RBC # BLD AUTO: 4.3 10*6/MM3 (ref 4.14–5.8)
SODIUM SERPL-SCNC: 139 MMOL/L (ref 136–145)
SODIUM SERPL-SCNC: 140 MMOL/L (ref 136–145)
TRIGL SERPL-MCNC: 88 MG/DL (ref 0–150)
TROPONIN T SERPL HS-MCNC: 257 NG/L
VLDLC SERPL-MCNC: 17 MG/DL (ref 5–40)
WBC NRBC COR # BLD AUTO: 5.31 10*3/MM3 (ref 3.4–10.8)

## 2024-07-31 PROCEDURE — 63710000001 INSULIN LISPRO (HUMAN) PER 5 UNITS

## 2024-07-31 PROCEDURE — 85027 COMPLETE CBC AUTOMATED: CPT | Performed by: INTERNAL MEDICINE

## 2024-07-31 PROCEDURE — C1894 INTRO/SHEATH, NON-LASER: HCPCS | Performed by: INTERNAL MEDICINE

## 2024-07-31 PROCEDURE — 85347 COAGULATION TIME ACTIVATED: CPT

## 2024-07-31 PROCEDURE — 83735 ASSAY OF MAGNESIUM: CPT | Performed by: INTERNAL MEDICINE

## 2024-07-31 PROCEDURE — C1725 CATH, TRANSLUMIN NON-LASER: HCPCS | Performed by: INTERNAL MEDICINE

## 2024-07-31 PROCEDURE — 25010000002 MORPHINE PER 10 MG: Performed by: INTERNAL MEDICINE

## 2024-07-31 PROCEDURE — 80061 LIPID PANEL: CPT | Performed by: INTERNAL MEDICINE

## 2024-07-31 PROCEDURE — B2121ZZ FLUOROSCOPY OF SINGLE CORONARY ARTERY BYPASS GRAFT USING LOW OSMOLAR CONTRAST: ICD-10-PCS | Performed by: INTERNAL MEDICINE

## 2024-07-31 PROCEDURE — 93459 L HRT ART/GRFT ANGIO: CPT | Performed by: INTERNAL MEDICINE

## 2024-07-31 PROCEDURE — 85730 THROMBOPLASTIN TIME PARTIAL: CPT | Performed by: INTERNAL MEDICINE

## 2024-07-31 PROCEDURE — 82330 ASSAY OF CALCIUM: CPT | Performed by: INTERNAL MEDICINE

## 2024-07-31 PROCEDURE — 25010000002 LIDOCAINE 1 % SOLUTION: Performed by: INTERNAL MEDICINE

## 2024-07-31 PROCEDURE — 84100 ASSAY OF PHOSPHORUS: CPT | Performed by: INTERNAL MEDICINE

## 2024-07-31 PROCEDURE — 25010000002 HEPARIN (PORCINE) PER 1000 UNITS: Performed by: INTERNAL MEDICINE

## 2024-07-31 PROCEDURE — C1769 GUIDE WIRE: HCPCS | Performed by: INTERNAL MEDICINE

## 2024-07-31 PROCEDURE — C9604 PERC D-E COR REVASC T CABG S: HCPCS | Performed by: INTERNAL MEDICINE

## 2024-07-31 PROCEDURE — 80053 COMPREHEN METABOLIC PANEL: CPT | Performed by: INTERNAL MEDICINE

## 2024-07-31 PROCEDURE — 25510000001 IOPAMIDOL PER 1 ML: Performed by: INTERNAL MEDICINE

## 2024-07-31 PROCEDURE — 4A023N7 MEASUREMENT OF CARDIAC SAMPLING AND PRESSURE, LEFT HEART, PERCUTANEOUS APPROACH: ICD-10-PCS | Performed by: INTERNAL MEDICINE

## 2024-07-31 PROCEDURE — 99152 MOD SED SAME PHYS/QHP 5/>YRS: CPT | Performed by: INTERNAL MEDICINE

## 2024-07-31 PROCEDURE — 99223 1ST HOSP IP/OBS HIGH 75: CPT | Performed by: NURSE PRACTITIONER

## 2024-07-31 PROCEDURE — B2111ZZ FLUOROSCOPY OF MULTIPLE CORONARY ARTERIES USING LOW OSMOLAR CONTRAST: ICD-10-PCS | Performed by: INTERNAL MEDICINE

## 2024-07-31 PROCEDURE — 82948 REAGENT STRIP/BLOOD GLUCOSE: CPT

## 2024-07-31 PROCEDURE — 25010000002 BUMETANIDE PER 0.5 MG: Performed by: INTERNAL MEDICINE

## 2024-07-31 PROCEDURE — B2181ZZ FLUOROSCOPY OF LEFT INTERNAL MAMMARY BYPASS GRAFT USING LOW OSMOLAR CONTRAST: ICD-10-PCS | Performed by: INTERNAL MEDICINE

## 2024-07-31 PROCEDURE — 84484 ASSAY OF TROPONIN QUANT: CPT | Performed by: NURSE PRACTITIONER

## 2024-07-31 PROCEDURE — 93005 ELECTROCARDIOGRAM TRACING: CPT | Performed by: NURSE PRACTITIONER

## 2024-07-31 PROCEDURE — 25010000002 HEPARIN (PORCINE) 25000-0.45 UT/250ML-% SOLUTION: Performed by: INTERNAL MEDICINE

## 2024-07-31 PROCEDURE — 25010000002 FUROSEMIDE PER 20 MG: Performed by: INTERNAL MEDICINE

## 2024-07-31 PROCEDURE — 93010 ELECTROCARDIOGRAM REPORT: CPT | Performed by: INTERNAL MEDICINE

## 2024-07-31 PROCEDURE — 027034Z DILATION OF CORONARY ARTERY, ONE ARTERY WITH DRUG-ELUTING INTRALUMINAL DEVICE, PERCUTANEOUS APPROACH: ICD-10-PCS | Performed by: INTERNAL MEDICINE

## 2024-07-31 PROCEDURE — C1887 CATHETER, GUIDING: HCPCS | Performed by: INTERNAL MEDICINE

## 2024-07-31 PROCEDURE — 25010000002 AMIODARONE IN DEXTROSE 5% 360-4.14 MG/200ML-% SOLUTION: Performed by: NURSE PRACTITIONER

## 2024-07-31 PROCEDURE — 25010000002 NICARDIPINE 2.5 MG/ML SOLUTION: Performed by: INTERNAL MEDICINE

## 2024-07-31 PROCEDURE — C1874 STENT, COATED/COV W/DEL SYS: HCPCS | Performed by: INTERNAL MEDICINE

## 2024-07-31 PROCEDURE — 99153 MOD SED SAME PHYS/QHP EA: CPT | Performed by: INTERNAL MEDICINE

## 2024-07-31 PROCEDURE — 25010000002 FENTANYL CITRATE (PF) 100 MCG/2ML SOLUTION: Performed by: INTERNAL MEDICINE

## 2024-07-31 PROCEDURE — 25010000002 MIDAZOLAM PER 1 MG: Performed by: INTERNAL MEDICINE

## 2024-07-31 PROCEDURE — 99222 1ST HOSP IP/OBS MODERATE 55: CPT | Performed by: INTERNAL MEDICINE

## 2024-07-31 PROCEDURE — 92937 PRQ TRLUML REVSC CAB GRF 1: CPT | Performed by: INTERNAL MEDICINE

## 2024-07-31 DEVICE — XIENCE SKYPOINT™ EVEROLIMUS ELUTING CORONARY STENT SYSTEM 3.00 MM X 18 MM / RAPID-EXCHANGE
Type: IMPLANTABLE DEVICE | Site: CORONARY | Status: FUNCTIONAL
Brand: XIENCE SKYPOINT™

## 2024-07-31 RX ORDER — MORPHINE SULFATE 2 MG/ML
2 INJECTION, SOLUTION INTRAMUSCULAR; INTRAVENOUS ONCE
Status: COMPLETED | OUTPATIENT
Start: 2024-07-31 | End: 2024-07-31

## 2024-07-31 RX ORDER — LIDOCAINE HYDROCHLORIDE 20 MG/ML
JELLY TOPICAL AS NEEDED
Status: DISCONTINUED | OUTPATIENT
Start: 2024-07-31 | End: 2024-08-03 | Stop reason: HOSPADM

## 2024-07-31 RX ORDER — ONDANSETRON 2 MG/ML
4 INJECTION INTRAMUSCULAR; INTRAVENOUS EVERY 6 HOURS PRN
Status: DISCONTINUED | OUTPATIENT
Start: 2024-07-31 | End: 2024-08-03 | Stop reason: HOSPADM

## 2024-07-31 RX ORDER — FUROSEMIDE 10 MG/ML
INJECTION INTRAMUSCULAR; INTRAVENOUS
Status: DISCONTINUED | OUTPATIENT
Start: 2024-07-31 | End: 2024-07-31 | Stop reason: HOSPADM

## 2024-07-31 RX ORDER — ASPIRIN 81 MG/1
81 TABLET ORAL DAILY
Status: DISCONTINUED | OUTPATIENT
Start: 2024-08-01 | End: 2024-08-03 | Stop reason: HOSPADM

## 2024-07-31 RX ORDER — ALPRAZOLAM 0.25 MG/1
0.25 TABLET ORAL ONCE
Status: COMPLETED | OUTPATIENT
Start: 2024-07-31 | End: 2024-07-31

## 2024-07-31 RX ORDER — DEXTROSE MONOHYDRATE 25 G/50ML
25 INJECTION, SOLUTION INTRAVENOUS
Status: DISCONTINUED | OUTPATIENT
Start: 2024-07-31 | End: 2024-08-03 | Stop reason: HOSPADM

## 2024-07-31 RX ORDER — LIDOCAINE HYDROCHLORIDE 10 MG/ML
INJECTION, SOLUTION INFILTRATION; PERINEURAL
Status: DISCONTINUED | OUTPATIENT
Start: 2024-07-31 | End: 2024-07-31 | Stop reason: HOSPADM

## 2024-07-31 RX ORDER — ALPRAZOLAM 0.25 MG/1
0.25 TABLET ORAL NIGHTLY PRN
Status: ACTIVE | OUTPATIENT
Start: 2024-07-31 | End: 2024-08-01

## 2024-07-31 RX ORDER — INSULIN LISPRO 100 [IU]/ML
2-9 INJECTION, SOLUTION INTRAVENOUS; SUBCUTANEOUS
Status: DISCONTINUED | OUTPATIENT
Start: 2024-07-31 | End: 2024-08-03 | Stop reason: HOSPADM

## 2024-07-31 RX ORDER — MAGNESIUM SULFATE HEPTAHYDRATE 40 MG/ML
2 INJECTION, SOLUTION INTRAVENOUS ONCE
Status: DISCONTINUED | OUTPATIENT
Start: 2024-07-31 | End: 2024-08-01

## 2024-07-31 RX ORDER — BUMETANIDE 0.25 MG/ML
1 INJECTION INTRAMUSCULAR; INTRAVENOUS EVERY 12 HOURS
Status: DISCONTINUED | OUTPATIENT
Start: 2024-07-31 | End: 2024-08-01

## 2024-07-31 RX ORDER — SODIUM CHLORIDE 9 MG/ML
250 INJECTION, SOLUTION INTRAVENOUS ONCE AS NEEDED
Status: DISCONTINUED | OUTPATIENT
Start: 2024-07-31 | End: 2024-08-03 | Stop reason: HOSPADM

## 2024-07-31 RX ORDER — SODIUM BICARBONATE 650 MG/1
1300 TABLET ORAL EVERY 4 HOURS
Status: COMPLETED | OUTPATIENT
Start: 2024-07-31 | End: 2024-07-31

## 2024-07-31 RX ORDER — AMIODARONE HYDROCHLORIDE 200 MG/1
200 TABLET ORAL
Status: DISCONTINUED | OUTPATIENT
Start: 2024-07-31 | End: 2024-08-01

## 2024-07-31 RX ORDER — DILTIAZEM HCL/D5W 125 MG/125
5-15 PLASTIC BAG, INJECTION (ML) INTRAVENOUS
Status: DISCONTINUED | OUTPATIENT
Start: 2024-08-01 | End: 2024-08-01

## 2024-07-31 RX ORDER — IBUPROFEN 600 MG/1
1 TABLET ORAL
Status: DISCONTINUED | OUTPATIENT
Start: 2024-07-31 | End: 2024-08-03 | Stop reason: HOSPADM

## 2024-07-31 RX ORDER — ONDANSETRON 4 MG/1
4 TABLET, ORALLY DISINTEGRATING ORAL EVERY 6 HOURS PRN
Status: DISCONTINUED | OUTPATIENT
Start: 2024-07-31 | End: 2024-08-03 | Stop reason: HOSPADM

## 2024-07-31 RX ORDER — ALUMINA, MAGNESIA, AND SIMETHICONE 2400; 2400; 240 MG/30ML; MG/30ML; MG/30ML
15 SUSPENSION ORAL EVERY 6 HOURS PRN
Status: DISCONTINUED | OUTPATIENT
Start: 2024-07-31 | End: 2024-08-03 | Stop reason: HOSPADM

## 2024-07-31 RX ORDER — ACETAMINOPHEN 325 MG/1
650 TABLET ORAL EVERY 4 HOURS PRN
Status: DISCONTINUED | OUTPATIENT
Start: 2024-07-31 | End: 2024-07-31 | Stop reason: SDUPTHER

## 2024-07-31 RX ORDER — HEPARIN SODIUM 1000 [USP'U]/ML
INJECTION, SOLUTION INTRAVENOUS; SUBCUTANEOUS
Status: DISCONTINUED | OUTPATIENT
Start: 2024-07-31 | End: 2024-07-31 | Stop reason: HOSPADM

## 2024-07-31 RX ORDER — NICOTINE POLACRILEX 4 MG
15 LOZENGE BUCCAL
Status: DISCONTINUED | OUTPATIENT
Start: 2024-07-31 | End: 2024-08-03 | Stop reason: HOSPADM

## 2024-07-31 RX ORDER — NITROGLYCERIN 0.4 MG/1
0.4 TABLET SUBLINGUAL
Status: DISCONTINUED | OUTPATIENT
Start: 2024-07-31 | End: 2024-08-03 | Stop reason: HOSPADM

## 2024-07-31 RX ORDER — POTASSIUM CHLORIDE 20 MEQ/1
40 TABLET, EXTENDED RELEASE ORAL ONCE
Status: COMPLETED | OUTPATIENT
Start: 2024-07-31 | End: 2024-07-31

## 2024-07-31 RX ORDER — MIDAZOLAM HYDROCHLORIDE 1 MG/ML
INJECTION INTRAMUSCULAR; INTRAVENOUS
Status: DISCONTINUED | OUTPATIENT
Start: 2024-07-31 | End: 2024-07-31 | Stop reason: HOSPADM

## 2024-07-31 RX ORDER — ASPIRIN 81 MG/1
TABLET, CHEWABLE ORAL
Status: DISCONTINUED | OUTPATIENT
Start: 2024-07-31 | End: 2024-07-31 | Stop reason: HOSPADM

## 2024-07-31 RX ORDER — FENTANYL CITRATE 50 UG/ML
INJECTION, SOLUTION INTRAMUSCULAR; INTRAVENOUS
Status: DISCONTINUED | OUTPATIENT
Start: 2024-07-31 | End: 2024-07-31 | Stop reason: HOSPADM

## 2024-07-31 RX ORDER — NICARDIPINE HYDROCHLORIDE 2.5 MG/ML
INJECTION INTRAVENOUS
Status: DISCONTINUED | OUTPATIENT
Start: 2024-07-31 | End: 2024-07-31 | Stop reason: HOSPADM

## 2024-07-31 RX ADMIN — Medication 10 ML: at 11:00

## 2024-07-31 RX ADMIN — PANTOPRAZOLE SODIUM 40 MG: 40 TABLET, DELAYED RELEASE ORAL at 18:47

## 2024-07-31 RX ADMIN — ISOSORBIDE MONONITRATE 30 MG: 30 TABLET, EXTENDED RELEASE ORAL at 11:37

## 2024-07-31 RX ADMIN — INSULIN LISPRO 2 UNITS: 100 INJECTION, SOLUTION INTRAVENOUS; SUBCUTANEOUS at 20:24

## 2024-07-31 RX ADMIN — RANOLAZINE 500 MG: 500 TABLET, EXTENDED RELEASE ORAL at 11:36

## 2024-07-31 RX ADMIN — Medication 1200 MG: at 20:24

## 2024-07-31 RX ADMIN — Medication 5 MG/HR: at 23:47

## 2024-07-31 RX ADMIN — ALPRAZOLAM 0.25 MG: 0.25 TABLET ORAL at 14:29

## 2024-07-31 RX ADMIN — METOPROLOL TARTRATE 25 MG: 25 TABLET, FILM COATED ORAL at 18:47

## 2024-07-31 RX ADMIN — AMANTADINE HYDROCHLORIDE 100 MG: 100 CAPSULE ORAL at 11:52

## 2024-07-31 RX ADMIN — METOPROLOL TARTRATE 25 MG: 25 TABLET, FILM COATED ORAL at 11:37

## 2024-07-31 RX ADMIN — HEPARIN SODIUM 15.21 UNITS/KG/HR: 10000 INJECTION, SOLUTION INTRAVENOUS at 04:29

## 2024-07-31 RX ADMIN — LIDOCAINE HYDROCHLORIDE: 20 JELLY TOPICAL at 20:25

## 2024-07-31 RX ADMIN — ISOSORBIDE MONONITRATE 30 MG: 30 TABLET, EXTENDED RELEASE ORAL at 11:36

## 2024-07-31 RX ADMIN — POTASSIUM CHLORIDE 40 MEQ: 1500 TABLET, EXTENDED RELEASE ORAL at 16:41

## 2024-07-31 RX ADMIN — RANOLAZINE 500 MG: 500 TABLET, EXTENDED RELEASE ORAL at 20:24

## 2024-07-31 RX ADMIN — PANTOPRAZOLE SODIUM 40 MG: 40 TABLET, DELAYED RELEASE ORAL at 11:51

## 2024-07-31 RX ADMIN — FOLIC ACID 1 MG: 1 TABLET ORAL at 11:37

## 2024-07-31 RX ADMIN — Medication 10 ML: at 20:40

## 2024-07-31 RX ADMIN — TICAGRELOR 90 MG: 90 TABLET ORAL at 20:24

## 2024-07-31 RX ADMIN — GUAIFENESIN 600 MG: 600 TABLET, EXTENDED RELEASE ORAL at 11:51

## 2024-07-31 RX ADMIN — AMIODARONE HYDROCHLORIDE 200 MG: 200 TABLET ORAL at 16:40

## 2024-07-31 RX ADMIN — BUMETANIDE 1 MG: 0.25 INJECTION INTRAMUSCULAR; INTRAVENOUS at 21:39

## 2024-07-31 RX ADMIN — AMIODARONE HYDROCHLORIDE 0.5 MG/MIN: 1.8 INJECTION, SOLUTION INTRAVENOUS at 06:17

## 2024-07-31 RX ADMIN — MORPHINE SULFATE 2 MG: 2 INJECTION, SOLUTION INTRAMUSCULAR; INTRAVENOUS at 14:30

## 2024-07-31 RX ADMIN — SODIUM BICARBONATE 1300 MG: 650 TABLET ORAL at 11:36

## 2024-07-31 NOTE — NURSING NOTE
Call placed to hospitalist to address  pt blood glucose and coverage. Pt given  1st doses of  po amio and iv drip Dc'd 1 hour later. PO lopressor changed to Q 8 hrs 2nd dose for the day given. Pt c/o of some urethral discomfort. May need to address with hospitalist also

## 2024-07-31 NOTE — CASE MANAGEMENT/SOCIAL WORK
Discharge Planning Assessment   [facility]     Patient Name: Chinedu Lopez  MRN: [id number]  Today's Date: 7/31/2024    Admit Date: 7/30/2024    Plan: DC Plan: Anticipate routine home with spouse. [facility] following for [facility].   Discharge Needs Assessment       Row Name 07/31/24 1602       Living Environment    People in Home spouse    Name(s) of People in Home Jessica Lopez    Current Living Arrangements home    Potentially Unsafe Housing Conditions none    In the past 12 months has the electric, gas, oil, or water company threatened to shut off services in your home? No    Primary Care Provided by self    Provides Primary Care For no one    Family Caregiver if Needed spouse    Family Caregiver Names Jessica Lopez    Quality of Family Relationships helpful;involved;supportive    Able to Return to Prior Arrangements yes       Resource/Environmental Concerns    Resource/Environmental Concerns none    Transportation Concerns none       Transportation Needs    In the past 12 months, has lack of transportation kept you from medical appointments or from getting medications? no    In the past 12 months, has lack of transportation kept you from meetings, work, or from getting things needed for daily living? No       Food Insecurity    Within the past 12 months, you worried that your food would run out before you got the money to buy more. Never true    Within the past 12 months, the food you bought just didn't last and you didn't have money to get more. Never true       Transition Planning    Patient/Family Anticipates Transition to home with family    Patient/Family Anticipated Services at Transition none    Transportation Anticipated car, drives self;family or friend will provide       Discharge Needs Assessment    Readmission Within the Last 30 Days no previous admission in last 30 days    Equipment Currently Used at Home none    Concerns to be Addressed no discharge needs identified;denies needs/concerns at this time     Anticipated Changes Related to Illness none    Equipment Needed After Discharge none    Provided Post Acute Provider List? N/A    Provided Post Acute Provider Quality & Resource List? N/A                   Discharge Plan       Row Name 07/31/24 4628       Plan    Plan DC Plan: Anticipate routine home with spouse. [facility] following for [facility].    Patient/Family in Agreement with Plan yes    Provided Post Acute Provider List? N/A    Provided Post Acute Provider Quality & Resource List? N/A    Plan Comments CM spoke with patient at bedside to discuss admission assessment and discharge planning. Patient confirms PCP and pharmacy. Patient confirms he is agreeable to enrolling in meds to bed program. CM enrolled patient and updated pharmacy in [place].Patient denies any difficulty affording medications or food at this time. Patient denies any additional needs for services or DME at this time. Patient reports IADL's and drives. Patient spouse will provide transportation when ready for DC. CM spoke with OP pharmacy for Brilinta cost analysis.Brilinta $107.50/mo. w/ 1st month free meds to bed. Spouse states medication will be affordable. CM to provide commercial discount card to potentially lower cost more. CM informed Cardiologist. CM inquired with Cardiologist about need for [facility] and [facility] Clinic referral at [place]. Dr. Bedoya states [facility] would benefit patient. Orders requested. CM placed referral in to [facility] and liaison Francisco J notified.CM will continue to follow for any further needs and adjust discharge plan accordingly. DC Barriers: Cardiac montioring, Post cath montioring, O2@6L nc, amiodarone gtt, IV Bumex, monitor labs, and pending [facility] approval.                  Continued Care and Services - Admitted Since 7/30/2024       Durable Medical Equipment       Service Provider Request Status Selected Services Address Phone Fax Patient Preferred    [facility] Pending - Request Sent N/A [address] , [place] [address]  [phone] --                     Demographic Summary       Row Name 07/31/24 1601       General Information    Admission Type inpatient    Arrived From home;other (see comments)  cath lab    Referral Source admission list    Reason for Consult discharge planning    Preferred Language English       Contact Information    Permission Granted to Share Info With                    Functional Status       Row Name 07/31/24 1602       Functional Status    Usual Activity Tolerance moderate    Current Activity Tolerance moderate       Physical Activity    On average, how many days per week do you engage in moderate to strenuous exercise (like a brisk walk)? 0 days    On average, how many minutes do you engage in exercise at this level? 0 min    Number of minutes of exercise per week 0       Functional Status, IADL    Medications independent    Meal Preparation independent    Housekeeping independent    Laundry independent    Shopping independent       Mental Status    General Appearance WDL WDL       Mental Status Summary    Recent Changes in Mental Status/Cognitive Functioning no changes       Employment/    Employment Status disabled    Current or Previous Occupation not applicable           Current or Previous  Service none                 Met with patient in room      Maintain distance greater than six feet and spent less than fifteen minutes in the room.      Adela Stover RN     Office Phone: [phone]  Office Cell:     [phone]

## 2024-07-31 NOTE — PROGRESS NOTES
"NEPHROLOGY PROGRESS NOTE------[facility]    [facility]  [phone]  Demar Do MD      Patient Care Team:  Nhung Clark APRN as PCP - General (Family Medicine)  Ernestina, MD Ranjan as Consulting Physician (Nephrology)      Provider:  Demar Do MD  Patient Name: Chinedu Lopez  :  1960    SUBJECTIVE:    F/U CRF/CKD/CHF    No major events overnight. Awaiting cardiac cath this AM. No dysuria.     Medication:  Acetylcysteine, 1,200 mg, Oral, BID  amantadine, 100 mg, Oral, Daily  aspirin, 81 mg, Oral, Daily  folic acid, 1 mg, Oral, Daily  guaiFENesin, 600 mg, Oral, Daily  isosorbide mononitrate, 30 mg, Oral, Daily  pantoprazole, 40 mg, Oral, BID AC  ranolazine, 500 mg, Oral, BID  sodium bicarbonate, 1,300 mg, Oral, Q4H  sodium chloride, 10 mL, Intravenous, Q12H      amiodarone, 0.5 mg/min, Last Rate: 0.5 mg/min (24 0617)  heparin, 9.01 Units/kg/hr, Last Rate: 15.21 Units/kg/hr (24 0429)        OBJECTIVE    Vital Sign Min/Max for last 24 hours  Temp  Min: 97.5 °F (36.4 °C)  Max: 98.9 °F (37.2 °C)   BP  Min: 95/55  Max: 143/85   Pulse  Min: 74  Max: 120   Resp  Min: 14  Max: 21   SpO2  Min: 89 %  Max: 95 %   No data recorded   Weight  Min: 105 kg (232 lb 2.3 oz)  Max: 112 kg (245 lb 13 oz)     Flowsheet Rows      Flowsheet Row First Filed Value   Admission Height 182.9 cm (72\") Documented at 2024 0938   Admission Weight 112 kg (245 lb 13 oz) Documented at 2024 0938            No intake/output data recorded.  I/O last 3 completed shifts:  In: 2310 [P.O.:360; I.V.:1525; Blood:425]  Out: 3375 [Urine:3375]    Physical Exam:  General Appearance: alert, appears stated age and cooperative  Head: normocephalic, without obvious abnormality and atraumatic  Eyes: conjunctivae and sclerae normal and no icterus  Neck: supple and +MINIMAL JVD  Lungs: +FINE LEFT CRACKLES  Heart: IRREG IRREG +MEDARDO. NO " "GALLOP, RUB, OR S3  Chest Wall: no abnormalities observed  Abdomen: normal bowel sounds. +OBESITY +VERY MILD ASCITES  Extremities: moves extremities well, +TRACE BILATERAL PRETIBIAL EDEMA, no cyanosis  Skin: no bleeding, bruising or rash. +PALLOR  Neurologic: Alert, and oriented. No focal deficits    Labs:    WBC WBC   Date Value Ref Range Status   07/31/2024 5.31 3.40 - 10.80 10*3/mm3 Final   07/30/2024 4.62 3.40 - 10.80 10*3/mm3 Final      HGB Hemoglobin   Date Value Ref Range Status   07/31/2024 8.6 (L) 13.0 - 17.7 g/dL Final   07/30/2024 7.5 (L) 13.0 - 17.7 g/dL Final      HCT Hematocrit   Date Value Ref Range Status   07/31/2024 32.1 (L) 37.5 - 51.0 % Final   07/30/2024 28.5 (L) 37.5 - 51.0 % Final      Platelets No results found for: \"LABPLAT\"   MCV MCV   Date Value Ref Range Status   07/31/2024 74.7 (L) 79.0 - 97.0 fL Final   07/30/2024 73.1 (L) 79.0 - 97.0 fL Final          Sodium Sodium   Date Value Ref Range Status   07/31/2024 139 136 - 145 mmol/L Final   07/30/2024 140 136 - 145 mmol/L Final      Potassium Potassium   Date Value Ref Range Status   07/31/2024 4.0 3.5 - 5.2 mmol/L Final     Comment:     Slight hemolysis detected by analyzer. Result may be falsely elevated.   07/30/2024 3.9 3.5 - 5.2 mmol/L Final      Chloride Chloride   Date Value Ref Range Status   07/31/2024 100 98 - 107 mmol/L Final   07/30/2024 102 98 - 107 mmol/L Final      CO2 CO2   Date Value Ref Range Status   07/31/2024 31.0 (H) 22.0 - 29.0 mmol/L Final   07/30/2024 28.1 22.0 - 29.0 mmol/L Final      BUN BUN   Date Value Ref Range Status   07/31/2024 9 8 - 23 mg/dL Final   07/30/2024 10 8 - 23 mg/dL Final      Creatinine Creatinine   Date Value Ref Range Status   07/31/2024 1.10 0.76 - 1.27 mg/dL Final   07/30/2024 1.23 0.76 - 1.27 mg/dL Final      Calcium Calcium   Date Value Ref Range Status   07/31/2024 9.4 8.6 - 10.5 mg/dL Final   07/30/2024 9.5 8.6 - 10.5 mg/dL Final      PO4 No components found for: \"PO4\"   Albumin Albumin " "  Date Value Ref Range Status   07/31/2024 3.7 3.5 - 5.2 g/dL Final      Magnesium Magnesium   Date Value Ref Range Status   07/31/2024 1.8 1.6 - 2.4 mg/dL Final      Uric Acid No components found for: \"URIC ACID\"     Imaging Results (Last 72 Hours)       Procedure Component Value Units Date/Time    US Renal Bilateral [[id number]] Collected: 07/30/24 1453     Updated: 07/30/24 1503    Narrative:      Date of Exam: 7/30/2024 2:05 PM EDT    Indication: LUDWIN/CKD.    Comparison: No comparisons available.    Technique: Grayscale and color Doppler ultrasound evaluation of the kidneys and urinary bladder was performed.      Findings:  Right Kidney: Right kidney measures 11.6 cm in long axis. Likely benign cyst in the right kidney measuring up to 2.2 cm. Increased renal cortical echogenicity.  No hydronephrosis.. No sonographically evident nephrolithiasis.    Left Kidney:  Left kidney measures 9.3 cm in long axis. Mildly increased renal cortical echogenicity. No suspicious appearing lesions.No hydronephrosis.No sonographically evident nephrolithiasis.    Bladder: The bladder appears unremarkable.    Additional findings: Mild perihepatic and pelvic ascites.      Impression:      Impression:  No hydronephrosis.    Increased renal cortical echogenicity which can be seen in medical renal disease.    Small volume ascites.          Electronically Signed: Gildardo Springer    7/30/2024 3:01 PM EDT    Workstation ID: [id number]                Results for orders placed during the hospital encounter of 01/20/23    Duplex Carotid Ultrasound CAR    Interpretation Summary    Right internal carotid artery plaque without significant stenosis.    Left internal carotid artery moderate stenosis.        ASSESSMENT / PLAN      Atrial fibrillation with RVR      RENAL FAILURE------Nonoliguric. Appears to have CRF/CKD STG 2.   Unknown if patient has baseline proteinuria or hematuria. CRF/CKD STG 2 most likely secondary to HTN NS vs. Early DM " nephropathy. Follow with gentle diuresis. Premedicated for cardiac cath with  Mucomyst and bicarbonate. Patient has been explained and understands the risks of IV dye exposure. No IVFs given volume overload. Getting transfusion which should help renal perfusion.  No NSAIDs     2. ANEMIA------PRBCs per Hem/Onc     3. VOLUME OVERLOAD/EDEMA/MILD ASCITES-----IV Bumex to restart this evening     4. ATRIAL FIBRILLATION WITH RVR------Watchman in place. Per , Cardiology     5. CAD WITH H/O CABG/PCI AND WITH CURRENT ANGINA------Cardiac cath today per , Cardiology     6. H/O CVA/TIA/HEAD TRAUMA WITH BRAIN BLEED     7. HYPERLIPIDEMIA----Not on Statin.       8. GERD/PUD PROPHYLAXIS-----On PPI. Benefits outweigh risks despite CKD     9. OBESITY/DEISY      Demar Do MD  [facility]  [phone]  07/31/24  07:36 EDT

## 2024-07-31 NOTE — PAYOR COMM NOTE
"This is clinical for Chinedu Lopez   Reference/Auth#: [id number]     AUTHORIZATION PENDING:     Please call or fax determination to contact below.   Thank you.    GHULAM Schneider, RN, CCM  Utilization Review Nurse  [facility]  Direct & confidential phone # [phone]  Fax # [phone]      Chinedu Lopez (63 y.o. Male)       Date of Birth   1960    Social Security Number       Address   [address] [place] [address] [place] [address]    Home Phone   [phone]    MRN   [id number]       Denominational   None    Marital Status                               Admission Date   7/30/24    Admission Type   Urgent    Admitting Provider   Orlando Whitt MD    Attending Provider   Brammell, Timothy Duane, MD    Department, Room/Bed   [facility]ARDIOV[facility] UNIT, 9339/1       Discharge Date       Discharge Disposition       Discharge Destination                                 Attending Provider: Brammell, Timothy Duane, MD    Allergies: No Known Allergies    Isolation: None   Infection: None   Code Status: CPR    Ht: 182.9 cm (72\")   Wt: 105 kg (232 lb 2.3 oz)    Admission Cmt: None   Principal Problem: Atrial fibrillation with RVR [I48.91]                   Active Insurance as of 7/30/2024       Primary Coverage       Payor Plan Insurance Group Employer/Plan Group    [facility] [id number]       Payor Plan Address Payor Plan Phone Number Payor Plan Fax Number Effective Dates    [address] [address]   4/1/2022 - None Entered    [facility] [id number]         Subscriber Name Subscriber Birth Date Member ID       CHINEDU LOPEZ 1960 [id number]                     Emergency Contacts        (Rel.) Home Phone Work Phone Mobile Phone    KISHORE LOPEZ (Spouse) [phone] -- [phone]                 History & Physical        Jaquelin Hernandez DO at 07/30/24 1001              Hospitalist Service  History and " Physical      Patient Name: Chinedu Lopez  : 1960  MRN: [id number]  Primary Care Physician:  Nhung Clark APRN  Date of admission: 2024      Subjective      Chief Complaint: Shortness of breath     History of Present Illness: Chinedu Lopez is a 63 y.o. male with past medical history of A-fib status post Watchman device, hypertension hyperlipidemia, ischemic cardiomyopathy, CAD status post CABG who presented to [facility] on 2024 as a transfer from outside facility with complaints of acute onset chest pain that started last night.  Patient states he was preparing to go to bed after brushing his teeth and started having left-sided sudden onset chest pain, felt like his previous heart attack.  It started to radiate to his left arm which made him concerned and come to the ER.  He was noted to be in A-fib RVR with elevated troponin.  Started on Cardizem drip and heparin drip and cardiology was consulted recommended transfer to our facility for further management.  Patient admits to having symptoms of chest pain intermittently and shortness of breath over the past 2 weeks.  He went to see his PCP and had his meds adjusted and was started on diuretics due to swelling of his both legs and abdomen area.  He has also gained about 30 pounds of weight over the past few days.  Denies eating extra salty foods, canned foods or frozen foods.  Recent cardiology office note reviewed.  Had stress test in March which was okay.  He also has been found to be anemic on his labs recently.  Had EGD and colonoscopy done in May of this year which was normal according to him.  He was supposed to see hematologist today as an outpatient.    ROS: Pertinent positives as noted in HPI/subjective.  All other systems were reviewed and are negative.      Personal History     Past Medical History:   Diagnosis Date    Atrial fibrillation      [facility]. Abstracted from [facility].    Brain bleed 2019  14th fell - Hospital on 17th for 3 weeks    CAD (coronary artery disease)     S/P CABG and PCI. Abstracted from [facility].    Hyperlipidemia     Hypertension     Myocardial infarction 2006    Non-ST elevation MI. Abstracted from [facility].    Obstructive sleep apnea on CPAP     At [facility]. Abstracted from [facility].    Presence of Watchman left atrial appendage closure device 1/31/2020    Stroke 2019    TIA (transient ischemic attack) 11/19/2013    Possible TIA- MRI shows small tumor/head. Abstracted from [facility].       Past Surgical History:   Procedure Laterality Date    ANGIOPLASTY  2006    3 srents were sequentially placed in promimal and mid body of the saphennous vein graft to obtuse marginal branch. Abstracted from [facility].    ATRIAL APPENDAGE EXCLUSION LEFT WITH TRANSESOPHAGEAL ECHOCARDIOGRAM N/A 1/30/2020    Procedure: Atrial Appendage Occlusion;  Surgeon: Angel Willams MD;  Location: [facility] CATH INVASIVE LOCATION;  Service: Cardiovascular    ATRIAL APPENDAGE EXCLUSION LEFT WITH TRANSESOPHAGEAL ECHOCARDIOGRAM N/A 1/30/2020    Procedure: Atrial Appendage Occlusion;  Surgeon: John Sheridan MD;  Location: [facility] CATH INVASIVE LOCATION;  Service: Cardiology    CARDIAC CATHETERIZATION  2000    2003, 2006, 2012. Abstracted from [facility].    CORONARY ARTERY BYPASS GRAFT  2000    Triple. Abstracted from [facility].    MUSCLE REPAIR      Tendons and nerves in right lower extremity. Abstracted from [facility].    OTHER SURGICAL HISTORY  08/2012    RCA mid and proximal- Xience stent x3. Abstracted from [facility].    OTHER SURGICAL HISTORY  09/2016    Head trauma/bleed at [facility]. Abstracted from [facility].       Family History: family history includes Cancer in an other family member; Diabetes in his father and mother; Heart attack in his father; Heart disease in an other family member; Hypertension in an other family member; Other in his sister. Otherwise pertinent FHx was reviewed and not  pertinent to current issue.    Social History:  reports that he has been smoking cigarettes. He has never used smokeless tobacco. He reports that he does not currently use alcohol. He reports that he does not use drugs.    Home Medications:  Prior to Admission Medications       Prescriptions Last Dose Informant Patient Reported? Taking?    amantadine (SYMMETREL) 100 MG capsule  Self Yes No    Take 1 capsule by mouth Daily.    aspirin 81 MG chewable tablet  Self Yes No    Chew 1 tablet Daily.    Cholecalciferol (VITAMIN D3) 125 MCG (5000 UT) capsule capsule  Self Yes No    Take 1 capsule by mouth Daily.    Cyanocobalamin (VITAMIN B 12) 500 MCG tablet  Self Yes No    Take 1 tablet by mouth Daily.    Dilt- MG 24 hr capsule   Yes No    doxazosin (CARDURA) 1 MG tablet  Self Yes No    Take 1 tablet by mouth every night at bedtime.    famotidine (PEPCID) 40 MG tablet  Self Yes No    Take 1 tablet by mouth Daily.    folic acid (FOLVITE) 1 MG tablet  Self Yes No    Take 1 tablet by mouth Daily.    guaiFENesin (MUCINEX) 600 MG 12 hr tablet  Self Yes No    Take 1 tablet by mouth Daily.    isosorbide mononitrate (IMDUR) 30 MG 24 hr tablet  Self Yes No    Take 1 tablet by mouth Daily.    metFORMIN (GLUCOPHAGE) 1000 MG tablet  Self Yes No    Take 1 tablet by mouth 2 (Two) Times a Day With Meals.    nebivolol (BYSTOLIC) 20 MG tablet  Self Yes No    Take 1 tablet by mouth 2 (Two) Times a Day.    nitroglycerin (NITROLINGUAL) 0.4 MG/SPRAY spray  Self Yes No    Place 1 spray under the tongue Every 5 (Five) Minutes As Needed for Chest Pain.    olmesartan (BENICAR) 20 MG tablet  Self Yes No    Take 1 tablet by mouth Daily.    pantoprazole (PROTONIX) 40 MG EC tablet  Self Yes No    Take 1 tablet by mouth Daily.    potassium chloride (K-DUR,KLOR-CON) 20 MEQ CR tablet  Self Yes No    Take 1 tablet by mouth 2 (Two) Times a Day.    QUEtiapine (SEROquel) 25 MG tablet  Self Yes No    Take 1 tablet by mouth Daily.    ranolazine (RANEXA)  500 MG 12 hr tablet  Self Yes No    Take 1 tablet by mouth 2 (Two) Times a Day.    rosuvastatin (CRESTOR) 40 MG tablet   Yes No    thiamine (VITAMIN B-1) 100 MG tablet  Self Yes No    Take 1 tablet by mouth Daily.              I have utilized all available, immediate resources to obtain, update, or review the patient's current medications including all prescriptions, over-the-counter products, herbals, cannabis/cannabidiol products, and vitamin.mineral/dietary (nutritional) supplements.    Allergies:  No Known Allergies    Objective      Vitals:   Temp:  [98.5 °F (36.9 °C)] 98.5 °F (36.9 °C)  Resp:  [14] 14  BP: (118)/(81) 118/81  Flow (L/min):  [2] 2    Physical Exam:    General: Awake, alert, NAD  HEENT: NC/AT, PERRL, EOMI, conjunctivae are clear, mucous membrane moist, oropharynx clear, Trachea midline   Cardiovascular: Regular rate and irregularly irregular rhythm, no murmurs  Respiratory: Decreased breath sounds at the bases bilaterally, no wheezing or rales, unlabored breathing  Abdomen: Soft, nontender, positive bowel sounds, no guarding  Neurologic: A&O, CN grossly intact, moves all extremities spontaneously  Musculoskeletal: Normal range of motion, no other gross deformities  Skin: Warm, mild to moderate bilateral lower extremity pitting edema noted      Result Review   Result Review:  I have personally reviewed the results from the time of this admission to 7/30/2024 10:01 EDT and agree with these findings:  [x]  Laboratory  [x]  Microbiology  [x]  Radiology  [x]  EKG/Telemetry   [x]  Cardiology/Vascular   []  Pathology  [x]  Old records  []  Other:      Assessment & Plan        Active Hospital Problems:  Active Hospital Problems    Diagnosis     **Atrial fibrillation with RVR        Assessment/Plan:     A-fib with RVR  -Known history of A-fib and patient is status post Watchman device previously  -Continue Cardizem drip, wean off as tolerated  -Resume home meds once reconciled  -Cardiology consulted on  admission    NSTEMI  CAD s/p CABG  -EKG with A-fib RVR noted  -Presented with chest pain, elevated troponin noted on admission, continue to trend  -Last stress test from March 2024 showed low risk study with small size infarct in the apex without signs of any ischemia noted  -Continue on heparin drip, resume home meds once reconciled  -Cardiology planning possible [facility] either today or tomorrow    LUDWIN vs CKD  -Unknown baseline creatinine but creatinine noted to be at 1.5 on admission  -Denies any history of CKD but may have underlying CKD due to chronic hypertension  -Check UA and renal ultrasound  -Nephrology consulted as patient will likely need cardiac cath    Acute on chronic heart failure  -Patient appears to be volume overloaded with chest x-ray also showing bilateral congestion  -Check proBNP  -Was given IV Bumex in the ER  -Defer further diuresis to cardiology and nephrology in setting of elevated creatinine  -Previous echo from 2021 had preserved EF, echo ordered during this admission    Microcytic anemia  -Hemoglobin of around 8 on admission with low MCV  -Had previous GI workup with EGD and colonoscopy in May which was normal according to him  -Iron panel ordered  -Heme-onc consulted for further evaluation    Hypertension  Hyperlipidemia  -Resume home meds once reconciled    VTE Prophylaxis:  Pharmacologic VTE prophylaxis orders are present.    CODE STATUS:    Code Status (Patient has no pulse and is not breathing): CPR (Attempt to Resuscitate)  Medical Interventions (Patient has pulse or is breathing): Full Support      Admission Status:  I believe this patient meets inpt status.    Signature:   Electronically signed by Jaquelin Hernandez DO, 07/30/24, 10:01 AM EDT.    Part of this note may be an electronic transcription/translation of spoken language to printed text using the Dragon Dictation System.      Electronically signed by Jaquelin Hernandez DO at 07/30/24 1054       Operative/Procedure Notes (last 48  "hours)  Notes from 24 1142 through 24 1142   No notes of this type exist for this encounter.          Physician Progress Notes (last 48 hours)        Ranjan Do MD at 24 0736          NEPHROLOGY PROGRESS NOTE------[facility]    [facility]  [phone]  Demar Do MD      Patient Care Team:  Nhung Clark APRN as PCP - General (Family Medicine)  Ranjan Do MD as Consulting Physician (Nephrology)      Provider:  Demar Do MD  Patient Name: Chinedu Lopez  :  1960    SUBJECTIVE:    F/U CRF/CKD/CHF    No major events overnight. Awaiting cardiac cath this AM. No dysuria.     Medication:  Acetylcysteine, 1,200 mg, Oral, BID  amantadine, 100 mg, Oral, Daily  aspirin, 81 mg, Oral, Daily  folic acid, 1 mg, Oral, Daily  guaiFENesin, 600 mg, Oral, Daily  isosorbide mononitrate, 30 mg, Oral, Daily  pantoprazole, 40 mg, Oral, BID AC  ranolazine, 500 mg, Oral, BID  sodium bicarbonate, 1,300 mg, Oral, Q4H  sodium chloride, 10 mL, Intravenous, Q12H      amiodarone, 0.5 mg/min, Last Rate: 0.5 mg/min (24 0617)  heparin, 9.01 Units/kg/hr, Last Rate: 15.21 Units/kg/hr (24 0429)        OBJECTIVE    Vital Sign Min/Max for last 24 hours  Temp  Min: 97.5 °F (36.4 °C)  Max: 98.9 °F (37.2 °C)   BP  Min: 95/55  Max: 143/85   Pulse  Min: 74  Max: 120   Resp  Min: 14  Max: 21   SpO2  Min: 89 %  Max: 95 %   No data recorded   Weight  Min: 105 kg (232 lb 2.3 oz)  Max: 112 kg (245 lb 13 oz)     Flowsheet Rows      Flowsheet Row First Filed Value   Admission Height 182.9 cm (72\") Documented at 2024 0938   Admission Weight 112 kg (245 lb 13 oz) Documented at 2024 0938            No intake/output data recorded.  I/O last 3 completed shifts:  In: 2310 [P.O.:360; I.V.:1525; Blood:425]  Out: 3375 [Urine:3375]    Physical Exam:  General Appearance: alert, appears stated age and " "cooperative  Head: normocephalic, without obvious abnormality and atraumatic  Eyes: conjunctivae and sclerae normal and no icterus  Neck: supple and +MINIMAL JVD  Lungs: +FINE LEFT CRACKLES  Heart: IRREG IRREG +MEDARDO. NO GALLOP, RUB, OR S3  Chest Wall: no abnormalities observed  Abdomen: normal bowel sounds. +OBESITY +VERY MILD ASCITES  Extremities: moves extremities well, +TRACE BILATERAL PRETIBIAL EDEMA, no cyanosis  Skin: no bleeding, bruising or rash. +PALLOR  Neurologic: Alert, and oriented. No focal deficits    Labs:    WBC WBC   Date Value Ref Range Status   07/31/2024 5.31 3.40 - 10.80 10*3/mm3 Final   07/30/2024 4.62 3.40 - 10.80 10*3/mm3 Final      HGB Hemoglobin   Date Value Ref Range Status   07/31/2024 8.6 (L) 13.0 - 17.7 g/dL Final   07/30/2024 7.5 (L) 13.0 - 17.7 g/dL Final      HCT Hematocrit   Date Value Ref Range Status   07/31/2024 32.1 (L) 37.5 - 51.0 % Final   07/30/2024 28.5 (L) 37.5 - 51.0 % Final      Platelets No results found for: \"LABPLAT\"   MCV MCV   Date Value Ref Range Status   07/31/2024 74.7 (L) 79.0 - 97.0 fL Final   07/30/2024 73.1 (L) 79.0 - 97.0 fL Final          Sodium Sodium   Date Value Ref Range Status   07/31/2024 139 136 - 145 mmol/L Final   07/30/2024 140 136 - 145 mmol/L Final      Potassium Potassium   Date Value Ref Range Status   07/31/2024 4.0 3.5 - 5.2 mmol/L Final     Comment:     Slight hemolysis detected by analyzer. Result may be falsely elevated.   07/30/2024 3.9 3.5 - 5.2 mmol/L Final      Chloride Chloride   Date Value Ref Range Status   07/31/2024 100 98 - 107 mmol/L Final   07/30/2024 102 98 - 107 mmol/L Final      CO2 CO2   Date Value Ref Range Status   07/31/2024 31.0 (H) 22.0 - 29.0 mmol/L Final   07/30/2024 28.1 22.0 - 29.0 mmol/L Final      BUN BUN   Date Value Ref Range Status   07/31/2024 9 8 - 23 mg/dL Final   07/30/2024 10 8 - 23 mg/dL Final      Creatinine Creatinine   Date Value Ref Range Status   07/31/2024 1.10 0.76 - 1.27 mg/dL Final   07/30/2024 " "1.23 0.76 - 1.27 mg/dL Final      Calcium Calcium   Date Value Ref Range Status   07/31/2024 9.4 8.6 - 10.5 mg/dL Final   07/30/2024 9.5 8.6 - 10.5 mg/dL Final      PO4 No components found for: \"PO4\"   Albumin Albumin   Date Value Ref Range Status   07/31/2024 3.7 3.5 - 5.2 g/dL Final      Magnesium Magnesium   Date Value Ref Range Status   07/31/2024 1.8 1.6 - 2.4 mg/dL Final      Uric Acid No components found for: \"URIC ACID\"     Imaging Results (Last 72 Hours)       Procedure Component Value Units Date/Time    US Renal Bilateral [[id number]] Collected: 07/30/24 1453     Updated: 07/30/24 1503    Narrative:      Date of Exam: 7/30/2024 2:05 PM EDT    Indication: LUDWIN/CKD.    Comparison: No comparisons available.    Technique: Grayscale and color Doppler ultrasound evaluation of the kidneys and urinary bladder was performed.      Findings:  Right Kidney: Right kidney measures 11.6 cm in long axis. Likely benign cyst in the right kidney measuring up to 2.2 cm. Increased renal cortical echogenicity.  No hydronephrosis.. No sonographically evident nephrolithiasis.    Left Kidney:  Left kidney measures 9.3 cm in long axis. Mildly increased renal cortical echogenicity. No suspicious appearing lesions.No hydronephrosis.No sonographically evident nephrolithiasis.    Bladder: The bladder appears unremarkable.    Additional findings: Mild perihepatic and pelvic ascites.      Impression:      Impression:  No hydronephrosis.    Increased renal cortical echogenicity which can be seen in medical renal disease.    Small volume ascites.          Electronically Signed: Gildardo Springer    7/30/2024 3:01 PM EDT    Workstation ID: [id number]                Results for orders placed during the hospital encounter of 01/20/23    Duplex Carotid Ultrasound CAR    Interpretation Summary    Right internal carotid artery plaque without significant stenosis.    Left internal carotid artery moderate stenosis.        ASSESSMENT / PLAN      Atrial " fibrillation with RVR      RENAL FAILURE------Nonoliguric. Appears to have CRF/CKD STG 2.   Unknown if patient has baseline proteinuria or hematuria. CRF/CKD STG 2 most likely secondary to HTN NS vs. Early DM nephropathy. Follow with gentle diuresis. Premedicated for cardiac cath with  Mucomyst and bicarbonate. Patient has been explained and understands the risks of IV dye exposure. No IVFs given volume overload. Getting transfusion which should help renal perfusion.  No NSAIDs     2. ANEMIA------PRBCs per Hem/Onc     3. VOLUME OVERLOAD/EDEMA/MILD ASCITES-----IV Bumex to restart this evening     4. ATRIAL FIBRILLATION WITH RVR------Watchman in place. Per , Cardiology     5. CAD WITH H/O CABG/PCI AND WITH CURRENT ANGINA------Cardiac cath today per , Cardiology     6. H/O CVA/TIA/HEAD TRAUMA WITH BRAIN BLEED     7. HYPERLIPIDEMIA----Not on Statin.       8. GERD/PUD PROPHYLAXIS-----On PPI. Benefits outweigh risks despite CKD     9. OBESITY/DEISY      Demar Do MD  [facility]  [phone]  07/31/24  07:36 EDT      Electronically signed by Ranjan Do MD at 07/31/24 0903          Consult Notes (last 48 hours)        Ranjan Do MD at 07/30/24 1648        Consult Orders    1. Inpatient Nephrology Consult [[id number]] ordered by José Miguel Izquierdo MD at 07/30/24 1014                     NEPHROLOGY CONSULTATION-----[facility]    [facility]  [phone]  Demar Do MD    Patient Care Team:  Nhung Clark APRN as PCP - General (Family Medicine)  Ranjan Do MD as Consulting Physician (Nephrology)    CC/REASON FOR CONSULTATION: RENAL FAILURE/ELEVATED SERUM CREATININE    PHYSICIAN REQUESTING CONSULTATION:     History of Present Illness    HPI    Patient is a 63 y.o. WM whom I was asked to see in consultation for evaluation and  management of renal failure/elevated serum creatinine. Patient was admitted after presenting with c/o SOB and CP.    Patient denies prior knowledge of functional kidney disease, proteinuria, or hematuria. No NSAIDs or recent IV dye exposure. No known h/o hepatitis, TB, rheumatic fever, jaundice, SLE, bleeding/bruising disorders.  +Urinary frequeny. No edema or fluid retention.  +Compliance with home meds. Was on diuretics in the form of  Bumex prior to admission. Was not on ACE-I/ARB prior to admission. No herbal med use.    Review of Systems   Constitutional:  Positive for activity change, appetite change and fatigue. Negative for chills, diaphoresis, fever and unexpected weight change.   HENT:  Negative for congestion, dental problem, drooling, ear discharge, ear pain, facial swelling, hearing loss, mouth sores, nosebleeds, postnasal drip, rhinorrhea, sinus pressure, sinus pain, sneezing, sore throat, tinnitus, trouble swallowing and voice change.    Eyes:  Negative for photophobia, pain, discharge, redness, itching and visual disturbance.   Respiratory:  Positive for shortness of breath. Negative for apnea, cough, choking, chest tightness, wheezing and stridor.    Cardiovascular:  Positive for chest pain. Negative for palpitations and leg swelling.   Gastrointestinal:  Positive for abdominal distention and abdominal pain. Negative for anal bleeding, blood in stool, constipation, diarrhea, nausea, rectal pain and vomiting.   Endocrine: Negative for cold intolerance, heat intolerance, polydipsia, polyphagia and polyuria.   Genitourinary:  Positive for frequency. Negative for decreased urine volume, difficulty urinating, dysuria, enuresis, flank pain, genital sores, hematuria and urgency.   Musculoskeletal:  Positive for arthralgias and back pain. Negative for gait problem, joint swelling, myalgias, neck pain and neck stiffness.   Skin:  Negative for color change, pallor, rash and wound.   Allergic/Immunologic:  Negative for environmental allergies, food allergies and immunocompromised state.   Neurological:  Positive for weakness. Negative for dizziness, tremors, seizures, syncope, facial asymmetry, speech difficulty, light-headedness, numbness and headaches.   Hematological:  Negative for adenopathy. Does not bruise/bleed easily.   Psychiatric/Behavioral:  Negative for agitation, behavioral problems, confusion, decreased concentration, dysphoric mood, hallucinations, self-injury, sleep disturbance and suicidal ideas. The patient is not nervous/anxious and is not hyperactive.           Past Medical History:   Diagnosis Date    Atrial fibrillation     June, [facility]. [facility]    Brain bleed 2019 October 14th [facility] on 17th for 3 weeks    CAD (coronary artery disease)     S/P CABG and PCI. [facility]ed from [facility]    Hyperlipidemia     Hypertension     Myocardial infarction 2006    Non-ST elevation MI. Abstracted from [facility]    Obstructive sleep apnea on CPAP     At . Abstracted from [facility]    Presence of Watchman left atrial appendage closure device 1/31/2020    Stroke 2019    TIA (transient ischemic attack) 11/19/2013    Possible TIA- MRI shows small tumor/head. [facility]ed from [facility]       Past Surgical History:   Procedure Laterality Date    ANGIOPLASTY  2006    3 srents were sequentially placed in promimal and mid body of the saphennous vein graft to obtuse marginal branch. Abstracted from [facility]    ATRIAL APPENDAGE EXCLUSION LEFT WITH TRANSESOPHAGEAL ECHOCARDIOGRAM N/A 1/30/2020    Procedure: Atrial Appendage Occlusion;  Surgeon: Angel Willams MD;  Location: [facility] CATH INVASIVE LOCATION;  Service: Cardiovascular    ATRIAL APPENDAGE EXCLUSION LEFT WITH TRANSESOPHAGEAL ECHOCARDIOGRAM N/A 1/30/2020    Procedure: Atrial Appendage Occlusion;  Surgeon: John Sheridan MD;  Location: [facility] CATH INVASIVE LOCATION;  Service: Cardiology    CARDIAC  CATHETERIZATION  2000    2003, 2006, 2012. Abstracted from [facility].    CORONARY ARTERY BYPASS GRAFT  2000    Triple. Abstracted from [facility].    MUSCLE REPAIR      Tendons and nerves in right lower extremity. Abstracted from [facility].    OTHER SURGICAL HISTORY  08/2012    RCA mid and proximal- Xience stent x3. Abstracted from [facility].    OTHER SURGICAL HISTORY  09/2016    Head trauma/bleed at [facility]. Abstracted from [facility].       Family History   Problem Relation Age of Onset    Diabetes Mother     Heart attack Father     Diabetes Father     Other Sister         MRSA    Heart disease Other         Positive for Ischemic    Cancer Other     Hypertension Other        Social History     Tobacco Use    Smoking status: Every Day     Current packs/day: 1.00     Average packs/day: 1 pack/day for 40.6 years (40.6 ttl pk-yrs)     Types: Cigarettes     Start date: 1984    Smokeless tobacco: Never    Tobacco comments:     Smoking cessation--encouraged---refuses patch, gum or chantix   Vaping Use    Vaping status: Never Used   Substance Use Topics    Alcohol use: Not Currently    Drug use: No       Home Meds:   Medications Prior to Admission   Medication Sig Dispense Refill Last Dose    amiodarone (PACERONE) 200 MG tablet Take 1 tablet by mouth Daily.       bumetanide (BUMEX) 1 MG tablet Take 1 tablet by mouth Daily.       amantadine (SYMMETREL) 100 MG capsule Take 1 capsule by mouth Daily.       amantadine (SYMMETREL) 100 MG tablet Take 1 tablet by mouth 1 (One) Time.       aspirin 81 MG chewable tablet Chew 1 tablet Daily.       Cholecalciferol (VITAMIN D3) 125 MCG (5000 UT) capsule capsule Take 1 capsule by mouth Daily.       Cyanocobalamin (VITAMIN B 12) 500 MCG tablet Take 1 tablet by mouth Daily.       doxazosin (CARDURA) 1 MG tablet Take 1 tablet by mouth every night at bedtime.       famotidine (PEPCID) 40 MG tablet Take 1 tablet by mouth Daily.       folic acid (FOLVITE) 1 MG tablet Take 1 tablet by mouth  Daily.       guaiFENesin (MUCINEX) 600 MG 12 hr tablet Take 1 tablet by mouth Daily.       isosorbide mononitrate (IMDUR) 30 MG 24 hr tablet Take 1 tablet by mouth Daily.       metFORMIN (GLUCOPHAGE) 1000 MG tablet Take 1 tablet by mouth 2 (Two) Times a Day With Meals.       Methylcobalamin 5000 MCG tablet dispersible Place 500 mcg on the tongue Daily.       nitroglycerin (NITROLINGUAL) 0.4 MG/SPRAY spray Place 1 spray under the tongue Every 5 (Five) Minutes As Needed for Chest Pain.       pantoprazole (PROTONIX) 40 MG EC tablet Take 1 tablet by mouth 2 (Two) Times a Day.       potassium chloride (K-DUR,KLOR-CON) 20 MEQ CR tablet Take 1 tablet by mouth 2 (Two) Times a Day.       QUEtiapine (SEROquel) 25 MG tablet Take 1 tablet by mouth Daily.       ranolazine (RANEXA) 500 MG 12 hr tablet Take 1 tablet by mouth 2 (Two) Times a Day.  6     thiamine (VITAMIN B-1) 100 MG tablet Take 1 tablet by mouth Daily.       thiamine (VITAMIN B1) 100 MG tablet Take 1 tablet by mouth Daily.          Scheduled Meds:  amantadine, 100 mg, Oral, Daily  aspirin, 81 mg, Oral, Daily  [START ON 7/31/2024] folic acid, 1 mg, Oral, Daily  guaiFENesin, 600 mg, Oral, Daily  [START ON 7/31/2024] isosorbide mononitrate, 30 mg, Oral, Daily  pantoprazole, 40 mg, Oral, BID AC  ranolazine, 500 mg, Oral, BID  sodium chloride, 10 mL, Intravenous, Q12H        Continuous Infusions:  amiodarone, 1 mg/min, Last Rate: 1 mg/min (07/30/24 1539)   Followed by  amiodarone, 0.5 mg/min  heparin, 9.01 Units/kg/hr, Last Rate: 14.21 Units/kg/hr (07/30/24 1635)        PRN Meds:    acetaminophen **OR** acetaminophen **OR** acetaminophen    senna-docusate sodium **AND** polyethylene glycol **AND** bisacodyl **AND** bisacodyl    Calcium Replacement - Follow Nurse / BPA Driven Protocol    heparin    heparin    Magnesium Standard Dose Replacement - Follow Nurse / BPA Driven Protocol    nitroglycerin    ondansetron ODT **OR** ondansetron    Phosphorus Replacement - Follow  "Nurse / BPA Driven Protocol    Potassium Replacement - Follow Nurse / BPA Driven Protocol    sodium chloride    sodium chloride    Allergies:  Patient has no known allergies.    OBJECTIVE    Vital Signs  Temp:  [97.5 °F (36.4 °C)-98.5 °F (36.9 °C)] 98.5 °F (36.9 °C)  Heart Rate:  [86-98] 92  Resp:  [14-19] 18  BP: ()/(55-81) 119/71    I/O this shift:  In: 415 [P.O.:240; I.V.:175]  Out: 1100 [Urine:1100]  No intake/output data recorded.    Physical Exam:  General Appearance: alert, appears stated age and cooperative  Head: normocephalic, without obvious abnormality and atraumatic  Eyes: conjunctivae and sclerae normal and no icterus  Neck: supple and +MILD JVD  Lungs: +FINE LEFT CRACKLES  Heart: IRREG IRREG +MEDARDO. NO GALLOP, RUB, OR S3  Chest Wall: no abnormalities observed  Abdomen: normal bowel sounds. +OBESITY +VERY MILD ASCITES  Extremities: moves extremities well, +TRACE BILATERAL PRETIBIAL EDEMA, no cyanosis  Skin: no bleeding, bruising or rash. +PALLOR  Neurologic: Alert, and oriented. No focal deficits    Results Review:    I reviewed the patient's new clinical results.    WBC WBC   Date Value Ref Range Status   07/30/2024 4.62 3.40 - 10.80 10*3/mm3 Final      HGB Hemoglobin   Date Value Ref Range Status   07/30/2024 7.5 (L) 13.0 - 17.7 g/dL Final      HCT Hematocrit   Date Value Ref Range Status   07/30/2024 28.5 (L) 37.5 - 51.0 % Final      Platelets No results found for: \"LABPLAT\"   MCV MCV   Date Value Ref Range Status   07/30/2024 73.1 (L) 79.0 - 97.0 fL Final          Sodium Sodium   Date Value Ref Range Status   07/30/2024 140 136 - 145 mmol/L Final      Potassium Potassium   Date Value Ref Range Status   07/30/2024 3.9 3.5 - 5.2 mmol/L Final      Chloride Chloride   Date Value Ref Range Status   07/30/2024 102 98 - 107 mmol/L Final      CO2 CO2   Date Value Ref Range Status   07/30/2024 28.1 22.0 - 29.0 mmol/L Final      BUN BUN   Date Value Ref Range Status   07/30/2024 10 8 - 23 mg/dL Final    " "  Creatinine Creatinine   Date Value Ref Range Status   07/30/2024 1.23 0.76 - 1.27 mg/dL Final      Calcium Calcium   Date Value Ref Range Status   07/30/2024 9.5 8.6 - 10.5 mg/dL Final      PO4 No results found for: \"CAPO4\"   Albumin No results found for: \"ALBUMIN\"   Magnesium No results found for: \"MG\"   Uric Acid No results found for: \"URICACID\"       Imaging Results (Last 72 Hours)       Procedure Component Value Units Date/Time    US Renal Bilateral [[id number]] Collected: 07/30/24 1453     Updated: 07/30/24 1503    Narrative:      Date of Exam: 7/30/2024 2:05 PM EDT    Indication: LUDWIN/CKD.    Comparison: No comparisons available.    Technique: Grayscale and color Doppler ultrasound evaluation of the kidneys and urinary bladder was performed.      Findings:  Right Kidney: Right kidney measures 11.6 cm in long axis. Likely benign cyst in the right kidney measuring up to 2.2 cm. Increased renal cortical echogenicity.  No hydronephrosis.. No sonographically evident nephrolithiasis.    Left Kidney:  Left kidney measures 9.3 cm in long axis. Mildly increased renal cortical echogenicity. No suspicious appearing lesions.No hydronephrosis.No sonographically evident nephrolithiasis.    Bladder: The bladder appears unremarkable.    Additional findings: Mild perihepatic and pelvic ascites.      Impression:      Impression:  No hydronephrosis.    Increased renal cortical echogenicity which can be seen in medical renal disease.    Small volume ascites.          Electronically Signed: Gildardo Springer    7/30/2024 3:01 PM EDT    Workstation ID: [id number]                    Results for orders placed during the hospital encounter of 01/20/23    Duplex Carotid Ultrasound CAR    Interpretation Summary    Right internal carotid artery plaque without significant stenosis.    Left internal carotid artery moderate stenosis.      ASSESSMENT / PLAN      Atrial fibrillation with RVR    RENAL FAILURE------Nonoliguric. Appears to have " CRF/CKD STG 2.   Unknown if patient has baseline proteinuria or hematuria. CRF/CKD STG 2 most likely secondary to HTN NS vs. Early DM nephropathy. Follow with gentle diuresis. Will check urine and serum studies and renal US to further characterize CKD and to assess for secondary diseases associated with CKD. Will premedicate for cardiac cath with  Mucomyst and bicarbonate. Patient has been explained and understands the risks of IV dye exposure. No IVFs given volume overload. Getting transfusion which should help renal perfusion.  No NSAIDs    2. ANEMIA------PRBCs per Hem/Onc    3. VOLUME OVERLOAD/EDEMA/MILD ASCITES-----IV Bumex x one this evening after current unit of PRBCs infused. Then will hold further diuretics until post cardiac cath tomorrow    4. ATRIAL FIBRILLATION WITH RVR------Watchman in place. Per , Cardiology    5. CAD WITH H/O CABG/PCI AND WITH CURRENT ANGINA------Cardiac cath in AM per , Cardiology    6. H/O CVA/TIA/HEAD TRAUMA WITH BRAIN BLEED    7. HYPERLIPIDEMIA----Not on Statin. Check CK, TSH    8. GERD/PUD PROPHYLAXIS-----On PPI. Benefits outweigh risks despite CKD    9. OBESITY/DEISY      I discussed the patient's findings and my recommendations with patient and nursing staff    Will follow along closely. Thank you for allowing us to see this patient in renal consultation.    [facility]  [phone]  MD Demar Bull MD  07/30/24  16:48 EDT              Electronically signed by Ranjan Do MD at 07/30/24 1711       Dilip Myrick at 07/30/24 1611        Consult Orders    1. Inpatient Spiritual Care Consult [[id number]] ordered by Jaquelin Hernandez DO at 07/30/24 1430                 Initial spiritual care visit. Pt in room with no family but staff present. Staff invited  but doctor arrived to talk to pt. Doctor informed pt about a probable cath procedure in the morning. After he left,   engaged pt how he felt about that but he was busy with nurses.  offered to follow up tomorrow after procedure and pt was OK wit that. There were no other needs at this time.  will follow up tomorrow to assess spiritual need.     Electronically signed by Dilip Myrick at 24 1636       Luzma Solomon APRN at 24 1243        Consult Orders    1. Hematology & Oncology Inpatient Consult [[id number]] ordered by José Miguel Izquierdo MD at 24 1014                         Hematology/Oncology Inpatient Consultation    Patient name: Chinedu Lopez  : 1960  MRN: [id number]  Referring Provider: Dr. Hernandez  Reason for Consultation: Iron deficiency anemia    Chief complaint: Shortness of breath    History of present illness:    Chinedu Lopez is a 63 y.o. male who presented to [facility] on [facility]24 with complaints of shortness of breath.  Past medical history of A-fib status post Watchman procedure, hypertension, hyperlipidemia, ischemic cardiomyopathy, CAD status post CABG and on low-dose aspirin, Thorpe's esophagus.  Presented as a transfer from an outside facility with complaints of acute onset chest pain that started the prior evening.  He stated he was preparing to go back to bed after brushing his teeth and started having left-sided sudden onset chest pain, felt like his previous heart attack.  Started to radiate to his left arm which made him concerned.  He was noted to be in A-fib RVR with elevated troponin.  He was started on a Cardizem drip and heparin drip and cardiology was consulted and recommended transfer to our facility for further management.  Patient admitted to having symptoms of chest pain intermittently and shortness of breath intermittently over the past 2 weeks.  He went to see his PCP and he had his medications adjusted and was started on diuretics due to swelling of his bilateral lower extremities and abdominal area.  He also  gained approximately 30 pounds of weight over the past few days.  He denied any increased sodium intake.  He had a stress test in March 2024 which was okay.  He had also been found to be anemic on recent lab work for which she had underwent an EGD and colonoscopy in May 2024 which was normal according to him.  He reported that he was supposed to see an hematologist as an outpatient the day of admission.    07/30/24  Hematology/Oncology was consulted for iron deficiency anemia.  At the time of the consultation patient with a WBC of 4.62, hemoglobin 7.5 g/dL, MCV 73.1, platelets 226,000, iron 13, iron sat 3%, TIBC 416.  Review of the chart shows labs from his PCP office dated 1/3/2024 and showing a WBC 6.2, hemoglobin 10.1 g/dL, MCV 75.2, platelets 171,000, 1+ anisocytosis, 1+ microcytes, 2+ hypochromia, 1+ elliptocytes, 1+ teardrop cells.  A stool for occult blood dated 12/20/2023 was negative.  A baseline CBC from 2020 showed normal hemoglobin.    He/She  has a past medical history of Atrial fibrillation, Brain bleed (2019), CAD (coronary artery disease), Hyperlipidemia, Hypertension, Myocardial infarction (2006), Obstructive sleep apnea on CPAP, Presence of Watchman left atrial appendage closure device (1/31/2020), Stroke (2019), and TIA (transient ischemic attack) (11/19/2013).    PCP: Nhung Clark APRN    History:  Past Medical History:   Diagnosis Date    Atrial fibrillation     June, [facility]. Abstracted from [facility].    Brain bleed 2019 October 14th fell - Hospital on 17th for 3 weeks    CAD (coronary artery disease)     S/P CABG and PCI. Abstracted from [facility].    Hyperlipidemia     Hypertension     Myocardial infarction 2006    Non-ST elevation MI. Abstracted from [facility].    Obstructive sleep apnea on CPAP     At . Abstracted from [facility].    Presence of Watchman left atrial appendage closure device 1/31/2020    Stroke 2019    TIA (transient ischemic attack) 11/19/2013    Possible  TIA- MRI shows small tumor/head. Abstracted from [facility].   ,   Past Surgical History:   Procedure Laterality Date    ANGIOPLASTY  2006    3 srents were sequentially placed in promimal and mid body of the saphennous vein graft to obtuse marginal branch. Abstracted from [facility].    ATRIAL APPENDAGE EXCLUSION LEFT WITH TRANSESOPHAGEAL ECHOCARDIOGRAM N/A 1/30/2020    Procedure: Atrial Appendage Occlusion;  Surgeon: Angel Willams MD;  Location: [facility] CATH INVASIVE LOCATION;  Service: Cardiovascular    ATRIAL APPENDAGE EXCLUSION LEFT WITH TRANSESOPHAGEAL ECHOCARDIOGRAM N/A 1/30/2020    Procedure: Atrial Appendage Occlusion;  Surgeon: John Sheridan MD;  Location: [facility] CATH INVASIVE LOCATION;  Service: Cardiology    CARDIAC CATHETERIZATION  2000 2003, 2006, 2012. Abstracted from [facility].    CORONARY ARTERY BYPASS GRAFT  2000    Triple. Abstracted from [facility].    MUSCLE REPAIR      Tendons and nerves in right lower extremity. Abstracted from [facility].    OTHER SURGICAL HISTORY  08/2012    RCA mid and proximal- Xience stent x3. Abstracted from [facility].    OTHER SURGICAL HISTORY  09/2016    Head trauma/bleed at [facility]. Abstracted from [facility].   ,   Family History   Problem Relation Age of Onset    Diabetes Mother     Heart attack Father     Diabetes Father     Other Sister         MRSA    Heart disease Other         Positive for Ischemic    Cancer Other     Hypertension Other    ,   Social History     Tobacco Use    Smoking status: Every Day     Current packs/day: 1.00     Average packs/day: 1 pack/day for 40.6 years (40.6 ttl pk-yrs)     Types: Cigarettes     Start date: 1984    Smokeless tobacco: Never    Tobacco comments:     Smoking cessation--encouraged---refuses patch, gum or chantix   Vaping Use    Vaping status: Never Used   Substance Use Topics    Alcohol use: Not Currently    Drug use: No   ,   Medications Prior to Admission   Medication Sig Dispense Refill Last Dose     amiodarone (PACERONE) 200 MG tablet Take 1 tablet by mouth Daily.       bumetanide (BUMEX) 1 MG tablet Take 1 tablet by mouth Daily.       amantadine (SYMMETREL) 100 MG capsule Take 1 capsule by mouth Daily.       amantadine (SYMMETREL) 100 MG tablet Take 1 tablet by mouth 1 (One) Time.       aspirin 81 MG chewable tablet Chew 1 tablet Daily.       Cholecalciferol (VITAMIN D3) 125 MCG (5000 UT) capsule capsule Take 1 capsule by mouth Daily.       Cyanocobalamin (VITAMIN B 12) 500 MCG tablet Take 1 tablet by mouth Daily.       doxazosin (CARDURA) 1 MG tablet Take 1 tablet by mouth every night at bedtime.       famotidine (PEPCID) 40 MG tablet Take 1 tablet by mouth Daily.       folic acid (FOLVITE) 1 MG tablet Take 1 tablet by mouth Daily.       guaiFENesin (MUCINEX) 600 MG 12 hr tablet Take 1 tablet by mouth Daily.       isosorbide mononitrate (IMDUR) 30 MG 24 hr tablet Take 1 tablet by mouth Daily.       metFORMIN (GLUCOPHAGE) 1000 MG tablet Take 1 tablet by mouth 2 (Two) Times a Day With Meals.       Methylcobalamin 5000 MCG tablet dispersible Place 500 mcg on the tongue Daily.       nitroglycerin (NITROLINGUAL) 0.4 MG/SPRAY spray Place 1 spray under the tongue Every 5 (Five) Minutes As Needed for Chest Pain.       pantoprazole (PROTONIX) 40 MG EC tablet Take 1 tablet by mouth 2 (Two) Times a Day.       potassium chloride (K-DUR,KLOR-CON) 20 MEQ CR tablet Take 1 tablet by mouth 2 (Two) Times a Day.       QUEtiapine (SEROquel) 25 MG tablet Take 1 tablet by mouth Daily.       ranolazine (RANEXA) 500 MG 12 hr tablet Take 1 tablet by mouth 2 (Two) Times a Day.  6     thiamine (VITAMIN B-1) 100 MG tablet Take 1 tablet by mouth Daily.       thiamine (VITAMIN B1) 100 MG tablet Take 1 tablet by mouth Daily.      , Scheduled Meds:  Acetylcysteine, 1,200 mg, Oral, BID  amantadine, 100 mg, Oral, Daily  aspirin, 81 mg, Oral, Daily  bumetanide, 1 mg, Intravenous, Once  ferric gluconate, 125 mg, Intravenous, Once  [START ON  "7/31/2024] folic acid, 1 mg, Oral, Daily  guaiFENesin, 600 mg, Oral, Daily  [START ON 7/31/2024] isosorbide mononitrate, 30 mg, Oral, Daily  pantoprazole, 40 mg, Oral, BID AC  ranolazine, 500 mg, Oral, BID  [START ON 7/31/2024] sodium bicarbonate, 1,300 mg, Oral, Q4H  sodium chloride, 10 mL, Intravenous, Q12H    , Continuous Infusions:  amiodarone, 1 mg/min, Last Rate: 1 mg/min (07/30/24 1539)   Followed by  amiodarone, 0.5 mg/min  heparin, 9.01 Units/kg/hr, Last Rate: 14.21 Units/kg/hr (07/30/24 1635)    , PRN Meds:    acetaminophen **OR** acetaminophen **OR** acetaminophen    senna-docusate sodium **AND** polyethylene glycol **AND** bisacodyl **AND** bisacodyl    Calcium Replacement - Follow Nurse / BPA Driven Protocol    heparin    heparin    Magnesium Standard Dose Replacement - Follow Nurse / BPA Driven Protocol    nitroglycerin    ondansetron ODT **OR** ondansetron    Phosphorus Replacement - Follow Nurse / BPA Driven Protocol    Potassium Replacement - Follow Nurse / BPA Driven Protocol    sodium chloride    sodium chloride   Allergies:  Patient has no known allergies.    Subjective     ROS:  Review of Systems     Objective   Vital Signs:   /71   Pulse 92   Temp 98.5 °F (36.9 °C) (Oral)   Resp 18   Ht 182.9 cm (72\")   Wt 111 kg (245 lb)   SpO2 94%   BMI 33.23 kg/m²     Physical Exam: (performed by MD)  Physical Exam    Results Review:  Lab Results (last 48 hours)       Procedure Component Value Units Date/Time    Basic Metabolic Panel [[id number]]  (Normal) Collected: 07/30/24 1029    Specimen: Blood from Arm, Right Updated: 07/30/24 1157     Glucose 96 mg/dL      BUN 10 mg/dL      Creatinine 1.23 mg/dL      Sodium 140 mmol/L      Potassium 3.9 mmol/L      Chloride 102 mmol/L      CO2 28.1 mmol/L      Calcium 9.5 mg/dL      BUN/Creatinine Ratio 8.1     Anion Gap 9.9 mmol/L      eGFR 66.8 mL/min/1.73     Narrative:      GFR Normal >60  Chronic Kidney Disease <60  Kidney Failure <15      High " Sensitivity Troponin T [[id number]]  (Abnormal) Collected: 07/30/24 1029    Specimen: Blood from Arm, Right Updated: 07/30/24 1111     HS Troponin T 299 ng/L     Narrative:      High Sensitive Troponin T Reference Range:  <14.0 ng/L- Negative Female for AMI  <22.0 ng/L- Negative Male for AMI  >=14 - Abnormal Female indicating possible myocardial injury.  >=22 - Abnormal Male indicating possible myocardial injury.   Clinicians would have to utilize clinical acumen, EKG, Troponin, and serial changes to determine if it is an Acute Myocardial Infarction or myocardial injury due to an underlying chronic condition.         Iron Profile [[id number]]  (Abnormal) Collected: 07/30/24 1029    Specimen: Blood from Arm, Right Updated: 07/30/24 1102     Iron 13 mcg/dL      Iron Saturation (TSAT) 3 %      Transferrin 279 mg/dL      TIBC 416 mcg/dL     BNP [[id number]]  (Abnormal) Collected: 07/30/24 1029    Specimen: Blood from Arm, Right Updated: 07/30/24 1102     proBNP 15,763.0 pg/mL     Narrative:      This assay is used as an aid in the diagnosis of individuals suspected of having heart failure. It can be used as an aid in the diagnosis of acute decompensated heart failure (ADHF) in patients presenting with signs and symptoms of ADHF to the emergency department (ED). In addition, NT-proBNP of <300 pg/mL indicates ADHF is not likely.    Age Range Result Interpretation  NT-proBNP Concentration (pg/mL:      <50             Positive            >450                   Gray                 300-450                    Negative             <300    50-75           Positive            >900                  Gray                300-900                  Negative            <300      >75             Positive            >1800                  Gray                300-1800                  Negative            <300    CBC & Differential [[id number]]  (Abnormal) Collected: 07/30/24 1029    Specimen: Blood Updated: 07/30/24 1042    Narrative:      The  following orders were created for panel order CBC & Differential.  Procedure                               Abnormality         Status                     ---------                               -----------         ------                     CBC Auto Differential[[id number]]        Abnormal            Final result               Scan Slide[[id number]]                                                                    Please view results for these tests on the individual orders.    CBC Auto Differential [[id number]]  (Abnormal) Collected: 07/30/24 1029    Specimen: Blood Updated: 07/30/24 1042     WBC 4.62 10*3/mm3      RBC 3.90 10*6/mm3      Hemoglobin 7.5 g/dL      Hematocrit 28.5 %      MCV 73.1 fL      MCH 19.2 pg      MCHC 26.3 g/dL      RDW 21.1 %      RDW-SD 55.1 fl      MPV 10.0 fL      Platelets 226 10*3/mm3      Neutrophil % 66.0 %      Lymphocyte % 19.9 %      Monocyte % 9.1 %      Eosinophil % 1.9 %      Basophil % 0.9 %      Immature Grans % 2.2 %      Neutrophils, Absolute 3.05 10*3/mm3      Lymphocytes, Absolute 0.92 10*3/mm3      Monocytes, Absolute 0.42 10*3/mm3      Eosinophils, Absolute 0.09 10*3/mm3      Basophils, Absolute 0.04 10*3/mm3      Immature Grans, Absolute 0.10 10*3/mm3      nRBC 0.4 /100 WBC              Pending Results:     Imaging Reviewed:   US Renal Bilateral    Result Date: 7/30/2024  Impression: No hydronephrosis. Increased renal cortical echogenicity which can be seen in medical renal disease. Small volume ascites. Electronically Signed: Gildardo Springer  7/30/2024 3:01 PM EDT  Workstation ID: [id number]         Assessment & Plan   ASSESSMENT  Iron deficiency anemia: Workup thus far consistent with iron deficiency anemia.  Per patient report he had an EGD and colonoscopy in May 2024 that was normal.  There are no records for review from the procedure.  There is a stool for occult blood on the chart from his PCP in early 2024 that was negative. UA negative for blood.     PLAN  Complete  anemia workup with additional labs to evaluate for hemolysis and nutritional deficiencies  Monitor CBC and transfuse for hemoglobin less than 7.0 g/dL or less than 8.0 g/dL if symptomatic or to optimize cardiac function.  Monitor closely due to anticoagulation with heparin drip currently.  Replace iron  Consult GI    Electronically signed by AMAYA Jean, 07/30/24, 12:44 PM EDT.        Thank you for this consult. We will be happy to follow along with you.           Electronically signed by Luzma Solomon APRN at 07/30/24 9797       José Miguel Izquierdo MD at 07/30/24 1215          [facility] CARDIOLOGY CONSULT  [facility]        Cardiology assessment and plan    Congestive heart failure  Acute on chronic CHF exacerbation  Acute systolic dysfunction  Severe LV dysfunction  Volume overload  Congestive heart failure NYHA class IV  Chest discomfort  Abnormal elevated troponin consistent with non-ST elevation myocardial infarction  Atrial fibrillation with rapid antler response  Worsening cardiomyopathy  Abnormal elevated proBNP secondary to congestive heart failure  Pulmonary edema and bilateral pleural effusions  Significant volume overload  Atrial fibrillation and with prior history of watchman implantation  Acute on chronic kidney injury  Coronary artery disease prior coronary artery bypass surgery prior PCI and stenting  Hypertension  Hyperlipidemia  Diabetes mellitus  History of TIA  History of intracranial hemorrhage status post fall in 2019  Anemia/iron deficiency anemia with a significant worsening of the hemoglobin    Patient is currently chest pain-free  Abnormal elevated troponin concern with non-ST ovation myocardial infarction  Elevated abnormal proBNP secondary congestive heart failure and worsening cardiomyopathy  Sodium is 140 potassium is 3.9 creatinine is 1.2  Anemia with hemoglobin of 7.5 and iron deficiency  Echocardiogram with LV ejection  fraction of 25 to 30%  Twelve-lead EKG with atrial fibrillation with T wave inversions involving anterolateral leads  Tmax is 99.8 pulse is 98 respirations are 19 blood pressure is 95/55 to 118/80 sats are 89 to 93%  Current medications include aspirin 81 mg p.o. once a day patient is on amiodarone 200 mg p.o. once a day Imdur 30 mg p.o. once a day patient is on IV Cardizem drip for rate control patient is on Ranexa 5 mg p.o. twice daily and patient is on IV heparin  Nephrology and hematology evaluation  Agree with the diuresis and close monitoring of renal function  High risk for contrast-induced nephropathy  Likely will benefit from cardiac catheterization when hemodynamically stable  Close monitoring of renal function and also troponin levels  If the hemodynamics are stable consider starting patient on beta-blockers and wean off the Cardizem drip  Diagnosis and treatment options reviewed and discussed with patient and family  Further recommendation based on patient course                  Subjective:     Encounter Date:07/30/2024    Subjective  Patient ID: Chinedu Lopez is a 63 y.o. male.    Chief Complaint: Chest Pain      HPI:  Chinedu Lopez is a 63 y.o. male known to Dr. Mosher and Dr. Willams with a past cardiac history of paroxysmal atrial fibrillation status post watchman implant in 2020 and CAD status post CABG in 2000 and PCI in 2006.  Last cath in 2016 showed no vessels amenable to PCI.  Last WILD in 2021 showed an EF of 60% with mild MR/TR and good seal of the OLESYA.  Patient recently underwent nuclear stress testing 3/2024 which was equivocal and he was continued on medical therapy.  PMH includes HTN, HLD, DM, TIA in 2013, and intracranial hemorrhage status post fall in 2019.  He was also recently referred to heme oncology for outpatient evaluation of anemia.      Patient presents to outside ER with complaints of chest pain and ruled in for non-STEMI and found with rapid atrial fibrillation and  acute CHF.  He was transferred to [facility] for further evaluation and management.    Patient reports that last night he developed left-sided chest pain radiating to his left arm and accompanied by shortness of breath.  He took 2 sublingual nitroglycerin which helped but the chest discomfort quickly returned.  He states this feels similar to his prior angina.  He tells me that recently he has been going in and out of atrial fibrillation.  He has also had a 32 pound weight gain in the last several days with abdominal distention and SOA.  He reports he was recently started on diuretics for this.  Prior to yesterday he had been able to perform activities such as push mowing the yard without unusual difficulty.  He denies any palpitations and cannot tell when he is in A-fib.  He is currently chest pain free.      Past Medical History:   Diagnosis Date    Atrial fibrillation     June, H. Abstracted from [facility].    Brain bleed 2019 October 14th fell - Hospital on 17th for 3 weeks    CAD (coronary artery disease)     S/P CABG and PCI. Abstracted from [facility].    Hyperlipidemia     Hypertension     Myocardial infarction 2006    Non-ST elevation MI. Abstracted from [facility].    Obstructive sleep apnea on CPAP     At . Abstracted from [facility].    Presence of Watchman left atrial appendage closure device 1/31/2020    Stroke 2019    TIA (transient ischemic attack) 11/19/2013    Possible TIA- MRI shows small tumor/head. Abstracted from [facility].         Past Surgical History:   Procedure Laterality Date    ANGIOPLASTY  2006    3 srents were sequentially placed in promimal and mid body of the saphennous vein graft to obtuse marginal branch. Abstracted from [facility].    ATRIAL APPENDAGE EXCLUSION LEFT WITH TRANSESOPHAGEAL ECHOCARDIOGRAM N/A 1/30/2020    Procedure: Atrial Appendage Occlusion;  Surgeon: Angel Willams MD;  Location: [facility] CATH INVASIVE LOCATION;  Service: Cardiovascular    ATRIAL  APPENDAGE EXCLUSION LEFT WITH TRANSESOPHAGEAL ECHOCARDIOGRAM N/A 1/30/2020    Procedure: Atrial Appendage Occlusion;  Surgeon: John Sheridan MD;  Location: [facility] CATH INVASIVE LOCATION;  Service: Cardiology    CARDIAC CATHETERIZATION  2000 2003, 2006, 2012. Abstracted from [facility].    CORONARY ARTERY BYPASS GRAFT  2000    Triple. Abstracted from [facility].    MUSCLE REPAIR      Tendons and nerves in right lower extremity. Abstracted from [facility].    OTHER SURGICAL HISTORY  08/2012    RCA mid and proximal- Xience stent x3. Abstracted from [facility].    OTHER SURGICAL HISTORY  09/2016    Head trauma/bleed at [facility]. Abstracted from [facility].         Social History     Socioeconomic History    Marital status:    Tobacco Use    Smoking status: Every Day     Current packs/day: 1.00     Types: Cigarettes    Smokeless tobacco: Never    Tobacco comments:     Smoking cessation--encouraged---refuses patch, gum or chantix   Vaping Use    Vaping status: Never Used   Substance and Sexual Activity    Alcohol use: Not Currently    Drug use: No    Sexual activity: Defer     Continues to smoke 1 pack/day.    Family History   Problem Relation Age of Onset    Diabetes Mother     Heart attack Father     Diabetes Father     Other Sister         MRSA    Heart disease Other         Positive for Ischemic    Cancer Other     Hypertension Other          No Known Allergies    Current Medications:   Scheduled Meds:amantadine, 100 mg, Oral, Daily  amiodarone, 200 mg, Oral, Daily  aspirin, 81 mg, Oral, Daily  folic acid, 1 mg, Oral, Daily  guaiFENesin, 600 mg, Oral, Daily  isosorbide mononitrate, 30 mg, Oral, Daily  pantoprazole, 40 mg, Oral, Daily  ranolazine, 500 mg, Oral, BID  sodium chloride, 10 mL, Intravenous, Q12H      Continuous Infusions:heparin, 9.01 Units/kg/hr        Review of Systems   Constitutional: Negative for chills, decreased appetite and malaise/fatigue.   HENT:  Negative for congestion and  "nosebleeds.    Eyes:  Negative for blurred vision and double vision.   Cardiovascular:  Positive for chest pain, dyspnea on exertion, irregular heartbeat, leg swelling, orthopnea and paroxysmal nocturnal dyspnea.   Respiratory:  Positive for shortness of breath. Negative for cough.    Hematologic/Lymphatic: Negative for adenopathy. Does not bruise/bleed easily.   Skin:  Negative for color change and rash.   Musculoskeletal:  Negative for back pain and joint pain.   Gastrointestinal:  Negative for bloating, abdominal pain, hematemesis and hematochezia.   Genitourinary:  Negative for flank pain and hematuria.   Neurological:  Negative for dizziness and focal weakness.   Psychiatric/Behavioral:  Negative for altered mental status. The patient does not have insomnia.      All other systems reviewed and are negative.      Objective:   Objective      BP 95/55 (BP Location: Right arm, Patient Position: Lying)   Pulse 98   Temp 97.8 °F (36.6 °C) (Oral)   Resp 19   Ht 182.9 cm (72\")   Wt 111 kg (245 lb)   SpO2 (!) 89%   BMI 33.23 kg/m²       General: Well-developed in NAD.  Neuro: AAOx3. No gross deficits.  HEENT: Sclerae clear, no xanthelasmas.  CV: S1S2, RRR. No murmurs or gallops.  Neck thick.  Unable to appreciate JVD.  Resp: Breathing is unlabored. Lungs CTA throughout.  GI: BS+. Abdominal ascites.  Ext: Pedal pulses are palpable. Extremities are nonedematous.  MS: moves all extremities, no weakness.  Skin: warm, dry.  Psych: calm and cooperative.            Lab Review:     Results from last 7 days   Lab Units 07/30/24  1029   SODIUM mmol/L 140   POTASSIUM mmol/L 3.9   CHLORIDE mmol/L 102   CO2 mmol/L 28.1   BUN mg/dL 10   CREATININE mg/dL 1.23   GLUCOSE mg/dL 96   CALCIUM mg/dL 9.5     Results from last 7 days   Lab Units 07/30/24  1029   HSTROP T ng/L 299*     Results from last 7 days   Lab Units 07/30/24  1029   WBC 10*3/mm3 4.62   HEMOGLOBIN g/dL 7.5*   HEMATOCRIT % 28.5*   PLATELETS 10*3/mm3 226              " "     Invalid input(s): \"LDLCALC\"  Results from last 7 days   Lab Units 07/30/24  1029   PROBNP pg/mL 15,763.0*           Recent Radiology:  Imaging Results (Most Recent)       None              ECHOCARDIOGRAM:    Results for orders placed during the hospital encounter of 07/30/24    Adult Transthoracic Echo Complete W/ Cont if Necessary Per Protocol    Interpretation Summary    Left ventricular systolic function is severely decreased. Left ventricular ejection fraction appears to be 26 - 30%.    The left ventricular cavity is moderately dilated.    Left ventricular wall thickness is consistent with mild concentric hypertrophy.    Left ventricular diastolic function is consistent with (grade I) impaired relaxation.    Moderately reduced right ventricular systolic function noted.    The right ventricular cavity is moderately dilated.    The left atrial cavity is moderately dilated.    Estimated right ventricular systolic pressure from tricuspid regurgitation is normal (<35 mmHg).           Assessment:   Assessment      Active Hospital Problems    Diagnosis  POA    **Atrial fibrillation with RVR [I48.91]  Yes     1) NSTEMI  -High-sensitivity troponin 83, 299  -Initial EKG shows rapid A-fib with ST depression inferior, anterior, lateral  -CXR showed small bilateral pleural effusions at [facility]  - started on heparin gtt    2) Acute Systolic heart failure  - BNP 93388  - CXR showed small bilateral pleural effusions at [facility]  - received IV diuretics at [facility]  - Last WILD in 2021 showed an EF of 60% with mild MR/TR and good seal of the OLESYA.   - prelim 2D echo 7/2024 shows severe LV dysfunction    3) atrial fibrillation with RVR --> CVR  - started on diltiazem gtt  - K 4.2  - not on anticoagulation s/p Watchman implant; good seal OLESYA per WILD 2021    5) LUDWIN  - Cr 1.5 --> 1.23  - nephrology consulted    4) CAD status post CABG in 2000 and PCI in 2006.    - Last cath in 2016 showed no vessels amenable to PCI.     - nuclear stress " testing 3/2024 which was equivocal     5) HTN    6) HLD    7) DM    8) TIA in 2013    9) intracranial hemorrhage status post fall in 2019    10) anemia  - Hgb 7.5  - had an upcoming outpatient appt with hemeoncology    11) tobacco dependence          Plan:   Follow serial cardiac enzymes and EKG.  Continue on heparin gtt.  Patient will be transfused with PRBC and hemoncology consulted.  Nephrology consulted for preoperative cath clearance and assistance with diuresis.  Tele shows rate controlled afib on diltiazem gtt.  Will eventually need to transition him off this given severe LV dysfunction prelim reading on 2D echo.  Will d/w attending cardiologist for further recommendations.         Electronically signed by AMAYA Gracia, 07/30/24, 12:42 PM EDT.     Electronically signed by José Miguel Izquierdo MD at 07/30/24 [id number]

## 2024-07-31 NOTE — PROGRESS NOTES
Cardiology [facility]      Patient Care Team:  Nhung Clark APRN as PCP - General (Family Medicine)  Ranjan Do MD as Consulting Physician (Nephrology)        Cardiology assessment and plan     Congestive heart failure  Acute on chronic CHF exacerbation  Acute systolic dysfunction  Severe LV dysfunction  Volume overload  Congestive heart failure NYHA class IV  Chest discomfort  Abnormal elevated troponin consistent with non-ST elevation myocardial infarction  Atrial fibrillation with rapid antler response  Worsening cardiomyopathy  Severe LV dysfunction  Abnormal elevated proBNP secondary to congestive heart failure  Pulmonary edema and bilateral pleural effusions  Significant volume overload  Atrial fibrillation and with prior history of watchman implantation  Acute on chronic kidney injury  Coronary artery disease prior coronary artery bypass surgery prior PCI and stenting  Hypertension  Hyperlipidemia  Diabetes mellitus  History of TIA  History of intracranial hemorrhage status post fall in 2019  Anemia/iron deficiency anemia with a significant worsening of the hemoglobin     Patient is currently chest pain-free  Abnormal elevated troponin concern with non-ST ovation myocardial infarction  Elevated abnormal proBNP secondary congestive heart failure and worsening cardiomyopathy  Anemia with hemoglobin of 7.5 and iron deficiency  Echocardiogram with LV ejection fraction of 25 to 30%  Twelve-lead EKG with atrial fibrillation with T wave inversions involving anterolateral leads  Diagnosis and treatment options reviewed and discussed with patient and family  Plan to proceed with cardiac catheterization for definite diagnosis and treatment options  Risk benefits and alternatives were cardiac catheterization reviewed and discussed with patient and family                  Chief Complaint: Chest discomfort atrial fibrillation shortness of breath    Subjective no new complaints    Interval History: no  "significant change in the overall status    History taken from: patient    Review of Systems:  Review of Systems   Constitutional: Negative for chills, decreased appetite and malaise/fatigue.   HENT:  Negative for congestion and nosebleeds.    Eyes:  Negative for blurred vision and double vision.   Cardiovascular:  Positive for irregular heartbeat, leg swelling, orthopnea and paroxysmal nocturnal dyspnea. Negative for chest pain and dyspnea on exertion.   Respiratory:  Positive for cough and shortness of breath.    Hematologic/Lymphatic: Negative for adenopathy. Does not bruise/bleed easily.   Skin:  Negative for color change and rash.   Musculoskeletal:  Negative for back pain and joint pain.   Gastrointestinal:  Negative for bloating, abdominal pain, hematemesis and hematochezia.   Genitourinary:  Negative for flank pain and hematuria.   Neurological:  Negative for dizziness and focal weakness.   Psychiatric/Behavioral:  Negative for altered mental status. The patient does not have insomnia.        Objective    Vital Signs  Visit Vitals  /90 (BP Location: Right arm, Patient Position: Lying)   Pulse (!) 136   Temp 97.5 °F (36.4 °C) (Oral)   Resp 17   Ht 182.9 cm (72\")   Wt 105 kg (232 lb 2.3 oz)   SpO2 (!) 88%   BMI 31.48 kg/m²     Oxygen Therapy  SpO2: (!) 88 %  Pulse Oximetry Type: Continuous  Device (Oxygen Therapy): nasal cannula  Flow (L/min): 7  Flowsheet Rows      Flowsheet Row First Filed Value   Admission Height 182.9 cm (72\") Documented at 07/30/2024 0938   Admission Weight 112 kg (245 lb 13 oz) Documented at 07/30/2024 0938          Intake & Output (last 3 days)         07/28 0701  07/29 0700 07/29 0701  07/30 0700 07/30 0701  07/31 0700 07/31 0701  08/01 0700    P.O.   360     I.V. (mL/kg)   1525 (14.5)     Blood   425     Total Intake(mL/kg)   2310 (22)     Urine (mL/kg/hr)   3375 2200 (3.6)    Emesis/NG output   0     Stool   0     Total Output   3375 2200    Net   -1065 -2200              "     Lines, Drains & Airways       Active LDAs       Name Placement date Placement time Site Days    Peripheral IV 07/30/24 1300 Anterior;Proximal;Right Forearm 07/30/24  1300  Forearm  less than 1    Peripheral IV   Left Antecubital --  present on admission  --  present on admission  Antecubital  --    Peripheral IV 07/30/24 1600 Anterior;Left;Upper Arm 07/30/24  1600  Arm  less than 1    Urethral Catheter Temperature probe;Silicone 16 Fr. 07/31/24  0940  -- less than 1    Arterial Sheath 6 Fr. Right Femoral 07/31/24  0824  Femoral  less than 1                    Physical Exam:  Constitutional:       Appearance: Well-developed.   Eyes:      Conjunctiva/sclera: Conjunctivae normal.      Pupils: Pupils are equal, round, and reactive to light.   HENT:      Head: Normocephalic and atraumatic.   Neck:      Thyroid: No thyromegaly.   Pulmonary:      Effort: Pulmonary effort is normal.      Breath sounds: Examination of the right-middle field reveals rales. Examination of the left-middle field reveals rales. Examination of the right-lower field reveals decreased breath sounds and rales. Examination of the left-lower field reveals decreased breath sounds and rales. Decreased breath sounds present. Rales present.   Cardiovascular:      Abnormal PMI. Tachycardia present. Irregularly irregular rhythm.      Murmurs: There is a grade 2/6 holosystolic murmur.      S3 and S4 Gallop.    Pulses:     Intact distal pulses.   Edema:     Peripheral edema absent.   Abdominal:      General: Bowel sounds are normal.      Palpations: Abdomen is soft.   Musculoskeletal:      Cervical back: Normal range of motion and neck supple. Skin:     General: Skin is warm.   Neurological:      Mental Status: Alert and oriented to person, place, and time.         Results Review:     I reviewed the patient's new clinical results.    Lab Results (last 24 hours)       Procedure Component Value Units Date/Time    Basic Metabolic Panel [[id number]]  (Abnormal)  Collected: 07/31/24 1155    Specimen: Blood from Cannula Updated: 07/31/24 1226     Glucose 118 mg/dL      BUN 8 mg/dL      Creatinine 0.97 mg/dL      Sodium 140 mmol/L      Potassium 3.6 mmol/L      Chloride 99 mmol/L      CO2 26.0 mmol/L      Calcium 9.5 mg/dL      BUN/Creatinine Ratio 8.2     Anion Gap 15.0 mmol/L      eGFR 88.8 mL/min/1.73     Narrative:      GFR Normal >60  Chronic Kidney Disease <60  Kidney Failure <15      aPTT [[id number]]  (Abnormal) Collected: 07/31/24 1155    Specimen: Blood Updated: 07/31/24 1213     PTT 80.2 seconds     Comprehensive Metabolic Panel [[id number]]  (Abnormal) Collected: 07/31/24 0508    Specimen: Blood Updated: 07/31/24 0559     Glucose 98 mg/dL      BUN 9 mg/dL      Creatinine 1.10 mg/dL      Sodium 139 mmol/L      Potassium 4.0 mmol/L      Comment: Slight hemolysis detected by analyzer. Result may be falsely elevated.        Chloride 100 mmol/L      CO2 31.0 mmol/L      Calcium 9.4 mg/dL      Total Protein 6.3 g/dL      Albumin 3.7 g/dL      ALT (SGPT) 5 U/L      AST (SGOT) 19 U/L      Alkaline Phosphatase 36 U/L      Total Bilirubin 0.5 mg/dL      Globulin 2.6 gm/dL      A/G Ratio 1.4 g/dL      BUN/Creatinine Ratio 8.2     Anion Gap 8.0 mmol/L      eGFR 76.4 mL/min/1.73     Narrative:      GFR Normal >60  Chronic Kidney Disease <60  Kidney Failure <15      Magnesium [[id number]]  (Normal) Collected: 07/31/24 0508    Specimen: Blood Updated: 07/31/24 0559     Magnesium 1.8 mg/dL     High Sensitivity Troponin T [[id number]]  (Abnormal) Collected: 07/31/24 0508    Specimen: Blood Updated: 07/31/24 0559     HS Troponin T 257 ng/L     Narrative:      High Sensitive Troponin T Reference Range:  <14.0 ng/L- Negative Female for AMI  <22.0 ng/L- Negative Male for AMI  >=14 - Abnormal Female indicating possible myocardial injury.  >=22 - Abnormal Male indicating possible myocardial injury.   Clinicians would have to utilize clinical acumen, EKG, Troponin, and serial changes to  determine if it is an Acute Myocardial Infarction or myocardial injury due to an underlying chronic condition.         Phosphorus [[id number]]  (Normal) Collected: 07/31/24 0508    Specimen: Blood Updated: 07/31/24 0556     Phosphorus 3.8 mg/dL     Lipid Panel [[id number]] Collected: 07/31/24 0508    Specimen: Blood Updated: 07/31/24 0556     Total Cholesterol 114 mg/dL      Triglycerides 88 mg/dL      HDL Cholesterol 46 mg/dL      LDL Cholesterol  51 mg/dL      VLDL Cholesterol 17 mg/dL      LDL/HDL Ratio 1.10    Narrative:      Cholesterol Reference Ranges  (U.S. Department of Health and Human Services ATP III Classifications)    Desirable          <200 mg/dL  Borderline High    200-239 mg/dL  High Risk          >240 mg/dL      Triglyceride Reference Ranges  (U.S. Department of Health and Human Services ATP III Classifications)    Normal           <150 mg/dL  Borderline High  150-199 mg/dL  High             200-499 mg/dL  Very High        >500 mg/dL    HDL Reference Ranges  (U.S. Department of Health and Human Services ATP III Classifications)    Low     <40 mg/dl (major risk factor for CHD)  High    >60 mg/dl ('negative' risk factor for CHD)        LDL Reference Ranges  (U.S. Department of Health and Human Services ATP III Classifications)    Optimal          <100 mg/dL  Near Optimal     100-129 mg/dL  Borderline High  130-159 mg/dL  High             160-189 mg/dL  Very High        >189 mg/dL    Calcium, Ionized [[id number]]  (Normal) Collected: 07/31/24 0508    Specimen: Blood Updated: 07/31/24 0555     Ionized Calcium 1.22 mmol/L     CBC (No Diff) [[id number]]  (Abnormal) Collected: 07/31/24 0508    Specimen: Blood Updated: 07/31/24 0526     WBC 5.31 10*3/mm3      RBC 4.30 10*6/mm3      Hemoglobin 8.6 g/dL      Hematocrit 32.1 %      MCV 74.7 fL      MCH 20.0 pg      MCHC 26.8 g/dL      RDW 22.4 %      RDW-SD 59.0 fl      MPV 10.0 fL      Platelets 213 10*3/mm3     aPTT [[id number]]  (Normal) Collected: 07/31/24  0256    Specimen: Blood Updated: 07/31/24 0328     PTT 69.2 seconds     Vitamin B12 [[id number]]  (Normal) Collected: 07/30/24 1446    Specimen: Blood Updated: 07/30/24 2111     Vitamin B-12 569 pg/mL     Narrative:      Results may be falsely increased if patient taking Biotin.      Folate [[id number]]  (Normal) Collected: 07/30/24 1446    Specimen: Blood Updated: 07/30/24 2111     Folate >20.00 ng/mL     Narrative:      Results may be falsely increased if patient taking Biotin.      Haptoglobin [[id number]]  (Abnormal) Collected: 07/30/24 1446    Specimen: Blood Updated: 07/30/24 2053     Haptoglobin 241 mg/dL      Comment: Specimen hemolyzed. Results may be affected.       PTH, Intact [[id number]]  (Normal) Collected: 07/30/24 2008    Specimen: Blood Updated: 07/30/24 2052     PTH, Intact 46.6 pg/mL     Narrative:      Results may be falsely decreased if patient taking Biotin.      aPTT [[id number]]  (Abnormal) Collected: 07/30/24 2008    Specimen: Blood Updated: 07/30/24 2034     PTT 40.4 seconds     Protein Elec + Interp, Serum [[id number]] Collected: 07/30/24 2008    Specimen: Blood Updated: 07/30/24 2012    Eosinophil Smear - Urine, Urine, Clean Catch [[id number]]  (Normal) Collected: 07/30/24 1431    Specimen: Urine, Clean Catch Updated: 07/30/24 1915     Eosinophil Smear 0 % EOS/100 Cells     Sodium, Urine, Random - Urine, Clean Catch [[id number]] Collected: 07/30/24 1431    Specimen: Urine, Clean Catch Updated: 07/30/24 1842     Sodium, Urine 111 mmol/L     Narrative:      Reference intervals for random urine have not been established.  Clinical usage is dependent upon physician's interpretation in combination with other laboratory tests.       TSH [[id number]]  (Normal) Collected: 07/30/24 1255    Specimen: Blood Updated: 07/30/24 1743     TSH 3.510 uIU/mL     CK [[id number]]  (Normal) Collected: 07/30/24 1255    Specimen: Blood Updated: 07/30/24 1743     Creatine Kinase 112 U/L     Uric Acid [[id number]]  (Normal)  Collected: 07/30/24 1255    Specimen: Blood Updated: 07/30/24 1743     Uric Acid 5.7 mg/dL     Urinalysis With Microscopic If Indicated (No Culture) - Urine, Clean Catch [[id number]]  (Normal) Collected: 07/30/24 1431    Specimen: Urine, Clean Catch Updated: 07/30/24 1441     Color, UA Yellow     Appearance, UA Clear     pH, UA <=5.0     Specific Gravity, UA 1.011     Glucose, UA Negative     Ketones, UA Negative     Bilirubin, UA Negative     Blood, UA Negative     Protein, UA Negative     Leuk Esterase, UA Negative     Nitrite, UA Negative     Urobilinogen, UA 0.2 E.U./dL    Narrative:      Urine microscopic not indicated.    Reticulocytes [[id number]]  (Abnormal) Collected: 07/30/24 1029    Specimen: Blood Updated: 07/30/24 1424     Reticulocyte % 2.07 %      Reticulocyte Absolute 0.0814 10*6/mm3     Ferritin [[id number]]  (Abnormal) Collected: 07/30/24 1255    Specimen: Blood Updated: 07/30/24 1413     Ferritin 22.80 ng/mL     Narrative:      Results may be falsely decreased if patient taking Biotin.      Lactate Dehydrogenase [[id number]]  (Normal) Collected: 07/30/24 1255    Specimen: Blood Updated: 07/30/24 1413      U/L     High Sensitivity Troponin T 2Hr [[id number]]  (Abnormal) Collected: 07/30/24 1255    Specimen: Blood Updated: 07/30/24 1328     HS Troponin T 313 ng/L      Troponin T Delta 14 ng/L     Narrative:      High Sensitive Troponin T Reference Range:  <14.0 ng/L- Negative Female for AMI  <22.0 ng/L- Negative Male for AMI  >=14 - Abnormal Female indicating possible myocardial injury.  >=22 - Abnormal Male indicating possible myocardial injury.   Clinicians would have to utilize clinical acumen, EKG, Troponin, and serial changes to determine if it is an Acute Myocardial Infarction or myocardial injury due to an underlying chronic condition.         aPTT [[id number]]  (Abnormal) Collected: 07/30/24 1255    Specimen: Blood Updated: 07/30/24 1317     PTT 36.0 seconds           Results for orders  placed during the hospital encounter of 07/30/24    Adult Transthoracic Echo Complete W/ Cont if Necessary Per Protocol    Interpretation Summary    Left ventricular systolic function is severely decreased. Left ventricular ejection fraction appears to be 26 - 30%.    The left ventricular cavity is moderately dilated.    Left ventricular wall thickness is consistent with mild concentric hypertrophy.    Left ventricular diastolic function is consistent with (grade I) impaired relaxation.    Moderately reduced right ventricular systolic function noted.    The right ventricular cavity is moderately dilated.    The left atrial cavity is moderately dilated.    Estimated right ventricular systolic pressure from tricuspid regurgitation is normal (<35 mmHg).        Medication Review:   I have reviewed the patient's current medication list  Scheduled Meds:Acetylcysteine, 1,200 mg, Oral, BID  amantadine, 100 mg, Oral, Daily  [START ON 8/1/2024] aspirin, 81 mg, Oral, Daily  bumetanide, 1 mg, Intravenous, Q12H  folic acid, 1 mg, Oral, Daily  guaiFENesin, 600 mg, Oral, Daily  isosorbide mononitrate, 30 mg, Oral, Daily  magnesium sulfate, 2 g, Intravenous, Once  metoprolol tartrate, 25 mg, Oral, Q12H  pantoprazole, 40 mg, Oral, BID AC  ranolazine, 500 mg, Oral, BID  sodium chloride, 10 mL, Intravenous, Q12H  ticagrelor, 90 mg, Oral, Q12H      Continuous Infusions:amiodarone, 0.5 mg/min, Last Rate: 0.5 mg/min (07/31/24 1120)      PRN Meds:.  acetaminophen **OR** acetaminophen **OR** acetaminophen    aluminum-magnesium hydroxide-simethicone    atropine    senna-docusate sodium **AND** polyethylene glycol **AND** bisacodyl **AND** bisacodyl    Calcium Replacement - Follow Nurse / BPA Driven Protocol    Magnesium Standard Dose Replacement - Follow Nurse / BPA Driven Protocol    nitroglycerin    ondansetron ODT **OR** ondansetron    Phosphorus Replacement - Follow Nurse / BPA Driven Protocol    Potassium Replacement - Follow Nurse /  BPA Driven Protocol    sodium chloride    sodium chloride    sodium chloride    ECG/EMG Results (last 24 hours)       Procedure Component Value Units Date/Time    Telemetry Scan [[id number]] Resulted: 07/30/24     Updated: 07/30/24 1609    Telemetry Scan [[id number]] Resulted: 07/30/24     Updated: 07/30/24 2234    Telemetry Scan [[id number]] Resulted: 07/30/24     Updated: 07/31/24 0112    ECG 12 Lead Chest Pain [[id number]] Collected: 07/31/24 0245     Updated: 07/31/24 0247     QT Interval 318 ms      QTC Interval 417 ms     Narrative:      HEART YPBG=819  bpm  RR Wwtijnsp=613  ms  OR Interval=  ms  P Horizontal Axis=  deg  P Front Axis=  deg  QRSD Ugezxist=886  ms  QT Rpljyspl=651  ms  XVrI=658  ms  QRS Axis=45  deg  T Wave Axis=237  deg  - ABNORMAL ECG -  Atrial fibrillation  Probable anterior infarct, age indeterminate  When compared with ECG of 30-Jul-2024 09:51:26,  No significant change  Date and Time of Study:2024-07-31 02:45:47    Telemetry Scan [[id number]] Resulted: 07/30/24     Updated: 07/31/24 0611    Telemetry Scan [[id number]] Resulted: 07/30/24     Updated: 07/31/24 0836    ECG 12 Lead Drug Monitoring; Amiodarone [[id number]] Collected: 07/31/24 1119     Updated: 07/31/24 1120     QT Interval 296 ms      QTC Interval 430 ms     Narrative:      HEART JIDZ=382  bpm  RR Zgtfstul=321  ms  OR Interval=  ms  P Horizontal Axis=  deg  P Front Axis=  deg  QRSD Rnqvclln=037  ms  QT Ggdfptuc=178  ms  NUdK=288  ms  QRS Axis=71  deg  T Wave Axis=262  deg  - ABNORMAL ECG -  Atrial fibrillation  Repol abnrm suggests ischemia, anterolateral  Date and Time of Study:2024-07-31 11:19:24            Imaging Results (Last 24 Hours)       Procedure Component Value Units Date/Time    US Renal Bilateral [[id number]] Collected: 07/30/24 1453     Updated: 07/30/24 1503    Narrative:      Date of Exam: 7/30/2024 2:05 PM EDT    Indication: LUDWIN/CKD.    Comparison: No comparisons available.    Technique: Grayscale and color Doppler  ultrasound evaluation of the kidneys and urinary bladder was performed.      Findings:  Right Kidney: Right kidney measures 11.6 cm in long axis. Likely benign cyst in the right kidney measuring up to 2.2 cm. Increased renal cortical echogenicity.  No hydronephrosis.. No sonographically evident nephrolithiasis.    Left Kidney:  Left kidney measures 9.3 cm in long axis. Mildly increased renal cortical echogenicity. No suspicious appearing lesions.No hydronephrosis.No sonographically evident nephrolithiasis.    Bladder: The bladder appears unremarkable.    Additional findings: Mild perihepatic and pelvic ascites.      Impression:      Impression:  No hydronephrosis.    Increased renal cortical echogenicity which can be seen in medical renal disease.    Small volume ascites.          Electronically Signed: Gildardo Springer    7/30/2024 3:01 PM EDT    Workstation ID: [id number]          Results for orders placed during the hospital encounter of 07/30/24    Cardiac Catheterization/Vascular Study    Conclusion  Table formatting from the original result was not included.  Cardiac Catheterization with PCI Report    Chinedu Lopez  [id number]  7/31/2024  @PCP@    He underwent cardiac catheterization and percutaneous coronary intervention    Indications for the procedure include: acute coronary syndrome.  Severe chest pain  Acute coronary syndrome  Non-ST elevation myocardial infarction  Atrial fibrillation with rapid response  Worsening cardiomyopathy  Severe LV dysfunction  Congestive heart failure    Procedure Details:  The risks, benefits, complications, treatment options, and expected outcomes were discussed with the patient. The patient and/or family concurred with the proposed plan, giving informed consent.    After informed consent the patient was brought to the cath lab after appropriate IV hydration was begun and oral premedication was given. He was further sedated with fentanyl. He was prepped and draped in the usual  manner. Using the modified Seldinger access technique, a 6 British sheath was placed in the femoral artery. A left heart catheterization with coronary arteriography was performed.  We used a AR mod diagnostic catheter to selectively engage the right coronary artery and we used a 5 British IMT catheter due to selective engagement of the LIMA to the LAD, findings are discussed below.    The decision was made to proceed with intervention. He received Heparin as per protocol. The details of the intervention are as follows:    We used a left coronary bypass guide catheter with guide liner support to get selective access to the vein graft to the marginal lesion was initially predilated multiple attempts using a guide liner support secondary to extensive calcification and difficult to cross the lesion with a balloon secondary to tortuosity and calcification initially with a 1 5 balloon eventually with a 2 oh balloon and then upgraded to 3 with balloon and stented with a 3.0 x 18 mm diastolic bleeding stent and lesion was postdilated with 4.0 x 12 mm noncompliant balloon up to 16 keven.  Patient tolerated procedure well with no immediate possible complication plan to obtain hemostasis by manual pressure procedural anticoagulation was heparin patient received 180 mg of Brilinta at the end of the procedure.    After the procedure was completed, sedation was stopped and the sheaths and catheters were all removed according to established hospital protocols.    Conscious sedation:  Conscious sedation was performed according to protocol.  I supervised and directed an independent trained observer with the assistance of monitoring the patient's level of consciousness and physiologic status throughout the procedure.  Intraoperative service time was 90 minutes.    Findings:    Hemodynamics Central aortic pressure systolic 120 diastolic of 66 the mean pressure of 89 mmHg  LV end-diastolic pressure of 29 mmHg  There was no gradient across  the aortic valve on the pullback of the pigtail catheter  Left Main Left main has diffuse 90% stenosis provides a small caliber marginal branch otherwise both LAD and left circumflex artery 100% occluded in the ostial portion  RCA Large-caliber vessel dominant vessel  Right coronary artery is a dominant vessel  Patent stent in the right coronary artery with no evidence of any significant in-stent restenosis  Right coronary artery provides a large caliber PDA and PLB branches that are angiographically normal  % occluded in the ostial portion  Circ 100% occluded in the ostial portion  SVG(s) Vein graft presumed to be marginal branch is 100% occluded in the ostial portion    Vein graft to the second marginal branch is patent with mid body angiographic 90% stenosis likely the culprit vessel for patient symptoms of non-ST ovation myocardial infarction likely this lesion is in-stent restenosis within the prior stent  JUANA LIMA to LAD is patent without any significant stenosis involving the ostium/body of the anastomotic site.  No significant disease involving the LAD after the LIMA touchdown  LV Not done  Coronary dominance Right coronary artery    Interventions/Vessels Successful PCI and stenting of the midportion of the vein graft to the marginal branch with placement of Xience drug-eluting stent  Guides/Wires BMW  Devices Xience drug-eluting stent 3.0 x 18 mm  Post % Stenosis  0  Pre-procedure SHANNON flow  10  Post procedure SHANNON flow  3  Closure Device None  Complications None    Estimated Blood Loss:  Minimal    Specimens: None    Complications:  None; patient tolerated the procedure well.    Disposition: PACU - hemodynamically stable.    Condition: stable    Impressions:  Native severe two-vessel coronary artery disease with 100% occlusion of the ostium of the LAD and 100% occlusion of the ostium of the left circumflex  Patent vein graft to the marginal with severe 90% stenosis involving the midportion of the  vein graft succinyl treated with placement of a Xience drug-eluting stent  Patent LIMA to the LAD  Patent stent in the right coronary artery with no significant in-stent restenosis    Recommendations:  Aspirin 81 mg p.o. once a day  Brilinta 90 mg p.o. twice daily  Continued aggressive risk factor modification  Observe patient in CVU bed overnight  Close monitoring of renal function  Optimize medical therapy for cardiomyopathy and rate control for atrial fibrillation  Patient currently being seen by nephrology and hematology oncology for underlying anemia and renal failure  Test results reviewed and discussed with patient and family  Further recommendation based on patient course     Results for orders placed during the hospital encounter of 07/30/24    Adult Transthoracic Echo Complete W/ Cont if Necessary Per Protocol    Interpretation Summary    Left ventricular systolic function is severely decreased. Left ventricular ejection fraction appears to be 26 - 30%.    The left ventricular cavity is moderately dilated.    Left ventricular wall thickness is consistent with mild concentric hypertrophy.    Left ventricular diastolic function is consistent with (grade I) impaired relaxation.    Moderately reduced right ventricular systolic function noted.    The right ventricular cavity is moderately dilated.    The left atrial cavity is moderately dilated.    Estimated right ventricular systolic pressure from tricuspid regurgitation is normal (<35 mmHg).     Lab Results   Component Value Date    GLUCOSE 118 (H) 07/31/2024    BUN 8 07/31/2024    CREATININE 0.97 07/31/2024    EGFR 88.8 07/31/2024    BCR 8.2 07/31/2024    K 3.6 07/31/2024    CO2 26.0 07/31/2024    CALCIUM 9.5 07/31/2024    ALBUMIN 3.7 07/31/2024    BILITOT 0.5 07/31/2024    AST 19 07/31/2024    ALT 5 07/31/2024      Lab Results   Component Value Date    CHOL 114 07/31/2024    TRIG 88 07/31/2024    HDL 46 07/31/2024    LDL 51 07/31/2024      Lab Results    Component Value Date    CKTOTAL 112 07/30/2024    TROPONINT 257 (C) 07/31/2024        Assessment & Plan       Atrial fibrillation with RVR        José Miguel Izquierdo MD  07/31/24  12:51 EDT

## 2024-07-31 NOTE — NURSING NOTE
Pt arrived to floor post cardiac cath at 1015 with Rt sheath in place atr line to pressure bag and ABP noted. Pt in reverse trendelenburg position to increasing c/o of SOA. O2 @ 7 liters nc. Noted pt received iv diuretic with large uop noted in newly place Nance catheter. BMP and pTT obtained. Given all due AM doses of po cardiac meds. IV Amio continues.

## 2024-07-31 NOTE — CONSULTS
"GI CONSULT  NOTE:    Referring Provider:  Dr. Reed    Chief complaint: MALACHI    Subjective . \"I was having chest pain and shortness of breath\"    History of present illness: Chinedu Lopez is a 63 y.o. male who has a history of Thorpe's esophagus, iron deficiency anemia, A-fib s/p watchman and CVA.  He presents with increased shortness of breath and chest pain.  He was found to have a non-STEMI s/p heart cath today requiring stent placement.  He continues to have shortness of breath but denies any current chest pain.  No nausea, vomiting, heartburn or difficulty swallowing.  No abdominal pain.  No constipation, diarrhea, melena or rectal bleeding.  He takes a baby aspirin at home but denies any other NSAID use.  He recently started iron at home but felt like this caused some edema and stopped use.  It sounds like he was planning possibly an outpatient video capsule endoscopy earlier this week through [facility] but this was cancelled.     Endo History:  5/6/2024 EGD/colonoscopy by Dr. Prescott -Thorpe's esophagus without dysplasia, polyps (TA), hemorrhoids  4/2021 EGD/colonoscopy by Dr. Prescott -Thorpe's esophagus without dysplasia, gastritis with biopsies H. pylori negative, polyps (HP/adenomatous), hemorrhoids    Past Medical History:  Past Medical History:   Diagnosis Date    Atrial fibrillation     June, [facility]. Abstracted from [facility].    Brain bleed 2019 October 14th [facility] for 3 weeks    CAD (coronary artery disease)     S/P CABG and PCI. Abstracted from [facility].    Hyperlipidemia     Hypertension     Myocardial infarction 2006    Non-ST elevation MI. Abstracted from [facility].    Obstructive sleep apnea on CPAP     At . Abstracted from [facility].    Presence of Watchman left atrial appendage closure device 1/31/2020    Stroke 2019    TIA (transient ischemic attack) 11/19/2013    Possible TIA- MRI shows small tumor/head. Abstracted from [facility].       Past Surgical History:  Past " Surgical History:   Procedure Laterality Date    ANGIOPLASTY  2006    3 srents were sequentially placed in promimal and mid body of the saphennous vein graft to obtuse marginal branch. Abstracted from [facility].    ATRIAL APPENDAGE EXCLUSION LEFT WITH TRANSESOPHAGEAL ECHOCARDIOGRAM N/A 1/30/2020    Procedure: Atrial Appendage Occlusion;  Surgeon: Angel Willams MD;  Location: [facility] CATH INVASIVE LOCATION;  Service: Cardiovascular    ATRIAL APPENDAGE EXCLUSION LEFT WITH TRANSESOPHAGEAL ECHOCARDIOGRAM N/A 1/30/2020    Procedure: Atrial Appendage Occlusion;  Surgeon: John Sheridan MD;  Location: [facility] CATH INVASIVE LOCATION;  Service: Cardiology    CARDIAC CATHETERIZATION  2000 2003, 2006, 2012. Abstracted from [facility].    CORONARY ARTERY BYPASS GRAFT  2000    Triple. Abstracted from [facility].    MUSCLE REPAIR      Tendons and nerves in right lower extremity. Abstracted from [facility].    OTHER SURGICAL HISTORY  08/2012    RCA mid and proximal- Xience stent x3. Abstracted from [facility].    OTHER SURGICAL HISTORY  09/2016    Head trauma/bleed at [facility]. Abstracted from [facility].       Social History:  Social History     Tobacco Use    Smoking status: Every Day     Current packs/day: 1.00     Average packs/day: 1 pack/day for 40.6 years (40.6 ttl pk-yrs)     Types: Cigarettes     Start date: 1984    Smokeless tobacco: Never    Tobacco comments:     Smoking cessation--encouraged---refuses patch, gum or chantix   Vaping Use    Vaping status: Never Used   Substance Use Topics    Alcohol use: Not Currently    Drug use: No       Family History:  Family History   Problem Relation Age of Onset    Diabetes Mother     Heart attack Father     Diabetes Father     Other Sister         MRSA    Heart disease Other         Positive for Ischemic    Cancer Other     Hypertension Other        Medications:  Medications Prior to Admission   Medication Sig Dispense Refill Last Dose    amiodarone (PACERONE) 200  MG tablet Take 1 tablet by mouth Daily.       bumetanide (BUMEX) 1 MG tablet Take 1 tablet by mouth Daily.       amantadine (SYMMETREL) 100 MG capsule Take 1 capsule by mouth Daily.       amantadine (SYMMETREL) 100 MG tablet Take 1 tablet by mouth 1 (One) Time.       aspirin 81 MG chewable tablet Chew 1 tablet Daily.       Cholecalciferol (VITAMIN D3) 125 MCG (5000 UT) capsule capsule Take 1 capsule by mouth Daily.       Cyanocobalamin (VITAMIN B 12) 500 MCG tablet Take 1 tablet by mouth Daily.       doxazosin (CARDURA) 1 MG tablet Take 1 tablet by mouth every night at bedtime.       famotidine (PEPCID) 40 MG tablet Take 1 tablet by mouth Daily.       folic acid (FOLVITE) 1 MG tablet Take 1 tablet by mouth Daily.       guaiFENesin (MUCINEX) 600 MG 12 hr tablet Take 1 tablet by mouth Daily.       isosorbide mononitrate (IMDUR) 30 MG 24 hr tablet Take 1 tablet by mouth Daily.       metFORMIN (GLUCOPHAGE) 1000 MG tablet Take 1 tablet by mouth 2 (Two) Times a Day With Meals.       Methylcobalamin 5000 MCG tablet dispersible Place 500 mcg on the tongue Daily.       nitroglycerin (NITROLINGUAL) 0.4 MG/SPRAY spray Place 1 spray under the tongue Every 5 (Five) Minutes As Needed for Chest Pain.       pantoprazole (PROTONIX) 40 MG EC tablet Take 1 tablet by mouth 2 (Two) Times a Day.       potassium chloride (K-DUR,KLOR-CON) 20 MEQ CR tablet Take 1 tablet by mouth 2 (Two) Times a Day.       QUEtiapine (SEROquel) 25 MG tablet Take 1 tablet by mouth Daily.       ranolazine (RANEXA) 500 MG 12 hr tablet Take 1 tablet by mouth 2 (Two) Times a Day.  6     thiamine (VITAMIN B-1) 100 MG tablet Take 1 tablet by mouth Daily.       thiamine (VITAMIN B1) 100 MG tablet Take 1 tablet by mouth Daily.          Scheduled Meds:Acetylcysteine, 1,200 mg, Oral, BID  amantadine, 100 mg, Oral, Daily  [START ON 8/1/2024] aspirin, 81 mg, Oral, Daily  bumetanide, 1 mg, Intravenous, Q12H  folic acid, 1 mg, Oral, Daily  guaiFENesin, 600 mg, Oral,  Daily  isosorbide mononitrate, 30 mg, Oral, Daily  magnesium sulfate, 2 g, Intravenous, Once  metoprolol tartrate, 25 mg, Oral, Q12H  pantoprazole, 40 mg, Oral, BID AC  ranolazine, 500 mg, Oral, BID  sodium chloride, 10 mL, Intravenous, Q12H  ticagrelor, 90 mg, Oral, Q12H      Continuous Infusions:amiodarone, 0.5 mg/min, Last Rate: 0.5 mg/min (07/31/24 1120)      PRN Meds:.  acetaminophen **OR** acetaminophen **OR** acetaminophen    aluminum-magnesium hydroxide-simethicone    atropine    senna-docusate sodium **AND** polyethylene glycol **AND** bisacodyl **AND** bisacodyl    Calcium Replacement - Follow Nurse / BPA Driven Protocol    Magnesium Standard Dose Replacement - Follow Nurse / BPA Driven Protocol    nitroglycerin    ondansetron ODT **OR** ondansetron    Phosphorus Replacement - Follow Nurse / BPA Driven Protocol    Potassium Replacement - Follow Nurse / BPA Driven Protocol    sodium chloride    sodium chloride    sodium chloride    ALLERGIES:  Patient has no known allergies.    ROS:  The following systems were reviewed  Constitution:  No fevers, chills, no unintentional weight loss  Skin: no rash, no jaundice  Eyes:  No blurry vision, no eye pain  HENT:  No change in hearing or smell  Resp: Dyspnea without cough  CV: Chest pain  :  No dysuria, hematuria  Musculoskeletal:  No leg cramps or arthralgias  Neuro:  No tremor, no numbness  Psych:  No depression or confusion    Objective resting in bed, chronically ill-appearing but no acute distress, family and nurse at bedside    Vital Signs:   Vitals:    07/31/24 1215 07/31/24 1230 07/31/24 1245 07/31/24 1300   BP: 124/83 134/80 135/79 120/85   BP Location:       Patient Position:       Pulse: (!) 133 117 118 (!) 128   Resp:       Temp:       TempSrc:       SpO2: 94% 95% 94% 94%   Weight:       Height:           Physical Exam:     General Appearance:    Awake and alert, in no acute distress, overweight   Head:    Normocephalic, without obvious abnormality,  "atraumatic   Throat:   No oral lesions, no thrush, oral mucosa moist   Lungs:     Respirations regular, even and unlabored   Chest Wall:    No abnormalities observed   Abdomen:     Soft, non-tender, nondistended   Rectal:     Deferred   Extremities:   Moves all extremities, no edema, no cyanosis       Skin:   No rash, no jaundice, normal palpation       Neurologic:   Cranial nerves 2 - 12 grossly intact  Somewhat anxious       Results Review:   I reviewed the patient's labs and imaging.  CBC    Results from last 7 days   Lab Units 07/31/24  0508 07/30/24  1029   WBC 10*3/mm3 5.31 4.62   HEMOGLOBIN g/dL 8.6* 7.5*   PLATELETS 10*3/mm3 213 226     CMP   Results from last 7 days   Lab Units 07/31/24  1155 07/31/24  0508 07/30/24  1029   SODIUM mmol/L 140 139 140   POTASSIUM mmol/L 3.6 4.0 3.9   CHLORIDE mmol/L 99 100 102   CO2 mmol/L 26.0 31.0* 28.1   BUN mg/dL 8 9 10   CREATININE mg/dL 0.97 1.10 1.23   GLUCOSE mg/dL 118* 98 96   ALBUMIN g/dL  --  3.7  --    BILIRUBIN mg/dL  --  0.5  --    ALK PHOS U/L  --  36*  --    AST (SGOT) U/L  --  19  --    ALT (SGPT) U/L  --  5  --    MAGNESIUM mg/dL  --  1.8  --    PHOSPHORUS mg/dL  --  3.8  --      Cr Clearance Estimated Creatinine Clearance: 100.2 mL/min (by C-G formula based on SCr of 0.97 mg/dL).  Coag   Results from last 7 days   Lab Units 07/31/24  1155 07/31/24  0256 07/30/24 2008 07/30/24  1255   APTT seconds 80.2* 69.2 40.4* 36.0*     HbA1C No results found for: \"HGBA1C\"  Blood Glucose No results found for: \"POCGLU\"  Infection     UA    Results from last 7 days   Lab Units 07/30/24  1431   NITRITE UA  Negative     Imaging Results (Last 72 Hours)       Procedure Component Value Units Date/Time    US Renal Bilateral [[id number]] Collected: 07/30/24 1453     Updated: 07/30/24 1503    Narrative:      Date of Exam: 7/30/2024 2:05 PM EDT    Indication: LUDWIN/CKD.    Comparison: No comparisons available.    Technique: Grayscale and color Doppler ultrasound evaluation of the " kidneys and urinary bladder was performed.      Findings:  Right Kidney: Right kidney measures 11.6 cm in long axis. Likely benign cyst in the right kidney measuring up to 2.2 cm. Increased renal cortical echogenicity.  No hydronephrosis.. No sonographically evident nephrolithiasis.    Left Kidney:  Left kidney measures 9.3 cm in long axis. Mildly increased renal cortical echogenicity. No suspicious appearing lesions.No hydronephrosis.No sonographically evident nephrolithiasis.    Bladder: The bladder appears unremarkable.    Additional findings: Mild perihepatic and pelvic ascites.      Impression:      Impression:  No hydronephrosis.    Increased renal cortical echogenicity which can be seen in medical renal disease.    Small volume ascites.          Electronically Signed: Gildardo Springer    7/30/2024 3:01 PM EDT    Workstation ID: [id number]            ASSESSMENT:  Iron deficiency anemia  Non-STEMI with CAD/stent placement (7/31/24)  CHF  A-fib, history of Watchman  Pulmonary edema  DMII  Thorpe's esophagus without dysplasia  History of TIA/intracranial hemorrhage s/p fall    PLAN:  Patient is a 63-year-old male with a history of A-fib, diabetes, heart failure and Thorpe's esophagus.  He presents with shortness of breath and chest pain.  Found to have a non-STEMI with heart cath today requiring stent placement.  History of Watchman.  GI asked consult for iron deficiency anemia.    5/6/2024 EGD/colonoscopy with Thorpe's esophagus without dysplasia, adenomatous polyps and hemorrhoids.  No evidence of bleeding source identified.  He denies any evidence of overt GI bleeding.  Agree with IV iron supplementation.  Continue daily PPI.  We can plan outpatient video capsule endoscopy upon discharge for further evaluation if needed.  Sounds like patient was planning to have this done earlier this week through his PCP office/CM but this was cancelled for some reason.  Okay for diet as tolerated.  Little for GI to offer at  this time, call with questions or concerns.    I discussed the patients findings and my recommendations with the patient.    We appreciate the referral    Electronically signed by AMAYA Begum, 07/31/24, 1:47 PM EDT.

## 2024-07-31 NOTE — PROGRESS NOTES
[facility] MEDICINE SERVICE  DAILY PROGRESS NOTE    NAME: Chinedu Lopez  : 1960  MRN: [id number]      LOS: 1 day     PROVIDER OF SERVICE: Timothy Duane Brammell, MD    Chief Complaint: Atrial fibrillation with RVR    Subjective:     Interval History:  History taken from: patient  Patient Complaints:  Denies further chest pain.  Has improvement in shortness of breath and has had significant diuresis.  He denies any gastric or bowel issues.  He otherwise denies any additional acute issues.    Review of Systems:   Review of Systems   All other systems reviewed and are negative.      Objective:     Vital Signs  Temp:  [97.5 °F (36.4 °C)-98.9 °F (37.2 °C)] 98.4 °F (36.9 °C)  Heart Rate:  [] 128  Resp:  [17-24] 24  BP: (120-161)/() 129/91  Flow (L/min):  [3-7] 6   Body mass index is 31.48 kg/m².    Physical Exam  Physical Exam  Constitutional:       General: He is not in acute distress.     Appearance: Normal appearance. He is obese.   HENT:      Head: Normocephalic.   Cardiovascular:      Rate and Rhythm: Tachycardia present.   Pulmonary:      Effort: Pulmonary effort is normal.      Breath sounds: Normal breath sounds.      Comments: Decreased breath sounds bilateral anteriorly  Abdominal:      General: Bowel sounds are normal.      Palpations: Abdomen is soft.      Tenderness: There is no abdominal tenderness.   Musculoskeletal:         General: No swelling.   Neurological:      Mental Status: He is alert. Mental status is at baseline.         Scheduled Meds   Acetylcysteine, 1,200 mg, Oral, BID  amantadine, 100 mg, Oral, Daily  amiodarone, 200 mg, Oral, Q24H  [START ON 2024] aspirin, 81 mg, Oral, Daily  bumetanide, 1 mg, Intravenous, Q12H  folic acid, 1 mg, Oral, Daily  guaiFENesin, 600 mg, Oral, Daily  isosorbide mononitrate, 30 mg, Oral, Daily  magnesium sulfate, 2 g, Intravenous, Once  metoprolol tartrate, 25 mg, Oral, Q8H  pantoprazole, 40 mg, Oral, BID AC  ranolazine, 500 mg,  Oral, BID  sodium chloride, 10 mL, Intravenous, Q12H  ticagrelor, 90 mg, Oral, Q12H       PRN Meds     acetaminophen **OR** acetaminophen **OR** acetaminophen    aluminum-magnesium hydroxide-simethicone    atropine    senna-docusate sodium **AND** polyethylene glycol **AND** bisacodyl **AND** bisacodyl    Calcium Replacement - Follow Nurse / BPA Driven Protocol    Magnesium Standard Dose Replacement - Follow Nurse / BPA Driven Protocol    nitroglycerin    ondansetron ODT **OR** ondansetron    Phosphorus Replacement - Follow Nurse / BPA Driven Protocol    Potassium Replacement - Follow Nurse / BPA Driven Protocol    sodium chloride    sodium chloride    sodium chloride   Infusions         Diagnostic Data    Results from last 7 days   Lab Units 07/31/24  1155 07/31/24  0508   WBC 10*3/mm3  --  5.31   HEMOGLOBIN g/dL  --  8.6*   HEMATOCRIT %  --  32.1*   PLATELETS 10*3/mm3  --  213   GLUCOSE mg/dL 118* 98   CREATININE mg/dL 0.97 1.10   BUN mg/dL 8 9   SODIUM mmol/L 140 139   POTASSIUM mmol/L 3.6 4.0   AST (SGOT) U/L  --  19   ALT (SGPT) U/L  --  5   ALK PHOS U/L  --  36*   BILIRUBIN mg/dL  --  0.5   ANION GAP mmol/L 15.0 8.0       US Renal Bilateral    Result Date: 7/30/2024  Impression: No hydronephrosis. Increased renal cortical echogenicity which can be seen in medical renal disease. Small volume ascites. Electronically Signed: Gildardo Springer  7/30/2024 3:01 PM EDT  Workstation ID: [id number]           Assessment:      Coronary artery disease status post cath and stenting  Cardiomyopathy   iron deficiency anemia  Acute on chronic combined congestive heart failure  History of atrial fibs and watchman's procedure  Pleural effusions  Acute on chronic renal injury  Type 2 diabetes  History of intracranial hemorrhage after fall           Plan: Appreciate subspecialty input.  Ongoing diuresis.  Follow-up labs.  Active and Resolved Problems  Active Hospital Problems    Diagnosis  POA    **Atrial fibrillation with RVR  [I48.91]  Yes      Resolved Hospital Problems   No resolved problems to display.           VTE Prophylaxis:  No VTE prophylaxis order currently exists.         Code status is   Code Status and Medical Interventions: CPR (Attempt to Resuscitate); Full Support   Ordered at: 07/30/24 0958     Code Status (Patient has no pulse and is not breathing):    CPR (Attempt to Resuscitate)     Medical Interventions (Patient has pulse or is breathing):    Full Support       Plan for disposition:home in 3 days    Time: 30 minutes    Signature: Electronically signed by Timothy Duane Brammell, MD, 07/31/24, 17:39 EDT.  [facility] Hospitalist Team

## 2024-07-31 NOTE — NURSING NOTE
Call placed to Dr Kwon/ cardiology to update status. Addressed anxiety, large uop and labs. Orders received. Please note I & O and MAR

## 2024-08-01 ENCOUNTER — APPOINTMENT (OUTPATIENT)
Dept: GENERAL RADIOLOGY | Facility: HOSPITAL | Age: 64
End: 2024-08-01
Payer: OTHER GOVERNMENT

## 2024-08-01 LAB
ALBUMIN SERPL ELPH-MCNC: 3.2 G/DL (ref 2.9–4.4)
ALBUMIN/GLOB SERPL: 1.1 {RATIO} (ref 0.7–1.7)
ALPHA1 GLOB SERPL ELPH-MCNC: 0.3 G/DL (ref 0–0.4)
ALPHA2 GLOB SERPL ELPH-MCNC: 1 G/DL (ref 0.4–1)
ANION GAP SERPL CALCULATED.3IONS-SCNC: 11.6 MMOL/L (ref 5–15)
B-GLOBULIN SERPL ELPH-MCNC: 0.9 G/DL (ref 0.7–1.3)
BH BB BLOOD EXPIRATION DATE: NORMAL
BH BB BLOOD TYPE BARCODE: 7300
BH BB DISPENSE STATUS: NORMAL
BH BB PRODUCT CODE: NORMAL
BH BB UNIT NUMBER: NORMAL
BUN SERPL-MCNC: 8 MG/DL (ref 8–23)
BUN/CREAT SERPL: 7.4 (ref 7–25)
CALCIUM SPEC-SCNC: 9.2 MG/DL (ref 8.6–10.5)
CHLORIDE SERPL-SCNC: 99 MMOL/L (ref 98–107)
CHOLEST SERPL-MCNC: 124 MG/DL (ref 0–200)
CO2 SERPL-SCNC: 29.4 MMOL/L (ref 22–29)
CREAT SERPL-MCNC: 1.08 MG/DL (ref 0.76–1.27)
CROSSMATCH INTERPRETATION: NORMAL
DEPRECATED RDW RBC AUTO: 59.1 FL (ref 37–54)
EGFRCR SERPLBLD CKD-EPI 2021: 78.1 ML/MIN/1.73
ERYTHROCYTE [DISTWIDTH] IN BLOOD BY AUTOMATED COUNT: 22.7 % (ref 12.3–15.4)
GAMMA GLOB SERPL ELPH-MCNC: 0.7 G/DL (ref 0.4–1.8)
GLOBULIN SER CALC-MCNC: 2.8 G/DL (ref 2.2–3.9)
GLUCOSE BLDC GLUCOMTR-MCNC: 113 MG/DL (ref 70–105)
GLUCOSE BLDC GLUCOMTR-MCNC: 134 MG/DL (ref 70–105)
GLUCOSE BLDC GLUCOMTR-MCNC: 138 MG/DL (ref 70–105)
GLUCOSE BLDC GLUCOMTR-MCNC: 173 MG/DL (ref 70–105)
GLUCOSE SERPL-MCNC: 107 MG/DL (ref 65–99)
HCT VFR BLD AUTO: 31.6 % (ref 37.5–51)
HDLC SERPL-MCNC: 47 MG/DL (ref 40–60)
HGB BLD-MCNC: 8.6 G/DL (ref 13–17.7)
LABORATORY COMMENT REPORT: NORMAL
LDLC SERPL CALC-MCNC: 61 MG/DL (ref 0–100)
LDLC/HDLC SERPL: 1.28 {RATIO}
M PROTEIN SERPL ELPH-MCNC: NORMAL G/DL
MAGNESIUM SERPL-MCNC: 1.7 MG/DL (ref 1.6–2.4)
MCH RBC QN AUTO: 20.2 PG (ref 26.6–33)
MCHC RBC AUTO-ENTMCNC: 27.2 G/DL (ref 31.5–35.7)
MCV RBC AUTO: 74.2 FL (ref 79–97)
PHOSPHATE SERPL-MCNC: 3.3 MG/DL (ref 2.5–4.5)
PLATELET # BLD AUTO: 260 10*3/MM3 (ref 140–450)
PMV BLD AUTO: 9.3 FL (ref 6–12)
POTASSIUM SERPL-SCNC: 3.6 MMOL/L (ref 3.5–5.2)
POTASSIUM SERPL-SCNC: 3.8 MMOL/L (ref 3.5–5.2)
PROT PATTERN SERPL ELPH-IMP: NORMAL
PROT SERPL-MCNC: 6 G/DL (ref 6–8.5)
QT INTERVAL: 296 MS
QT INTERVAL: 318 MS
QT INTERVAL: 344 MS
QT INTERVAL: 354 MS
QTC INTERVAL: 417 MS
QTC INTERVAL: 417 MS
QTC INTERVAL: 430 MS
QTC INTERVAL: 450 MS
RBC # BLD AUTO: 4.26 10*6/MM3 (ref 4.14–5.8)
SODIUM SERPL-SCNC: 140 MMOL/L (ref 136–145)
TRIGL SERPL-MCNC: 84 MG/DL (ref 0–150)
UNIT  ABO: NORMAL
UNIT  RH: NORMAL
VLDLC SERPL-MCNC: 16 MG/DL (ref 5–40)
WBC NRBC COR # BLD AUTO: 7.44 10*3/MM3 (ref 3.4–10.8)

## 2024-08-01 PROCEDURE — 71045 X-RAY EXAM CHEST 1 VIEW: CPT

## 2024-08-01 PROCEDURE — 82948 REAGENT STRIP/BLOOD GLUCOSE: CPT

## 2024-08-01 PROCEDURE — 84100 ASSAY OF PHOSPHORUS: CPT | Performed by: INTERNAL MEDICINE

## 2024-08-01 PROCEDURE — 84132 ASSAY OF SERUM POTASSIUM: CPT | Performed by: HOSPITALIST

## 2024-08-01 PROCEDURE — 25010000002 MAGNESIUM SULFATE 2 GM/50ML SOLUTION: Performed by: INTERNAL MEDICINE

## 2024-08-01 PROCEDURE — 85027 COMPLETE CBC AUTOMATED: CPT | Performed by: INTERNAL MEDICINE

## 2024-08-01 PROCEDURE — 80061 LIPID PANEL: CPT | Performed by: INTERNAL MEDICINE

## 2024-08-01 PROCEDURE — 99232 SBSQ HOSP IP/OBS MODERATE 35: CPT | Performed by: INTERNAL MEDICINE

## 2024-08-01 PROCEDURE — 63710000001 INSULIN LISPRO (HUMAN) PER 5 UNITS

## 2024-08-01 PROCEDURE — 93010 ELECTROCARDIOGRAM REPORT: CPT | Performed by: INTERNAL MEDICINE

## 2024-08-01 PROCEDURE — 99222 1ST HOSP IP/OBS MODERATE 55: CPT | Performed by: INTERNAL MEDICINE

## 2024-08-01 PROCEDURE — 83735 ASSAY OF MAGNESIUM: CPT | Performed by: INTERNAL MEDICINE

## 2024-08-01 PROCEDURE — 80048 BASIC METABOLIC PNL TOTAL CA: CPT | Performed by: INTERNAL MEDICINE

## 2024-08-01 PROCEDURE — 93005 ELECTROCARDIOGRAM TRACING: CPT | Performed by: INTERNAL MEDICINE

## 2024-08-01 PROCEDURE — 25010000002 DIGOXIN PER 500 MCG: Performed by: INTERNAL MEDICINE

## 2024-08-01 RX ORDER — POTASSIUM CHLORIDE 20 MEQ/1
40 TABLET, EXTENDED RELEASE ORAL EVERY 4 HOURS
Status: COMPLETED | OUTPATIENT
Start: 2024-08-01 | End: 2024-08-01

## 2024-08-01 RX ORDER — DIGOXIN 0.25 MG/ML
250 INJECTION INTRAMUSCULAR; INTRAVENOUS ONCE
Status: COMPLETED | OUTPATIENT
Start: 2024-08-01 | End: 2024-08-01

## 2024-08-01 RX ORDER — MAGNESIUM SULFATE HEPTAHYDRATE 40 MG/ML
2 INJECTION, SOLUTION INTRAVENOUS EVERY 12 HOURS
Status: COMPLETED | OUTPATIENT
Start: 2024-08-01 | End: 2024-08-01

## 2024-08-01 RX ORDER — METOPROLOL SUCCINATE 50 MG/1
50 TABLET, EXTENDED RELEASE ORAL EVERY 12 HOURS SCHEDULED
Status: DISCONTINUED | OUTPATIENT
Start: 2024-08-01 | End: 2024-08-02

## 2024-08-01 RX ORDER — SPIRONOLACTONE 25 MG/1
12.5 TABLET ORAL DAILY
Status: DISCONTINUED | OUTPATIENT
Start: 2024-08-01 | End: 2024-08-03 | Stop reason: HOSPADM

## 2024-08-01 RX ORDER — BUMETANIDE 1 MG/1
1 TABLET ORAL 2 TIMES DAILY
Status: DISCONTINUED | OUTPATIENT
Start: 2024-08-01 | End: 2024-08-03 | Stop reason: HOSPADM

## 2024-08-01 RX ADMIN — MAGNESIUM SULFATE HEPTAHYDRATE 2 G: 40 INJECTION, SOLUTION INTRAVENOUS at 20:15

## 2024-08-01 RX ADMIN — Medication 1200 MG: at 08:30

## 2024-08-01 RX ADMIN — AMANTADINE HYDROCHLORIDE 100 MG: 100 CAPSULE ORAL at 09:51

## 2024-08-01 RX ADMIN — METOPROLOL TARTRATE 25 MG: 25 TABLET, FILM COATED ORAL at 02:54

## 2024-08-01 RX ADMIN — SPIRONOLACTONE 12.5 MG: 25 TABLET ORAL at 12:06

## 2024-08-01 RX ADMIN — EMPAGLIFLOZIN 10 MG: 10 TABLET, FILM COATED ORAL at 12:06

## 2024-08-01 RX ADMIN — Medication 1200 MG: at 20:17

## 2024-08-01 RX ADMIN — AMIODARONE HYDROCHLORIDE 200 MG: 200 TABLET ORAL at 08:31

## 2024-08-01 RX ADMIN — MAGNESIUM SULFATE HEPTAHYDRATE 2 G: 40 INJECTION, SOLUTION INTRAVENOUS at 08:40

## 2024-08-01 RX ADMIN — Medication 10 ML: at 20:28

## 2024-08-01 RX ADMIN — PANTOPRAZOLE SODIUM 40 MG: 40 TABLET, DELAYED RELEASE ORAL at 17:29

## 2024-08-01 RX ADMIN — TICAGRELOR 90 MG: 90 TABLET ORAL at 08:32

## 2024-08-01 RX ADMIN — RANOLAZINE 500 MG: 500 TABLET, EXTENDED RELEASE ORAL at 08:33

## 2024-08-01 RX ADMIN — PANTOPRAZOLE SODIUM 40 MG: 40 TABLET, DELAYED RELEASE ORAL at 08:32

## 2024-08-01 RX ADMIN — ASPIRIN 81 MG: 81 TABLET, COATED ORAL at 08:31

## 2024-08-01 RX ADMIN — POTASSIUM CHLORIDE 40 MEQ: 1500 TABLET, EXTENDED RELEASE ORAL at 08:30

## 2024-08-01 RX ADMIN — GUAIFENESIN 600 MG: 600 TABLET, EXTENDED RELEASE ORAL at 08:34

## 2024-08-01 RX ADMIN — DIGOXIN 250 MCG: 0.25 INJECTION INTRAMUSCULAR; INTRAVENOUS at 15:11

## 2024-08-01 RX ADMIN — FOLIC ACID 1 MG: 1 TABLET ORAL at 08:34

## 2024-08-01 RX ADMIN — METOPROLOL SUCCINATE 50 MG: 50 TABLET, EXTENDED RELEASE ORAL at 20:17

## 2024-08-01 RX ADMIN — TICAGRELOR 90 MG: 90 TABLET ORAL at 20:17

## 2024-08-01 RX ADMIN — POTASSIUM CHLORIDE 40 MEQ: 1500 TABLET, EXTENDED RELEASE ORAL at 05:31

## 2024-08-01 RX ADMIN — BUMETANIDE 1 MG: 1 TABLET ORAL at 20:17

## 2024-08-01 RX ADMIN — ISOSORBIDE MONONITRATE 30 MG: 30 TABLET, EXTENDED RELEASE ORAL at 08:33

## 2024-08-01 RX ADMIN — RANOLAZINE 500 MG: 500 TABLET, EXTENDED RELEASE ORAL at 20:17

## 2024-08-01 RX ADMIN — BUMETANIDE 1 MG: 1 TABLET ORAL at 09:48

## 2024-08-01 RX ADMIN — INSULIN LISPRO 2 UNITS: 100 INJECTION, SOLUTION INTRAVENOUS; SUBCUTANEOUS at 08:30

## 2024-08-01 RX ADMIN — METOPROLOL SUCCINATE 50 MG: 50 TABLET, EXTENDED RELEASE ORAL at 09:51

## 2024-08-01 RX ADMIN — Medication 10 ML: at 08:35

## 2024-08-01 NOTE — CONSULTS
CC--- coronary artery disease, atrial fibrillation, Watchman device in situ and LV dysfunction    Sub  63-year-old male patient well-known to me admitted with shortness of breath and has rapid AF and non-STEMI and underwent PCI to vein graft to marginal branch and patient continues to have difficult to control AF and my consultation requested.  Patient has been in AF since 2020 consistent with longstanding persistent AF and patient is on amiodarone at home  Patient placed on intravenous Cardizem for rate control  Patient denies any chest pain or dyspnea today.  Patient had a prior watchman implantation for intracranial bleed and Watchman device was placed in 2020.  Progressive LV dysfunction was noted with EF less than 30% and he also has history of diabetes hypertension hyperlipidemia.      Past Medical History:   Diagnosis Date    Atrial fibrillation     June, [facility]. Abstracted from [facility].    Brain bleed 2019 October 14th fell - Hospital on 17th for 3 weeks    CAD (coronary artery disease)     S/P CABG and PCI. Abstracted from [facility].    Hyperlipidemia     Hypertension     Myocardial infarction 2006    Non-ST elevation MI. Abstracted from [facility].    Obstructive sleep apnea on CPAP     At . Abstracted from [facility].    Presence of Watchman left atrial appendage closure device 1/31/2020    Stroke 2019    TIA (transient ischemic attack) 11/19/2013    Possible TIA- MRI shows small tumor/head. Abstracted from [facility].     Past Surgical History:   Procedure Laterality Date    ANGIOPLASTY  2006    3 srents were sequentially placed in promimal and mid body of the saphennous vein graft to obtuse marginal branch. Abstracted from [facility].    ATRIAL APPENDAGE EXCLUSION LEFT WITH TRANSESOPHAGEAL ECHOCARDIOGRAM N/A 1/30/2020    Procedure: Atrial Appendage Occlusion;  Surgeon: Angel Willams MD;  Location: [facility] CATH INVASIVE LOCATION;  Service: Cardiovascular    ATRIAL APPENDAGE EXCLUSION LEFT WITH  TRANSESOPHAGEAL ECHOCARDIOGRAM N/A 1/30/2020    Procedure: Atrial Appendage Occlusion;  Surgeon: John Sheridan MD;  Location: [facility] CATH INVASIVE LOCATION;  Service: Cardiology    CARDIAC CATHETERIZATION  2000 2003, 2006, 2012. Abstracted from [facility].    CARDIAC CATHETERIZATION N/A 7/31/2024    Procedure: Left Heart Cath possible PCI;  Surgeon: José Miguel Izquierdo MD;  Location: [facility] CATH INVASIVE LOCATION;  Service: Cardiovascular;  Laterality: N/A;    CARDIAC CATHETERIZATION N/A 7/31/2024    Procedure: Stent ANA coronary;  Surgeon: José Miguel Izquierdo MD;  Location: [facility] CATH INVASIVE LOCATION;  Service: Cardiovascular;  Laterality: N/A;    CARDIAC CATHETERIZATION N/A 7/31/2024    Procedure: Percutaneous Coronary Intervention;  Surgeon: José Miguel Izquierdo MD;  Location: [facility] CATH INVASIVE LOCATION;  Service: Cardiovascular;  Laterality: N/A;    CORONARY ARTERY BYPASS GRAFT  2000    Triple. Abstracted from [facility].    MUSCLE REPAIR      Tendons and nerves in right lower extremity. Abstracted from [facility].    OTHER SURGICAL HISTORY  08/2012    RCA mid and proximal- Xience stent x3. Abstracted from [facility].    OTHER SURGICAL HISTORY  09/2016    Head trauma/bleed at [facility]. Abstracted from [facility].     Family History   Problem Relation Age of Onset    Diabetes Mother     Heart attack Father     Diabetes Father     Other Sister         MRSA    Heart disease Other         Positive for Ischemic    Cancer Other     Hypertension Other      Social History     Tobacco Use    Smoking status: Every Day     Current packs/day: 1.00     Average packs/day: 1 pack/day for 40.6 years (40.6 ttl pk-yrs)     Types: Cigarettes     Start date: 1984    Smokeless tobacco: Never    Tobacco comments:     Smoking cessation--encouraged---refuses patch, gum or chantix   Vaping Use    Vaping status: Never Used   Substance Use Topics    Alcohol use: Not Currently    Drug use: No     Medications  Prior to Admission   Medication Sig Dispense Refill Last Dose    amiodarone (PACERONE) 200 MG tablet Take 1 tablet by mouth Daily.       bumetanide (BUMEX) 1 MG tablet Take 1 tablet by mouth Daily.       amantadine (SYMMETREL) 100 MG capsule Take 1 capsule by mouth Daily.       amantadine (SYMMETREL) 100 MG tablet Take 1 tablet by mouth 1 (One) Time.       aspirin 81 MG chewable tablet Chew 1 tablet Daily.       Cholecalciferol (VITAMIN D3) 125 MCG (5000 UT) capsule capsule Take 1 capsule by mouth Daily.       Cyanocobalamin (VITAMIN B 12) 500 MCG tablet Take 1 tablet by mouth Daily.       doxazosin (CARDURA) 1 MG tablet Take 1 tablet by mouth every night at bedtime.       famotidine (PEPCID) 40 MG tablet Take 1 tablet by mouth Daily.       folic acid (FOLVITE) 1 MG tablet Take 1 tablet by mouth Daily.       guaiFENesin (MUCINEX) 600 MG 12 hr tablet Take 1 tablet by mouth Daily.       isosorbide mononitrate (IMDUR) 30 MG 24 hr tablet Take 1 tablet by mouth Daily.       metFORMIN (GLUCOPHAGE) 1000 MG tablet Take 1 tablet by mouth 2 (Two) Times a Day With Meals.       Methylcobalamin 5000 MCG tablet dispersible Place 500 mcg on the tongue Daily.       nitroglycerin (NITROLINGUAL) 0.4 MG/SPRAY spray Place 1 spray under the tongue Every 5 (Five) Minutes As Needed for Chest Pain.       pantoprazole (PROTONIX) 40 MG EC tablet Take 1 tablet by mouth 2 (Two) Times a Day.       potassium chloride (K-DUR,KLOR-CON) 20 MEQ CR tablet Take 1 tablet by mouth 2 (Two) Times a Day.       QUEtiapine (SEROquel) 25 MG tablet Take 1 tablet by mouth Daily.       ranolazine (RANEXA) 500 MG 12 hr tablet Take 1 tablet by mouth 2 (Two) Times a Day.  6     thiamine (VITAMIN B-1) 100 MG tablet Take 1 tablet by mouth Daily.       thiamine (VITAMIN B1) 100 MG tablet Take 1 tablet by mouth Daily.        Allergies:  Patient has no known allergies.    Review of Systems   General:  positive for fatigue and tiredness  Eyes: No redness  Cardiovascular:  No chest pain, no palpitations        Physical Exam    General:      well developed, well nourished, in no acute distress.    Head:      normocephalic and atraumatic.    Eyes:      PERRL/EOM intact, conjunctivae and sclerae clear without nystagmus.    Neck:      no  thyromegaly, trachea central with normal respiratory effort  Lungs:      clear bilaterally to auscultation.    Heart:       irregular rate and rhythm, S1, S2 without murmurs, rubs, or gallops  Skin:      intact without lesions or rashes.    Psych:      alert and cooperative; normal mood and affect; normal attention span and concentration.          CBC    Results from last 7 days   Lab Units 08/01/24  0254 07/31/24  0508 07/30/24  1029   WBC 10*3/mm3 7.44 5.31 4.62   HEMOGLOBIN g/dL 8.6* 8.6* 7.5*   PLATELETS 10*3/mm3 260 213 226     BMP   Results from last 7 days   Lab Units 08/01/24  0254 07/31/24  1155 07/31/24  0508 07/30/24  1029   SODIUM mmol/L 140 140 139 140   POTASSIUM mmol/L 3.6 3.6 4.0 3.9   CHLORIDE mmol/L 99 99 100 102   CO2 mmol/L 29.4* 26.0 31.0* 28.1   BUN mg/dL 8 8 9 10   CREATININE mg/dL 1.08 0.97 1.10 1.23   GLUCOSE mg/dL 107* 118* 98 96   MAGNESIUM mg/dL 1.7  --  1.8  --    PHOSPHORUS mg/dL 3.3  --  3.8  --      CMP   Results from last 7 days   Lab Units 08/01/24  0254 07/31/24  1155 07/31/24  0508 07/30/24  1029   SODIUM mmol/L 140 140 139 140   POTASSIUM mmol/L 3.6 3.6 4.0 3.9   CHLORIDE mmol/L 99 99 100 102   CO2 mmol/L 29.4* 26.0 31.0* 28.1   BUN mg/dL 8 8 9 10   CREATININE mg/dL 1.08 0.97 1.10 1.23   GLUCOSE mg/dL 107* 118* 98 96   ALBUMIN g/dL  --   --  3.7  --    BILIRUBIN mg/dL  --   --  0.5  --    ALK PHOS U/L  --   --  36*  --    AST (SGOT) U/L  --   --  19  --    ALT (SGPT) U/L  --   --  5  --      Radiology(recent) US Renal Bilateral    Result Date: 7/30/2024  Impression: No hydronephrosis. Increased renal cortical echogenicity which can be seen in medical renal disease. Small volume ascites. Electronically Signed: Gildardo WADE  Gian  7/30/2024 3:01 PM EDT  Workstation ID: [id number]         Assessment plan    Longstanding persistent AF with intermittent rapid ventricular rate  Stop  Amiodarone  Stop IV Cardizem  Start on long acting Toprol-XL 50 mg twice daily  Non-STEMI post PCI on dual antiplatelet agents followed by Dr. Kwon  Progressive LV dysfunction and echocardiogram can be done in few weeks to evaluate LV function improvement  Watchman device in situ  Prior history of intracranial hemorrhage  Patient will benefit with outpatient WILD to exclude the rare possibility of delayed device related thrombus  Discussed with the patient and the hospitalist and Dr. Kwon  It is reasonable for the patient to be discharged home and followed closely as an outpatient      Electronically signed by Angel Willams MD, 08/01/24, 9:34 AM EDT.

## 2024-08-01 NOTE — PROGRESS NOTES
Cardiology [facility]      Patient Care Team:  Nhung Clark APRN as PCP - General (Family Medicine)  Ranjan Do MD as Consulting Physician (Nephrology)              Cardiology assessment and plan     Congestive heart failure  Acute on chronic CHF exacerbation  Acute systolic dysfunction  Severe LV dysfunction  Volume overload  Congestive heart failure NYHA class IV  Chest discomfort  Abnormal elevated troponin consistent with non-ST elevation myocardial infarction  Atrial fibrillation with rapid antler response  Worsening cardiomyopathy  Severe LV dysfunction  Abnormal elevated proBNP secondary to congestive heart failure  Pulmonary edema and bilateral pleural effusions  Significant volume overload  Atrial fibrillation and with prior history of watchman implantation  Acute on chronic kidney injury  Coronary artery disease prior coronary artery bypass surgery prior PCI and stenting  Hypertension  Hyperlipidemia  Diabetes mellitus  History of TIA  History of intracranial hemorrhage status post fall in 2019  Anemia/iron deficiency anemia with a significant worsening of the hemoglobin  Native severe two-vessel coronary artery disease with 100% occlusion of the ostium of the LAD and 100% occlusion of the ostium of the left circumflex  Patent vein graft to the marginal with severe 90% stenosis involving the midportion of the vein graft succinyl treated with placement of a Xience drug-eluting stent  Patent LIMA to the LAD  Patent stent in the right coronary artery with no significant in-stent restenosis  Tmax is 98.5 pulse is 97-1 54 respirations are 16 blood pressure is 103/68 to 123/91 sats are 98%  Sodium is 140 potassium is 3.6 creatinine is 1.0 hemoglobin is 8.6  Current medications include aspirin 81 mg p.o. once a day Bumex 1 mg p.o. twice daily patient is on isosorbide 30 mg p.o. once a day Toprol-XL 50 mg p.o. once a day Ranexa 500 mg p.o. twice daily patient is on Aldactone 12.5 mg p.o. once  "a day patient is on Jardiance 10 mg p.o. once a day  EP consult was obtained for atrial fibrillation with rapid response and worsening cardiomyopathy  Arrange for a LifeVest device  Diagnosis and treatment options reviewed and discussed with patient  Optimize medical therapy for cardiomyopathy  Patient is currently on beta-blocker SGLT2 inhibitor Aldactone  Consider angiotensin receptor blocker before discharge if renal function is stable  Further recommendation based on patient course                Chief Complaint: Chest pain shortness of breath    Subjective no new complaints    Interval History: No significant change in the overall status    History taken from: patient    Review of Systems:  Review of Systems   Constitutional: Negative for chills, decreased appetite and malaise/fatigue.   HENT:  Negative for congestion and nosebleeds.    Eyes:  Negative for blurred vision and double vision.   Cardiovascular:  Positive for dyspnea on exertion. Negative for chest pain, irregular heartbeat, leg swelling, near-syncope, orthopnea, palpitations and paroxysmal nocturnal dyspnea.   Respiratory:  Negative for cough and shortness of breath.    Hematologic/Lymphatic: Negative for adenopathy. Does not bruise/bleed easily.   Skin:  Negative for color change and rash.   Gastrointestinal:  Negative for bloating, abdominal pain, hematemesis and hematochezia.   Genitourinary:  Negative for flank pain and hematuria.   Neurological:  Negative for dizziness and focal weakness.   Psychiatric/Behavioral:  Negative for altered mental status. The patient does not have insomnia.        Objective    Vital Signs  Visit Vitals  /91   Pulse 101   Temp 97.5 °F (36.4 °C) (Oral)   Resp 15   Ht 182.9 cm (72\")   Wt 102 kg (225 lb 12 oz)   SpO2 98%   BMI 30.62 kg/m²     Oxygen Therapy  SpO2: 98 %  Pulse Oximetry Type: Continuous  Device (Oxygen Therapy): nasal cannula  Flow (L/min): 2  Flowsheet Rows      Flowsheet Row First Filed Value " "  Admission Height 182.9 cm (72\") Documented at 07/30/2024 0938   Admission Weight 112 kg (245 lb 13 oz) Documented at 07/30/2024 0938          Intake & Output (last 3 days)         07/29 0701 07/30 0700 07/30 0701 07/31 0700 07/31 0701  08/01 0700 08/01 0701  08/02 0700    P.O.  360 780     I.V. (mL/kg)  1525 (14.5) 891 (8.7) 78 (0.8)    Blood  425      Total Intake(mL/kg)  2310 (22) 1671 (16.4) 78 (0.8)    Urine (mL/kg/hr)  3375 4150 (1.7) 200 (0.3)    Emesis/NG output  0 0     Stool  0 0     Total Output  3375 4150 200    Net  -1065 -2479 -122            Urine Unmeasured Occurrence   1 x           Lines, Drains & Airways       Active LDAs       Name Placement date Placement time Site Days    Peripheral IV 07/30/24 1300 Anterior;Proximal;Right Forearm 07/30/24  1300  Forearm  2    Peripheral IV 07/30/24 1600 Anterior;Left;Upper Arm 07/30/24  1600  Arm  1                    Physical Exam:  Constitutional:       Appearance: Well-developed.   Eyes:      Conjunctiva/sclera: Conjunctivae normal.      Pupils: Pupils are equal, round, and reactive to light.   HENT:      Head: Normocephalic and atraumatic.   Neck:      Thyroid: No thyromegaly.   Pulmonary:      Effort: Increased respiratory effort.      Breath sounds: Normal breath sounds. Examination of the right-middle field reveals rales. Examination of the left-middle field reveals rales. Examination of the right-lower field reveals decreased breath sounds and rales. Examination of the left-lower field reveals decreased breath sounds and rales.   Cardiovascular:      Abnormal PMI. Tachycardia present. Irregular rhythm.      S3 and S4 .    Pulses:     Intact distal pulses.   Edema:     Peripheral edema absent.   Abdominal:      General: Bowel sounds are normal.      Palpations: Abdomen is soft.   Musculoskeletal:      Cervical back: Normal range of motion and neck supple. Skin:     General: Skin is warm.   Neurological:      Mental Status: Alert and oriented to " person, place, and time.         Results Review:     I reviewed the patient's new clinical results.    Lab Results (last 24 hours)       Procedure Component Value Units Date/Time    Protein Elec + Interp, Serum [[id number]] Collected: 07/30/24 2008    Specimen: Blood Updated: 08/01/24 1212     Total Protein 6.0 g/dL      Albumin 3.2 g/dL      Alpha-1-Globulin 0.3 g/dL      Alpha-2-Globulin 1.0 g/dL      Beta Globulin 0.9 g/dL      Gamma Globulin 0.7 g/dL      M-Marlon Not Observed g/dL      Globulin 2.8 g/dL      A/G Ratio 1.1     Please note Comment     Comment: Protein electrophoresis scan will follow via computer, mail, or   delivery.        SPE Interpretation Comment     Comment: The SPE pattern appears unremarkable. Evidence of monoclonal  protein is not apparent.       Narrative:      Performed at:  [address] [place] [address]  [id number]  : Santiago Fan PhD, Phone:  [phone]    POC Glucose 4x Daily Before Meals & at Bedtime [[id number]]  (Abnormal) Collected: 08/01/24 1202    Specimen: Blood Updated: 08/01/24 1204     Glucose 113 mg/dL      Comment: Serial Number: [id number]:  [id number]       POC Glucose 4x Daily Before Meals & at Bedtime [[id number]]  (Abnormal) Collected: 08/01/24 0744    Specimen: Blood Updated: 08/01/24 0746     Glucose 173 mg/dL      Comment: Serial Number: [id number]:  [id number]       Basic Metabolic Panel [[id number]]  (Abnormal) Collected: 08/01/24 0254    Specimen: Blood Updated: 08/01/24 0330     Glucose 107 mg/dL      BUN 8 mg/dL      Creatinine 1.08 mg/dL      Sodium 140 mmol/L      Potassium 3.6 mmol/L      Chloride 99 mmol/L      CO2 29.4 mmol/L      Calcium 9.2 mg/dL      BUN/Creatinine Ratio 7.4     Anion Gap 11.6 mmol/L      eGFR 78.1 mL/min/1.73     Narrative:      GFR Normal >60  Chronic Kidney Disease <60  Kidney Failure <15      Magnesium [[id number]]  (Normal) Collected: 08/01/24 0254    Specimen: Blood Updated:  08/01/24 0330     Magnesium 1.7 mg/dL     Phosphorus [[id number]]  (Normal) Collected: 08/01/24 0254    Specimen: Blood Updated: 08/01/24 0330     Phosphorus 3.3 mg/dL     Lipid Panel [[id number]] Collected: 08/01/24 0254    Specimen: Blood Updated: 08/01/24 0330     Total Cholesterol 124 mg/dL      Triglycerides 84 mg/dL      HDL Cholesterol 47 mg/dL      LDL Cholesterol  61 mg/dL      VLDL Cholesterol 16 mg/dL      LDL/HDL Ratio 1.28    Narrative:      Cholesterol Reference Ranges  (U.S. Department of Health and Human Services ATP III Classifications)    Desirable          <200 mg/dL  Borderline High    200-239 mg/dL  High Risk          >240 mg/dL      Triglyceride Reference Ranges  (U.S. Department of Health and Human Services ATP III Classifications)    Normal           <150 mg/dL  Borderline High  150-199 mg/dL  High             200-499 mg/dL  Very High        >500 mg/dL    HDL Reference Ranges  (U.S. Department of Health and Human Services ATP III Classifications)    Low     <40 mg/dl (major risk factor for CHD)  High    >60 mg/dl ('negative' risk factor for CHD)        LDL Reference Ranges  (U.S. Department of Health and Human Services ATP III Classifications)    Optimal          <100 mg/dL  Near Optimal     100-129 mg/dL  Borderline High  130-159 mg/dL  High             160-189 mg/dL  Very High        >189 mg/dL    CBC (No Diff) [[id number]]  (Abnormal) Collected: 08/01/24 0254    Specimen: Blood Updated: 08/01/24 0312     WBC 7.44 10*3/mm3      RBC 4.26 10*6/mm3      Hemoglobin 8.6 g/dL      Hematocrit 31.6 %      MCV 74.2 fL      MCH 20.2 pg      MCHC 27.2 g/dL      RDW 22.7 %      RDW-SD 59.1 fl      MPV 9.3 fL      Platelets 260 10*3/mm3     POC Glucose Once [[id number]]  (Abnormal) Collected: 07/31/24 2003    Specimen: Blood Updated: 07/31/24 2005     Glucose 191 mg/dL      Comment: Serial Number: [id number]:  [id number]       POC Activated Clotting Time [[id number]]  (Abnormal) Collected: 07/31/24  1358    Specimen: Arterial Blood Updated: 07/31/24 1915     Activated Clotting Time  146 Seconds      Comment: Serial Number: [id number]:  [id number]       POC Glucose Once [[id number]]  (Abnormal) Collected: 07/31/24 1820    Specimen: Blood Updated: 07/31/24 1823     Glucose 222 mg/dL      Comment: Serial Number: [id number]:  [id number]             Results for orders placed during the hospital encounter of 07/30/24    Adult Transthoracic Echo Complete W/ Cont if Necessary Per Protocol    Interpretation Summary    Left ventricular systolic function is severely decreased. Left ventricular ejection fraction appears to be 26 - 30%.    The left ventricular cavity is moderately dilated.    Left ventricular wall thickness is consistent with mild concentric hypertrophy.    Left ventricular diastolic function is consistent with (grade I) impaired relaxation.    Moderately reduced right ventricular systolic function noted.    The right ventricular cavity is moderately dilated.    The left atrial cavity is moderately dilated.    Estimated right ventricular systolic pressure from tricuspid regurgitation is normal (<35 mmHg).        Medication Review:   I have reviewed the patient's current medication list  Scheduled Meds:Acetylcysteine, 1,200 mg, Oral, BID  amantadine, 100 mg, Oral, Daily  aspirin, 81 mg, Oral, Daily  bumetanide, 1 mg, Oral, BID  empagliflozin, 10 mg, Oral, Daily  folic acid, 1 mg, Oral, Daily  guaiFENesin, 600 mg, Oral, Daily  insulin lispro, 2-9 Units, Subcutaneous, 4x Daily AC & at Bedtime  isosorbide mononitrate, 30 mg, Oral, Daily  magnesium sulfate, 2 g, Intravenous, Q12H  metoprolol succinate XL, 50 mg, Oral, Q12H  pantoprazole, 40 mg, Oral, BID AC  ranolazine, 500 mg, Oral, BID  sodium chloride, 10 mL, Intravenous, Q12H  spironolactone, 12.5 mg, Oral, Daily  ticagrelor, 90 mg, Oral, Q12H      Continuous Infusions:   PRN Meds:.  acetaminophen **OR** acetaminophen **OR** acetaminophen     ALPRAZolam    aluminum-magnesium hydroxide-simethicone    atropine    senna-docusate sodium **AND** polyethylene glycol **AND** bisacodyl **AND** bisacodyl    Calcium Replacement - Follow Nurse / BPA Driven Protocol    dextrose    dextrose    glucagon (human recombinant)    Lidocaine HCl gel    Magnesium Standard Dose Replacement - Follow Nurse / BPA Driven Protocol    nitroglycerin    ondansetron ODT **OR** ondansetron    Phosphorus Replacement - Follow Nurse / BPA Driven Protocol    Potassium Replacement - Follow Nurse / BPA Driven Protocol    sodium chloride    sodium chloride    sodium chloride    ECG/EMG Results (last 24 hours)       Procedure Component Value Units Date/Time    Telemetry Scan [[id number]] Resulted: 07/30/24     Updated: 07/31/24 1400    Telemetry Scan [[id number]] Resulted: 07/30/24     Updated: 07/31/24 1907    Telemetry Scan [[id number]] Resulted: 07/30/24     Updated: 07/31/24 2056    Telemetry Scan [[id number]] Resulted: 07/30/24     Updated: 08/01/24 0054    Telemetry Scan [[id number]] Resulted: 07/30/24     Updated: 08/01/24 0434    Telemetry Scan [[id number]] Resulted: 07/30/24     Updated: 08/01/24 0800    ECG 12 Lead Rhythm Change [[id number]] Collected: 08/01/24 0759     Updated: 08/01/24 0801     QT Interval 344 ms      QTC Interval 450 ms     Narrative:      HEART IACE=336  bpm  RR Zjxmapsm=148  ms  NH Interval=  ms  P Horizontal Axis=  deg  P Front Axis=  deg  QRSD Purvbmst=311  ms  QT Hbepvech=486  ms  IEjR=523  ms  QRS Axis=44  deg  T Wave Axis=198  deg  - ABNORMAL ECG -  Atrial fibrillation  Repol abnrm suggests ischemia, anterolateral  Date and Time of Study:2024-08-01 07:59:27            Imaging Results (Last 24 Hours)       ** No results found for the last 24 hours. **          Results for orders placed during the hospital encounter of 07/30/24    Cardiac Catheterization/Vascular Study    Conclusion  Table formatting from the original result was not included.  Cardiac  Catheterization with PCI Report    Chinedu Lopez  [id number]  7/31/2024  @PCP@    He underwent cardiac catheterization and percutaneous coronary intervention    Indications for the procedure include: acute coronary syndrome.  Severe chest pain  Acute coronary syndrome  Non-ST elevation myocardial infarction  Atrial fibrillation with rapid response  Worsening cardiomyopathy  Severe LV dysfunction  Congestive heart failure    Procedure Details:  The risks, benefits, complications, treatment options, and expected outcomes were discussed with the patient. The patient and/or family concurred with the proposed plan, giving informed consent.    After informed consent the patient was brought to the cath lab after appropriate IV hydration was begun and oral premedication was given. He was further sedated with fentanyl. He was prepped and draped in the usual manner. Using the modified Seldinger access technique, a 6 Danish sheath was placed in the femoral artery. A left heart catheterization with coronary arteriography was performed.  We used a AR mod diagnostic catheter to selectively engage the right coronary artery and we used a 5 Danish IMT catheter due to selective engagement of the LIMA to the LAD, findings are discussed below.    The decision was made to proceed with intervention. He received Heparin as per protocol. The details of the intervention are as follows:    We used a left coronary bypass guide catheter with guide liner support to get selective access to the vein graft to the marginal lesion was initially predilated multiple attempts using a guide liner support secondary to extensive calcification and difficult to cross the lesion with a balloon secondary to tortuosity and calcification initially with a 1 5 balloon eventually with a 2 oh balloon and then upgraded to 3 with balloon and stented with a 3.0 x 18 mm diastolic bleeding stent and lesion was postdilated with 4.0 x 12 mm noncompliant balloon up to 16  keven.  Patient tolerated procedure well with no immediate possible complication plan to obtain hemostasis by manual pressure procedural anticoagulation was heparin patient received 180 mg of Brilinta at the end of the procedure.    After the procedure was completed, sedation was stopped and the sheaths and catheters were all removed according to established hospital protocols.    Conscious sedation:  Conscious sedation was performed according to protocol.  I supervised and directed an independent trained observer with the assistance of monitoring the patient's level of consciousness and physiologic status throughout the procedure.  Intraoperative service time was 90 minutes.    Findings:    Hemodynamics Central aortic pressure systolic 120 diastolic of 66 the mean pressure of 89 mmHg  LV end-diastolic pressure of 29 mmHg  There was no gradient across the aortic valve on the pullback of the pigtail catheter  Left Main Left main has diffuse 90% stenosis provides a small caliber marginal branch otherwise both LAD and left circumflex artery 100% occluded in the ostial portion  RCA Large-caliber vessel dominant vessel  Right coronary artery is a dominant vessel  Patent stent in the right coronary artery with no evidence of any significant in-stent restenosis  Right coronary artery provides a large caliber PDA and PLB branches that are angiographically normal  % occluded in the ostial portion  Circ 100% occluded in the ostial portion  SVG(s) Vein graft presumed to be marginal branch is 100% occluded in the ostial portion    Vein graft to the second marginal branch is patent with mid body angiographic 90% stenosis likely the culprit vessel for patient symptoms of non-ST ovation myocardial infarction likely this lesion is in-stent restenosis within the prior stent  JUANA LIMA to LAD is patent without any significant stenosis involving the ostium/body of the anastomotic site.  No significant disease involving the LAD  after the LIMA touchdown  LV Not done  Coronary dominance Right coronary artery    Interventions/Vessels Successful PCI and stenting of the midportion of the vein graft to the marginal branch with placement of Xience drug-eluting stent  Guides/Wires BMW  Devices Xience drug-eluting stent 3.0 x 18 mm  Post % Stenosis  0  Pre-procedure SHANNON flow  10  Post procedure SHANNON flow  3  Closure Device None  Complications None    Estimated Blood Loss:  Minimal    Specimens: None    Complications:  None; patient tolerated the procedure well.    Disposition: PACU - hemodynamically stable.    Condition: stable    Impressions:  Native severe two-vessel coronary artery disease with 100% occlusion of the ostium of the LAD and 100% occlusion of the ostium of the left circumflex  Patent vein graft to the marginal with severe 90% stenosis involving the midportion of the vein graft succinyl treated with placement of a Xience drug-eluting stent  Patent LIMA to the LAD  Patent stent in the right coronary artery with no significant in-stent restenosis    Recommendations:  Aspirin 81 mg p.o. once a day  Brilinta 90 mg p.o. twice daily  Continued aggressive risk factor modification  Observe patient in CVU bed overnight  Close monitoring of renal function  Optimize medical therapy for cardiomyopathy and rate control for atrial fibrillation  Patient currently being seen by nephrology and hematology oncology for underlying anemia and renal failure  Test results reviewed and discussed with patient and family  Further recommendation based on patient course     Results for orders placed during the hospital encounter of 07/30/24    Adult Transthoracic Echo Complete W/ Cont if Necessary Per Protocol    Interpretation Summary    Left ventricular systolic function is severely decreased. Left ventricular ejection fraction appears to be 26 - 30%.    The left ventricular cavity is moderately dilated.    Left ventricular wall thickness is consistent with  mild concentric hypertrophy.    Left ventricular diastolic function is consistent with (grade I) impaired relaxation.    Moderately reduced right ventricular systolic function noted.    The right ventricular cavity is moderately dilated.    The left atrial cavity is moderately dilated.    Estimated right ventricular systolic pressure from tricuspid regurgitation is normal (<35 mmHg).     Lab Results   Component Value Date    GLUCOSE 107 (H) 08/01/2024    BUN 8 08/01/2024    CREATININE 1.08 08/01/2024    EGFR 78.1 08/01/2024    BCR 7.4 08/01/2024    K 3.6 08/01/2024    CO2 29.4 (H) 08/01/2024    CALCIUM 9.2 08/01/2024    PROTENTOTREF 6.0 07/30/2024    ALBUMIN 3.7 07/31/2024    BILITOT 0.5 07/31/2024    AST 19 07/31/2024    ALT 5 07/31/2024      Lab Results   Component Value Date    CHOL 124 08/01/2024    TRIG 84 08/01/2024    HDL 47 08/01/2024    LDL 61 08/01/2024      Lab Results   Component Value Date    CKTOTAL 112 07/30/2024    TROPONINT 257 (C) 07/31/2024        Assessment & Plan       Atrial fibrillation with RVR        José Miguel Izquierdo MD  08/01/24  13:30 EDT

## 2024-08-01 NOTE — PROGRESS NOTES
[facility] MEDICINE SERVICE  DAILY PROGRESS NOTE    NAME: Chinedu Lopez  : 1960  MRN: [id number]      LOS: 2 days     PROVIDER OF SERVICE: Timothy Duane Brammell, MD    Chief Complaint: Atrial fibrillation with RVR    Subjective:     Interval History:  History taken from: patient  Patient Complaints: Remains quite tachycardic.  He denies any palpitations or sensations of tachycardia.  He denies any shortness of breath.  Eating without issue.  Denies any urinary or bowel issues.  Denies any other additional acute issues.  Is anxious as to his ability to be discharged.        Review of Systems:   Review of Systems   All other systems reviewed and are negative.      Objective:     Vital Signs  Temp:  [97.4 °F (36.3 °C)-98 °F (36.7 °C)] 97.5 °F (36.4 °C)  Heart Rate:  [] 101  Resp:  [15-22] 15  BP: (103-171)/() 123/91  Flow (L/min):  [2-6] 2   Body mass index is 30.62 kg/m².    Physical Exam  Physical Exam  Vitals reviewed.   Cardiovascular:      Rate and Rhythm: Tachycardia present. Rhythm irregular.   Pulmonary:      Effort: Pulmonary effort is normal.      Breath sounds: Rales present.   Abdominal:      General: Bowel sounds are normal.      Palpations: Abdomen is soft.      Tenderness: There is no abdominal tenderness.   Musculoskeletal:         General: No swelling.   Neurological:      Mental Status: He is alert. Mental status is at baseline.   Psychiatric:         Behavior: Behavior normal.         Scheduled Meds   Acetylcysteine, 1,200 mg, Oral, BID  amantadine, 100 mg, Oral, Daily  aspirin, 81 mg, Oral, Daily  bumetanide, 1 mg, Oral, BID  empagliflozin, 10 mg, Oral, Daily  folic acid, 1 mg, Oral, Daily  guaiFENesin, 600 mg, Oral, Daily  insulin lispro, 2-9 Units, Subcutaneous, 4x Daily AC & at Bedtime  isosorbide mononitrate, 30 mg, Oral, Daily  magnesium sulfate, 2 g, Intravenous, Q12H  metoprolol succinate XL, 50 mg, Oral, Q12H  pantoprazole, 40 mg, Oral, BID AC  ranolazine, 500  mg, Oral, BID  sodium chloride, 10 mL, Intravenous, Q12H  spironolactone, 12.5 mg, Oral, Daily  ticagrelor, 90 mg, Oral, Q12H       PRN Meds     acetaminophen **OR** acetaminophen **OR** acetaminophen    ALPRAZolam    aluminum-magnesium hydroxide-simethicone    atropine    senna-docusate sodium **AND** polyethylene glycol **AND** bisacodyl **AND** bisacodyl    Calcium Replacement - Follow Nurse / BPA Driven Protocol    dextrose    dextrose    glucagon (human recombinant)    Lidocaine HCl gel    Magnesium Standard Dose Replacement - Follow Nurse / BPA Driven Protocol    nitroglycerin    ondansetron ODT **OR** ondansetron    Phosphorus Replacement - Follow Nurse / BPA Driven Protocol    Potassium Replacement - Follow Nurse / BPA Driven Protocol    sodium chloride    sodium chloride    sodium chloride   Infusions         Diagnostic Data    Results from last 7 days   Lab Units 08/01/24  1347 08/01/24  0254 07/31/24  1155 07/31/24  0508   WBC 10*3/mm3  --  7.44  --  5.31   HEMOGLOBIN g/dL  --  8.6*  --  8.6*   HEMATOCRIT %  --  31.6*  --  32.1*   PLATELETS 10*3/mm3  --  260  --  213   GLUCOSE mg/dL  --  107*   < > 98   CREATININE mg/dL  --  1.08   < > 1.10   BUN mg/dL  --  8   < > 9   SODIUM mmol/L  --  140   < > 139   POTASSIUM mmol/L 3.8 3.6   < > 4.0   AST (SGOT) U/L  --   --   --  19   ALT (SGPT) U/L  --   --   --  5   ALK PHOS U/L  --   --   --  36*   BILIRUBIN mg/dL  --   --   --  0.5   ANION GAP mmol/L  --  11.6   < > 8.0    < > = values in this interval not displayed.       No radiology results for the last day          Assessment:   Coronary artery disease status post cath and stenting  Cardiomyopathy   iron deficiency anemia  Acute on chronic combined congestive heart failure  History of atrial fibs and watchman's procedure  Acute on chronic renal injury  Type 2 diabetes  History of intracranial hemorrhage after fall    Plan: Cardiology to attempt rate limiting medication adjustments.  Monitor for any worsened  congestive heart failure in light of his poorly controlled rate.  Chest x-ray.   discussed with cardiology at bedside.  Active and Resolved Problems  Active Hospital Problems    Diagnosis  POA    **Atrial fibrillation with RVR [I48.91]  Yes      Resolved Hospital Problems   No resolved problems to display.           VTE Prophylaxis:  No VTE prophylaxis order currently exists.         Code status is   Code Status and Medical Interventions: CPR (Attempt to Resuscitate); Full Support   Ordered at: 07/30/24 0958     Code Status (Patient has no pulse and is not breathing):    CPR (Attempt to Resuscitate)     Medical Interventions (Patient has pulse or is breathing):    Full Support       Plan for disposition:home in 1 days    Time: 30 minutes    Signature: Electronically signed by Timothy Duane Brammell, MD, 08/01/24, 16:10 EDT.  [facility] Hospitalist Team

## 2024-08-01 NOTE — CASE MANAGEMENT/SOCIAL WORK
Continued Stay Note   [facility]     Patient Name: Chinedu Lopez  MRN: [id number]  Today's Date: 8/1/2024    Admit Date: 7/30/2024    Plan: DC Plan: Anticipate routine home with spouse. [employer] Providing Lifevest.   Discharge Plan       Row Name 08/01/24 1617       Plan    Plan DC Plan: Anticipate routine home with spouse. [employer] Providing Lifevest.    Patient/Family in Agreement with Plan yes    Provided Post Acute Provider List? N/A    Provided Post Acute Provider Quality & Resource List? N/A    Plan Comments CM spoke with Hospitalist and nursing for morning rounds and reviewed chart for clinical updates. Patient having elevate HR and now on Cardizem gtt. CM spoke with Francisco J at [facility] and he states patient was approved and fitted this morning for Lifevest. CM delivered [employer] coupon to bedside and gave to spouse.CM will continue to follow for any further needs and adjust discharge plan accordingly. DC Barriers: O2@4L nc, Cardiac monitoring, Cardizem gtt, and monitor labs.               Expected Discharge Date and Time       Expected Discharge Date Expected Discharge Time    Aug 2, 2024           Met with patient in room     Maintain distance greater than six feet and spent less than fifteen minutes in the room.      Adela Stover RN     Office Phone: [phone]  Office Cell:     [phone]

## 2024-08-01 NOTE — PROGRESS NOTES
"NEPHROLOGY PROGRESS NOTE------[facility]    [facility]  [phone]  Demar Do MD      Patient Care Team:  Nhung Clark APRN as PCP - General (Family Medicine)  Ernestina, MD Ranjan as Consulting Physician (Nephrology)      Provider:  Demar Do MD  Patient Name: Chinedu Lopez  :  1960    SUBJECTIVE:    F/U CRF/CKD/CHF    S/P Cardiac cath/PCI/Stent yesterday. No angina. No dysuria.     Medication:  Acetylcysteine, 1,200 mg, Oral, BID  amantadine, 100 mg, Oral, Daily  amiodarone, 200 mg, Oral, Q24H  aspirin, 81 mg, Oral, Daily  bumetanide, 1 mg, Intravenous, Q12H  folic acid, 1 mg, Oral, Daily  guaiFENesin, 600 mg, Oral, Daily  insulin lispro, 2-9 Units, Subcutaneous, 4x Daily AC & at Bedtime  isosorbide mononitrate, 30 mg, Oral, Daily  magnesium sulfate, 2 g, Intravenous, Once  metoprolol tartrate, 25 mg, Oral, Q8H  pantoprazole, 40 mg, Oral, BID AC  potassium chloride ER, 40 mEq, Oral, Q4H  ranolazine, 500 mg, Oral, BID  sodium chloride, 10 mL, Intravenous, Q12H  ticagrelor, 90 mg, Oral, Q12H      dilTIAZem, 5-15 mg/hr, Last Rate: 7.5 mg/hr (24 0316)        OBJECTIVE    Vital Sign Min/Max for last 24 hours  Temp  Min: 97.5 °F (36.4 °C)  Max: 98.4 °F (36.9 °C)   BP  Min: 118/98  Max: 171/102   Pulse  Min: 97  Max: 154   Resp  Min: 17  Max: 24   SpO2  Min: 62 %  Max: 97 %   No data recorded   Weight  Min: 102 kg (225 lb 12 oz)  Max: 102 kg (225 lb 12 oz)     Flowsheet Rows      Flowsheet Row First Filed Value   Admission Height 182.9 cm (72\") Documented at 2024 0938   Admission Weight 112 kg (245 lb 13 oz) Documented at 2024 0938            No intake/output data recorded.  I/O last 3 completed shifts:  In: 3566 [P.O.:900; I.V.:2241; Blood:425]  Out: 5600 [Urine:5600]    Physical Exam:  General Appearance: alert, appears stated age and cooperative  Head: normocephalic, without obvious " "abnormality and atraumatic  Eyes: conjunctivae and sclerae normal and no icterus  Neck: supple and +MINIMAL JVD  Lungs: +FINE LEFT CRACKLES  Heart: IRREG IRREG +MEDARDO. NO GALLOP, RUB, OR S3  Chest Wall: no abnormalities observed  Abdomen: normal bowel sounds. +OBESITY +VERY MILD ASCITES  Extremities: moves extremities well, +TRACE BILATERAL PRETIBIAL EDEMA, no cyanosis  Skin: no bleeding, bruising or rash. +PALLOR  Neurologic: Alert, and oriented. No focal deficits    Labs:    WBC WBC   Date Value Ref Range Status   08/01/2024 7.44 3.40 - 10.80 10*3/mm3 Final   07/31/2024 5.31 3.40 - 10.80 10*3/mm3 Final   07/30/2024 4.62 3.40 - 10.80 10*3/mm3 Final      HGB Hemoglobin   Date Value Ref Range Status   08/01/2024 8.6 (L) 13.0 - 17.7 g/dL Final   07/31/2024 8.6 (L) 13.0 - 17.7 g/dL Final   07/30/2024 7.5 (L) 13.0 - 17.7 g/dL Final      HCT Hematocrit   Date Value Ref Range Status   08/01/2024 31.6 (L) 37.5 - 51.0 % Final   07/31/2024 32.1 (L) 37.5 - 51.0 % Final   07/30/2024 28.5 (L) 37.5 - 51.0 % Final      Platelets No results found for: \"LABPLAT\"   MCV MCV   Date Value Ref Range Status   08/01/2024 74.2 (L) 79.0 - 97.0 fL Final   07/31/2024 74.7 (L) 79.0 - 97.0 fL Final   07/30/2024 73.1 (L) 79.0 - 97.0 fL Final          Sodium Sodium   Date Value Ref Range Status   08/01/2024 140 136 - 145 mmol/L Final   07/31/2024 140 136 - 145 mmol/L Final   07/31/2024 139 136 - 145 mmol/L Final   07/30/2024 140 136 - 145 mmol/L Final      Potassium Potassium   Date Value Ref Range Status   08/01/2024 3.6 3.5 - 5.2 mmol/L Final   07/31/2024 3.6 3.5 - 5.2 mmol/L Final   07/31/2024 4.0 3.5 - 5.2 mmol/L Final     Comment:     Slight hemolysis detected by analyzer. Result may be falsely elevated.   07/30/2024 3.9 3.5 - 5.2 mmol/L Final      Chloride Chloride   Date Value Ref Range Status   08/01/2024 99 98 - 107 mmol/L Final   07/31/2024 99 98 - 107 mmol/L Final   07/31/2024 100 98 - 107 mmol/L Final   07/30/2024 102 98 - 107 mmol/L " "Final      CO2 CO2   Date Value Ref Range Status   08/01/2024 29.4 (H) 22.0 - 29.0 mmol/L Final   07/31/2024 26.0 22.0 - 29.0 mmol/L Final   07/31/2024 31.0 (H) 22.0 - 29.0 mmol/L Final   07/30/2024 28.1 22.0 - 29.0 mmol/L Final      BUN BUN   Date Value Ref Range Status   08/01/2024 8 8 - 23 mg/dL Final   07/31/2024 8 8 - 23 mg/dL Final   07/31/2024 9 8 - 23 mg/dL Final   07/30/2024 10 8 - 23 mg/dL Final      Creatinine Creatinine   Date Value Ref Range Status   08/01/2024 1.08 0.76 - 1.27 mg/dL Final   07/31/2024 0.97 0.76 - 1.27 mg/dL Final   07/31/2024 1.10 0.76 - 1.27 mg/dL Final   07/30/2024 1.23 0.76 - 1.27 mg/dL Final      Calcium Calcium   Date Value Ref Range Status   08/01/2024 9.2 8.6 - 10.5 mg/dL Final   07/31/2024 9.5 8.6 - 10.5 mg/dL Final   07/31/2024 9.4 8.6 - 10.5 mg/dL Final   07/30/2024 9.5 8.6 - 10.5 mg/dL Final      PO4 No components found for: \"PO4\"   Albumin Albumin   Date Value Ref Range Status   07/31/2024 3.7 3.5 - 5.2 g/dL Final      Magnesium Magnesium   Date Value Ref Range Status   08/01/2024 1.7 1.6 - 2.4 mg/dL Final   07/31/2024 1.8 1.6 - 2.4 mg/dL Final      Uric Acid No components found for: \"URIC ACID\"     Imaging Results (Last 72 Hours)       Procedure Component Value Units Date/Time    US Renal Bilateral [[id number]] Collected: 07/30/24 1453     Updated: 07/30/24 1503    Narrative:      Date of Exam: 7/30/2024 2:05 PM EDT    Indication: LUDWIN/CKD.    Comparison: No comparisons available.    Technique: Grayscale and color Doppler ultrasound evaluation of the kidneys and urinary bladder was performed.      Findings:  Right Kidney: Right kidney measures 11.6 cm in long axis. Likely benign cyst in the right kidney measuring up to 2.2 cm. Increased renal cortical echogenicity.  No hydronephrosis.. No sonographically evident nephrolithiasis.    Left Kidney:  Left kidney measures 9.3 cm in long axis. Mildly increased renal cortical echogenicity. No suspicious appearing lesions.No " hydronephrosis.No sonographically evident nephrolithiasis.    Bladder: The bladder appears unremarkable.    Additional findings: Mild perihepatic and pelvic ascites.      Impression:      Impression:  No hydronephrosis.    Increased renal cortical echogenicity which can be seen in medical renal disease.    Small volume ascites.          Electronically Signed: Gildardo Springer    7/30/2024 3:01 PM EDT    Workstation ID: [id number]                Results for orders placed during the hospital encounter of 01/20/23    Duplex Carotid Ultrasound CAR    Interpretation Summary    Right internal carotid artery plaque without significant stenosis.    Left internal carotid artery moderate stenosis.        ASSESSMENT / PLAN      Atrial fibrillation with RVR      CRF/CKD------Nonoliguric. Appears to have CRF/CKD STG 2.   Unknown if patient has baseline proteinuria or hematuria. CRF/CKD STG 2 most likely secondary to HTN NS vs. Early DM nephropathy. Follow with gentle diuresis. Stable post cardiac cath. No NSAIDs     2. ANEMIA------PRBCs per Hem/Onc     3. VOLUME OVERLOAD/EDEMA/MILD ASCITES-----Change to po Bumex today     4. ATRIAL FIBRILLATION WITH RVR------Watchman in place. Per , Cardiology     5. CAD WITH H/O CABG/PCI AND WITH CURRENT ANGINA------Cardiac cath/PCI/Stent done yesterday per , Cardiology     6. H/O CVA/TIA/HEAD TRAUMA WITH BRAIN BLEED     7. HYPERLIPIDEMIA----Not on Statin.       8. GERD/PUD PROPHYLAXIS-----On PPI. Benefits outweigh risks despite CKD     9. OBESITY/DEISY      Demar Do MD  [facility]  [phone]  08/01/24  07:38 EDT

## 2024-08-02 LAB
ACT BLD: 140 SECONDS (ref 89–137)
ACT BLD: 220 SECONDS (ref 89–137)
ACT BLD: 226 SECONDS (ref 89–137)
ACT BLD: 232 SECONDS (ref 89–137)
ANION GAP SERPL CALCULATED.3IONS-SCNC: 11.5 MMOL/L (ref 5–15)
BUN SERPL-MCNC: 9 MG/DL (ref 8–23)
BUN/CREAT SERPL: 8.5 (ref 7–25)
CALCIUM SPEC-SCNC: 9.4 MG/DL (ref 8.6–10.5)
CHLORIDE SERPL-SCNC: 99 MMOL/L (ref 98–107)
CO2 SERPL-SCNC: 26.5 MMOL/L (ref 22–29)
CREAT SERPL-MCNC: 1.06 MG/DL (ref 0.76–1.27)
DEPRECATED RDW RBC AUTO: 59.8 FL (ref 37–54)
EGFRCR SERPLBLD CKD-EPI 2021: 79.8 ML/MIN/1.73
ERYTHROCYTE [DISTWIDTH] IN BLOOD BY AUTOMATED COUNT: 23.1 % (ref 12.3–15.4)
GLUCOSE BLDC GLUCOMTR-MCNC: 110 MG/DL (ref 70–105)
GLUCOSE BLDC GLUCOMTR-MCNC: 119 MG/DL (ref 70–105)
GLUCOSE BLDC GLUCOMTR-MCNC: 139 MG/DL (ref 70–105)
GLUCOSE BLDC GLUCOMTR-MCNC: 93 MG/DL (ref 70–105)
GLUCOSE SERPL-MCNC: 103 MG/DL (ref 65–99)
HCT VFR BLD AUTO: 32.1 % (ref 37.5–51)
HGB BLD-MCNC: 8.6 G/DL (ref 13–17.7)
MCH RBC QN AUTO: 19.9 PG (ref 26.6–33)
MCHC RBC AUTO-ENTMCNC: 26.8 G/DL (ref 31.5–35.7)
MCV RBC AUTO: 74.3 FL (ref 79–97)
PLATELET # BLD AUTO: 241 10*3/MM3 (ref 140–450)
PMV BLD AUTO: 9.2 FL (ref 6–12)
POTASSIUM SERPL-SCNC: 3.8 MMOL/L (ref 3.5–5.2)
RBC # BLD AUTO: 4.32 10*6/MM3 (ref 4.14–5.8)
SODIUM SERPL-SCNC: 137 MMOL/L (ref 136–145)
WBC NRBC COR # BLD AUTO: 7.19 10*3/MM3 (ref 3.4–10.8)

## 2024-08-02 PROCEDURE — 25010000002 DIGOXIN PER 500 MCG: Performed by: INTERNAL MEDICINE

## 2024-08-02 PROCEDURE — 25010000002 ENOXAPARIN PER 10 MG: Performed by: INTERNAL MEDICINE

## 2024-08-02 PROCEDURE — 82948 REAGENT STRIP/BLOOD GLUCOSE: CPT

## 2024-08-02 PROCEDURE — 99232 SBSQ HOSP IP/OBS MODERATE 35: CPT | Performed by: INTERNAL MEDICINE

## 2024-08-02 PROCEDURE — 80048 BASIC METABOLIC PNL TOTAL CA: CPT | Performed by: INTERNAL MEDICINE

## 2024-08-02 PROCEDURE — 85027 COMPLETE CBC AUTOMATED: CPT | Performed by: INTERNAL MEDICINE

## 2024-08-02 RX ORDER — DIGOXIN 0.25 MG/ML
250 INJECTION INTRAMUSCULAR; INTRAVENOUS EVERY 6 HOURS
Status: COMPLETED | OUTPATIENT
Start: 2024-08-02 | End: 2024-08-02

## 2024-08-02 RX ORDER — METOPROLOL SUCCINATE 100 MG/1
100 TABLET, EXTENDED RELEASE ORAL EVERY 12 HOURS SCHEDULED
Qty: 60 TABLET | Refills: 0 | Status: SHIPPED | OUTPATIENT
Start: 2024-08-02

## 2024-08-02 RX ORDER — SPIRONOLACTONE 25 MG/1
12.5 TABLET ORAL DAILY
Qty: 30 TABLET | Refills: 0 | Status: SHIPPED | OUTPATIENT
Start: 2024-08-03

## 2024-08-02 RX ORDER — ENOXAPARIN SODIUM 100 MG/ML
40 INJECTION SUBCUTANEOUS DAILY
Status: DISCONTINUED | OUTPATIENT
Start: 2024-08-02 | End: 2024-08-03 | Stop reason: HOSPADM

## 2024-08-02 RX ORDER — METOPROLOL SUCCINATE 50 MG/1
100 TABLET, EXTENDED RELEASE ORAL EVERY 12 HOURS SCHEDULED
Status: DISCONTINUED | OUTPATIENT
Start: 2024-08-02 | End: 2024-08-03 | Stop reason: HOSPADM

## 2024-08-02 RX ORDER — ATORVASTATIN CALCIUM 40 MG/1
40 TABLET, FILM COATED ORAL DAILY
Qty: 30 TABLET | Refills: 0 | Status: SHIPPED | OUTPATIENT
Start: 2024-08-02 | End: 2024-08-03

## 2024-08-02 RX ORDER — POTASSIUM CHLORIDE 20 MEQ/1
20 TABLET, EXTENDED RELEASE ORAL 2 TIMES DAILY
Status: DISCONTINUED | OUTPATIENT
Start: 2024-08-02 | End: 2024-08-03 | Stop reason: HOSPADM

## 2024-08-02 RX ORDER — DIGOXIN 0.25 MG/ML
250 INJECTION INTRAMUSCULAR; INTRAVENOUS ONCE
Status: COMPLETED | OUTPATIENT
Start: 2024-08-02 | End: 2024-08-02

## 2024-08-02 RX ADMIN — POTASSIUM CHLORIDE 20 MEQ: 1500 TABLET, EXTENDED RELEASE ORAL at 08:14

## 2024-08-02 RX ADMIN — PANTOPRAZOLE SODIUM 40 MG: 40 TABLET, DELAYED RELEASE ORAL at 08:14

## 2024-08-02 RX ADMIN — TICAGRELOR 90 MG: 90 TABLET ORAL at 20:50

## 2024-08-02 RX ADMIN — ENOXAPARIN SODIUM 40 MG: 100 INJECTION SUBCUTANEOUS at 16:51

## 2024-08-02 RX ADMIN — ISOSORBIDE MONONITRATE 30 MG: 30 TABLET, EXTENDED RELEASE ORAL at 08:18

## 2024-08-02 RX ADMIN — BUMETANIDE 1 MG: 1 TABLET ORAL at 20:50

## 2024-08-02 RX ADMIN — DIGOXIN 250 MCG: 0.25 INJECTION INTRAMUSCULAR; INTRAVENOUS at 20:49

## 2024-08-02 RX ADMIN — GUAIFENESIN 600 MG: 600 TABLET, EXTENDED RELEASE ORAL at 08:14

## 2024-08-02 RX ADMIN — FOLIC ACID 1 MG: 1 TABLET ORAL at 08:14

## 2024-08-02 RX ADMIN — METOPROLOL SUCCINATE 100 MG: 50 TABLET, EXTENDED RELEASE ORAL at 20:50

## 2024-08-02 RX ADMIN — TICAGRELOR 90 MG: 90 TABLET ORAL at 08:13

## 2024-08-02 RX ADMIN — SPIRONOLACTONE 12.5 MG: 25 TABLET ORAL at 08:14

## 2024-08-02 RX ADMIN — PANTOPRAZOLE SODIUM 40 MG: 40 TABLET, DELAYED RELEASE ORAL at 16:52

## 2024-08-02 RX ADMIN — EMPAGLIFLOZIN 10 MG: 10 TABLET, FILM COATED ORAL at 08:14

## 2024-08-02 RX ADMIN — RANOLAZINE 500 MG: 500 TABLET, EXTENDED RELEASE ORAL at 20:50

## 2024-08-02 RX ADMIN — ASPIRIN 81 MG: 81 TABLET, COATED ORAL at 08:15

## 2024-08-02 RX ADMIN — BUMETANIDE 1 MG: 1 TABLET ORAL at 08:14

## 2024-08-02 RX ADMIN — METOPROLOL SUCCINATE 50 MG: 50 TABLET, EXTENDED RELEASE ORAL at 08:14

## 2024-08-02 RX ADMIN — Medication 10 ML: at 08:15

## 2024-08-02 RX ADMIN — DIGOXIN 250 MCG: 0.25 INJECTION INTRAMUSCULAR; INTRAVENOUS at 08:14

## 2024-08-02 RX ADMIN — RANOLAZINE 500 MG: 500 TABLET, EXTENDED RELEASE ORAL at 08:14

## 2024-08-02 RX ADMIN — Medication 10 ML: at 20:53

## 2024-08-02 RX ADMIN — POTASSIUM CHLORIDE 20 MEQ: 1500 TABLET, EXTENDED RELEASE ORAL at 20:50

## 2024-08-02 RX ADMIN — AMANTADINE HYDROCHLORIDE 100 MG: 100 CAPSULE ORAL at 08:18

## 2024-08-02 RX ADMIN — DIGOXIN 250 MCG: 0.25 INJECTION INTRAMUSCULAR; INTRAVENOUS at 14:26

## 2024-08-02 NOTE — PROGRESS NOTES
Hematology/Oncology Inpatient Progress Note    PATIENT NAME: Chinedu Lopez  : 1960  MRN: [id number]    CHIEF COMPLAINT: Shortness of breath    HISTORY OF PRESENT ILLNESS:      Chinedu Lopez is a 63 y.o. male who presented to [facility] on 2024 with complaints of shortness of breath.  Past medical history of A-fib status post Watchman procedure, hypertension, hyperlipidemia, ischemic cardiomyopathy, CAD status post CABG and on low-dose aspirin, Thorpe's esophagus.  Presented as a transfer from an outside facility with complaints of acute onset chest pain that started the prior evening.  He stated he was preparing to go back to bed after brushing his teeth and started having left-sided sudden onset chest pain, felt like his previous heart attack.  Started to radiate to his left arm which made him concerned.  He was noted to be in A-fib RVR with elevated troponin.  He was started on a Cardizem drip and heparin drip and cardiology was consulted and recommended transfer to our facility for further management.  Patient admitted to having symptoms of chest pain intermittently and shortness of breath intermittently over the past 2 weeks.  He went to see his PCP and he had his medications adjusted and was started on diuretics due to swelling of his bilateral lower extremities and abdominal area.  He also gained approximately 30 pounds of weight over the past few days.  He denied any increased sodium intake.  He had a stress test in 2024 which was okay.  He had also been found to be anemic on recent lab work for which she had underwent an EGD and colonoscopy in May 2024 which was normal according to him.  He reported that he was supposed to see an hematologist as an outpatient the day of admission.     24  Hematology/Oncology was consulted for iron deficiency anemia.  At the time of the consultation patient with a WBC of 4.62, hemoglobin 7.5 g/dL, MCV 73.1, platelets 226,000, iron  13, iron sat 3%, TIBC 416.  Review of the chart shows labs from his PCP office dated 1/3/2024 and showing a WBC 6.2, hemoglobin 10.1 g/dL, MCV 75.2, platelets 171,000, 1+ anisocytosis, 1+ microcytes, 2+ hypochromia, 1+ elliptocytes, 1+ teardrop cells.  A stool for occult blood dated 12/20/2023 was negative.  A baseline CBC from 2020 showed normal hemoglobin.     He/She  has a past medical history of Atrial fibrillation, Brain bleed (2019), CAD (coronary artery disease), Hyperlipidemia, Hypertension, Myocardial infarction (2006), Obstructive sleep apnea on CPAP, Presence of Watchman left atrial appendage closure device (1/31/2020), Stroke (2019), and TIA (transient ischemic attack) (11/19/2013).     PCP: Nhung Clark APRN    Subjective     No changes    ROS:  Review of Systems   Constitutional:  Positive for fatigue.   Neurological:  Positive for weakness.        MEDICATIONS:    Scheduled Meds:  Acetylcysteine, 1,200 mg, Oral, BID  amantadine, 100 mg, Oral, Daily  aspirin, 81 mg, Oral, Daily  bumetanide, 1 mg, Oral, BID  empagliflozin, 10 mg, Oral, Daily  folic acid, 1 mg, Oral, Daily  guaiFENesin, 600 mg, Oral, Daily  insulin lispro, 2-9 Units, Subcutaneous, 4x Daily AC & at Bedtime  isosorbide mononitrate, 30 mg, Oral, Daily  magnesium sulfate, 2 g, Intravenous, Q12H  metoprolol succinate XL, 50 mg, Oral, Q12H  pantoprazole, 40 mg, Oral, BID AC  ranolazine, 500 mg, Oral, BID  sodium chloride, 10 mL, Intravenous, Q12H  spironolactone, 12.5 mg, Oral, Daily  ticagrelor, 90 mg, Oral, Q12H       Continuous Infusions:      PRN Meds:    acetaminophen **OR** acetaminophen **OR** acetaminophen    aluminum-magnesium hydroxide-simethicone    atropine    senna-docusate sodium **AND** polyethylene glycol **AND** bisacodyl **AND** bisacodyl    Calcium Replacement - Follow Nurse / BPA Driven Protocol    dextrose    dextrose    glucagon (human recombinant)    Lidocaine HCl gel    Magnesium Standard Dose Replacement -  "Follow Nurse / BPA Driven Protocol    nitroglycerin    ondansetron ODT **OR** ondansetron    Phosphorus Replacement - Follow Nurse / BPA Driven Protocol    Potassium Replacement - Follow Nurse / BPA Driven Protocol    sodium chloride    sodium chloride    sodium chloride     ALLERGIES:  No Known Allergies    Objective    VITALS:   /85   Pulse 114   Temp 98.4 °F (36.9 °C) (Oral)   Resp 21   Ht 182.9 cm (72\")   Wt 102 kg (225 lb 12 oz)   SpO2 94%   BMI 30.62 kg/m²     PHYSICAL EXAM: (performed by MD)  Physical Exam  Vitals and nursing note reviewed.   Constitutional:       General: He is not in acute distress.     Appearance: He is not diaphoretic.   HENT:      Head: Normocephalic and atraumatic.   Eyes:      General: No scleral icterus.        Right eye: No discharge.         Left eye: No discharge.      Conjunctiva/sclera: Conjunctivae normal.   Neck:      Thyroid: No thyromegaly.   Cardiovascular:      Rate and Rhythm: Normal rate and regular rhythm.      Heart sounds: Normal heart sounds.      No friction rub. No gallop.   Pulmonary:      Effort: Pulmonary effort is normal. No respiratory distress.      Breath sounds: No stridor. No wheezing.   Abdominal:      General: Bowel sounds are normal.      Palpations: Abdomen is soft. There is no mass.      Tenderness: There is no abdominal tenderness. There is no guarding or rebound.   Musculoskeletal:         General: No tenderness. Normal range of motion.      Cervical back: Normal range of motion and neck supple.   Lymphadenopathy:      Cervical: No cervical adenopathy.   Skin:     General: Skin is warm.      Findings: No erythema or rash.   Neurological:      Mental Status: He is alert and oriented to person, place, and time.      Motor: No abnormal muscle tone.   Psychiatric:         Behavior: Behavior normal.           RECENT LABS:  Lab Results (last 24 hours)       Procedure Component Value Units Date/Time    POC Glucose Once [[id number]]  (Abnormal) " Collected: 08/01/24 2001    Specimen: Blood Updated: 08/01/24 2002     Glucose 134 mg/dL      Comment: Serial Number: [id number]:  [id number]       POC Glucose 4x Daily Before Meals & at Bedtime [[id number]]  (Abnormal) Collected: 08/01/24 1711    Specimen: Blood Updated: 08/01/24 1712     Glucose 138 mg/dL      Comment: Serial Number: [id number]:  [id number]       Potassium [[id number]]  (Normal) Collected: 08/01/24 1347    Specimen: Blood Updated: 08/01/24 1411     Potassium 3.8 mmol/L     Protein Elec + Interp, Serum [[id number]] Collected: 07/30/24 2008    Specimen: Blood Updated: 08/01/24 1212     Total Protein 6.0 g/dL      Albumin 3.2 g/dL      Alpha-1-Globulin 0.3 g/dL      Alpha-2-Globulin 1.0 g/dL      Beta Globulin 0.9 g/dL      Gamma Globulin 0.7 g/dL      M-Marlon Not Observed g/dL      Globulin 2.8 g/dL      A/G Ratio 1.1     Please note Comment     Comment: Protein electrophoresis scan will follow via computer, mail, or   delivery.        SPE Interpretation Comment     Comment: The SPE pattern appears unremarkable. Evidence of monoclonal  protein is not apparent.       Narrative:      Performed at:  [address] [place] [address]  [id number]  : Santiago Fan PhD, Phone:  [phone]    POC Glucose 4x Daily Before Meals & at Bedtime [[id number]]  (Abnormal) Collected: 08/01/24 1202    Specimen: Blood Updated: 08/01/24 1204     Glucose 113 mg/dL      Comment: Serial Number: [id number]:  [id number]       POC Glucose 4x Daily Before Meals & at Bedtime [[id number]]  (Abnormal) Collected: 08/01/24 0744    Specimen: Blood Updated: 08/01/24 0746     Glucose 173 mg/dL      Comment: Serial Number: [id number]:  [id number]       Basic Metabolic Panel [[id number]]  (Abnormal) Collected: 08/01/24 0254    Specimen: Blood Updated: 08/01/24 0330     Glucose 107 mg/dL      BUN 8 mg/dL      Creatinine 1.08 mg/dL      Sodium 140 mmol/L      Potassium 3.6 mmol/L       Chloride 99 mmol/L      CO2 29.4 mmol/L      Calcium 9.2 mg/dL      BUN/Creatinine Ratio 7.4     Anion Gap 11.6 mmol/L      eGFR 78.1 mL/min/1.73     Narrative:      GFR Normal >60  Chronic Kidney Disease <60  Kidney Failure <15      Magnesium [[id number]]  (Normal) Collected: 08/01/24 0254    Specimen: Blood Updated: 08/01/24 0330     Magnesium 1.7 mg/dL     Phosphorus [[id number]]  (Normal) Collected: 08/01/24 0254    Specimen: Blood Updated: 08/01/24 0330     Phosphorus 3.3 mg/dL     Lipid Panel [[id number]] Collected: 08/01/24 0254    Specimen: Blood Updated: 08/01/24 0330     Total Cholesterol 124 mg/dL      Triglycerides 84 mg/dL      HDL Cholesterol 47 mg/dL      LDL Cholesterol  61 mg/dL      VLDL Cholesterol 16 mg/dL      LDL/HDL Ratio 1.28    Narrative:      Cholesterol Reference Ranges  (U.S. Department of Health and Human Services ATP III Classifications)    Desirable          <200 mg/dL  Borderline High    200-239 mg/dL  High Risk          >240 mg/dL      Triglyceride Reference Ranges  (U.S. Department of Health and Human Services ATP III Classifications)    Normal           <150 mg/dL  Borderline High  150-199 mg/dL  High             200-499 mg/dL  Very High        >500 mg/dL    HDL Reference Ranges  (U.S. Department of Health and Human Services ATP III Classifications)    Low     <40 mg/dl (major risk factor for CHD)  High    >60 mg/dl ('negative' risk factor for CHD)        LDL Reference Ranges  (U.S. Department of Health and Human Services ATP III Classifications)    Optimal          <100 mg/dL  Near Optimal     100-129 mg/dL  Borderline High  130-159 mg/dL  High             160-189 mg/dL  Very High        >189 mg/dL    CBC (No Diff) [[id number]]  (Abnormal) Collected: 08/01/24 0254    Specimen: Blood Updated: 08/01/24 0312     WBC 7.44 10*3/mm3      RBC 4.26 10*6/mm3      Hemoglobin 8.6 g/dL      Hematocrit 31.6 %      MCV 74.2 fL      MCH 20.2 pg      MCHC 27.2 g/dL      RDW 22.7 %      RDW-SD 59.1  fl      MPV 9.3 fL      Platelets 260 10*3/mm3             PENDING RESULTS:     IMAGING REVIEWED:  No radiology results for the last day    Assessment & Plan   ASSESSMENT:        Iron deficiency anemia: Workup thus far consistent with iron deficiency anemia.  Per patient report he had an EGD and colonoscopy in May 2024 that was normal.  There are no records for review from the procedure.  There is a stool for occult blood on the chart from his PCP in early 2024 that was negative. UA negative for blood.      PLAN  So far he has normal B12 and folate levels.  There is no evidence of hemolysis  Monitor CBC and transfuse for hemoglobin less than 7.0 g/dL or less than 8.0 g/dL if symptomatic or to optimize cardiac function.  Monitor closely due to anticoagulation with heparin drip currently.  Patient will need additional IV iron after discharge from the hospital outpatient  Consulted with GI and no plans for repeat endoscopy at this time but consider video capsule endoscopy for iron deficiency anemia persists  Discussed with patient  If discharged to follow-up in the office in 1 to 2 weeks.  Current hemoglobin 8.6 g per DL.           Electronically signed by Mell Mcfarland MD, 08/01/24, 8:15 PM EDT.

## 2024-08-02 NOTE — CONSULTS
"Heart Failure Program  Nurse Navigator  Discharge Planning    Patient Name:Chinedu Lopez  :1960  Cardiologist:Nomi  Current Admission Date: 2024   Previous Admission:    Admission frequency:   admissions in 6 months    Heart Failure history per record:    Symptoms on admission:SOA, chest pain, 20lbs weight gain prior to arrival      Admission Weight:  Flowsheet Rows      Flowsheet Row First Filed Value   Admission Height 182.9 cm (72\") Documented at 2024 0938   Admission Weight 112 kg (245 lb 13 oz) Documented at 2024 0938              Current Home Medications:  Prior to Admission medications    Medication Sig Start Date End Date Taking? Authorizing Provider   amiodarone (PACERONE) 200 MG tablet Take 1 tablet by mouth Daily. 24  Yes Loraine Crews MD   bumetanide (BUMEX) 1 MG tablet Take 1 tablet by mouth Daily. 24  Yes Loraine Crews MD   empagliflozin (JARDIANCE) 10 MG tablet tablet Take 1 tablet by mouth Daily. 8/3/24  Yes Brammell, Timothy Duane, MD   metoprolol succinate XL (TOPROL-XL) 100 MG 24 hr tablet Take 1 tablet by mouth Every 12 (Twelve) Hours. 24  Yes Brammell, Timothy Duane, MD   spironolactone (ALDACTONE) 25 MG tablet Take 0.5 tablets by mouth Daily. 8/3/24  Yes Brammell, Timothy Duane, MD   ticagrelor (BRILINTA) 90 MG tablet tablet Take 1 tablet by mouth Every 12 (Twelve) Hours. 24  Yes Brammell, Timothy Duane, MD   amantadine (SYMMETREL) 100 MG capsule Take 1 capsule by mouth Daily.    Loraine Crews MD   amantadine (SYMMETREL) 100 MG tablet Take 1 tablet by mouth 1 (One) Time.    Loraine Crews MD   aspirin 81 MG chewable tablet Chew 1 tablet Daily.    Loraine Crews MD   atorvastatin (Lipitor) 40 MG tablet Take 1 tablet by mouth Daily. 24   Brammell, Timothy Duane, MD   Cholecalciferol (VITAMIN D3) 125 MCG (5000 UT) capsule capsule Take 1 capsule by mouth Daily.    Loraine Crews MD   Cyanocobalamin " (VITAMIN B 12) 500 MCG tablet Take 1 tablet by mouth Daily.    Loraine Crews MD   doxazosin (CARDURA) 1 MG tablet Take 1 tablet by mouth every night at bedtime. 5/26/21   Loraine Crews MD   famotidine (PEPCID) 40 MG tablet Take 1 tablet by mouth Daily.    Loraine Crews MD   folic acid (FOLVITE) 1 MG tablet Take 1 tablet by mouth Daily.    Loraine Crews MD   guaiFENesin (MUCINEX) 600 MG 12 hr tablet Take 1 tablet by mouth Daily.    Loraine Crews MD   isosorbide mononitrate (IMDUR) 30 MG 24 hr tablet Take 1 tablet by mouth Daily.    Loraine Crews MD   metFORMIN (GLUCOPHAGE) 1000 MG tablet Take 1 tablet by mouth 2 (Two) Times a Day With Meals.    Loraine Crews MD   Methylcobalamin 5000 MCG tablet dispersible Place 500 mcg on the tongue Daily.    Loraine Crews MD   nitroglycerin (NITROLINGUAL) 0.4 MG/SPRAY spray Place 1 spray under the tongue Every 5 (Five) Minutes As Needed for Chest Pain.    Loraine Crews MD   pantoprazole (PROTONIX) 40 MG EC tablet Take 1 tablet by mouth 2 (Two) Times a Day.    Loraine Crews MD   potassium chloride (K-DUR,KLOR-CON) 20 MEQ CR tablet Take 1 tablet by mouth 2 (Two) Times a Day.    Loraine Crews MD   QUEtiapine (SEROquel) 25 MG tablet Take 1 tablet by mouth Daily.    Loraine Crews MD   ranolazine (RANEXA) 500 MG 12 hr tablet Take 1 tablet by mouth 2 (Two) Times a Day. 10/1/19   Loraine Crews MD   thiamine (VITAMIN B-1) 100 MG tablet Take 1 tablet by mouth Daily.    Loraine Crews MD   thiamine (VITAMIN B1) 100 MG tablet Take 1 tablet by mouth Daily.    Loraine Crews MD       Social history:   Pt from home, potential issues with affording high cost meds    Smoking status:current    Diagnostics Testing:  proBNP level: 15031    Echocardiogram:Results for orders placed during the hospital encounter of 07/30/24    Adult Transthoracic Echo Complete W/ Cont if Necessary Per  Protocol    Interpretation Summary    Left ventricular systolic function is severely decreased. Left ventricular ejection fraction appears to be 26 - 30%.    The left ventricular cavity is moderately dilated.    Left ventricular wall thickness is consistent with mild concentric hypertrophy.    Left ventricular diastolic function is consistent with (grade I) impaired relaxation.    Moderately reduced right ventricular systolic function noted.    The right ventricular cavity is moderately dilated.    The left atrial cavity is moderately dilated.    Estimated right ventricular systolic pressure from tricuspid regurgitation is normal (<35 mmHg).        Patient Assessment:   Pt sitting up in chair, resp even and unlabored. Trace edema ankles.     Current O2: 2L NC  Home O2:      Education provided to patient:  yes- Heart Failure disease education  yes -Symptom identification/management  yes -Daily Weights  yes- Diet education  yes- Fluid restriction (if ordered)  yes- Activity education  yes- Medication education  na- Smoking cessation  yes- Follow-up Appointments  yes-Provided information on how to access [facility] My HF Guide/Heart Failure Interactive workbook    Acceptance of learning: acceptance, cooperative    Heart Failure education interactive teaching session time: 30 minutes    GWT%    Identified needs/barriers:   Volume status, new dx, life vest at bedside, GDMT - aldactone toprol xl, jardiance. Needs 7 day follow-up and cardiology follow-up    Intervention:   Education, HF clinic information provided

## 2024-08-02 NOTE — CONSULTS
Pt education provided about cardiac rehab program. Rehab brochure given, HH diet packet, antiplatelet education, and stent info provided. Verified phone number, will call to follow up after discharge.

## 2024-08-02 NOTE — PROGRESS NOTES
"NEPHROLOGY PROGRESS NOTE------[facility]    [facility]  [phone]  Demar Do MD      Patient Care Team:  Nhung Clark APRN as PCP - General (Family Medicine)  Ernestina, MD Ranjan as Consulting Physician (Nephrology)      Provider:  Demar Do MD  Patient Name: Chinedu Lopez  :  1960    SUBJECTIVE:    F/U CRF/CKD/CHF    Feeling and breathing better this AM. Nice diuresis overnight. No angina. No dysuria.     Medication:  amantadine, 100 mg, Oral, Daily  aspirin, 81 mg, Oral, Daily  bumetanide, 1 mg, Oral, BID  empagliflozin, 10 mg, Oral, Daily  folic acid, 1 mg, Oral, Daily  guaiFENesin, 600 mg, Oral, Daily  insulin lispro, 2-9 Units, Subcutaneous, 4x Daily AC & at Bedtime  isosorbide mononitrate, 30 mg, Oral, Daily  metoprolol succinate XL, 50 mg, Oral, Q12H  pantoprazole, 40 mg, Oral, BID AC  ranolazine, 500 mg, Oral, BID  sodium chloride, 10 mL, Intravenous, Q12H  spironolactone, 12.5 mg, Oral, Daily  ticagrelor, 90 mg, Oral, Q12H             OBJECTIVE    Vital Sign Min/Max for last 24 hours  Temp  Min: 97.4 °F (36.3 °C)  Max: 98.5 °F (36.9 °C)   BP  Min: 103/66  Max: 143/97   Pulse  Min: 101  Max: 146   Resp  Min: 15  Max: 21   SpO2  Min: 82 %  Max: 98 %   No data recorded   No data recorded     Flowsheet Rows      Flowsheet Row First Filed Value   Admission Height 182.9 cm (72\") Documented at 2024 0938   Admission Weight 112 kg (245 lb 13 oz) Documented at 2024 0938            No intake/output data recorded.  I/O last 3 completed shifts:  In: 1968 [P.O.:1560; I.V.:408]  Out: 425 [Urine:4250]    Physical Exam:  General Appearance: alert, appears stated age and cooperative  Head: normocephalic, without obvious abnormality and atraumatic  Eyes: conjunctivae and sclerae normal and no icterus  Neck: supple and NO GROSS JVD NOW  Lungs: NO CRACKLES NOW  Heart: IRREG IRREG +MEDARDO. NO GALLOP, " "RUB, OR S3  Chest Wall: no abnormalities observed  Abdomen: normal bowel sounds. +OBESITY +VERY MILD ASCITES  Extremities: moves extremities well, +TRACE BILATERAL PRETIBIAL EDEMA, no cyanosis  Skin: no bleeding, bruising or rash. +PALLOR  Neurologic: Alert, and oriented. No focal deficits    Labs:    WBC WBC   Date Value Ref Range Status   08/02/2024 7.19 3.40 - 10.80 10*3/mm3 Final   08/01/2024 7.44 3.40 - 10.80 10*3/mm3 Final   07/31/2024 5.31 3.40 - 10.80 10*3/mm3 Final   07/30/2024 4.62 3.40 - 10.80 10*3/mm3 Final      HGB Hemoglobin   Date Value Ref Range Status   08/02/2024 8.6 (L) 13.0 - 17.7 g/dL Final   08/01/2024 8.6 (L) 13.0 - 17.7 g/dL Final   07/31/2024 8.6 (L) 13.0 - 17.7 g/dL Final   07/30/2024 7.5 (L) 13.0 - 17.7 g/dL Final      HCT Hematocrit   Date Value Ref Range Status   08/02/2024 32.1 (L) 37.5 - 51.0 % Final   08/01/2024 31.6 (L) 37.5 - 51.0 % Final   07/31/2024 32.1 (L) 37.5 - 51.0 % Final   07/30/2024 28.5 (L) 37.5 - 51.0 % Final      Platelets No results found for: \"LABPLAT\"   MCV MCV   Date Value Ref Range Status   08/02/2024 74.3 (L) 79.0 - 97.0 fL Final   08/01/2024 74.2 (L) 79.0 - 97.0 fL Final   07/31/2024 74.7 (L) 79.0 - 97.0 fL Final   07/30/2024 73.1 (L) 79.0 - 97.0 fL Final          Sodium Sodium   Date Value Ref Range Status   08/02/2024 137 136 - 145 mmol/L Final   08/01/2024 140 136 - 145 mmol/L Final   07/31/2024 140 136 - 145 mmol/L Final   07/31/2024 139 136 - 145 mmol/L Final   07/30/2024 140 136 - 145 mmol/L Final      Potassium Potassium   Date Value Ref Range Status   08/02/2024 3.8 3.5 - 5.2 mmol/L Final   08/01/2024 3.8 3.5 - 5.2 mmol/L Final   08/01/2024 3.6 3.5 - 5.2 mmol/L Final   07/31/2024 3.6 3.5 - 5.2 mmol/L Final   07/31/2024 4.0 3.5 - 5.2 mmol/L Final     Comment:     Slight hemolysis detected by analyzer. Result may be falsely elevated.   07/30/2024 3.9 3.5 - 5.2 mmol/L Final      Chloride Chloride   Date Value Ref Range Status   08/02/2024 99 98 - 107 mmol/L " "Final   08/01/2024 99 98 - 107 mmol/L Final   07/31/2024 99 98 - 107 mmol/L Final   07/31/2024 100 98 - 107 mmol/L Final   07/30/2024 102 98 - 107 mmol/L Final      CO2 CO2   Date Value Ref Range Status   08/02/2024 26.5 22.0 - 29.0 mmol/L Final   08/01/2024 29.4 (H) 22.0 - 29.0 mmol/L Final   07/31/2024 26.0 22.0 - 29.0 mmol/L Final   07/31/2024 31.0 (H) 22.0 - 29.0 mmol/L Final   07/30/2024 28.1 22.0 - 29.0 mmol/L Final      BUN BUN   Date Value Ref Range Status   08/02/2024 9 8 - 23 mg/dL Final   08/01/2024 8 8 - 23 mg/dL Final   07/31/2024 8 8 - 23 mg/dL Final   07/31/2024 9 8 - 23 mg/dL Final   07/30/2024 10 8 - 23 mg/dL Final      Creatinine Creatinine   Date Value Ref Range Status   08/02/2024 1.06 0.76 - 1.27 mg/dL Final   08/01/2024 1.08 0.76 - 1.27 mg/dL Final   07/31/2024 0.97 0.76 - 1.27 mg/dL Final   07/31/2024 1.10 0.76 - 1.27 mg/dL Final   07/30/2024 1.23 0.76 - 1.27 mg/dL Final      Calcium Calcium   Date Value Ref Range Status   08/02/2024 9.4 8.6 - 10.5 mg/dL Final   08/01/2024 9.2 8.6 - 10.5 mg/dL Final   07/31/2024 9.5 8.6 - 10.5 mg/dL Final   07/31/2024 9.4 8.6 - 10.5 mg/dL Final   07/30/2024 9.5 8.6 - 10.5 mg/dL Final      PO4 No components found for: \"PO4\"   Albumin Albumin   Date Value Ref Range Status   07/31/2024 3.7 3.5 - 5.2 g/dL Final   07/30/2024 3.2 2.9 - 4.4 g/dL Final      Magnesium Magnesium   Date Value Ref Range Status   08/01/2024 1.7 1.6 - 2.4 mg/dL Final   07/31/2024 1.8 1.6 - 2.4 mg/dL Final      Uric Acid No components found for: \"URIC ACID\"     Imaging Results (Last 72 Hours)       Procedure Component Value Units Date/Time    XR Chest 1 View [[id number]] Collected: 08/01/24 2021     Updated: 08/01/24 2026    Narrative:      XR CHEST 1 VW    Date of Exam: 8/1/2024 6:00 PM EDT    Indication: chf    Comparison: None available.    Findings:  Mediastinum: Cardiomediastinal silhouette appears enlarged    Lungs: Indistinctness of the pulmonary vasculature with mild pulmonary " vascular congestion. Bibasal hazy opacities.    Pleura: Small bilateral pleural effusions. No pneumothorax.    Bones and soft tissues: Median sternotomy.        Impression:      Impression:  Cardiomegaly with mild pulmonary edema and small bilateral pleural effusions.      Electronically Signed: Gildardo Springer    8/1/2024 8:23 PM EDT    Workstation ID: [id number]    US Renal Bilateral [[id number]] Collected: 07/30/24 1453     Updated: 07/30/24 1503    Narrative:      Date of Exam: 7/30/2024 2:05 PM EDT    Indication: LUDWIN/CKD.    Comparison: No comparisons available.    Technique: Grayscale and color Doppler ultrasound evaluation of the kidneys and urinary bladder was performed.      Findings:  Right Kidney: Right kidney measures 11.6 cm in long axis. Likely benign cyst in the right kidney measuring up to 2.2 cm. Increased renal cortical echogenicity.  No hydronephrosis.. No sonographically evident nephrolithiasis.    Left Kidney:  Left kidney measures 9.3 cm in long axis. Mildly increased renal cortical echogenicity. No suspicious appearing lesions.No hydronephrosis.No sonographically evident nephrolithiasis.    Bladder: The bladder appears unremarkable.    Additional findings: Mild perihepatic and pelvic ascites.      Impression:      Impression:  No hydronephrosis.    Increased renal cortical echogenicity which can be seen in medical renal disease.    Small volume ascites.          Electronically Signed: Gildardo Springer    7/30/2024 3:01 PM EDT    Workstation ID: [id number]            Results for orders placed during the hospital encounter of 07/30/24    XR Chest 1 View    Narrative  XR CHEST 1 VW    Date of Exam: 8/1/2024 6:00 PM EDT    Indication: chf    Comparison: None available.    Findings:  Mediastinum: Cardiomediastinal silhouette appears enlarged    Lungs: Indistinctness of the pulmonary vasculature with mild pulmonary vascular congestion. Bibasal hazy opacities.    Pleura: Small bilateral pleural effusions.  No pneumothorax.    Bones and soft tissues: Median sternotomy.    Impression  Impression:  Cardiomegaly with mild pulmonary edema and small bilateral pleural effusions.      Electronically Signed: Gildardo Springer  8/1/2024 8:23 PM EDT  Workstation ID: [id number]      Results for orders placed during the hospital encounter of 01/20/23    Duplex Carotid Ultrasound CAR    Interpretation Summary    Right internal carotid artery plaque without significant stenosis.    Left internal carotid artery moderate stenosis.        ASSESSMENT / PLAN      Atrial fibrillation with RVR      CRF/CKD------Nonoliguric. Appears to have CRF/CKD STG 2.   Unknown if patient has baseline proteinuria or hematuria. CRF/CKD STG 2 most likely secondary to HTN NS vs. Early DM nephropathy. Follow with gentle diuresis. Stable post cardiac cath. No NSAIDs     2. ANEMIA------PRBCs per Hem/Onc     3. VOLUME OVERLOAD/EDEMA/MILD ASCITES-----Diuresing with po Bumex and low dose Aldactone     4. ATRIAL FIBRILLATION WITH RVR------Watchman in place. Per , Cardiology. Digoxin given today     5. CAD WITH H/O CABG/PCI AND WITH CURRENT ANGINA------Cardiac cath/PCI/Stent done yesterday per , Cardiology     6. H/O CVA/TIA/HEAD TRAUMA WITH BRAIN BLEED     7. HYPERLIPIDEMIA----Not on Statin.       8. GERD/PUD PROPHYLAXIS-----On PPI. Benefits outweigh risks despite CKD     9. OBESITY/DEISY    10. HTN WITH CKD------BP good      Demar Do MD  [facility]  [phone]  08/02/24  07:41 EDT

## 2024-08-02 NOTE — CASE MANAGEMENT/SOCIAL WORK
Case Management Discharge Note      Final Note: KAREN spoke with Hospitalist and nursing for morning rounds and reviewed chart for clinical updates. Caridology and EP in agreement for discharge. CM informed MD of nursing report of HR still going up into the 140's. All aware and remain agreeable to DC and patient will follow up OP for a WILD. DC orders placed. Patient to transport home via private vehicle with spouse.    \    Transportation Services  Private: Car (with spouse)    Final Discharge Disposition Code: 01 - home or self-care

## 2024-08-02 NOTE — PROGRESS NOTES
Cardiology [facility]      Patient Care Team:  Nhung Clark APRN as PCP - General (Family Medicine)  Ranjan Do MD as Consulting Physician (Nephrology)              Cardiology assessment and plan     Congestive heart failure  Acute on chronic CHF exacerbation  Acute systolic dysfunction  Severe LV dysfunction  Volume overload  Congestive heart failure NYHA class IV  Chest discomfort  Abnormal elevated troponin consistent with non-ST elevation myocardial infarction  Atrial fibrillation with rapid antler response  Worsening cardiomyopathy  Severe LV dysfunction  Abnormal elevated proBNP secondary to congestive heart failure  Pulmonary edema and bilateral pleural effusions  Significant volume overload  Atrial fibrillation and with prior history of watchman implantation  Acute on chronic kidney injury  Coronary artery disease prior coronary artery bypass surgery prior PCI and stenting  Hypertension  Hyperlipidemia  Diabetes mellitus  History of TIA  History of intracranial hemorrhage status post fall in 2019  Anemia/iron deficiency anemia with a significant worsening of the hemoglobin  Native severe two-vessel coronary artery disease with 100% occlusion of the ostium of the LAD and 100% occlusion of the ostium of the left circumflex  Patent vein graft to the marginal with severe 90% stenosis involving the midportion of the vein graft succinyl treated with placement of a Xience drug-eluting stent  Patent LIMA to the LAD  Patent stent in the right coronary artery with no significant in-stent restenosis  Heart rate is still poorly controlled  Sodium is 137 potassium is 3.8 creatinine is 1.0 hemoglobin is 8.6  Tmax is 98.2 pulse is 95-1 47 respirations are 20 blood pressure is 116/96 to 132/90 sats are 94%  Current medications include aspirin 81 mg p.o. once a day Bumex 1 mg p.o. once a day patient is currently being loaded with dig  Patient is currently on isosorbide 30 mg p.o. once a day patient is on  "Toprol- mg p.o. every 12 hours  Patient is on Ranexa 20 mg p.o. twice daily Aldactone 12.5 mg p.o. once a day patient is currently on Brilinta 90 mg p.o. twice daily  Arrange for a LifeVest device  Diagnosis and treatment options reviewed and discussed with patient  Optimize medical therapy for cardiomyopathy  Consider angiotensin receptor blocker if the renal function is stable and if the blood pressure tolerates as an outpatient  Once the heart rate is controlled we will try to discharge patient home and follow-up as an outpatient  Diagnosis and treatment options reviewed and discussed with patient.  Further recommendation based on patient course                Chief Complaint: Chest pain shortness of breath    Subjective no new complaints    Interval History: No significant change in the overall status    History taken from: patient    Review of Systems:  Review of Systems   Constitutional: Negative for chills, decreased appetite and malaise/fatigue.   HENT:  Negative for congestion and nosebleeds.    Eyes:  Negative for blurred vision and double vision.   Cardiovascular:  Positive for dyspnea on exertion. Negative for chest pain, irregular heartbeat, leg swelling, near-syncope, orthopnea, palpitations and paroxysmal nocturnal dyspnea.   Respiratory:  Negative for cough and shortness of breath.    Hematologic/Lymphatic: Negative for adenopathy. Does not bruise/bleed easily.   Skin:  Negative for color change and rash.   Gastrointestinal:  Negative for bloating, abdominal pain, hematemesis and hematochezia.   Genitourinary:  Negative for flank pain and hematuria.   Neurological:  Negative for dizziness and focal weakness.   Psychiatric/Behavioral:  Negative for altered mental status. The patient does not have insomnia.        Objective    Vital Signs  Visit Vitals  /89   Pulse 116   Temp 98.3 °F (36.8 °C) (Oral)   Resp 24   Ht 182.9 cm (72\")   Wt 102 kg (225 lb 12 oz)   SpO2 93%   BMI 30.62 kg/m² " "    Oxygen Therapy  SpO2: 93 %  Pulse Oximetry Type: Continuous  Device (Oxygen Therapy): room air  Flow (L/min): 2  Flowsheet Rows      Flowsheet Row First Filed Value   Admission Height 182.9 cm (72\") Documented at 07/30/2024 0938   Admission Weight 112 kg (245 lb 13 oz) Documented at 07/30/2024 0938          Intake & Output (last 3 days)         07/29 0701 07/30 0700 07/30 0701 07/31 0700 07/31 0701 08/01 0700 08/01 0701 08/02 0700    P.O.  360 780     I.V. (mL/kg)  1525 (14.5) 891 (8.7) 78 (0.8)    Blood  425      Total Intake(mL/kg)  2310 (22) 1671 (16.4) 78 (0.8)    Urine (mL/kg/hr)  3375 4150 (1.7) 200 (0.3)    Emesis/NG output  0 0     Stool  0 0     Total Output  3375 4150 200    Net  -1065 -2479 -122            Urine Unmeasured Occurrence   1 x           Lines, Drains & Airways       Active LDAs       Name Placement date Placement time Site Days    Peripheral IV 07/30/24 1300 Anterior;Proximal;Right Forearm 07/30/24  1300  Forearm  2    Peripheral IV 07/30/24 1600 Anterior;Left;Upper Arm 07/30/24  1600  Arm  1                    Physical Exam:  Constitutional:       Appearance: Well-developed.   Eyes:      Conjunctiva/sclera: Conjunctivae normal.      Pupils: Pupils are equal, round, and reactive to light.   HENT:      Head: Normocephalic and atraumatic.   Neck:      Thyroid: No thyromegaly.   Pulmonary:      Effort: Increased respiratory effort.      Breath sounds: Normal breath sounds. Examination of the right-middle field reveals rales. Examination of the left-middle field reveals rales. Examination of the right-lower field reveals decreased breath sounds and rales. Examination of the left-lower field reveals decreased breath sounds and rales.   Cardiovascular:      Abnormal PMI. Tachycardia present. Irregular rhythm.      S3 and S4 .    Pulses:     Intact distal pulses.   Edema:     Peripheral edema absent.   Abdominal:      General: Bowel sounds are normal.      Palpations: Abdomen is soft. "   Musculoskeletal:      Cervical back: Normal range of motion and neck supple. Skin:     General: Skin is warm.   Neurological:      Mental Status: Alert and oriented to person, place, and time.         Results Review:     I reviewed the patient's new clinical results.    Lab Results (last 24 hours)       Procedure Component Value Units Date/Time    POC Glucose 4x Daily Before Meals & at Bedtime [[id number]]  (Abnormal) Collected: 08/02/24 1113    Specimen: Blood Updated: 08/02/24 1116     Glucose 119 mg/dL      Comment: Serial Number: [id number]:  [id number]       POC Glucose 4x Daily Before Meals & at Bedtime [[id number]]  (Abnormal) Collected: 08/02/24 0756    Specimen: Blood Updated: 08/02/24 0758     Glucose 139 mg/dL      Comment: Serial Number: [id number]:  [id number]       Basic Metabolic Panel [[id number]]  (Abnormal) Collected: 08/02/24 0237    Specimen: Blood Updated: 08/02/24 0316     Glucose 103 mg/dL      BUN 9 mg/dL      Creatinine 1.06 mg/dL      Sodium 137 mmol/L      Potassium 3.8 mmol/L      Chloride 99 mmol/L      CO2 26.5 mmol/L      Calcium 9.4 mg/dL      BUN/Creatinine Ratio 8.5     Anion Gap 11.5 mmol/L      eGFR 79.8 mL/min/1.73     Narrative:      GFR Normal >60  Chronic Kidney Disease <60  Kidney Failure <15      CBC (No Diff) [[id number]]  (Abnormal) Collected: 08/02/24 0237    Specimen: Blood Updated: 08/02/24 0254     WBC 7.19 10*3/mm3      RBC 4.32 10*6/mm3      Hemoglobin 8.6 g/dL      Hematocrit 32.1 %      MCV 74.3 fL      MCH 19.9 pg      MCHC 26.8 g/dL      RDW 23.1 %      RDW-SD 59.8 fl      MPV 9.2 fL      Platelets 241 10*3/mm3     POC Glucose Once [[id number]]  (Abnormal) Collected: 08/01/24 2001    Specimen: Blood Updated: 08/01/24 2002     Glucose 134 mg/dL      Comment: Serial Number: [id number]:  [id number]       POC Glucose 4x Daily Before Meals & at Bedtime [[id number]]  (Abnormal) Collected: 08/01/24 1711    Specimen: Blood Updated: 08/01/24 1712     Glucose  138 mg/dL      Comment: Serial Number: [id number]:  [id number]       Potassium [[id number]]  (Normal) Collected: 08/01/24 1347    Specimen: Blood Updated: 08/01/24 1411     Potassium 3.8 mmol/L           Results for orders placed during the hospital encounter of 07/30/24    Adult Transthoracic Echo Complete W/ Cont if Necessary Per Protocol    Interpretation Summary    Left ventricular systolic function is severely decreased. Left ventricular ejection fraction appears to be 26 - 30%.    The left ventricular cavity is moderately dilated.    Left ventricular wall thickness is consistent with mild concentric hypertrophy.    Left ventricular diastolic function is consistent with (grade I) impaired relaxation.    Moderately reduced right ventricular systolic function noted.    The right ventricular cavity is moderately dilated.    The left atrial cavity is moderately dilated.    Estimated right ventricular systolic pressure from tricuspid regurgitation is normal (<35 mmHg).        Medication Review:   I have reviewed the patient's current medication list  Scheduled Meds:amantadine, 100 mg, Oral, Daily  aspirin, 81 mg, Oral, Daily  bumetanide, 1 mg, Oral, BID  digoxin, 250 mcg, Intravenous, Q6H  empagliflozin, 10 mg, Oral, Daily  folic acid, 1 mg, Oral, Daily  guaiFENesin, 600 mg, Oral, Daily  insulin lispro, 2-9 Units, Subcutaneous, 4x Daily AC & at Bedtime  isosorbide mononitrate, 30 mg, Oral, Daily  metoprolol succinate XL, 100 mg, Oral, Q12H  pantoprazole, 40 mg, Oral, BID AC  potassium chloride ER, 20 mEq, Oral, BID  ranolazine, 500 mg, Oral, BID  sodium chloride, 10 mL, Intravenous, Q12H  spironolactone, 12.5 mg, Oral, Daily  ticagrelor, 90 mg, Oral, Q12H      Continuous Infusions:   PRN Meds:.  acetaminophen **OR** acetaminophen **OR** acetaminophen    aluminum-magnesium hydroxide-simethicone    atropine    senna-docusate sodium **AND** polyethylene glycol **AND** bisacodyl **AND** bisacodyl    Calcium  Replacement - Follow Nurse / BPA Driven Protocol    dextrose    dextrose    glucagon (human recombinant)    Lidocaine HCl gel    Magnesium Standard Dose Replacement - Follow Nurse / BPA Driven Protocol    nitroglycerin    ondansetron ODT **OR** ondansetron    Phosphorus Replacement - Follow Nurse / BPA Driven Protocol    Potassium Replacement - Follow Nurse / BPA Driven Protocol    sodium chloride    sodium chloride    sodium chloride    ECG/EMG Results (last 24 hours)       Procedure Component Value Units Date/Time    Telemetry Scan [[id number]] Resulted: 07/30/24     Updated: 07/31/24 1400    Telemetry Scan [[id number]] Resulted: 07/30/24     Updated: 07/31/24 1907    Telemetry Scan [[id number]] Resulted: 07/30/24     Updated: 07/31/24 2056    Telemetry Scan [[id number]] Resulted: 07/30/24     Updated: 08/01/24 0054    Telemetry Scan [[id number]] Resulted: 07/30/24     Updated: 08/01/24 0434    Telemetry Scan [[id number]] Resulted: 07/30/24     Updated: 08/01/24 0800    ECG 12 Lead Rhythm Change [[id number]] Collected: 08/01/24 0759     Updated: 08/01/24 0801     QT Interval 344 ms      QTC Interval 450 ms     Narrative:      HEART FCHR=739  bpm  RR Mgylesys=176  ms  PA Interval=  ms  P Horizontal Axis=  deg  P Front Axis=  deg  QRSD Jexuejuz=101  ms  QT Qhhxbcwq=520  ms  HZcQ=279  ms  QRS Axis=44  deg  T Wave Axis=198  deg  - ABNORMAL ECG -  Atrial fibrillation  Repol abnrm suggests ischemia, anterolateral  Date and Time of Study:2024-08-01 07:59:27            Imaging Results (Last 24 Hours)       Procedure Component Value Units Date/Time    XR Chest 1 View [[id number]] Collected: 08/01/24 2021     Updated: 08/01/24 2026    Narrative:      XR CHEST 1 VW    Date of Exam: 8/1/2024 6:00 PM EDT    Indication: chf    Comparison: None available.    Findings:  Mediastinum: Cardiomediastinal silhouette appears enlarged    Lungs: Indistinctness of the pulmonary vasculature with mild pulmonary vascular congestion. Bibasal hazy  opacities.    Pleura: Small bilateral pleural effusions. No pneumothorax.    Bones and soft tissues: Median sternotomy.        Impression:      Impression:  Cardiomegaly with mild pulmonary edema and small bilateral pleural effusions.      Electronically Signed: Gildardo Springer    8/1/2024 8:23 PM EDT    Workstation ID: [id number]          Results for orders placed during the hospital encounter of 07/30/24    Cardiac Catheterization/Vascular Study    Conclusion  Table formatting from the original result was not included.  Cardiac Catheterization with PCI Report    Chinedu Lopez  [id number]  7/31/2024  @PCP@    He underwent cardiac catheterization and percutaneous coronary intervention    Indications for the procedure include: acute coronary syndrome.  Severe chest pain  Acute coronary syndrome  Non-ST elevation myocardial infarction  Atrial fibrillation with rapid response  Worsening cardiomyopathy  Severe LV dysfunction  Congestive heart failure    Procedure Details:  The risks, benefits, complications, treatment options, and expected outcomes were discussed with the patient. The patient and/or family concurred with the proposed plan, giving informed consent.    After informed consent the patient was brought to the cath lab after appropriate IV hydration was begun and oral premedication was given. He was further sedated with fentanyl. He was prepped and draped in the usual manner. Using the modified Seldinger access technique, a 6 [place] sheath was placed in the femoral artery. A left heart catheterization with coronary arteriography was performed.  We used a AR mod diagnostic catheter to selectively engage the right coronary artery and we used a 5 [place] IMT catheter due to selective engagement of the LIMA to the LAD, findings are discussed below.    The decision was made to proceed with intervention. He received Heparin as per protocol. The details of the intervention are as follows:    We used a left coronary  bypass guide catheter with guide liner support to get selective access to the vein graft to the marginal lesion was initially predilated multiple attempts using a guide liner support secondary to extensive calcification and difficult to cross the lesion with a balloon secondary to tortuosity and calcification initially with a 1 5 balloon eventually with a 2 oh balloon and then upgraded to 3 with balloon and stented with a 3.0 x 18 mm diastolic bleeding stent and lesion was postdilated with 4.0 x 12 mm noncompliant balloon up to 16 keven.  Patient tolerated procedure well with no immediate possible complication plan to obtain hemostasis by manual pressure procedural anticoagulation was heparin patient received 180 mg of Brilinta at the end of the procedure.    After the procedure was completed, sedation was stopped and the sheaths and catheters were all removed according to established hospital protocols.    Conscious sedation:  Conscious sedation was performed according to protocol.  I supervised and directed an independent trained observer with the assistance of monitoring the patient's level of consciousness and physiologic status throughout the procedure.  Intraoperative service time was 90 minutes.    Findings:    Hemodynamics Central aortic pressure systolic 120 diastolic of 66 the mean pressure of 89 mmHg  LV end-diastolic pressure of 29 mmHg  There was no gradient across the aortic valve on the pullback of the pigtail catheter  Left Main Left main has diffuse 90% stenosis provides a small caliber marginal branch otherwise both LAD and left circumflex artery 100% occluded in the ostial portion  RCA Large-caliber vessel dominant vessel  Right coronary artery is a dominant vessel  Patent stent in the right coronary artery with no evidence of any significant in-stent restenosis  Right coronary artery provides a large caliber PDA and PLB branches that are angiographically normal  % occluded in the ostial  portion  Circ 100% occluded in the ostial portion  SVG(s) Vein graft presumed to be marginal branch is 100% occluded in the ostial portion    Vein graft to the second marginal branch is patent with mid body angiographic 90% stenosis likely the culprit vessel for patient symptoms of non-ST ovation myocardial infarction likely this lesion is in-stent restenosis within the prior stent  JUANA LIMA to LAD is patent without any significant stenosis involving the ostium/body of the anastomotic site.  No significant disease involving the LAD after the LIMA touchdown  LV Not done  Coronary dominance Right coronary artery    Interventions/Vessels Successful PCI and stenting of the midportion of the vein graft to the marginal branch with placement of Xience drug-eluting stent  Guides/Wires BMW  Devices Xience drug-eluting stent 3.0 x 18 mm  Post % Stenosis  0  Pre-procedure SHANNON flow  10  Post procedure SHANNON flow  3  Closure Device None  Complications None    Estimated Blood Loss:  Minimal    Specimens: None    Complications:  None; patient tolerated the procedure well.    Disposition: PACU - hemodynamically stable.    Condition: stable    Impressions:  Native severe two-vessel coronary artery disease with 100% occlusion of the ostium of the LAD and 100% occlusion of the ostium of the left circumflex  Patent vein graft to the marginal with severe 90% stenosis involving the midportion of the vein graft succinyl treated with placement of a Xience drug-eluting stent  Patent LIMA to the LAD  Patent stent in the right coronary artery with no significant in-stent restenosis    Recommendations:  Aspirin 81 mg p.o. once a day  Brilinta 90 mg p.o. twice daily  Continued aggressive risk factor modification  Observe patient in CVU bed overnight  Close monitoring of renal function  Optimize medical therapy for cardiomyopathy and rate control for atrial fibrillation  Patient currently being seen by nephrology and hematology oncology for  underlying anemia and renal failure  Test results reviewed and discussed with patient and family  Further recommendation based on patient course     Results for orders placed during the hospital encounter of 07/30/24    Adult Transthoracic Echo Complete W/ Cont if Necessary Per Protocol    Interpretation Summary    Left ventricular systolic function is severely decreased. Left ventricular ejection fraction appears to be 26 - 30%.    The left ventricular cavity is moderately dilated.    Left ventricular wall thickness is consistent with mild concentric hypertrophy.    Left ventricular diastolic function is consistent with (grade I) impaired relaxation.    Moderately reduced right ventricular systolic function noted.    The right ventricular cavity is moderately dilated.    The left atrial cavity is moderately dilated.    Estimated right ventricular systolic pressure from tricuspid regurgitation is normal (<35 mmHg).     Lab Results   Component Value Date    GLUCOSE 103 (H) 08/02/2024    BUN 9 08/02/2024    CREATININE 1.06 08/02/2024    EGFR 79.8 08/02/2024    BCR 8.5 08/02/2024    K 3.8 08/02/2024    CO2 26.5 08/02/2024    CALCIUM 9.4 08/02/2024    PROTENTOTREF 6.0 07/30/2024    ALBUMIN 3.7 07/31/2024    BILITOT 0.5 07/31/2024    AST 19 07/31/2024    ALT 5 07/31/2024      Lab Results   Component Value Date    CHOL 124 08/01/2024    TRIG 84 08/01/2024    HDL 47 08/01/2024    LDL 61 08/01/2024      Lab Results   Component Value Date    CKTOTAL 112 07/30/2024    TROPONINT 257 (C) 07/31/2024        Assessment & Plan       Atrial fibrillation with RVR        José Miguel Izquierdo MD  08/02/24  13:49 EDT

## 2024-08-02 NOTE — DISCHARGE SUMMARY
[facility] Medicine Services  Discharge Summary    Date of Service: 2024  Patient Name: Chinedu Lopez  : 1960  MRN: [id number]    Date of Admission: 2024  Discharge Diagnosis:       Acute nontransmural myocardial infarct  Atrial fibs with RVR  Acute on chronic diastolic/systolic congestive heart failure  Iron deficiency  Anemia  Acute renal injury  History of watchman's procedure  History of CNS bleeding in the past  History of coronary artery disease  Thorpe's esophagus  Type 2 DM  Ischemic cardiomyopathy          Date of Discharge: 2024  Primary Care Physician: Nhung Clark APRN      Presenting Problem:   Atrial fibrillation with RVR [I48.91]    Active and Resolved Hospital Problems:  Active Hospital Problems    Diagnosis POA    **Atrial fibrillation with RVR [I48.91] Yes      Resolved Hospital Problems   No resolved problems to display.         Hospital Course     HPI:  Per the H&P dated 2024    Hospital Course:   This is a older than stated age 63-year-old white male who presented with issues as discussed in history and physical.  He was afebrile during his hospitalization.  Oxygenation was maintained on room air.  Blood pressure is well-controlled.  Urine eosinophils returned being chest x-ray showed cardiomegaly and evidence of mild pulmonary edema.  Renal ultrasound showed no evidence of hydronephrosis with increased renal cortical echogenicity bilaterally.  Initial electrolytes were nonremarkable.  His creatinine was 1.23.  proBNP was elevated in excess of 15,000.  Iron saturation returned being 3%.  Ferritin level returned and 22.  He displayed a significant anemia on presentation.  Troponins were elevated and he underwent coronary artery catheterization and stenting as discussed in procedure report. Cardiology with attempts at rate control for his atrial fibs which was difficult to maintain.  Thyroid functions appear normal with a  TSH of 3.5.  B12 and folic acid levels normal.  Protein electrophoresis returned showing no evidence of monoclonal dermopathy.  Echocardiogram was performed that showed a decreased ejection fraction at approximately 25 to 30%.  He was seen in evaluation by hematology for his noted anemia.  It was thought that he would require outpatient follow-up for additional iron therapy.  It was thought that any need for gastroenterology evaluation be reviewed by primary care with gastroenterology not feeling that any acute intervention was needed at present.  He was arranged for a LifeVest therapy to use at time of discharge.  It was thought by cardiology in light of his persistent atrial fibs that his amiodarone should be discontinued.  He otherwise will follow-up with cardiology as noted with any needed attempts at afterload reduction and further medication adjustments.              Reasons For Change In Medications and Indications for New Medications:      Day of Discharge     Vital Signs:  Temp:  [98 °F (36.7 °C)-98.7 °F (37.1 °C)] 98.2 °F (36.8 °C)  Heart Rate:  [] 93  Resp:  [19-22] 21  BP: (125-152)/() 125/76    Physical Exam:  Physical Exam  Constitutional:       Appearance: Normal appearance. He is obese.   HENT:      Head: Normocephalic.   Cardiovascular:      Rate and Rhythm: Tachycardia present. Rhythm irregular.   Pulmonary:      Effort: Pulmonary effort is normal.      Breath sounds: Normal breath sounds.   Abdominal:      General: Bowel sounds are normal.      Palpations: Abdomen is soft.      Tenderness: There is no abdominal tenderness.   Musculoskeletal:         General: No swelling.   Neurological:      Mental Status: He is alert. Mental status is at baseline.   Psychiatric:         Behavior: Behavior normal.            Pertinent  and/or Most Recent Results     LAB RESULTS:      Lab 08/03/24  0420 08/02/24  0237 08/01/24  0254 07/31/24  1155 07/31/24  0508 07/31/24  0256 07/30/24 2008  07/30/24  1255 07/30/24  1029   WBC 5.38 7.19 7.44  --  5.31  --   --   --  4.62   HEMOGLOBIN 8.7* 8.6* 8.6*  --  8.6*  --   --   --  7.5*   HEMATOCRIT 31.7* 32.1* 31.6*  --  32.1*  --   --   --  28.5*   PLATELETS 272 241 260  --  213  --   --   --  226   NEUTROS ABS  --   --   --   --   --   --   --   --  3.05   IMMATURE GRANS (ABS)  --   --   --   --   --   --   --   --  0.10*   LYMPHS ABS  --   --   --   --   --   --   --   --  0.92   MONOS ABS  --   --   --   --   --   --   --   --  0.42   EOS ABS  --   --   --   --   --   --   --   --  0.09   MCV 75.5* 74.3* 74.2*  --  74.7*  --   --   --  73.1*   LDH  --   --   --   --   --   --   --  150  --    APTT 30.2*  --   --  80.2*  --  69.2 40.4* 36.0*  --          Lab 08/03/24  0420 08/02/24  0237 08/01/24  1347 08/01/24  0254 07/31/24  1155 07/31/24  0508 07/30/24  1255   SODIUM 137 137  --  140 140 139  --    POTASSIUM 3.7 3.8 3.8 3.6 3.6 4.0  --    CHLORIDE 100 99  --  99 99 100  --    CO2 27.8 26.5  --  29.4* 26.0 31.0*  --    ANION GAP 9.2 11.5  --  11.6 15.0 8.0  --    BUN 11 9  --  8 8 9  --    CREATININE 1.15 1.06  --  1.08 0.97 1.10  --    EGFR 72.4 79.8  --  78.1 88.8 76.4  --    GLUCOSE 96 103*  --  107* 118* 98  --    CALCIUM 9.3 9.4  --  9.2 9.5 9.4  --    IONIZED CALCIUM  --   --   --   --   --  1.22  --    MAGNESIUM 2.1  --   --  1.7  --  1.8  --    PHOSPHORUS 2.8  --   --  3.3  --  3.8  --    TSH  --   --   --   --   --   --  3.510         Lab 07/31/24  0508 07/30/24 2008   TOTAL PROTEIN 6.3  --    ALBUMIN 3.7 3.2   GLOBULIN 2.6  --    ALT (SGPT) 5  --    AST (SGOT) 19  --    BILIRUBIN 0.5  --    ALK PHOS 36*  --          Lab 07/31/24  0508 07/30/24  1255 07/30/24  1029   PROBNP  --   --  15,763.0*   HSTROP T 257* 313* 299*         Lab 08/01/24  0254 07/31/24  0508   CHOLESTEROL 124 114   LDL CHOL 61 51   HDL CHOL 47 46   TRIGLYCERIDES 84 88         Lab 07/30/24  1446 07/30/24  1255 07/30/24  1029   IRON  --   --  13*   IRON SATURATION (TSAT)  --   --   3*   TIBC  --   --  416   TRANSFERRIN  --   --  279   FERRITIN  --  22.80*  --    FOLATE >20.00  --   --    VITAMIN B 12 569  --   --    ABO TYPING  --  B  --    RH TYPING  --  Positive  --    ANTIBODY SCREEN  --  Negative  --          Brief Urine Lab Results  (Last result in the past 365 days)        Color   Clarity   Blood   Leuk Est   Nitrite   Protein   CREAT   Urine HCG        07/30/24 1431 Yellow   Clear   Negative   Negative   Negative   Negative                 Microbiology Results (last 10 days)       Procedure Component Value - Date/Time    Eosinophil Smear - Urine, Urine, Clean Catch [[id number]]  (Normal) Collected: 07/30/24 1431    Lab Status: Final result Specimen: Urine, Clean Catch Updated: 07/30/24 1915     Eosinophil Smear 0 % EOS/100 Cells             XR Chest 1 View    Result Date: 8/1/2024  Impression: Impression: Cardiomegaly with mild pulmonary edema and small bilateral pleural effusions. Electronically Signed: Gildardo Springer  8/1/2024 8:23 PM EDT  Workstation ID: [id number]    US Renal Bilateral    Result Date: 7/30/2024  Impression: Impression: No hydronephrosis. Increased renal cortical echogenicity which can be seen in medical renal disease. Small volume ascites. Electronically Signed: Gildardo Springer  7/30/2024 3:01 PM EDT  Workstation ID: [id number]     Results for orders placed during the hospital encounter of 01/20/23    Duplex Carotid Ultrasound CAR    Interpretation Summary    Right internal carotid artery plaque without significant stenosis.    Left internal carotid artery moderate stenosis.      Results for orders placed during the hospital encounter of 01/20/23    Duplex Carotid Ultrasound CAR    Interpretation Summary    Right internal carotid artery plaque without significant stenosis.    Left internal carotid artery moderate stenosis.      Results for orders placed during the hospital encounter of 07/30/24    Adult Transthoracic Echo Complete W/ Cont if Necessary Per  Protocol    Interpretation Summary    Left ventricular systolic function is severely decreased. Left ventricular ejection fraction appears to be 26 - 30%.    The left ventricular cavity is moderately dilated.    Left ventricular wall thickness is consistent with mild concentric hypertrophy.    Left ventricular diastolic function is consistent with (grade I) impaired relaxation.    Moderately reduced right ventricular systolic function noted.    The right ventricular cavity is moderately dilated.    The left atrial cavity is moderately dilated.    Estimated right ventricular systolic pressure from tricuspid regurgitation is normal (<35 mmHg).      Labs Pending at Discharge:      Procedures Performed  Procedure(s):  Left Heart Cath possible PCI  Stent ANA coronary  Percutaneous Coronary Intervention         Consults:   Consults       Date and Time Order Name Status Description    7/30/2024  5:17 PM Inpatient Gastroenterology Consult Completed     7/30/2024 10:14 AM Hematology & Oncology Inpatient Consult Completed     7/30/2024 10:14 AM Inpatient Nephrology Consult Completed               Discharge Details        Discharge Medications        New Medications        Instructions Start Date   atorvastatin 40 MG tablet  Commonly known as: Lipitor   40 mg, Oral, Daily      Brilinta 90 MG tablet tablet  Generic drug: ticagrelor   90 mg, Oral, Every 12 Hours Scheduled      digoxin 125 MCG tablet  Commonly known as: Lanoxin   125 mcg, Oral, Daily Digoxin      Jardiance 10 MG tablet tablet  Generic drug: empagliflozin   10 mg, Oral, Daily      metoprolol succinate  MG 24 hr tablet  Commonly known as: TOPROL-XL   100 mg, Oral, Every 12 Hours Scheduled      spironolactone 25 MG tablet  Commonly known as: ALDACTONE   12.5 mg, Oral, Daily             Continue These Medications        Instructions Start Date   amantadine 100 MG capsule  Commonly known as: SYMMETREL   100 mg, Oral, Daily      amantadine 100 MG tablet  Commonly  known as: SYMMETREL   100 mg, Oral, Once      aspirin 81 MG chewable tablet   81 mg, Oral, Daily      bumetanide 1 MG tablet  Commonly known as: BUMEX   1 mg, Oral, Daily      doxazosin 1 MG tablet  Commonly known as: CARDURA   1 mg, Oral, Every Night at Bedtime      famotidine 40 MG tablet  Commonly known as: PEPCID   40 mg, Oral, Daily      folic acid 1 MG tablet  Commonly known as: FOLVITE   1 mg, Oral, Daily      guaiFENesin 600 MG 12 hr tablet  Commonly known as: MUCINEX   600 mg, Oral, Daily      isosorbide mononitrate 30 MG 24 hr tablet  Commonly known as: IMDUR   30 mg, Oral, Daily      metFORMIN 1000 MG tablet  Commonly known as: GLUCOPHAGE   1,000 mg, Oral, 2 Times Daily With Meals      Methylcobalamin 5000 MCG tablet dispersible   500 mcg, Translingual, Daily      nitroglycerin 0.4 MG/SPRAY spray  Commonly known as: NITROLINGUAL   1 spray, Sublingual, Every 5 Minutes PRN      pantoprazole 40 MG EC tablet  Commonly known as: PROTONIX   40 mg, Oral, 2 Times Daily      potassium chloride 20 MEQ CR tablet  Commonly known as: KLOR-CON M20   20 mEq, Oral, 2 Times Daily      ranolazine 500 MG 12 hr tablet  Commonly known as: RANEXA   500 mg, Oral, 2 Times Daily      thiamine 100 MG tablet  Commonly known as: VITAMIN B-1   100 mg, Oral, Daily      Vitamin B 12 500 MCG tablet   500 mcg, Oral, Daily      vitamin D3 125 MCG (5000 UT) capsule capsule   5,000 Units, Oral, Daily             Stop These Medications      amiodarone 200 MG tablet  Commonly known as: PACERONE     QUEtiapine 25 MG tablet  Commonly known as: SEROquel     thiamine 100 MG tablet  Commonly known as: VITAMIN B1              No Known Allergies      Discharge Disposition: home  Home or Self Care    Diet:  Hospital:  Diet Order   Procedures    Diet: Cardiac, Diabetic; Healthy Heart (2-3 Na+); Consistent Carbohydrate; Fluid Consistency: Thin (IDDSI 0)         Discharge Activity:         CODE STATUS:  Code Status and Medical Interventions: CPR  (Attempt to Resuscitate); Full Support   Ordered at: 07/30/24 0958     Code Status (Patient has no pulse and is not breathing):    CPR (Attempt to Resuscitate)     Medical Interventions (Patient has pulse or is breathing):    Full Support         Future Appointments   Date Time Provider Department Center   8/14/2024  8:00 AM Richard Thomas APRN [facility] None   8/28/2024  1:30 PM Renny Mosher DO [facility]   10/21/2024 11:45 AM Angel Willams MD [facility]       Additional Instructions for the Follow-ups that You Need to Schedule       Discharge Follow-up with PCP   As directed       Currently Documented PCP:    Nhung Clark APRN    PCP Phone Number:    [phone]     Follow Up Details: one week        Discharge Follow-up with Specialty: cardiology; 2 Weeks   As directed      Specialty: cardiology   Follow Up: 2 Weeks        Discharge Follow-up with Specified Provider: hematology; 2 Weeks   As directed      To: hematology   Follow Up: 2 Weeks        Discharge Follow-up with Specified Provider: nephrology; 1 Month   As directed      To: nephrology   Follow Up: 1 Month        Basic Metabolic Panel    Aug 08, 2024 (Approximate)      Release to patient: Routine Release                Time spent on Discharge including face to face service:  >30 minutes    Signature: Electronically signed by Timothy Duane Brammell, MD, 08/03/24, 14:01 EDT.  [facility] Hospitalist Team

## 2024-08-02 NOTE — PLAN OF CARE
Goal Outcome Evaluation:      Pt up in chair x1 standby assist. Pt very eager to return to home.

## 2024-08-02 NOTE — PLAN OF CARE
Pt was originally having resting heart rates earlier this shift of 120s-140s. Pt was given 2 doses of digoxin 250 mcg. Resting heart rate is now around 105-120s. Pt is being further evaluated and expected to discharge home tomorrow pending an appropriate heart rate.

## 2024-08-02 NOTE — PAYOR COMM NOTE
"This is clinical update for Chinedu Lopez   Reference/Auth#: [id number]      AUTHORIZATION PENDING: Case submitted 7/31--still pending approval.     Please call or fax determination to contact below.   Thank you.     GHULAM Schneider, RN, [facility]  Utilization Review Nurse  [facility]  Direct & confidential phone # [phone]  Fax # [phone]      Chinedu Lopez (63 y.o. Male)       Date of Birth   1960    Social Security Number       Address   [address] [place] [address] [place] [address]    Home Phone   [phone]    MRN   [id number]       Hoahaoism   None    Marital Status                               Admission Date   7/30/24    Admission Type   Urgent    Admitting Provider   Orlando Whitt MD    Attending Provider   Brammell, Timothy Duane, MD    Department, Room/Bed   [facility] CARDIOVASCULAR CARE UNIT, 4599/1       Discharge Date       Discharge Disposition       Discharge Destination                                 Attending Provider: Brammell, Timothy Duane, MD    Allergies: No Known Allergies    Isolation: None   Infection: None   Code Status: CPR    Ht: 182.9 cm (72\")   Wt: 102 kg (225 lb 12 oz)    Admission Cmt: None   Principal Problem: Atrial fibrillation with RVR [I48.91]                   Active Insurance as of 7/30/2024       Primary Coverage       Payor Plan Insurance Group Employer/Plan Group    [facility] [id number]       Payor Plan Address Payor Plan Phone Number Payor Plan Fax Number Effective Dates    [address] [address]   4/1/2022 - None Entered    [place] [id number]         Subscriber Name Subscriber Birth Date Member ID       CHINEDU LOPEZ 1960 [id number]               Secondary Coverage       Payor Plan Insurance Group Employer/Plan Group    MEDICARE MEDICARE A ONLY        Payor Plan Address Payor Plan Phone Number Payor Plan Fax Number Effective Dates    [address] [phone]  " 2022 - None Entered    [facility] [id number]         Subscriber Name Subscriber Birth Date Member ID       LANCE LOPEZ 1960 [id number]                     Emergency Contacts        (Rel.) Home Phone Work Phone Mobile Phone    KISHORE LOPEZ (Spouse) [phone] -- [phone]              Operative/Procedure Notes (last 48 hours)  Notes from 24 0948 through 24 0948   No notes of this type exist for this encounter.          Physician Progress Notes (last 48 hours)        Ranjan Do MD at 24 0741          NEPHROLOGY PROGRESS NOTE------[facility]    [facility]  [phone]  Demar Do MD      Patient Care Team:  Nhung Clark APRN as PCP - General (Family Medicine)  Ranjan Do MD as Consulting Physician (Nephrology)      Provider:  Demar Do MD  Patient Name: Lance Lopez  :  1960    SUBJECTIVE:    F/U CRF/CKD/CHF    Feeling and breathing better this AM. Nice diuresis overnight. No angina. No dysuria.     Medication:  amantadine, 100 mg, Oral, Daily  aspirin, 81 mg, Oral, Daily  bumetanide, 1 mg, Oral, BID  empagliflozin, 10 mg, Oral, Daily  folic acid, 1 mg, Oral, Daily  guaiFENesin, 600 mg, Oral, Daily  insulin lispro, 2-9 Units, Subcutaneous, 4x Daily AC & at Bedtime  isosorbide mononitrate, 30 mg, Oral, Daily  metoprolol succinate XL, 50 mg, Oral, Q12H  pantoprazole, 40 mg, Oral, BID AC  ranolazine, 500 mg, Oral, BID  sodium chloride, 10 mL, Intravenous, Q12H  spironolactone, 12.5 mg, Oral, Daily  ticagrelor, 90 mg, Oral, Q12H             OBJECTIVE    Vital Sign Min/Max for last 24 hours  Temp  Min: 97.4 °F (36.3 °C)  Max: 98.5 °F (36.9 °C)   BP  Min: 103/66  Max: 143/97   Pulse  Min: 101  Max: 146   Resp  Min: 15  Max: 21   SpO2  Min: 82 %  Max: 98 %   No data recorded   No data recorded     Flowsheet Rows      Flowsheet Row First Filed  "Value   Admission Height 182.9 cm (72\") Documented at 07/30/2024 0938   Admission Weight 112 kg (245 lb 13 oz) Documented at 07/30/2024 0938            No intake/output data recorded.  I/O last 3 completed shifts:  In: 1968 [P.O.:1560; I.V.:408]  Out: 4250 [Urine:4250]    Physical Exam:  General Appearance: alert, appears stated age and cooperative  Head: normocephalic, without obvious abnormality and atraumatic  Eyes: conjunctivae and sclerae normal and no icterus  Neck: supple and NO GROSS JVD NOW  Lungs: NO CRACKLES NOW  Heart: IRREG IRREG +MEDARDO. NO GALLOP, RUB, OR S3  Chest Wall: no abnormalities observed  Abdomen: normal bowel sounds. +OBESITY +VERY MILD ASCITES  Extremities: moves extremities well, +TRACE BILATERAL PRETIBIAL EDEMA, no cyanosis  Skin: no bleeding, bruising or rash. +PALLOR  Neurologic: Alert, and oriented. No focal deficits    Labs:    WBC WBC   Date Value Ref Range Status   08/02/2024 7.19 3.40 - 10.80 10*3/mm3 Final   08/01/2024 7.44 3.40 - 10.80 10*3/mm3 Final   07/31/2024 5.31 3.40 - 10.80 10*3/mm3 Final   07/30/2024 4.62 3.40 - 10.80 10*3/mm3 Final      HGB Hemoglobin   Date Value Ref Range Status   08/02/2024 8.6 (L) 13.0 - 17.7 g/dL Final   08/01/2024 8.6 (L) 13.0 - 17.7 g/dL Final   07/31/2024 8.6 (L) 13.0 - 17.7 g/dL Final   07/30/2024 7.5 (L) 13.0 - 17.7 g/dL Final      HCT Hematocrit   Date Value Ref Range Status   08/02/2024 32.1 (L) 37.5 - 51.0 % Final   08/01/2024 31.6 (L) 37.5 - 51.0 % Final   07/31/2024 32.1 (L) 37.5 - 51.0 % Final   07/30/2024 28.5 (L) 37.5 - 51.0 % Final      Platelets No results found for: \"LABPLAT\"   MCV MCV   Date Value Ref Range Status   08/02/2024 74.3 (L) 79.0 - 97.0 fL Final   08/01/2024 74.2 (L) 79.0 - 97.0 fL Final   07/31/2024 74.7 (L) 79.0 - 97.0 fL Final   07/30/2024 73.1 (L) 79.0 - 97.0 fL Final          Sodium Sodium   Date Value Ref Range Status   08/02/2024 137 136 - 145 mmol/L Final   08/01/2024 140 136 - 145 mmol/L Final   07/31/2024 140 136 - " "145 mmol/L Final   07/31/2024 139 136 - 145 mmol/L Final   07/30/2024 140 136 - 145 mmol/L Final      Potassium Potassium   Date Value Ref Range Status   08/02/2024 3.8 3.5 - 5.2 mmol/L Final   08/01/2024 3.8 3.5 - 5.2 mmol/L Final   08/01/2024 3.6 3.5 - 5.2 mmol/L Final   07/31/2024 3.6 3.5 - 5.2 mmol/L Final   07/31/2024 4.0 3.5 - 5.2 mmol/L Final     Comment:     Slight hemolysis detected by analyzer. Result may be falsely elevated.   07/30/2024 3.9 3.5 - 5.2 mmol/L Final      Chloride Chloride   Date Value Ref Range Status   08/02/2024 99 98 - 107 mmol/L Final   08/01/2024 99 98 - 107 mmol/L Final   07/31/2024 99 98 - 107 mmol/L Final   07/31/2024 100 98 - 107 mmol/L Final   07/30/2024 102 98 - 107 mmol/L Final      CO2 CO2   Date Value Ref Range Status   08/02/2024 26.5 22.0 - 29.0 mmol/L Final   08/01/2024 29.4 (H) 22.0 - 29.0 mmol/L Final   07/31/2024 26.0 22.0 - 29.0 mmol/L Final   07/31/2024 31.0 (H) 22.0 - 29.0 mmol/L Final   07/30/2024 28.1 22.0 - 29.0 mmol/L Final      BUN BUN   Date Value Ref Range Status   08/02/2024 9 8 - 23 mg/dL Final   08/01/2024 8 8 - 23 mg/dL Final   07/31/2024 8 8 - 23 mg/dL Final   07/31/2024 9 8 - 23 mg/dL Final   07/30/2024 10 8 - 23 mg/dL Final      Creatinine Creatinine   Date Value Ref Range Status   08/02/2024 1.06 0.76 - 1.27 mg/dL Final   08/01/2024 1.08 0.76 - 1.27 mg/dL Final   07/31/2024 0.97 0.76 - 1.27 mg/dL Final   07/31/2024 1.10 0.76 - 1.27 mg/dL Final   07/30/2024 1.23 0.76 - 1.27 mg/dL Final      Calcium Calcium   Date Value Ref Range Status   08/02/2024 9.4 8.6 - 10.5 mg/dL Final   08/01/2024 9.2 8.6 - 10.5 mg/dL Final   07/31/2024 9.5 8.6 - 10.5 mg/dL Final   07/31/2024 9.4 8.6 - 10.5 mg/dL Final   07/30/2024 9.5 8.6 - 10.5 mg/dL Final      PO4 No components found for: \"PO4\"   Albumin Albumin   Date Value Ref Range Status   07/31/2024 3.7 3.5 - 5.2 g/dL Final   07/30/2024 3.2 2.9 - 4.4 g/dL Final      Magnesium Magnesium   Date Value Ref Range Status " "  08/01/2024 1.7 1.6 - 2.4 mg/dL Final   07/31/2024 1.8 1.6 - 2.4 mg/dL Final      Uric Acid No components found for: \"URIC ACID\"     Imaging Results (Last 72 Hours)       Procedure Component Value Units Date/Time    XR Chest 1 View [[id number]] Collected: 08/01/24 2021     Updated: 08/01/24 2026    Narrative:      XR CHEST 1 VW    Date of Exam: 8/1/2024 6:00 PM EDT    Indication: chf    Comparison: None available.    Findings:  Mediastinum: Cardiomediastinal silhouette appears enlarged    Lungs: Indistinctness of the pulmonary vasculature with mild pulmonary vascular congestion. Bibasal hazy opacities.    Pleura: Small bilateral pleural effusions. No pneumothorax.    Bones and soft tissues: Median sternotomy.        Impression:      Impression:  Cardiomegaly with mild pulmonary edema and small bilateral pleural effusions.      Electronically Signed: Gildardo Springer    8/1/2024 8:23 PM EDT    Workstation ID: [id number]     Renal Bilateral [[id number]] Collected: 07/30/24 1453     Updated: 07/30/24 1503    Narrative:      Date of Exam: 7/30/2024 2:05 PM EDT    Indication: LUDWIN/CKD.    Comparison: No comparisons available.    Technique: Grayscale and color Doppler ultrasound evaluation of the kidneys and urinary bladder was performed.      Findings:  Right Kidney: Right kidney measures 11.6 cm in long axis. Likely benign cyst in the right kidney measuring up to 2.2 cm. Increased renal cortical echogenicity.  No hydronephrosis.. No sonographically evident nephrolithiasis.    Left Kidney:  Left kidney measures 9.3 cm in long axis. Mildly increased renal cortical echogenicity. No suspicious appearing lesions.No hydronephrosis.No sonographically evident nephrolithiasis.    Bladder: The bladder appears unremarkable.    Additional findings: Mild perihepatic and pelvic ascites.      Impression:      Impression:  No hydronephrosis.    Increased renal cortical echogenicity which can be seen in medical renal disease.    Small " volume ascites.          Electronically Signed: Gildardo WADE Gian    7/30/2024 3:01 PM EDT    Workstation ID: [id number]            Results for orders placed during the hospital encounter of 07/30/24    XR Chest 1 View    Narrative  XR CHEST 1 VW    Date of Exam: 8/1/2024 6:00 PM EDT    Indication: chf    Comparison: None available.    Findings:  Mediastinum: Cardiomediastinal silhouette appears enlarged    Lungs: Indistinctness of the pulmonary vasculature with mild pulmonary vascular congestion. Bibasal hazy opacities.    Pleura: Small bilateral pleural effusions. No pneumothorax.    Bones and soft tissues: Median sternotomy.    Impression  Impression:  Cardiomegaly with mild pulmonary edema and small bilateral pleural effusions.      Electronically Signed: Gildardo Osbornhanh  8/1/2024 8:23 PM EDT  Workstation ID: [id number]      Results for orders placed during the hospital encounter of 01/20/23    Duplex Carotid Ultrasound CAR    Interpretation Summary    Right internal carotid artery plaque without significant stenosis.    Left internal carotid artery moderate stenosis.        ASSESSMENT / PLAN      Atrial fibrillation with RVR      CRF/CKD------Nonoliguric. Appears to have CRF/CKD STG 2.   Unknown if patient has baseline proteinuria or hematuria. CRF/CKD STG 2 most likely secondary to HTN NS vs. Early DM nephropathy. Follow with gentle diuresis. Stable post cardiac cath. No NSAIDs     2. ANEMIA------PRBCs per Hem/Onc     3. VOLUME OVERLOAD/EDEMA/MILD ASCITES-----Diuresing with po Bumex and low dose Aldactone     4. ATRIAL FIBRILLATION WITH RVR------Watchman in place. Per , Cardiology. Digoxin given today     5. CAD WITH H/O CABG/PCI AND WITH CURRENT ANGINA------Cardiac cath/PCI/Stent done yesterday per , Cardiology     6. H/O CVA/TIA/HEAD TRAUMA WITH BRAIN BLEED     7. HYPERLIPIDEMIA----Not on Statin.       8. GERD/PUD PROPHYLAXIS-----On PPI. Benefits outweigh risks despite CKD     9.  OBESITY/DEISY    10. HTN WITH CKD------BP good      Demar Do MD  [facility]  [phone]  24  07:41 EDT      Electronically signed by Ranjan Do MD at 24 0837       Mell Mcfarland MD at 24                Hematology/Oncology Inpatient Progress Note    PATIENT NAME: Chinedu Lopez  : 1960  MRN: [id number]    CHIEF COMPLAINT: Shortness of breath    HISTORY OF PRESENT ILLNESS:      Chinedu Lopez is a 63 y.o. male who presented to [facility] on 2024 with complaints of shortness of breath.  Past medical history of A-fib status post Watchman procedure, hypertension, hyperlipidemia, ischemic cardiomyopathy, CAD status post CABG and on low-dose aspirin, Thorpe's esophagus.  Presented as a transfer from an outside facility with complaints of acute onset chest pain that started the prior evening.  He stated he was preparing to go back to bed after brushing his teeth and started having left-sided sudden onset chest pain, felt like his previous heart attack.  Started to radiate to his left arm which made him concerned.  He was noted to be in A-fib RVR with elevated troponin.  He was started on a Cardizem drip and heparin drip and cardiology was consulted and recommended transfer to our facility for further management.  Patient admitted to having symptoms of chest pain intermittently and shortness of breath intermittently over the past 2 weeks.  He went to see his PCP and he had his medications adjusted and was started on diuretics due to swelling of his bilateral lower extremities and abdominal area.  He also gained approximately 30 pounds of weight over the past few days.  He denied any increased sodium intake.  He had a stress test in 2024 which was okay.  He had also been found to be anemic on recent lab work for which she had underwent an EGD and colonoscopy in May 2024 which was normal according to him.   He reported that he was supposed to see an hematologist as an outpatient the day of admission.     07/30/24  Hematology/Oncology was consulted for iron deficiency anemia.  At the time of the consultation patient with a WBC of 4.62, hemoglobin 7.5 g/dL, MCV 73.1, platelets 226,000, iron 13, iron sat 3%, TIBC 416.  Review of the chart shows labs from his PCP office dated 1/3/2024 and showing a WBC 6.2, hemoglobin 10.1 g/dL, MCV 75.2, platelets 171,000, 1+ anisocytosis, 1+ microcytes, 2+ hypochromia, 1+ elliptocytes, 1+ teardrop cells.  A stool for occult blood dated 12/20/2023 was negative.  A baseline CBC from 2020 showed normal hemoglobin.     He/She  has a past medical history of Atrial fibrillation, Brain bleed (2019), CAD (coronary artery disease), Hyperlipidemia, Hypertension, Myocardial infarction (2006), Obstructive sleep apnea on CPAP, Presence of Watchman left atrial appendage closure device (1/31/2020), Stroke (2019), and TIA (transient ischemic attack) (11/19/2013).     PCP: Nhung Clark APRN    Subjective     No changes    ROS:  Review of Systems   Constitutional:  Positive for fatigue.   Neurological:  Positive for weakness.        MEDICATIONS:    Scheduled Meds:  Acetylcysteine, 1,200 mg, Oral, BID  amantadine, 100 mg, Oral, Daily  aspirin, 81 mg, Oral, Daily  bumetanide, 1 mg, Oral, BID  empagliflozin, 10 mg, Oral, Daily  folic acid, 1 mg, Oral, Daily  guaiFENesin, 600 mg, Oral, Daily  insulin lispro, 2-9 Units, Subcutaneous, 4x Daily AC & at Bedtime  isosorbide mononitrate, 30 mg, Oral, Daily  magnesium sulfate, 2 g, Intravenous, Q12H  metoprolol succinate XL, 50 mg, Oral, Q12H  pantoprazole, 40 mg, Oral, BID AC  ranolazine, 500 mg, Oral, BID  sodium chloride, 10 mL, Intravenous, Q12H  spironolactone, 12.5 mg, Oral, Daily  ticagrelor, 90 mg, Oral, Q12H       Continuous Infusions:      PRN Meds:    acetaminophen **OR** acetaminophen **OR** acetaminophen    aluminum-magnesium  "hydroxide-simethicone    atropine    senna-docusate sodium **AND** polyethylene glycol **AND** bisacodyl **AND** bisacodyl    Calcium Replacement - Follow Nurse / BPA Driven Protocol    dextrose    dextrose    glucagon (human recombinant)    Lidocaine HCl gel    Magnesium Standard Dose Replacement - Follow Nurse / BPA Driven Protocol    nitroglycerin    ondansetron ODT **OR** ondansetron    Phosphorus Replacement - Follow Nurse / BPA Driven Protocol    Potassium Replacement - Follow Nurse / BPA Driven Protocol    sodium chloride    sodium chloride    sodium chloride     ALLERGIES:  No Known Allergies    Objective    VITALS:   /85   Pulse 114   Temp 98.4 °F (36.9 °C) (Oral)   Resp 21   Ht 182.9 cm (72\")   Wt 102 kg (225 lb 12 oz)   SpO2 94%   BMI 30.62 kg/m²     PHYSICAL EXAM: (performed by MD)  Physical Exam  Vitals and nursing note reviewed.   Constitutional:       General: He is not in acute distress.     Appearance: He is not diaphoretic.   HENT:      Head: Normocephalic and atraumatic.   Eyes:      General: No scleral icterus.        Right eye: No discharge.         Left eye: No discharge.      Conjunctiva/sclera: Conjunctivae normal.   Neck:      Thyroid: No thyromegaly.   Cardiovascular:      Rate and Rhythm: Normal rate and regular rhythm.      Heart sounds: Normal heart sounds.      No friction rub. No gallop.   Pulmonary:      Effort: Pulmonary effort is normal. No respiratory distress.      Breath sounds: No stridor. No wheezing.   Abdominal:      General: Bowel sounds are normal.      Palpations: Abdomen is soft. There is no mass.      Tenderness: There is no abdominal tenderness. There is no guarding or rebound.   Musculoskeletal:         General: No tenderness. Normal range of motion.      Cervical back: Normal range of motion and neck supple.   Lymphadenopathy:      Cervical: No cervical adenopathy.   Skin:     General: Skin is warm.      Findings: No erythema or rash.   Neurological:      " Mental Status: He is alert and oriented to person, place, and time.      Motor: No abnormal muscle tone.   Psychiatric:         Behavior: Behavior normal.           RECENT LABS:  Lab Results (last 24 hours)       Procedure Component Value Units Date/Time    POC Glucose Once [[id number]]  (Abnormal) Collected: 08/01/24 2001    Specimen: Blood Updated: 08/01/24 2002     Glucose 134 mg/dL      Comment: Serial Number: [id number]:  [id number]       POC Glucose 4x Daily Before Meals & at Bedtime [[id number]]  (Abnormal) Collected: 08/01/24 1711    Specimen: Blood Updated: 08/01/24 1712     Glucose 138 mg/dL      Comment: Serial Number: [id number]:  [id number]       Potassium [[id number]]  (Normal) Collected: 08/01/24 1347    Specimen: Blood Updated: 08/01/24 1411     Potassium 3.8 mmol/L     Protein Elec + Interp, Serum [[id number]] Collected: 07/30/24 2008    Specimen: Blood Updated: 08/01/24 1212     Total Protein 6.0 g/dL      Albumin 3.2 g/dL      Alpha-1-Globulin 0.3 g/dL      Alpha-2-Globulin 1.0 g/dL      Beta Globulin 0.9 g/dL      Gamma Globulin 0.7 g/dL      M-Marlon Not Observed g/dL      Globulin 2.8 g/dL      A/G Ratio 1.1     Please note Comment     Comment: Protein electrophoresis scan will follow via computer, mail, or   delivery.        SPE Interpretation Comment     Comment: The SPE pattern appears unremarkable. Evidence of monoclonal  protein is not apparent.       Narrative:      Performed at:   - [address]  [id number]  : Santiago Fan PhD, Phone:  [phone]    POC Glucose 4x Daily Before Meals & at Bedtime [[id number]]  (Abnormal) Collected: 08/01/24 1202    Specimen: Blood Updated: 08/01/24 1204     Glucose 113 mg/dL      Comment: Serial Number: [id number]:  [id number]       POC Glucose 4x Daily Before Meals & at Bedtime [[id number]]  (Abnormal) Collected: 08/01/24 0744    Specimen: Blood Updated: 08/01/24 0746     Glucose 173  mg/dL      Comment: Serial Number: [id number]:  [id number]       Basic Metabolic Panel [[id number]]  (Abnormal) Collected: 08/01/24 0254    Specimen: Blood Updated: 08/01/24 0330     Glucose 107 mg/dL      BUN 8 mg/dL      Creatinine 1.08 mg/dL      Sodium 140 mmol/L      Potassium 3.6 mmol/L      Chloride 99 mmol/L      CO2 29.4 mmol/L      Calcium 9.2 mg/dL      BUN/Creatinine Ratio 7.4     Anion Gap 11.6 mmol/L      eGFR 78.1 mL/min/1.73     Narrative:      GFR Normal >60  Chronic Kidney Disease <60  Kidney Failure <15      Magnesium [[id number]]  (Normal) Collected: 08/01/24 0254    Specimen: Blood Updated: 08/01/24 0330     Magnesium 1.7 mg/dL     Phosphorus [[id number]]  (Normal) Collected: 08/01/24 0254    Specimen: Blood Updated: 08/01/24 0330     Phosphorus 3.3 mg/dL     Lipid Panel [[id number]] Collected: 08/01/24 0254    Specimen: Blood Updated: 08/01/24 0330     Total Cholesterol 124 mg/dL      Triglycerides 84 mg/dL      HDL Cholesterol 47 mg/dL      LDL Cholesterol  61 mg/dL      VLDL Cholesterol 16 mg/dL      LDL/HDL Ratio 1.28    Narrative:      Cholesterol Reference Ranges  (U.S. Department of Health and Human Services ATP III Classifications)    Desirable          <200 mg/dL  Borderline High    200-239 mg/dL  High Risk          >240 mg/dL      Triglyceride Reference Ranges  (U.S. Department of Health and Human Services ATP III Classifications)    Normal           <150 mg/dL  Borderline High  150-199 mg/dL  High             200-499 mg/dL  Very High        >500 mg/dL    HDL Reference Ranges  (U.S. Department of Health and Human Services ATP III Classifications)    Low     <40 mg/dl (major risk factor for CHD)  High    >60 mg/dl ('negative' risk factor for CHD)        LDL Reference Ranges  (U.S. Department of Health and Human Services ATP III Classifications)    Optimal          <100 mg/dL  Near Optimal     100-129 mg/dL  Borderline High  130-159 mg/dL  High             160-189 mg/dL  Very High         >189 mg/dL    CBC (No Diff) [[id number]]  (Abnormal) Collected: 24    Specimen: Blood Updated: 24     WBC 7.44 10*3/mm3      RBC 4.26 10*6/mm3      Hemoglobin 8.6 g/dL      Hematocrit 31.6 %      MCV 74.2 fL      MCH 20.2 pg      MCHC 27.2 g/dL      RDW 22.7 %      RDW-SD 59.1 fl      MPV 9.3 fL      Platelets 260 10*3/mm3             PENDING RESULTS:     IMAGING REVIEWED:  No radiology results for the last day    Assessment & Plan   ASSESSMENT:        Iron deficiency anemia: Workup thus far consistent with iron deficiency anemia.  Per patient report he had an EGD and colonoscopy in May 2024 that was normal.  There are no records for review from the procedure.  There is a stool for occult blood on the chart from his PCP in early  that was negative. UA negative for blood.      PLAN  So far he has normal B12 and folate levels.  There is no evidence of hemolysis  Monitor CBC and transfuse for hemoglobin less than 7.0 g/dL or less than 8.0 g/dL if symptomatic or to optimize cardiac function.  Monitor closely due to anticoagulation with heparin drip currently.  Patient will need additional IV iron after discharge from the hospital outpatient  Consulted with GI and no plans for repeat endoscopy at this time but consider video capsule endoscopy for iron deficiency anemia persists  Discussed with patient  If discharged to follow-up in the office in 1 to 2 weeks.  Current hemoglobin 8.6 g per DL.           Electronically signed by Mell Mcfarland MD, 24, 8:15 PM EDT.                    Electronically signed by Mell Mcfarland MD at 24       Brammell, Timothy Duane, MD at 24 1610              [facility] MEDICINE SERVICE  DAILY PROGRESS NOTE    NAME: Chinedu Lopez  : 1960  MRN: [id number]      LOS: 2 days     PROVIDER OF SERVICE: Timothy Duane Brammell, MD    Chief Complaint: Atrial fibrillation with RVR    Subjective:     Interval History:   History taken from: patient  Patient Complaints: Remains quite tachycardic.  He denies any palpitations or sensations of tachycardia.  He denies any shortness of breath.  Eating without issue.  Denies any urinary or bowel issues.  Denies any other additional acute issues.  Is anxious as to his ability to be discharged.        Review of Systems:   Review of Systems   All other systems reviewed and are negative.      Objective:     Vital Signs  Temp:  [97.4 °F (36.3 °C)-98 °F (36.7 °C)] 97.5 °F (36.4 °C)  Heart Rate:  [] 101  Resp:  [15-22] 15  BP: (103-171)/() 123/91  Flow (L/min):  [2-6] 2   Body mass index is 30.62 kg/m².    Physical Exam  Physical Exam  Vitals reviewed.   Cardiovascular:      Rate and Rhythm: Tachycardia present. Rhythm irregular.   Pulmonary:      Effort: Pulmonary effort is normal.      Breath sounds: Rales present.   Abdominal:      General: Bowel sounds are normal.      Palpations: Abdomen is soft.      Tenderness: There is no abdominal tenderness.   Musculoskeletal:         General: No swelling.   Neurological:      Mental Status: He is alert. Mental status is at baseline.   Psychiatric:         Behavior: Behavior normal.         Scheduled Meds   Acetylcysteine, 1,200 mg, Oral, BID  amantadine, 100 mg, Oral, Daily  aspirin, 81 mg, Oral, Daily  bumetanide, 1 mg, Oral, BID  empagliflozin, 10 mg, Oral, Daily  folic acid, 1 mg, Oral, Daily  guaiFENesin, 600 mg, Oral, Daily  insulin lispro, 2-9 Units, Subcutaneous, 4x Daily AC & at Bedtime  isosorbide mononitrate, 30 mg, Oral, Daily  magnesium sulfate, 2 g, Intravenous, Q12H  metoprolol succinate XL, 50 mg, Oral, Q12H  pantoprazole, 40 mg, Oral, BID AC  ranolazine, 500 mg, Oral, BID  sodium chloride, 10 mL, Intravenous, Q12H  spironolactone, 12.5 mg, Oral, Daily  ticagrelor, 90 mg, Oral, Q12H       PRN Meds     acetaminophen **OR** acetaminophen **OR** acetaminophen    ALPRAZolam    aluminum-magnesium hydroxide-simethicone     atropine    senna-docusate sodium **AND** polyethylene glycol **AND** bisacodyl **AND** bisacodyl    Calcium Replacement - Follow Nurse / BPA Driven Protocol    dextrose    dextrose    glucagon (human recombinant)    Lidocaine HCl gel    Magnesium Standard Dose Replacement - Follow Nurse / BPA Driven Protocol    nitroglycerin    ondansetron ODT **OR** ondansetron    Phosphorus Replacement - Follow Nurse / BPA Driven Protocol    Potassium Replacement - Follow Nurse / BPA Driven Protocol    sodium chloride    sodium chloride    sodium chloride   Infusions         Diagnostic Data    Results from last 7 days   Lab Units 08/01/24  1347 08/01/24  0254 07/31/24  1155 07/31/24  0508   WBC 10*3/mm3  --  7.44  --  5.31   HEMOGLOBIN g/dL  --  8.6*  --  8.6*   HEMATOCRIT %  --  31.6*  --  32.1*   PLATELETS 10*3/mm3  --  260  --  213   GLUCOSE mg/dL  --  107*   < > 98   CREATININE mg/dL  --  1.08   < > 1.10   BUN mg/dL  --  8   < > 9   SODIUM mmol/L  --  140   < > 139   POTASSIUM mmol/L 3.8 3.6   < > 4.0   AST (SGOT) U/L  --   --   --  19   ALT (SGPT) U/L  --   --   --  5   ALK PHOS U/L  --   --   --  36*   BILIRUBIN mg/dL  --   --   --  0.5   ANION GAP mmol/L  --  11.6   < > 8.0    < > = values in this interval not displayed.       No radiology results for the last day          Assessment:   Coronary artery disease status post cath and stenting  Cardiomyopathy   iron deficiency anemia  Acute on chronic combined congestive heart failure  History of atrial fibs and watchman's procedure  Acute on chronic renal injury  Type 2 diabetes  History of intracranial hemorrhage after fall    Plan: Cardiology to attempt rate limiting medication adjustments.  Monitor for any worsened congestive heart failure in light of his poorly controlled rate.  Chest x-ray.   discussed with cardiology at bedside.  Active and Resolved Problems  Active Hospital Problems    Diagnosis  POA    **Atrial fibrillation with RVR [I48.91]  Yes      Resolved  Hospital Problems   No resolved problems to display.           VTE Prophylaxis:  No VTE prophylaxis order currently exists.         Code status is   Code Status and Medical Interventions: CPR (Attempt to Resuscitate); Full Support   Ordered at: 07/30/24 0958     Code Status (Patient has no pulse and is not breathing):    CPR (Attempt to Resuscitate)     Medical Interventions (Patient has pulse or is breathing):    Full Support       Plan for disposition:home in 1 days    Time: 30 minutes    Signature: Electronically signed by Timothy Duane Brammell, MD, 08/01/24, 16:10 EDT.  [facility] Hospitalist Team     Electronically signed by Brammell, Timothy Duane, MD at 08/01/24 1615       José Miguel Izquierdo MD at 08/01/24 1330          Cardiology [facility]      Patient Care Team:  Nhung Clark APRN as PCP - General (Family Medicine)  Ranjan Do MD as Consulting Physician (Nephrology)              Cardiology assessment and plan     Congestive heart failure  Acute on chronic CHF exacerbation  Acute systolic dysfunction  Severe LV dysfunction  Volume overload  Congestive heart failure NYHA class IV  Chest discomfort  Abnormal elevated troponin consistent with non-ST elevation myocardial infarction  Atrial fibrillation with rapid antler response  Worsening cardiomyopathy  Severe LV dysfunction  Abnormal elevated proBNP secondary to congestive heart failure  Pulmonary edema and bilateral pleural effusions  Significant volume overload  Atrial fibrillation and with prior history of watchman implantation  Acute on chronic kidney injury  Coronary artery disease prior coronary artery bypass surgery prior PCI and stenting  Hypertension  Hyperlipidemia  Diabetes mellitus  History of TIA  History of intracranial hemorrhage status post fall in 2019  Anemia/iron deficiency anemia with a significant worsening of the hemoglobin  Native severe two-vessel coronary artery disease with 100% occlusion of the ostium  of the LAD and 100% occlusion of the ostium of the left circumflex  Patent vein graft to the marginal with severe 90% stenosis involving the midportion of the vein graft succinyl treated with placement of a Xience drug-eluting stent  Patent LIMA to the LAD  Patent stent in the right coronary artery with no significant in-stent restenosis  Tmax is 98.5 pulse is 97-1 54 respirations are 16 blood pressure is 103/68 to 123/91 sats are 98%  Sodium is 140 potassium is 3.6 creatinine is 1.0 hemoglobin is 8.6  Current medications include aspirin 81 mg p.o. once a day Bumex 1 mg p.o. twice daily patient is on isosorbide 30 mg p.o. once a day Toprol-XL 50 mg p.o. once a day Ranexa 500 mg p.o. twice daily patient is on Aldactone 12.5 mg p.o. once a day patient is on Jardiance 10 mg p.o. once a day  EP consult was obtained for atrial fibrillation with rapid response and worsening cardiomyopathy  Arrange for a LifeVest device  Diagnosis and treatment options reviewed and discussed with patient  Optimize medical therapy for cardiomyopathy  Patient is currently on beta-blocker SGLT2 inhibitor Aldactone  Consider angiotensin receptor blocker before discharge if renal function is stable  Further recommendation based on patient course                Chief Complaint: Chest pain shortness of breath    Subjective no new complaints    Interval History: No significant change in the overall status    History taken from: patient    Review of Systems:  Review of Systems   Constitutional: Negative for chills, decreased appetite and malaise/fatigue.   HENT:  Negative for congestion and nosebleeds.    Eyes:  Negative for blurred vision and double vision.   Cardiovascular:  Positive for dyspnea on exertion. Negative for chest pain, irregular heartbeat, leg swelling, near-syncope, orthopnea, palpitations and paroxysmal nocturnal dyspnea.   Respiratory:  Negative for cough and shortness of breath.    Hematologic/Lymphatic: Negative for adenopathy.  "Does not bruise/bleed easily.   Skin:  Negative for color change and rash.   Gastrointestinal:  Negative for bloating, abdominal pain, hematemesis and hematochezia.   Genitourinary:  Negative for flank pain and hematuria.   Neurological:  Negative for dizziness and focal weakness.   Psychiatric/Behavioral:  Negative for altered mental status. The patient does not have insomnia.        Objective    Vital Signs  Visit Vitals  /91   Pulse 101   Temp 97.5 °F (36.4 °C) (Oral)   Resp 15   Ht 182.9 cm (72\")   Wt 102 kg (225 lb 12 oz)   SpO2 98%   BMI 30.62 kg/m²     Oxygen Therapy  SpO2: 98 %  Pulse Oximetry Type: Continuous  Device (Oxygen Therapy): nasal cannula  Flow (L/min): 2  Flowsheet Rows      Flowsheet Row First Filed Value   Admission Height 182.9 cm (72\") Documented at 07/30/2024 0938   Admission Weight 112 kg (245 lb 13 oz) Documented at 07/30/2024 0938          Intake & Output (last 3 days)         07/29 0701  07/30 0700 07/30 0701  07/31 0700 07/31 0701  08/01 0700 08/01 0701  08/02 0700    P.O.  360 780     I.V. (mL/kg)  1525 (14.5) 891 (8.7) 78 (0.8)    Blood  425      Total Intake(mL/kg)  2310 (22) 1671 (16.4) 78 (0.8)    Urine (mL/kg/hr)  3375 4150 (1.7) 200 (0.3)    Emesis/NG output  0 0     Stool  0 0     Total Output  3375 4150 200    Net  -1065 -2479 -122            Urine Unmeasured Occurrence   1 x           Lines, Drains & Airways       Active LDAs       Name Placement date Placement time Site Days    Peripheral IV 07/30/24 1300 Anterior;Proximal;Right Forearm 07/30/24  1300  Forearm  2    Peripheral IV 07/30/24 1600 Anterior;Left;Upper Arm 07/30/24  1600  Arm  1                    Physical Exam:  Constitutional:       Appearance: Well-developed.   Eyes:      Conjunctiva/sclera: Conjunctivae normal.      Pupils: Pupils are equal, round, and reactive to light.   HENT:      Head: Normocephalic and atraumatic.   Neck:      Thyroid: No thyromegaly.   Pulmonary:      Effort: Increased respiratory " effort.      Breath sounds: Normal breath sounds. Examination of the right-middle field reveals rales. Examination of the left-middle field reveals rales. Examination of the right-lower field reveals decreased breath sounds and rales. Examination of the left-lower field reveals decreased breath sounds and rales.   Cardiovascular:      Abnormal PMI. Tachycardia present. Irregular rhythm.      S3 and S4 .    Pulses:     Intact distal pulses.   Edema:     Peripheral edema absent.   Abdominal:      General: Bowel sounds are normal.      Palpations: Abdomen is soft.   Musculoskeletal:      Cervical back: Normal range of motion and neck supple. Skin:     General: Skin is warm.   Neurological:      Mental Status: Alert and oriented to person, place, and time.         Results Review:     I reviewed the patient's new clinical results.    Lab Results (last 24 hours)       Procedure Component Value Units Date/Time    Protein Elec + Interp, Serum [[id number]] Collected: 07/30/24 2008    Specimen: Blood Updated: 08/01/24 1212     Total Protein 6.0 g/dL      Albumin 3.2 g/dL      Alpha-1-Globulin 0.3 g/dL      Alpha-2-Globulin 1.0 g/dL      Beta Globulin 0.9 g/dL      Gamma Globulin 0.7 g/dL      M-Marlon Not Observed g/dL      Globulin 2.8 g/dL      A/G Ratio 1.1     Please note Comment     Comment: Protein electrophoresis scan will follow via computer, mail, or   delivery.        SPE Interpretation Comment     Comment: The SPE pattern appears unremarkable. Evidence of monoclonal  protein is not apparent.       Narrative:      Performed at:  [address] [place] [address]  [id number]  : Santiago Fan PhD, Phone:  [phone]    POC Glucose 4x Daily Before Meals & at Bedtime [[id number]]  (Abnormal) Collected: 08/01/24 1202    Specimen: Blood Updated: 08/01/24 1204     Glucose 113 mg/dL      Comment: Serial Number: [id number]:  [id number]       POC Glucose 4x Daily Before Meals & at  Bedtime [[id number]]  (Abnormal) Collected: 08/01/24 0744    Specimen: Blood Updated: 08/01/24 0746     Glucose 173 mg/dL      Comment: Serial Number: [id number]:  [id number]       Basic Metabolic Panel [[id number]]  (Abnormal) Collected: 08/01/24 0254    Specimen: Blood Updated: 08/01/24 0330     Glucose 107 mg/dL      BUN 8 mg/dL      Creatinine 1.08 mg/dL      Sodium 140 mmol/L      Potassium 3.6 mmol/L      Chloride 99 mmol/L      CO2 29.4 mmol/L      Calcium 9.2 mg/dL      BUN/Creatinine Ratio 7.4     Anion Gap 11.6 mmol/L      eGFR 78.1 mL/min/1.73     Narrative:      GFR Normal >60  Chronic Kidney Disease <60  Kidney Failure <15      Magnesium [[id number]]  (Normal) Collected: 08/01/24 0254    Specimen: Blood Updated: 08/01/24 0330     Magnesium 1.7 mg/dL     Phosphorus [[id number]]  (Normal) Collected: 08/01/24 0254    Specimen: Blood Updated: 08/01/24 0330     Phosphorus 3.3 mg/dL     Lipid Panel [[id number]] Collected: 08/01/24 0254    Specimen: Blood Updated: 08/01/24 0330     Total Cholesterol 124 mg/dL      Triglycerides 84 mg/dL      HDL Cholesterol 47 mg/dL      LDL Cholesterol  61 mg/dL      VLDL Cholesterol 16 mg/dL      LDL/HDL Ratio 1.28    Narrative:      Cholesterol Reference Ranges  (U.S. Department of Health and Human Services ATP III Classifications)    Desirable          <200 mg/dL  Borderline High    200-239 mg/dL  High Risk          >240 mg/dL      Triglyceride Reference Ranges  (U.S. Department of Health and Human Services ATP III Classifications)    Normal           <150 mg/dL  Borderline High  150-199 mg/dL  High             200-499 mg/dL  Very High        >500 mg/dL    HDL Reference Ranges  (U.S. Department of Health and Human Services ATP III Classifications)    Low     <40 mg/dl (major risk factor for CHD)  High    >60 mg/dl ('negative' risk factor for CHD)        LDL Reference Ranges  (U.S. Department of Health and Human Services ATP III Classifications)    Optimal          <100  mg/dL  Near Optimal     100-129 mg/dL  Borderline High  130-159 mg/dL  High             160-189 mg/dL  Very High        >189 mg/dL    CBC (No Diff) [[id number]]  (Abnormal) Collected: 08/01/24 0254    Specimen: Blood Updated: 08/01/24 0312     WBC 7.44 10*3/mm3      RBC 4.26 10*6/mm3      Hemoglobin 8.6 g/dL      Hematocrit 31.6 %      MCV 74.2 fL      MCH 20.2 pg      MCHC 27.2 g/dL      RDW 22.7 %      RDW-SD 59.1 fl      MPV 9.3 fL      Platelets 260 10*3/mm3     POC Glucose Once [[id number]]  (Abnormal) Collected: 07/31/24 2003    Specimen: Blood Updated: 07/31/24 2005     Glucose 191 mg/dL      Comment: Serial Number: [id number]:  [id number]       POC Activated Clotting Time [[id number]]  (Abnormal) Collected: 07/31/24 1358    Specimen: Arterial Blood Updated: 07/31/24 1915     Activated Clotting Time  146 Seconds      Comment: Serial Number: [id number]:  [id number]       POC Glucose Once [[id number]]  (Abnormal) Collected: 07/31/24 1820    Specimen: Blood Updated: 07/31/24 1823     Glucose 222 mg/dL      Comment: Serial Number: [id number]:  [id number]             Results for orders placed during the hospital encounter of 07/30/24    Adult Transthoracic Echo Complete W/ Cont if Necessary Per Protocol    Interpretation Summary    Left ventricular systolic function is severely decreased. Left ventricular ejection fraction appears to be 26 - 30%.    The left ventricular cavity is moderately dilated.    Left ventricular wall thickness is consistent with mild concentric hypertrophy.    Left ventricular diastolic function is consistent with (grade I) impaired relaxation.    Moderately reduced right ventricular systolic function noted.    The right ventricular cavity is moderately dilated.    The left atrial cavity is moderately dilated.    Estimated right ventricular systolic pressure from tricuspid regurgitation is normal (<35 mmHg).        Medication Review:   I have reviewed the patient's current  medication list  Scheduled Meds:Acetylcysteine, 1,200 mg, Oral, BID  amantadine, 100 mg, Oral, Daily  aspirin, 81 mg, Oral, Daily  bumetanide, 1 mg, Oral, BID  empagliflozin, 10 mg, Oral, Daily  folic acid, 1 mg, Oral, Daily  guaiFENesin, 600 mg, Oral, Daily  insulin lispro, 2-9 Units, Subcutaneous, 4x Daily AC & at Bedtime  isosorbide mononitrate, 30 mg, Oral, Daily  magnesium sulfate, 2 g, Intravenous, Q12H  metoprolol succinate XL, 50 mg, Oral, Q12H  pantoprazole, 40 mg, Oral, BID AC  ranolazine, 500 mg, Oral, BID  sodium chloride, 10 mL, Intravenous, Q12H  spironolactone, 12.5 mg, Oral, Daily  ticagrelor, 90 mg, Oral, Q12H      Continuous Infusions:   PRN Meds:.  acetaminophen **OR** acetaminophen **OR** acetaminophen    ALPRAZolam    aluminum-magnesium hydroxide-simethicone    atropine    senna-docusate sodium **AND** polyethylene glycol **AND** bisacodyl **AND** bisacodyl    Calcium Replacement - Follow Nurse / BPA Driven Protocol    dextrose    dextrose    glucagon (human recombinant)    Lidocaine HCl gel    Magnesium Standard Dose Replacement - Follow Nurse / BPA Driven Protocol    nitroglycerin    ondansetron ODT **OR** ondansetron    Phosphorus Replacement - Follow Nurse / BPA Driven Protocol    Potassium Replacement - Follow Nurse / BPA Driven Protocol    sodium chloride    sodium chloride    sodium chloride    ECG/EMG Results (last 24 hours)       Procedure Component Value Units Date/Time    Telemetry Scan [[id number]] Resulted: 07/30/24     Updated: 07/31/24 1400    Telemetry Scan [[id number]] Resulted: 07/30/24     Updated: 07/31/24 1907    Telemetry Scan [[id number]] Resulted: 07/30/24     Updated: 07/31/24 2056    Telemetry Scan [[id number]] Resulted: 07/30/24     Updated: 08/01/24 0054    Telemetry Scan [[id number]] Resulted: 07/30/24     Updated: 08/01/24 0434    Telemetry Scan [[id number]] Resulted: 07/30/24     Updated: 08/01/24 0800    ECG 12 Lead Rhythm Change [[id number]] Collected: 08/01/24 075      Updated: 08/01/24 0801     QT Interval 344 ms      QTC Interval 450 ms     Narrative:      HEART RJRO=713  bpm  RR Tfthufkb=221  ms  AZ Interval=  ms  P Horizontal Axis=  deg  P Front Axis=  deg  QRSD Vyzghaek=430  ms  QT Fmhdokdp=334  ms  TMcQ=304  ms  QRS Axis=44  deg  T Wave Axis=198  deg  - ABNORMAL ECG -  Atrial fibrillation  Repol abnrm suggests ischemia, anterolateral  Date and Time of Study:2024-08-01 07:59:27            Imaging Results (Last 24 Hours)       ** No results found for the last 24 hours. **          Results for orders placed during the hospital encounter of 07/30/24    Cardiac Catheterization/Vascular Study    Conclusion  Table formatting from the original result was not included.  Cardiac Catheterization with PCI Report    Chinedu Lopez  [id number]  7/31/2024  @PCP@    He underwent cardiac catheterization and percutaneous coronary intervention    Indications for the procedure include: acute coronary syndrome.  Severe chest pain  Acute coronary syndrome  Non-ST elevation myocardial infarction  Atrial fibrillation with rapid response  Worsening cardiomyopathy  Severe LV dysfunction  Congestive heart failure    Procedure Details:  The risks, benefits, complications, treatment options, and expected outcomes were discussed with the patient. The patient and/or family concurred with the proposed plan, giving informed consent.    After informed consent the patient was brought to the cath lab after appropriate IV hydration was begun and oral premedication was given. He was further sedated with fentanyl. He was prepped and draped in the usual manner. Using the modified Seldinger access technique, a 6 [place] sheath was placed in the femoral artery. A left heart catheterization with coronary arteriography was performed.  We used a AR mod diagnostic catheter to selectively engage the right coronary artery and we used a 5 [place] IMT catheter due to selective engagement of the LIMA to the LAD, findings are  discussed below.    The decision was made to proceed with intervention. He received Heparin as per protocol. The details of the intervention are as follows:    We used a left coronary bypass guide catheter with guide liner support to get selective access to the vein graft to the marginal lesion was initially predilated multiple attempts using a guide liner support secondary to extensive calcification and difficult to cross the lesion with a balloon secondary to tortuosity and calcification initially with a 1 5 balloon eventually with a 2 oh balloon and then upgraded to 3 with balloon and stented with a 3.0 x 18 mm diastolic bleeding stent and lesion was postdilated with 4.0 x 12 mm noncompliant balloon up to 16 keven.  Patient tolerated procedure well with no immediate possible complication plan to obtain hemostasis by manual pressure procedural anticoagulation was heparin patient received 180 mg of Brilinta at the end of the procedure.    After the procedure was completed, sedation was stopped and the sheaths and catheters were all removed according to established hospital protocols.    Conscious sedation:  Conscious sedation was performed according to protocol.  I supervised and directed an independent trained observer with the assistance of monitoring the patient's level of consciousness and physiologic status throughout the procedure.  Intraoperative service time was 90 minutes.    Findings:    Hemodynamics Central aortic pressure systolic 120 diastolic of 66 the mean pressure of 89 mmHg  LV end-diastolic pressure of 29 mmHg  There was no gradient across the aortic valve on the pullback of the pigtail catheter  Left Main Left main has diffuse 90% stenosis provides a small caliber marginal branch otherwise both LAD and left circumflex artery 100% occluded in the ostial portion  RCA Large-caliber vessel dominant vessel  Right coronary artery is a dominant vessel  Patent stent in the right coronary artery with no  evidence of any significant in-stent restenosis  Right coronary artery provides a large caliber PDA and PLB branches that are angiographically normal  % occluded in the ostial portion  Circ 100% occluded in the ostial portion  SVG(s) Vein graft presumed to be marginal branch is 100% occluded in the ostial portion    Vein graft to the second marginal branch is patent with mid body angiographic 90% stenosis likely the culprit vessel for patient symptoms of non-ST ovation myocardial infarction likely this lesion is in-stent restenosis within the prior stent  JUANA LIMA to LAD is patent without any significant stenosis involving the ostium/body of the anastomotic site.  No significant disease involving the LAD after the LIMA touchdown  LV Not done  Coronary dominance Right coronary artery    Interventions/Vessels Successful PCI and stenting of the midportion of the vein graft to the marginal branch with placement of Xience drug-eluting stent  Guides/Wires BMW  Devices Xience drug-eluting stent 3.0 x 18 mm  Post % Stenosis  0  Pre-procedure SHANNON flow  10  Post procedure SHANNON flow  3  Closure Device None  Complications None    Estimated Blood Loss:  Minimal    Specimens: None    Complications:  None; patient tolerated the procedure well.    Disposition: PACU - hemodynamically stable.    Condition: stable    Impressions:  Native severe two-vessel coronary artery disease with 100% occlusion of the ostium of the LAD and 100% occlusion of the ostium of the left circumflex  Patent vein graft to the marginal with severe 90% stenosis involving the midportion of the vein graft succinyl treated with placement of a Xience drug-eluting stent  Patent LIMA to the LAD  Patent stent in the right coronary artery with no significant in-stent restenosis    Recommendations:  Aspirin 81 mg p.o. once a day  Brilinta 90 mg p.o. twice daily  Continued aggressive risk factor modification  Observe patient in CVU bed overnight  Close  monitoring of renal function  Optimize medical therapy for cardiomyopathy and rate control for atrial fibrillation  Patient currently being seen by nephrology and hematology oncology for underlying anemia and renal failure  Test results reviewed and discussed with patient and family  Further recommendation based on patient course     Results for orders placed during the hospital encounter of 07/30/24    Adult Transthoracic Echo Complete W/ Cont if Necessary Per Protocol    Interpretation Summary    Left ventricular systolic function is severely decreased. Left ventricular ejection fraction appears to be 26 - 30%.    The left ventricular cavity is moderately dilated.    Left ventricular wall thickness is consistent with mild concentric hypertrophy.    Left ventricular diastolic function is consistent with (grade I) impaired relaxation.    Moderately reduced right ventricular systolic function noted.    The right ventricular cavity is moderately dilated.    The left atrial cavity is moderately dilated.    Estimated right ventricular systolic pressure from tricuspid regurgitation is normal (<35 mmHg).     Lab Results   Component Value Date    GLUCOSE 107 (H) 08/01/2024    BUN 8 08/01/2024    CREATININE 1.08 08/01/2024    EGFR 78.1 08/01/2024    BCR 7.4 08/01/2024    K 3.6 08/01/2024    CO2 29.4 (H) 08/01/2024    CALCIUM 9.2 08/01/2024    PROTENTOTREF 6.0 07/30/2024    ALBUMIN 3.7 07/31/2024    BILITOT 0.5 07/31/2024    AST 19 07/31/2024    ALT 5 07/31/2024      Lab Results   Component Value Date    CHOL 124 08/01/2024    TRIG 84 08/01/2024    HDL 47 08/01/2024    LDL 61 08/01/2024      Lab Results   Component Value Date    CKTOTAL 112 07/30/2024    TROPONINT 257 (C) 07/31/2024        Assessment & Plan       Atrial fibrillation with RVR        José Miguel Izquierdo MD  08/01/24  13:30 EDT                 Electronically signed by José Miguel Izquierdo MD at 08/01/24 1352       Ranjan oD MD at 08/01/24  "0738          NEPHROLOGY PROGRESS NOTE------[facility]    [facility]  [phone]  Demar Do MD      Patient Care Team:  Nhung Clark APRN as PCP - General (Family Medicine)  Ernestina, MD Ranjan as Consulting Physician (Nephrology)      Provider:  Demar Do MD  Patient Name: Chinedu Lopez  :  1960    SUBJECTIVE:    F/U CRF/CKD/CHF    S/P Cardiac cath/PCI/Stent yesterday. No angina. No dysuria.     Medication:  Acetylcysteine, 1,200 mg, Oral, BID  amantadine, 100 mg, Oral, Daily  amiodarone, 200 mg, Oral, Q24H  aspirin, 81 mg, Oral, Daily  bumetanide, 1 mg, Intravenous, Q12H  folic acid, 1 mg, Oral, Daily  guaiFENesin, 600 mg, Oral, Daily  insulin lispro, 2-9 Units, Subcutaneous, 4x Daily AC & at Bedtime  isosorbide mononitrate, 30 mg, Oral, Daily  magnesium sulfate, 2 g, Intravenous, Once  metoprolol tartrate, 25 mg, Oral, Q8H  pantoprazole, 40 mg, Oral, BID AC  potassium chloride ER, 40 mEq, Oral, Q4H  ranolazine, 500 mg, Oral, BID  sodium chloride, 10 mL, Intravenous, Q12H  ticagrelor, 90 mg, Oral, Q12H      dilTIAZem, 5-15 mg/hr, Last Rate: 7.5 mg/hr (24 0316)        OBJECTIVE    Vital Sign Min/Max for last 24 hours  Temp  Min: 97.5 °F (36.4 °C)  Max: 98.4 °F (36.9 °C)   BP  Min: 118/98  Max: 171/102   Pulse  Min: 97  Max: 154   Resp  Min: 17  Max: 24   SpO2  Min: 62 %  Max: 97 %   No data recorded   Weight  Min: 102 kg (225 lb 12 oz)  Max: 102 kg (225 lb 12 oz)     Flowsheet Rows      Flowsheet Row First Filed Value   Admission Height 182.9 cm (72\") Documented at 2024 0938   Admission Weight 112 kg (245 lb 13 oz) Documented at 2024 0938            No intake/output data recorded.  I/O last 3 completed shifts:  In: 3566 [P.O.:900; I.V.:2241; Blood:425]  Out: 5600 [Urine:5600]    Physical Exam:  General Appearance: alert, appears stated age and cooperative  Head: normocephalic, " "without obvious abnormality and atraumatic  Eyes: conjunctivae and sclerae normal and no icterus  Neck: supple and +MINIMAL JVD  Lungs: +FINE LEFT CRACKLES  Heart: IRREG IRREG +MEDARDO. NO GALLOP, RUB, OR S3  Chest Wall: no abnormalities observed  Abdomen: normal bowel sounds. +OBESITY +VERY MILD ASCITES  Extremities: moves extremities well, +TRACE BILATERAL PRETIBIAL EDEMA, no cyanosis  Skin: no bleeding, bruising or rash. +PALLOR  Neurologic: Alert, and oriented. No focal deficits    Labs:    WBC WBC   Date Value Ref Range Status   08/01/2024 7.44 3.40 - 10.80 10*3/mm3 Final   07/31/2024 5.31 3.40 - 10.80 10*3/mm3 Final   07/30/2024 4.62 3.40 - 10.80 10*3/mm3 Final      HGB Hemoglobin   Date Value Ref Range Status   08/01/2024 8.6 (L) 13.0 - 17.7 g/dL Final   07/31/2024 8.6 (L) 13.0 - 17.7 g/dL Final   07/30/2024 7.5 (L) 13.0 - 17.7 g/dL Final      HCT Hematocrit   Date Value Ref Range Status   08/01/2024 31.6 (L) 37.5 - 51.0 % Final   07/31/2024 32.1 (L) 37.5 - 51.0 % Final   07/30/2024 28.5 (L) 37.5 - 51.0 % Final      Platelets No results found for: \"LABPLAT\"   MCV MCV   Date Value Ref Range Status   08/01/2024 74.2 (L) 79.0 - 97.0 fL Final   07/31/2024 74.7 (L) 79.0 - 97.0 fL Final   07/30/2024 73.1 (L) 79.0 - 97.0 fL Final          Sodium Sodium   Date Value Ref Range Status   08/01/2024 140 136 - 145 mmol/L Final   07/31/2024 140 136 - 145 mmol/L Final   07/31/2024 139 136 - 145 mmol/L Final   07/30/2024 140 136 - 145 mmol/L Final      Potassium Potassium   Date Value Ref Range Status   08/01/2024 3.6 3.5 - 5.2 mmol/L Final   07/31/2024 3.6 3.5 - 5.2 mmol/L Final   07/31/2024 4.0 3.5 - 5.2 mmol/L Final     Comment:     Slight hemolysis detected by analyzer. Result may be falsely elevated.   07/30/2024 3.9 3.5 - 5.2 mmol/L Final      Chloride Chloride   Date Value Ref Range Status   08/01/2024 99 98 - 107 mmol/L Final   07/31/2024 99 98 - 107 mmol/L Final   07/31/2024 100 98 - 107 mmol/L Final   07/30/2024 102 98 " "- 107 mmol/L Final      CO2 CO2   Date Value Ref Range Status   08/01/2024 29.4 (H) 22.0 - 29.0 mmol/L Final   07/31/2024 26.0 22.0 - 29.0 mmol/L Final   07/31/2024 31.0 (H) 22.0 - 29.0 mmol/L Final   07/30/2024 28.1 22.0 - 29.0 mmol/L Final      BUN BUN   Date Value Ref Range Status   08/01/2024 8 8 - 23 mg/dL Final   07/31/2024 8 8 - 23 mg/dL Final   07/31/2024 9 8 - 23 mg/dL Final   07/30/2024 10 8 - 23 mg/dL Final      Creatinine Creatinine   Date Value Ref Range Status   08/01/2024 1.08 0.76 - 1.27 mg/dL Final   07/31/2024 0.97 0.76 - 1.27 mg/dL Final   07/31/2024 1.10 0.76 - 1.27 mg/dL Final   07/30/2024 1.23 0.76 - 1.27 mg/dL Final      Calcium Calcium   Date Value Ref Range Status   08/01/2024 9.2 8.6 - 10.5 mg/dL Final   07/31/2024 9.5 8.6 - 10.5 mg/dL Final   07/31/2024 9.4 8.6 - 10.5 mg/dL Final   07/30/2024 9.5 8.6 - 10.5 mg/dL Final      PO4 No components found for: \"PO4\"   Albumin Albumin   Date Value Ref Range Status   07/31/2024 3.7 3.5 - 5.2 g/dL Final      Magnesium Magnesium   Date Value Ref Range Status   08/01/2024 1.7 1.6 - 2.4 mg/dL Final   07/31/2024 1.8 1.6 - 2.4 mg/dL Final      Uric Acid No components found for: \"URIC ACID\"     Imaging Results (Last 72 Hours)       Procedure Component Value Units Date/Time    US Renal Bilateral [[id number]] Collected: 07/30/24 1453     Updated: 07/30/24 1503    Narrative:      Date of Exam: 7/30/2024 2:05 PM EDT    Indication: LUDWIN/CKD.    Comparison: No comparisons available.    Technique: Grayscale and color Doppler ultrasound evaluation of the kidneys and urinary bladder was performed.      Findings:  Right Kidney: Right kidney measures 11.6 cm in long axis. Likely benign cyst in the right kidney measuring up to 2.2 cm. Increased renal cortical echogenicity.  No hydronephrosis.. No sonographically evident nephrolithiasis.    Left Kidney:  Left kidney measures 9.3 cm in long axis. Mildly increased renal cortical echogenicity. No suspicious appearing " lesions.No hydronephrosis.No sonographically evident nephrolithiasis.    Bladder: The bladder appears unremarkable.    Additional findings: Mild perihepatic and pelvic ascites.      Impression:      Impression:  No hydronephrosis.    Increased renal cortical echogenicity which can be seen in medical renal disease.    Small volume ascites.          Electronically Signed: Gildardo Springer    2024 3:01 PM EDT    Workstation ID: [id number]                Results for orders placed during the hospital encounter of 23    Duplex Carotid Ultrasound CAR    Interpretation Summary    Right internal carotid artery plaque without significant stenosis.    Left internal carotid artery moderate stenosis.        ASSESSMENT / PLAN      Atrial fibrillation with RVR      CRF/CKD------Nonoliguric. Appears to have CRF/CKD STG 2.   Unknown if patient has baseline proteinuria or hematuria. CRF/CKD STG 2 most likely secondary to HTN NS vs. Early DM nephropathy. Follow with gentle diuresis. Stable post cardiac cath. No NSAIDs     2. ANEMIA------PRBCs per Hem/Onc     3. VOLUME OVERLOAD/EDEMA/MILD ASCITES-----Change to po Bumex today     4. ATRIAL FIBRILLATION WITH RVR------Watchman in place. Per , Cardiology     5. CAD WITH H/O CABG/PCI AND WITH CURRENT ANGINA------Cardiac cath/PCI/Stent done yesterday per , Cardiology     6. H/O CVA/TIA/HEAD TRAUMA WITH BRAIN BLEED     7. HYPERLIPIDEMIA----Not on Statin.       8. GERD/PUD PROPHYLAXIS-----On PPI. Benefits outweigh risks despite CKD     9. OBESITY/DEISY      Demar Do MD  [facility]  [phone]  24  07:38 EDT      Electronically signed by Ranjan Do MD at 24 0816       Brammell, Timothy Duane, MD at 24 1739              [facility] MEDICINE SERVICE  DAILY PROGRESS NOTE    NAME: Chinedu Lopez  : 1960  MRN: [id number]      LOS: 1 day     PROVIDER OF SERVICE: Timothy Duane  MD Brianna    Chief Complaint: Atrial fibrillation with RVR    Subjective:     Interval History:  History taken from: patient  Patient Complaints:  Denies further chest pain.  Has improvement in shortness of breath and has had significant diuresis.  He denies any gastric or bowel issues.  He otherwise denies any additional acute issues.    Review of Systems:   Review of Systems   All other systems reviewed and are negative.      Objective:     Vital Signs  Temp:  [97.5 °F (36.4 °C)-98.9 °F (37.2 °C)] 98.4 °F (36.9 °C)  Heart Rate:  [] 128  Resp:  [17-24] 24  BP: (120-161)/() 129/91  Flow (L/min):  [3-7] 6   Body mass index is 31.48 kg/m².    Physical Exam  Physical Exam  Constitutional:       General: He is not in acute distress.     Appearance: Normal appearance. He is obese.   HENT:      Head: Normocephalic.   Cardiovascular:      Rate and Rhythm: Tachycardia present.   Pulmonary:      Effort: Pulmonary effort is normal.      Breath sounds: Normal breath sounds.      Comments: Decreased breath sounds bilateral anteriorly  Abdominal:      General: Bowel sounds are normal.      Palpations: Abdomen is soft.      Tenderness: There is no abdominal tenderness.   Musculoskeletal:         General: No swelling.   Neurological:      Mental Status: He is alert. Mental status is at baseline.         Scheduled Meds   Acetylcysteine, 1,200 mg, Oral, BID  amantadine, 100 mg, Oral, Daily  amiodarone, 200 mg, Oral, Q24H  [START ON 8/1/2024] aspirin, 81 mg, Oral, Daily  bumetanide, 1 mg, Intravenous, Q12H  folic acid, 1 mg, Oral, Daily  guaiFENesin, 600 mg, Oral, Daily  isosorbide mononitrate, 30 mg, Oral, Daily  magnesium sulfate, 2 g, Intravenous, Once  metoprolol tartrate, 25 mg, Oral, Q8H  pantoprazole, 40 mg, Oral, BID AC  ranolazine, 500 mg, Oral, BID  sodium chloride, 10 mL, Intravenous, Q12H  ticagrelor, 90 mg, Oral, Q12H       PRN Meds     acetaminophen **OR** acetaminophen **OR** acetaminophen     aluminum-magnesium hydroxide-simethicone    atropine    senna-docusate sodium **AND** polyethylene glycol **AND** bisacodyl **AND** bisacodyl    Calcium Replacement - Follow Nurse / BPA Driven Protocol    Magnesium Standard Dose Replacement - Follow Nurse / BPA Driven Protocol    nitroglycerin    ondansetron ODT **OR** ondansetron    Phosphorus Replacement - Follow Nurse / BPA Driven Protocol    Potassium Replacement - Follow Nurse / BPA Driven Protocol    sodium chloride    sodium chloride    sodium chloride   Infusions         Diagnostic Data    Results from last 7 days   Lab Units 07/31/24  1155 07/31/24  0508   WBC 10*3/mm3  --  5.31   HEMOGLOBIN g/dL  --  8.6*   HEMATOCRIT %  --  32.1*   PLATELETS 10*3/mm3  --  213   GLUCOSE mg/dL 118* 98   CREATININE mg/dL 0.97 1.10   BUN mg/dL 8 9   SODIUM mmol/L 140 139   POTASSIUM mmol/L 3.6 4.0   AST (SGOT) U/L  --  19   ALT (SGPT) U/L  --  5   ALK PHOS U/L  --  36*   BILIRUBIN mg/dL  --  0.5   ANION GAP mmol/L 15.0 8.0       US Renal Bilateral    Result Date: 7/30/2024  Impression: No hydronephrosis. Increased renal cortical echogenicity which can be seen in medical renal disease. Small volume ascites. Electronically Signed: Gildardo Springer  7/30/2024 3:01 PM EDT  Workstation ID: [id number]           Assessment:      Coronary artery disease status post cath and stenting  Cardiomyopathy   iron deficiency anemia  Acute on chronic combined congestive heart failure  History of atrial fibs and watchman's procedure  Pleural effusions  Acute on chronic renal injury  Type 2 diabetes  History of intracranial hemorrhage after fall           Plan: Appreciate subspecialty input.  Ongoing diuresis.  Follow-up labs.  Active and Resolved Problems  Active Hospital Problems    Diagnosis  POA    **Atrial fibrillation with RVR [I48.91]  Yes      Resolved Hospital Problems   No resolved problems to display.           VTE Prophylaxis:  No VTE prophylaxis order currently exists.         Code  status is   Code Status and Medical Interventions: CPR (Attempt to Resuscitate); Full Support   Ordered at: 07/30/24 0958     Code Status (Patient has no pulse and is not breathing):    CPR (Attempt to Resuscitate)     Medical Interventions (Patient has pulse or is breathing):    Full Support       Plan for disposition:home in 3 days    Time: 30 minutes    Signature: Electronically signed by Timothy Duane Brammell, MD, 07/31/24, 17:39 EDT.  [facility] Hospitalist Team     Electronically signed by Brammell, Timothy Duane, MD at 07/31/24 1875       José Miguel Izquierdo MD at 07/31/24 1251          Cardiology [facility]      Patient Care Team:  Nhung Calrk APRN as PCP - General (Family Medicine)  Ranjan Do MD as Consulting Physician (Nephrology)        Cardiology assessment and plan     Congestive heart failure  Acute on chronic CHF exacerbation  Acute systolic dysfunction  Severe LV dysfunction  Volume overload  Congestive heart failure NYHA class IV  Chest discomfort  Abnormal elevated troponin consistent with non-ST elevation myocardial infarction  Atrial fibrillation with rapid antler response  Worsening cardiomyopathy  Severe LV dysfunction  Abnormal elevated proBNP secondary to congestive heart failure  Pulmonary edema and bilateral pleural effusions  Significant volume overload  Atrial fibrillation and with prior history of watchman implantation  Acute on chronic kidney injury  Coronary artery disease prior coronary artery bypass surgery prior PCI and stenting  Hypertension  Hyperlipidemia  Diabetes mellitus  History of TIA  History of intracranial hemorrhage status post fall in 2019  Anemia/iron deficiency anemia with a significant worsening of the hemoglobin     Patient is currently chest pain-free  Abnormal elevated troponin concern with non-ST ovation myocardial infarction  Elevated abnormal proBNP secondary congestive heart failure and worsening cardiomyopathy  Anemia with  "hemoglobin of 7.5 and iron deficiency  Echocardiogram with LV ejection fraction of 25 to 30%  Twelve-lead EKG with atrial fibrillation with T wave inversions involving anterolateral leads  Diagnosis and treatment options reviewed and discussed with patient and family  Plan to proceed with cardiac catheterization for definite diagnosis and treatment options  Risk benefits and alternatives were cardiac catheterization reviewed and discussed with patient and family                  Chief Complaint: Chest discomfort atrial fibrillation shortness of breath    Subjective no new complaints    Interval History: no significant change in the overall status    History taken from: patient    Review of Systems:  Review of Systems   Constitutional: Negative for chills, decreased appetite and malaise/fatigue.   HENT:  Negative for congestion and nosebleeds.    Eyes:  Negative for blurred vision and double vision.   Cardiovascular:  Positive for irregular heartbeat, leg swelling, orthopnea and paroxysmal nocturnal dyspnea. Negative for chest pain and dyspnea on exertion.   Respiratory:  Positive for cough and shortness of breath.    Hematologic/Lymphatic: Negative for adenopathy. Does not bruise/bleed easily.   Skin:  Negative for color change and rash.   Musculoskeletal:  Negative for back pain and joint pain.   Gastrointestinal:  Negative for bloating, abdominal pain, hematemesis and hematochezia.   Genitourinary:  Negative for flank pain and hematuria.   Neurological:  Negative for dizziness and focal weakness.   Psychiatric/Behavioral:  Negative for altered mental status. The patient does not have insomnia.        Objective    Vital Signs  Visit Vitals  /90 (BP Location: Right arm, Patient Position: Lying)   Pulse (!) 136   Temp 97.5 °F (36.4 °C) (Oral)   Resp 17   Ht 182.9 cm (72\")   Wt 105 kg (232 lb 2.3 oz)   SpO2 (!) 88%   BMI 31.48 kg/m²     Oxygen Therapy  SpO2: (!) 88 %  Pulse Oximetry Type: Continuous  Device " "(Oxygen Therapy): nasal cannula  Flow (L/min): 7  Flowsheet Rows      Flowsheet Row First Filed Value   Admission Height 182.9 cm (72\") Documented at 07/30/2024 0938   Admission Weight 112 kg (245 lb 13 oz) Documented at 07/30/2024 0938          Intake & Output (last 3 days)         07/28 0701  07/29 0700 07/29 0701  07/30 0700 07/30 0701  07/31 0700 07/31 0701 08/01 0700    P.O.   360     I.V. (mL/kg)   1525 (14.5)     Blood   425     Total Intake(mL/kg)   2310 (22)     Urine (mL/kg/hr)   3375 2200 (3.6)    Emesis/NG output   0     Stool   0     Total Output   3375 2200    Net   -1065 -2200                  Lines, Drains & Airways       Active LDAs       Name Placement date Placement time Site Days    Peripheral IV 07/30/24 1300 Anterior;Proximal;Right Forearm 07/30/24  1300  Forearm  less than 1    Peripheral IV   Left Antecubital --  present on admission  --  present on admission  Antecubital  --    Peripheral IV 07/30/24 1600 Anterior;Left;Upper Arm 07/30/24  1600  Arm  less than 1    Urethral Catheter Temperature probe;Silicone 16 Fr. 07/31/24  0940  -- less than 1    Arterial Sheath 6 Fr. Right Femoral 07/31/24  0824  Femoral  less than 1                    Physical Exam:  Constitutional:       Appearance: Well-developed.   Eyes:      Conjunctiva/sclera: Conjunctivae normal.      Pupils: Pupils are equal, round, and reactive to light.   HENT:      Head: Normocephalic and atraumatic.   Neck:      Thyroid: No thyromegaly.   Pulmonary:      Effort: Pulmonary effort is normal.      Breath sounds: Examination of the right-middle field reveals rales. Examination of the left-middle field reveals rales. Examination of the right-lower field reveals decreased breath sounds and rales. Examination of the left-lower field reveals decreased breath sounds and rales. Decreased breath sounds present. Rales present.   Cardiovascular:      Abnormal PMI. Tachycardia present. Irregularly irregular rhythm.      Murmurs: There is a " grade 2/6 holosystolic murmur.      S3 and S4 Gallop.    Pulses:     Intact distal pulses.   Edema:     Peripheral edema absent.   Abdominal:      General: Bowel sounds are normal.      Palpations: Abdomen is soft.   Musculoskeletal:      Cervical back: Normal range of motion and neck supple. Skin:     General: Skin is warm.   Neurological:      Mental Status: Alert and oriented to person, place, and time.         Results Review:     I reviewed the patient's new clinical results.    Lab Results (last 24 hours)       Procedure Component Value Units Date/Time    Basic Metabolic Panel [[id number]]  (Abnormal) Collected: 07/31/24 1155    Specimen: Blood from Cannula Updated: 07/31/24 1226     Glucose 118 mg/dL      BUN 8 mg/dL      Creatinine 0.97 mg/dL      Sodium 140 mmol/L      Potassium 3.6 mmol/L      Chloride 99 mmol/L      CO2 26.0 mmol/L      Calcium 9.5 mg/dL      BUN/Creatinine Ratio 8.2     Anion Gap 15.0 mmol/L      eGFR 88.8 mL/min/1.73     Narrative:      GFR Normal >60  Chronic Kidney Disease <60  Kidney Failure <15      aPTT [[id number]]  (Abnormal) Collected: 07/31/24 1155    Specimen: Blood Updated: 07/31/24 1213     PTT 80.2 seconds     Comprehensive Metabolic Panel [[id number]]  (Abnormal) Collected: 07/31/24 0508    Specimen: Blood Updated: 07/31/24 0559     Glucose 98 mg/dL      BUN 9 mg/dL      Creatinine 1.10 mg/dL      Sodium 139 mmol/L      Potassium 4.0 mmol/L      Comment: Slight hemolysis detected by analyzer. Result may be falsely elevated.        Chloride 100 mmol/L      CO2 31.0 mmol/L      Calcium 9.4 mg/dL      Total Protein 6.3 g/dL      Albumin 3.7 g/dL      ALT (SGPT) 5 U/L      AST (SGOT) 19 U/L      Alkaline Phosphatase 36 U/L      Total Bilirubin 0.5 mg/dL      Globulin 2.6 gm/dL      A/G Ratio 1.4 g/dL      BUN/Creatinine Ratio 8.2     Anion Gap 8.0 mmol/L      eGFR 76.4 mL/min/1.73     Narrative:      GFR Normal >60  Chronic Kidney Disease <60  Kidney Failure <15      Magnesium  [[id number]]  (Normal) Collected: 07/31/24 0508    Specimen: Blood Updated: 07/31/24 0559     Magnesium 1.8 mg/dL     High Sensitivity Troponin T [[id number]]  (Abnormal) Collected: 07/31/24 0508    Specimen: Blood Updated: 07/31/24 0559     HS Troponin T 257 ng/L     Narrative:      High Sensitive Troponin T Reference Range:  <14.0 ng/L- Negative Female for AMI  <22.0 ng/L- Negative Male for AMI  >=14 - Abnormal Female indicating possible myocardial injury.  >=22 - Abnormal Male indicating possible myocardial injury.   Clinicians would have to utilize clinical acumen, EKG, Troponin, and serial changes to determine if it is an Acute Myocardial Infarction or myocardial injury due to an underlying chronic condition.         Phosphorus [[id number]]  (Normal) Collected: 07/31/24 0508    Specimen: Blood Updated: 07/31/24 0556     Phosphorus 3.8 mg/dL     Lipid Panel [[id number]] Collected: 07/31/24 0508    Specimen: Blood Updated: 07/31/24 0556     Total Cholesterol 114 mg/dL      Triglycerides 88 mg/dL      HDL Cholesterol 46 mg/dL      LDL Cholesterol  51 mg/dL      VLDL Cholesterol 17 mg/dL      LDL/HDL Ratio 1.10    Narrative:      Cholesterol Reference Ranges  (U.S. Department of Health and Human Services ATP III Classifications)    Desirable          <200 mg/dL  Borderline High    200-239 mg/dL  High Risk          >240 mg/dL      Triglyceride Reference Ranges  (U.S. Department of Health and Human Services ATP III Classifications)    Normal           <150 mg/dL  Borderline High  150-199 mg/dL  High             200-499 mg/dL  Very High        >500 mg/dL    HDL Reference Ranges  (U.S. Department of Health and Human Services ATP III Classifications)    Low     <40 mg/dl (major risk factor for CHD)  High    >60 mg/dl ('negative' risk factor for CHD)        LDL Reference Ranges  (U.S. Department of Health and Human Services ATP III Classifications)    Optimal          <100 mg/dL  Near Optimal     100-129 mg/dL  Borderline  High  130-159 mg/dL  High             160-189 mg/dL  Very High        >189 mg/dL    Calcium, Ionized [[id number]]  (Normal) Collected: 07/31/24 0508    Specimen: Blood Updated: 07/31/24 0555     Ionized Calcium 1.22 mmol/L     CBC (No Diff) [[id number]]  (Abnormal) Collected: 07/31/24 0508    Specimen: Blood Updated: 07/31/24 0526     WBC 5.31 10*3/mm3      RBC 4.30 10*6/mm3      Hemoglobin 8.6 g/dL      Hematocrit 32.1 %      MCV 74.7 fL      MCH 20.0 pg      MCHC 26.8 g/dL      RDW 22.4 %      RDW-SD 59.0 fl      MPV 10.0 fL      Platelets 213 10*3/mm3     aPTT [[id number]]  (Normal) Collected: 07/31/24 0256    Specimen: Blood Updated: 07/31/24 0328     PTT 69.2 seconds     Vitamin B12 [[id number]]  (Normal) Collected: 07/30/24 1446    Specimen: Blood Updated: 07/30/24 2111     Vitamin B-12 569 pg/mL     Narrative:      Results may be falsely increased if patient taking Biotin.      Folate [[id number]]  (Normal) Collected: 07/30/24 1446    Specimen: Blood Updated: 07/30/24 2111     Folate >20.00 ng/mL     Narrative:      Results may be falsely increased if patient taking Biotin.      Haptoglobin [[id number]]  (Abnormal) Collected: 07/30/24 1446    Specimen: Blood Updated: 07/30/24 2053     Haptoglobin 241 mg/dL      Comment: Specimen hemolyzed. Results may be affected.       PTH, Intact [[id number]]  (Normal) Collected: 07/30/24 2008    Specimen: Blood Updated: 07/30/24 2052     PTH, Intact 46.6 pg/mL     Narrative:      Results may be falsely decreased if patient taking Biotin.      aPTT [[id number]]  (Abnormal) Collected: 07/30/24 2008    Specimen: Blood Updated: 07/30/24 2034     PTT 40.4 seconds     Protein Elec + Interp, Serum [[id number]] Collected: 07/30/24 2008    Specimen: Blood Updated: 07/30/24 2012    Eosinophil Smear - Urine, Urine, Clean Catch [[id number]]  (Normal) Collected: 07/30/24 1431    Specimen: Urine, Clean Catch Updated: 07/30/24 1915     Eosinophil Smear 0 % EOS/100 Cells     Sodium, Urine, Random -  Urine, Clean Catch [[id number]] Collected: 07/30/24 1431    Specimen: Urine, Clean Catch Updated: 07/30/24 1842     Sodium, Urine 111 mmol/L     Narrative:      Reference intervals for random urine have not been established.  Clinical usage is dependent upon physician's interpretation in combination with other laboratory tests.       TSH [[id number]]  (Normal) Collected: 07/30/24 1255    Specimen: Blood Updated: 07/30/24 1743     TSH 3.510 uIU/mL     CK [[id number]]  (Normal) Collected: 07/30/24 1255    Specimen: Blood Updated: 07/30/24 1743     Creatine Kinase 112 U/L     Uric Acid [[id number]]  (Normal) Collected: 07/30/24 1255    Specimen: Blood Updated: 07/30/24 1743     Uric Acid 5.7 mg/dL     Urinalysis With Microscopic If Indicated (No Culture) - Urine, Clean Catch [[id number]]  (Normal) Collected: 07/30/24 1431    Specimen: Urine, Clean Catch Updated: 07/30/24 1441     Color, UA Yellow     Appearance, UA Clear     pH, UA <=5.0     Specific Gravity, UA 1.011     Glucose, UA Negative     Ketones, UA Negative     Bilirubin, UA Negative     Blood, UA Negative     Protein, UA Negative     Leuk Esterase, UA Negative     Nitrite, UA Negative     Urobilinogen, UA 0.2 E.U./dL    Narrative:      Urine microscopic not indicated.    Reticulocytes [[id number]]  (Abnormal) Collected: 07/30/24 1029    Specimen: Blood Updated: 07/30/24 1424     Reticulocyte % 2.07 %      Reticulocyte Absolute 0.0814 10*6/mm3     Ferritin [[id number]]  (Abnormal) Collected: 07/30/24 1255    Specimen: Blood Updated: 07/30/24 1413     Ferritin 22.80 ng/mL     Narrative:      Results may be falsely decreased if patient taking Biotin.      Lactate Dehydrogenase [[id number]]  (Normal) Collected: 07/30/24 1255    Specimen: Blood Updated: 07/30/24 1413      U/L     High Sensitivity Troponin T 2Hr [[id number]]  (Abnormal) Collected: 07/30/24 1255    Specimen: Blood Updated: 07/30/24 1328     HS Troponin T 313 ng/L      Troponin T Delta 14 ng/L      Narrative:      High Sensitive Troponin T Reference Range:  <14.0 ng/L- Negative Female for AMI  <22.0 ng/L- Negative Male for AMI  >=14 - Abnormal Female indicating possible myocardial injury.  >=22 - Abnormal Male indicating possible myocardial injury.   Clinicians would have to utilize clinical acumen, EKG, Troponin, and serial changes to determine if it is an Acute Myocardial Infarction or myocardial injury due to an underlying chronic condition.         aPTT [[id number]]  (Abnormal) Collected: 07/30/24 1255    Specimen: Blood Updated: 07/30/24 1317     PTT 36.0 seconds           Results for orders placed during the hospital encounter of 07/30/24    Adult Transthoracic Echo Complete W/ Cont if Necessary Per Protocol    Interpretation Summary    Left ventricular systolic function is severely decreased. Left ventricular ejection fraction appears to be 26 - 30%.    The left ventricular cavity is moderately dilated.    Left ventricular wall thickness is consistent with mild concentric hypertrophy.    Left ventricular diastolic function is consistent with (grade I) impaired relaxation.    Moderately reduced right ventricular systolic function noted.    The right ventricular cavity is moderately dilated.    The left atrial cavity is moderately dilated.    Estimated right ventricular systolic pressure from tricuspid regurgitation is normal (<35 mmHg).        Medication Review:   I have reviewed the patient's current medication list  Scheduled Meds:Acetylcysteine, 1,200 mg, Oral, BID  amantadine, 100 mg, Oral, Daily  [START ON 8/1/2024] aspirin, 81 mg, Oral, Daily  bumetanide, 1 mg, Intravenous, Q12H  folic acid, 1 mg, Oral, Daily  guaiFENesin, 600 mg, Oral, Daily  isosorbide mononitrate, 30 mg, Oral, Daily  magnesium sulfate, 2 g, Intravenous, Once  metoprolol tartrate, 25 mg, Oral, Q12H  pantoprazole, 40 mg, Oral, BID AC  ranolazine, 500 mg, Oral, BID  sodium chloride, 10 mL, Intravenous, Q12H  ticagrelor, 90 mg, Oral,  Q12H      Continuous Infusions:amiodarone, 0.5 mg/min, Last Rate: 0.5 mg/min (07/31/24 1120)      PRN Meds:.  acetaminophen **OR** acetaminophen **OR** acetaminophen    aluminum-magnesium hydroxide-simethicone    atropine    senna-docusate sodium **AND** polyethylene glycol **AND** bisacodyl **AND** bisacodyl    Calcium Replacement - Follow Nurse / BPA Driven Protocol    Magnesium Standard Dose Replacement - Follow Nurse / BPA Driven Protocol    nitroglycerin    ondansetron ODT **OR** ondansetron    Phosphorus Replacement - Follow Nurse / BPA Driven Protocol    Potassium Replacement - Follow Nurse / BPA Driven Protocol    sodium chloride    sodium chloride    sodium chloride    ECG/EMG Results (last 24 hours)       Procedure Component Value Units Date/Time    Telemetry Scan [[id number]] Resulted: 07/30/24     Updated: 07/30/24 1609    Telemetry Scan [[id number]] Resulted: 07/30/24     Updated: 07/30/24 2234    Telemetry Scan [[id number]] Resulted: 07/30/24     Updated: 07/31/24 0112    ECG 12 Lead Chest Pain [[id number]] Collected: 07/31/24 0245     Updated: 07/31/24 0247     QT Interval 318 ms      QTC Interval 417 ms     Narrative:      HEART RHSL=128  bpm  RR Fxpydzpy=365  ms  CA Interval=  ms  P Horizontal Axis=  deg  P Front Axis=  deg  QRSD Klzgyddw=753  ms  QT Bkbdpjwq=882  ms  BNuI=059  ms  QRS Axis=45  deg  T Wave Axis=237  deg  - ABNORMAL ECG -  Atrial fibrillation  Probable anterior infarct, age indeterminate  When compared with ECG of 30-Jul-2024 09:51:26,  No significant change  Date and Time of Study:2024-07-31 02:45:47    Telemetry Scan [[id number]] Resulted: 07/30/24     Updated: 07/31/24 0611    Telemetry Scan [[id number]] Resulted: 07/30/24     Updated: 07/31/24 0836    ECG 12 Lead Drug Monitoring; Amiodarone [[id number]] Collected: 07/31/24 1119     Updated: 07/31/24 1120     QT Interval 296 ms      QTC Interval 430 ms     Narrative:      HEART KURR=525  bpm  RR Ycouwtvk=166  ms  CA Interval=  ms  P  Horizontal Axis=  deg  P Front Axis=  deg  QRSD Frumoyaj=982  ms  QT Ziwdptsp=577  ms  EIhM=451  ms  QRS Axis=71  deg  T Wave Axis=262  deg  - ABNORMAL ECG -  Atrial fibrillation  Repol abnrm suggests ischemia, anterolateral  Date and Time of Study:2024-07-31 11:19:24            Imaging Results (Last 24 Hours)       Procedure Component Value Units Date/Time    US Renal Bilateral [[id number]] Collected: 07/30/24 1453     Updated: 07/30/24 1503    Narrative:      Date of Exam: 7/30/2024 2:05 PM EDT    Indication: LUDWIN/CKD.    Comparison: No comparisons available.    Technique: Grayscale and color Doppler ultrasound evaluation of the kidneys and urinary bladder was performed.      Findings:  Right Kidney: Right kidney measures 11.6 cm in long axis. Likely benign cyst in the right kidney measuring up to 2.2 cm. Increased renal cortical echogenicity.  No hydronephrosis.. No sonographically evident nephrolithiasis.    Left Kidney:  Left kidney measures 9.3 cm in long axis. Mildly increased renal cortical echogenicity. No suspicious appearing lesions.No hydronephrosis.No sonographically evident nephrolithiasis.    Bladder: The bladder appears unremarkable.    Additional findings: Mild perihepatic and pelvic ascites.      Impression:      Impression:  No hydronephrosis.    Increased renal cortical echogenicity which can be seen in medical renal disease.    Small volume ascites.          Electronically Signed: Gildardo Springer    7/30/2024 3:01 PM EDT    Workstation ID: [id number]          Results for orders placed during the hospital encounter of 07/30/24    Cardiac Catheterization/Vascular Study    Conclusion  Table formatting from the original result was not included.  Cardiac Catheterization with PCI Report    Chinedu LADY Jessica  [id number]  7/31/2024  @PCP@    He underwent cardiac catheterization and percutaneous coronary intervention    Indications for the procedure include: acute coronary syndrome.  Severe chest pain  Acute  coronary syndrome  Non-ST elevation myocardial infarction  Atrial fibrillation with rapid response  Worsening cardiomyopathy  Severe LV dysfunction  Congestive heart failure    Procedure Details:  The risks, benefits, complications, treatment options, and expected outcomes were discussed with the patient. The patient and/or family concurred with the proposed plan, giving informed consent.    After informed consent the patient was brought to the cath lab after appropriate IV hydration was begun and oral premedication was given. He was further sedated with fentanyl. He was prepped and draped in the usual manner. Using the modified Seldinger access technique, a 6 [place]an sheath was placed in the femoral artery. A left heart catheterization with coronary arteriography was performed.  We used a AR mod diagnostic catheter to selectively engage the right coronary artery and we used a 5 [place]an IMT catheter due to selective engagement of the LIMA to the LAD, findings are discussed below.    The decision was made to proceed with intervention. He received Heparin as per protocol. The details of the intervention are as follows:    We used a left coronary bypass guide catheter with guide liner support to get selective access to the vein graft to the marginal lesion was initially predilated multiple attempts using a guide liner support secondary to extensive calcification and difficult to cross the lesion with a balloon secondary to tortuosity and calcification initially with a 1 5 balloon eventually with a 2 oh balloon and then upgraded to 3 with balloon and stented with a 3.0 x 18 mm diastolic bleeding stent and lesion was postdilated with 4.0 x 12 mm noncompliant balloon up to 16 keven.  Patient tolerated procedure well with no immediate possible complication plan to obtain hemostasis by manual pressure procedural anticoagulation was heparin patient received 180 mg of Brilinta at the end of the procedure.    After the procedure  was completed, sedation was stopped and the sheaths and catheters were all removed according to established hospital protocols.    Conscious sedation:  Conscious sedation was performed according to protocol.  I supervised and directed an independent trained observer with the assistance of monitoring the patient's level of consciousness and physiologic status throughout the procedure.  Intraoperative service time was 90 minutes.    Findings:    Hemodynamics Central aortic pressure systolic 120 diastolic of 66 the mean pressure of 89 mmHg  LV end-diastolic pressure of 29 mmHg  There was no gradient across the aortic valve on the pullback of the pigtail catheter  Left Main Left main has diffuse 90% stenosis provides a small caliber marginal branch otherwise both LAD and left circumflex artery 100% occluded in the ostial portion  RCA Large-caliber vessel dominant vessel  Right coronary artery is a dominant vessel  Patent stent in the right coronary artery with no evidence of any significant in-stent restenosis  Right coronary artery provides a large caliber PDA and PLB branches that are angiographically normal  % occluded in the ostial portion  Circ 100% occluded in the ostial portion  SVG(s) Vein graft presumed to be marginal branch is 100% occluded in the ostial portion    Vein graft to the second marginal branch is patent with mid body angiographic 90% stenosis likely the culprit vessel for patient symptoms of non-ST ovation myocardial infarction likely this lesion is in-stent restenosis within the prior stent  JUANA LIMA to LAD is patent without any significant stenosis involving the ostium/body of the anastomotic site.  No significant disease involving the LAD after the LIMA touchdown  LV Not done  Coronary dominance Right coronary artery    Interventions/Vessels Successful PCI and stenting of the midportion of the vein graft to the marginal branch with placement of Xience drug-eluting stent  Guides/Wires  BMW  Devices Xience drug-eluting stent 3.0 x 18 mm  Post % Stenosis  0  Pre-procedure SHANNON flow  10  Post procedure SHANNON flow  3  Closure Device None  Complications None    Estimated Blood Loss:  Minimal    Specimens: None    Complications:  None; patient tolerated the procedure well.    Disposition: PACU - hemodynamically stable.    Condition: stable    Impressions:  Native severe two-vessel coronary artery disease with 100% occlusion of the ostium of the LAD and 100% occlusion of the ostium of the left circumflex  Patent vein graft to the marginal with severe 90% stenosis involving the midportion of the vein graft succinyl treated with placement of a Xience drug-eluting stent  Patent LIMA to the LAD  Patent stent in the right coronary artery with no significant in-stent restenosis    Recommendations:  Aspirin 81 mg p.o. once a day  Brilinta 90 mg p.o. twice daily  Continued aggressive risk factor modification  Observe patient in CVU bed overnight  Close monitoring of renal function  Optimize medical therapy for cardiomyopathy and rate control for atrial fibrillation  Patient currently being seen by nephrology and hematology oncology for underlying anemia and renal failure  Test results reviewed and discussed with patient and family  Further recommendation based on patient course     Results for orders placed during the hospital encounter of 07/30/24    Adult Transthoracic Echo Complete W/ Cont if Necessary Per Protocol    Interpretation Summary    Left ventricular systolic function is severely decreased. Left ventricular ejection fraction appears to be 26 - 30%.    The left ventricular cavity is moderately dilated.    Left ventricular wall thickness is consistent with mild concentric hypertrophy.    Left ventricular diastolic function is consistent with (grade I) impaired relaxation.    Moderately reduced right ventricular systolic function noted.    The right ventricular cavity is moderately dilated.    The left  atrial cavity is moderately dilated.    Estimated right ventricular systolic pressure from tricuspid regurgitation is normal (<35 mmHg).     Lab Results   Component Value Date    GLUCOSE 118 (H) 07/31/2024    BUN 8 07/31/2024    CREATININE 0.97 07/31/2024    EGFR 88.8 07/31/2024    BCR 8.2 07/31/2024    K 3.6 07/31/2024    CO2 26.0 07/31/2024    CALCIUM 9.5 07/31/2024    ALBUMIN 3.7 07/31/2024    BILITOT 0.5 07/31/2024    AST 19 07/31/2024    ALT 5 07/31/2024      Lab Results   Component Value Date    CHOL 114 07/31/2024    TRIG 88 07/31/2024    HDL 46 07/31/2024    LDL 51 07/31/2024      Lab Results   Component Value Date    CKTOTAL 112 07/30/2024    TROPONINT 257 (C) 07/31/2024        Assessment & Plan       Atrial fibrillation with RVR        José Miguel Izquierdo MD  07/31/24  12:51 EDT                 Electronically signed by José Miguel Izquierdo MD at 07/31/24 1254          Consult Notes (last 48 hours)        Angel Willams MD at 08/01/24 0928          CC--- coronary artery disease, atrial fibrillation, Watchman device in situ and LV dysfunction    Sub  63-year-old male patient well-known to me admitted with shortness of breath and has rapid AF and non-STEMI and underwent PCI to vein graft to marginal branch and patient continues to have difficult to control AF and my consultation requested.  Patient has been in AF since 2020 consistent with longstanding persistent AF and patient is on amiodarone at home  Patient placed on intravenous Cardizem for rate control  Patient denies any chest pain or dyspnea today.  Patient had a prior watchman implantation for intracranial bleed and Watchman device was placed in 2020.  Progressive LV dysfunction was noted with EF less than 30% and he also has history of diabetes hypertension hyperlipidemia.      Past Medical History:   Diagnosis Date    Atrial fibrillation     June, [facility]    Brain bleed 2019 October 14th [facility]  for 3 weeks    CAD (coronary artery disease)     S/P CABG and PCI. Abstracted from [facility].    Hyperlipidemia     Hypertension     Myocardial infarction 2006    Non-ST elevation MI. Abstracted from [facility].    Obstructive sleep apnea on CPAP     At [facility]. Abstracted from [facility].    Presence of Watchman left atrial appendage closure device 1/31/2020    Stroke 2019    TIA (transient ischemic attack) 11/19/2013    Possible TIA- MRI shows small tumor/head. Abstracted from [facility].     Past Surgical History:   Procedure Laterality Date    ANGIOPLASTY  2006    3 srents were sequentially placed in promimal and mid body of the saphennous vein graft to obtuse marginal branch. Abstracted from [facility].    ATRIAL APPENDAGE EXCLUSION LEFT WITH TRANSESOPHAGEAL ECHOCARDIOGRAM N/A 1/30/2020    Procedure: Atrial Appendage Occlusion;  Surgeon: Angel Willams MD;  Location: [facility] CATH INVASIVE LOCATION;  Service: Cardiovascular    ATRIAL APPENDAGE EXCLUSION LEFT WITH TRANSESOPHAGEAL ECHOCARDIOGRAM N/A 1/30/2020    Procedure: Atrial Appendage Occlusion;  Surgeon: John Sheridan MD;  Location: [facility] CATH INVASIVE LOCATION;  Service: Cardiology    CARDIAC CATHETERIZATION  2000 2003, 2006, 2012. [facility]    CARDIAC CATHETERIZATION N/A 7/31/2024    Procedure: Left Heart Cath possible PCI;  Surgeon: José Miguel Izquierdo MD;  Location: [facility] CATH INVASIVE LOCATION;  Service: Cardiovascular;  Laterality: N/A;    CARDIAC CATHETERIZATION N/A 7/31/2024    Procedure: Stent ANA coronary;  Surgeon: José Miguel Izquierdo MD;  Location: [facility] CATH INVASIVE LOCATION;  Service: Cardiovascular;  Laterality: N/A;    CARDIAC CATHETERIZATION N/A 7/31/2024    Procedure: Percutaneous Coronary Intervention;  Surgeon: José Miguel Izquierdo MD;  Location: [facility] CATH INVASIVE LOCATION;  Service: Cardiovascular;  Laterality: N/A;    CORONARY ARTERY BYPASS GRAFT  2000    Triple. Abstracted from  [email or web address].    MUSCLE REPAIR      Tendons and nerves in right lower extremity. Abstracted from [facility].    OTHER SURGICAL HISTORY  08/2012    RCA mid and proximal- Xience stent x3. Abstracted from [facility].    OTHER SURGICAL HISTORY  09/2016    Head trauma/bleed at [facility]. Abstracted from [facility].     Family History   Problem Relation Age of Onset    Diabetes Mother     Heart attack Father     Diabetes Father     Other Sister         MRSA    Heart disease Other         Positive for Ischemic    Cancer Other     Hypertension Other      Social History     Tobacco Use    Smoking status: Every Day     Current packs/day: 1.00     Average packs/day: 1 pack/day for 40.6 years (40.6 ttl pk-yrs)     Types: Cigarettes     Start date: 1984    Smokeless tobacco: Never    Tobacco comments:     Smoking cessation--encouraged---refuses patch, gum or chantix   Vaping Use    Vaping status: Never Used   Substance Use Topics    Alcohol use: Not Currently    Drug use: No     Medications Prior to Admission   Medication Sig Dispense Refill Last Dose    amiodarone (PACERONE) 200 MG tablet Take 1 tablet by mouth Daily.       bumetanide (BUMEX) 1 MG tablet Take 1 tablet by mouth Daily.       amantadine (SYMMETREL) 100 MG capsule Take 1 capsule by mouth Daily.       amantadine (SYMMETREL) 100 MG tablet Take 1 tablet by mouth 1 (One) Time.       aspirin 81 MG chewable tablet Chew 1 tablet Daily.       Cholecalciferol (VITAMIN D3) 125 MCG (5000 UT) capsule capsule Take 1 capsule by mouth Daily.       Cyanocobalamin (VITAMIN B 12) 500 MCG tablet Take 1 tablet by mouth Daily.       doxazosin (CARDURA) 1 MG tablet Take 1 tablet by mouth every night at bedtime.       famotidine (PEPCID) 40 MG tablet Take 1 tablet by mouth Daily.       folic acid (FOLVITE) 1 MG tablet Take 1 tablet by mouth Daily.       guaiFENesin (MUCINEX) 600 MG 12 hr tablet Take 1 tablet by mouth Daily.       isosorbide mononitrate (IMDUR) 30 MG 24 hr tablet Take 1 tablet  by mouth Daily.       metFORMIN (GLUCOPHAGE) 1000 MG tablet Take 1 tablet by mouth 2 (Two) Times a Day With Meals.       Methylcobalamin 5000 MCG tablet dispersible Place 500 mcg on the tongue Daily.       nitroglycerin (NITROLINGUAL) 0.4 MG/SPRAY spray Place 1 spray under the tongue Every 5 (Five) Minutes As Needed for Chest Pain.       pantoprazole (PROTONIX) 40 MG EC tablet Take 1 tablet by mouth 2 (Two) Times a Day.       potassium chloride (K-DUR,KLOR-CON) 20 MEQ CR tablet Take 1 tablet by mouth 2 (Two) Times a Day.       QUEtiapine (SEROquel) 25 MG tablet Take 1 tablet by mouth Daily.       ranolazine (RANEXA) 500 MG 12 hr tablet Take 1 tablet by mouth 2 (Two) Times a Day.  6     thiamine (VITAMIN B-1) 100 MG tablet Take 1 tablet by mouth Daily.       thiamine (VITAMIN B1) 100 MG tablet Take 1 tablet by mouth Daily.        Allergies:  Patient has no known allergies.    Review of Systems   General:  positive for fatigue and tiredness  Eyes: No redness  Cardiovascular: No chest pain, no palpitations        Physical Exam    General:      well developed, well nourished, in no acute distress.    Head:      normocephalic and atraumatic.    Eyes:      PERRL/EOM intact, conjunctivae and sclerae clear without nystagmus.    Neck:      no  thyromegaly, trachea central with normal respiratory effort  Lungs:      clear bilaterally to auscultation.    Heart:       irregular rate and rhythm, S1, S2 without murmurs, rubs, or gallops  Skin:      intact without lesions or rashes.    Psych:      alert and cooperative; normal mood and affect; normal attention span and concentration.          CBC    Results from last 7 days   Lab Units 08/01/24  0254 07/31/24  0508 07/30/24  1029   WBC 10*3/mm3 7.44 5.31 4.62   HEMOGLOBIN g/dL 8.6* 8.6* 7.5*   PLATELETS 10*3/mm3 260 213 226     BMP   Results from last 7 days   Lab Units 08/01/24  0254 07/31/24  1155 07/31/24  0508 07/30/24  1029   SODIUM mmol/L 140 140 139 140   POTASSIUM mmol/L  3.6 3.6 4.0 3.9   CHLORIDE mmol/L 99 99 100 102   CO2 mmol/L 29.4* 26.0 31.0* 28.1   BUN mg/dL 8 8 9 10   CREATININE mg/dL 1.08 0.97 1.10 1.23   GLUCOSE mg/dL 107* 118* 98 96   MAGNESIUM mg/dL 1.7  --  1.8  --    PHOSPHORUS mg/dL 3.3  --  3.8  --      CMP   Results from last 7 days   Lab Units 08/01/24  0254 07/31/24  1155 07/31/24  0508 07/30/24  1029   SODIUM mmol/L 140 140 139 140   POTASSIUM mmol/L 3.6 3.6 4.0 3.9   CHLORIDE mmol/L 99 99 100 102   CO2 mmol/L 29.4* 26.0 31.0* 28.1   BUN mg/dL 8 8 9 10   CREATININE mg/dL 1.08 0.97 1.10 1.23   GLUCOSE mg/dL 107* 118* 98 96   ALBUMIN g/dL  --   --  3.7  --    BILIRUBIN mg/dL  --   --  0.5  --    ALK PHOS U/L  --   --  36*  --    AST (SGOT) U/L  --   --  19  --    ALT (SGPT) U/L  --   --  5  --      Radiology(recent) US Renal Bilateral    Result Date: 7/30/2024  Impression: No hydronephrosis. Increased renal cortical echogenicity which can be seen in medical renal disease. Small volume ascites. Electronically Signed: Gildardo Springer  7/30/2024 3:01 PM EDT  Workstation ID: [id number]         Assessment plan    Longstanding persistent AF with intermittent rapid ventricular rate  Stop  Amiodarone  Stop IV Cardizem  Start on long acting Toprol-XL 50 mg twice daily  Non-STEMI post PCI on dual antiplatelet agents followed by Dr. Kwon  Progressive LV dysfunction and echocardiogram can be done in few weeks to evaluate LV function improvement  Watchman device in situ  Prior history of intracranial hemorrhage  Patient will benefit with outpatient WILD to exclude the rare possibility of delayed device related thrombus  Discussed with the patient and the hospitalist and Dr. Kwon  It is reasonable for the patient to be discharged home and followed closely as an outpatient      Electronically signed by Angel Willams MD, 08/01/24, 9:34 AM EDT.      Electronically signed by Angel Willams MD at 08/01/24 0934       Marcia Colbert APRN at 07/31/24 2857        Consult Orders     "1. Inpatient Gastroenterology Consult [[id number]] ordered by José Miguel Izquierdo MD at 07/30/24 1717              Attestation signed by Alfredo Way MD at 07/31/24 1813 (Updated)    The patient was seen and examined with gastroenterology advanced practice provider, Marcia Colbert. I personally performed the substantive portion of the history of presenting illness.  I also performed the physical exam and the medical decision making.    63-year-old male presenting with shortness of breath and chest pain and found to have a non-ST elevation MI.  He underwent heart cath today with stent placement.  GI was asked to see for iron deficiency anemia.  He has no GI complaints.  He had an EGD and colonoscopy in May 2024 with findings above.  He did not tolerate oral iron saying that it caused him to swell however, this could also have been from heart failure.  On exam he is pale but in no acute distress and he ate his entire dinner on his tray.  WBC 5, Hgb 8.6, CR 0.9  Impression:  Iron deficiency anemia without overt GI bleeding and recent negative EGD and colonoscopy  Thorpe's esophagus without dysplasia  Personal history of colon polyps  Non-ST elevation MI status post stent today  Plan:  Okay for anticoagulation  Replace iron IV  Continue PPI daily indefinitely for Thorpe's esophagus  Monitor hemoglobin periodically and replace iron as needed  No plans for repeat endoscopy  Plan outpatient video capsule endoscopy if iron deficiency anemia persists.  GI will be available as needed while in the hospital.  He can continue his cardiac diet.  Please call with questions    Electronically signed by Alfredo Way MD, 07/31/24, 6:07 PM EDT.                       GI CONSULT  NOTE:    Referring Provider:  Dr. Reed    Chief complaint: MALACHI    Subjective . \"I was having chest pain and shortness of breath\"    History of present illness: Chinedu Lopez is a 63 y.o. male who has a history of Thorpe's " esophagus, iron deficiency anemia, A-fib s/p watchman and CVA.  He presents with increased shortness of breath and chest pain.  He was found to have a non-STEMI s/p heart cath today requiring stent placement.  He continues to have shortness of breath but denies any current chest pain.  No nausea, vomiting, heartburn or difficulty swallowing.  No abdominal pain.  No constipation, diarrhea, melena or rectal bleeding.  He takes a baby aspirin at home but denies any other NSAID use.  He recently started iron at home but felt like this caused some edema and stopped use.  It sounds like he was planning possibly an outpatient video capsule endoscopy earlier this week through [facility] but this was cancelled.     Endo History:  5/6/2024 EGD/colonoscopy by Dr. Prescott -Thorpe's esophagus without dysplasia, polyps (TA), hemorrhoids  4/2021 EGD/colonoscopy by Dr. Prescott -Thorpe's esophagus without dysplasia, gastritis with biopsies H. pylori negative, polyps (HP/adenomatous), hemorrhoids    Past Medical History:  Past Medical History:   Diagnosis Date    Atrial fibrillation     June, [facility]. Abstracted from [facility].    Brain bleed 2019 October 14th [facility] - Hospital on 17th for 3 weeks    CAD (coronary artery disease)     S/P CABG and PCI. Abstracted from [facility].    Hyperlipidemia     Hypertension     Myocardial infarction 2006    Non-ST elevation MI. Abstracted from [facility].    Obstructive sleep apnea on CPAP     At . Abstracted from [facility].    Presence of Watchman left atrial appendage closure device 1/31/2020    Stroke 2019    TIA (transient ischemic attack) 11/19/2013    Possible TIA- MRI shows small tumor/head. Abstracted from [facility].       Past Surgical History:  Past Surgical History:   Procedure Laterality Date    ANGIOPLASTY  2006    3 srents were sequentially placed in promimal and mid body of the saphennous vein graft to obtuse marginal branch. Abstracted from [facility].    ATRIAL APPENDAGE EXCLUSION  LEFT WITH TRANSESOPHAGEAL ECHOCARDIOGRAM N/A 1/30/2020    Procedure: Atrial Appendage Occlusion;  Surgeon: Angel Willams MD;  Location:  [facility] CATH INVASIVE LOCATION;  Service: Cardiovascular    ATRIAL APPENDAGE EXCLUSION LEFT WITH TRANSESOPHAGEAL ECHOCARDIOGRAM N/A 1/30/2020    Procedure: Atrial Appendage Occlusion;  Surgeon: John Sheridan MD;  Location: [facility] CATH INVASIVE LOCATION;  Service: Cardiology    CARDIAC CATHETERIZATION  2000 2003, 2006, 2012. Abstracted from [facility].    CORONARY ARTERY BYPASS GRAFT  2000    Triple. Abstracted from [facility].    MUSCLE REPAIR      Tendons and nerves in right lower extremity. Abstracted from [facility].    OTHER SURGICAL HISTORY  08/2012    RCA mid and proximal- Xience stent x3. Abstracted from [facility].    OTHER SURGICAL HISTORY  09/2016    Head trauma/bleed at [facility]. Abstracted from [facility].       Social History:  Social History     Tobacco Use    Smoking status: Every Day     Current packs/day: 1.00     Average packs/day: 1 pack/day for 40.6 years (40.6 ttl pk-yrs)     Types: Cigarettes     Start date: 1984    Smokeless tobacco: Never    Tobacco comments:     Smoking cessation--encouraged---refuses patch, gum or chantix   Vaping Use    Vaping status: Never Used   Substance Use Topics    Alcohol use: Not Currently    Drug use: No       Family History:  Family History   Problem Relation Age of Onset    Diabetes Mother     Heart attack Father     Diabetes Father     Other Sister         MRSA    Heart disease Other         Positive for Ischemic    Cancer Other     Hypertension Other        Medications:  Medications Prior to Admission   Medication Sig Dispense Refill Last Dose    amiodarone (PACERONE) 200 MG tablet Take 1 tablet by mouth Daily.       bumetanide (BUMEX) 1 MG tablet Take 1 tablet by mouth Daily.       amantadine (SYMMETREL) 100 MG capsule Take 1 capsule by mouth Daily.       amantadine (SYMMETREL) 100 MG tablet Take 1 tablet by  mouth 1 (One) Time.       aspirin 81 MG chewable tablet Chew 1 tablet Daily.       Cholecalciferol (VITAMIN D3) 125 MCG (5000 UT) capsule capsule Take 1 capsule by mouth Daily.       Cyanocobalamin (VITAMIN B 12) 500 MCG tablet Take 1 tablet by mouth Daily.       doxazosin (CARDURA) 1 MG tablet Take 1 tablet by mouth every night at bedtime.       famotidine (PEPCID) 40 MG tablet Take 1 tablet by mouth Daily.       folic acid (FOLVITE) 1 MG tablet Take 1 tablet by mouth Daily.       guaiFENesin (MUCINEX) 600 MG 12 hr tablet Take 1 tablet by mouth Daily.       isosorbide mononitrate (IMDUR) 30 MG 24 hr tablet Take 1 tablet by mouth Daily.       metFORMIN (GLUCOPHAGE) 1000 MG tablet Take 1 tablet by mouth 2 (Two) Times a Day With Meals.       Methylcobalamin 5000 MCG tablet dispersible Place 500 mcg on the tongue Daily.       nitroglycerin (NITROLINGUAL) 0.4 MG/SPRAY spray Place 1 spray under the tongue Every 5 (Five) Minutes As Needed for Chest Pain.       pantoprazole (PROTONIX) 40 MG EC tablet Take 1 tablet by mouth 2 (Two) Times a Day.       potassium chloride (K-DUR,KLOR-CON) 20 MEQ CR tablet Take 1 tablet by mouth 2 (Two) Times a Day.       QUEtiapine (SEROquel) 25 MG tablet Take 1 tablet by mouth Daily.       ranolazine (RANEXA) 500 MG 12 hr tablet Take 1 tablet by mouth 2 (Two) Times a Day.  6     thiamine (VITAMIN B-1) 100 MG tablet Take 1 tablet by mouth Daily.       thiamine (VITAMIN B1) 100 MG tablet Take 1 tablet by mouth Daily.          Scheduled Meds:Acetylcysteine, 1,200 mg, Oral, BID  amantadine, 100 mg, Oral, Daily  [START ON 8/1/2024] aspirin, 81 mg, Oral, Daily  bumetanide, 1 mg, Intravenous, Q12H  folic acid, 1 mg, Oral, Daily  guaiFENesin, 600 mg, Oral, Daily  isosorbide mononitrate, 30 mg, Oral, Daily  magnesium sulfate, 2 g, Intravenous, Once  metoprolol tartrate, 25 mg, Oral, Q12H  pantoprazole, 40 mg, Oral, BID AC  ranolazine, 500 mg, Oral, BID  sodium chloride, 10 mL, Intravenous,  Q12H  ticagrelor, 90 mg, Oral, Q12H      Continuous Infusions:amiodarone, 0.5 mg/min, Last Rate: 0.5 mg/min (07/31/24 1120)      PRN Meds:.  acetaminophen **OR** acetaminophen **OR** acetaminophen    aluminum-magnesium hydroxide-simethicone    atropine    senna-docusate sodium **AND** polyethylene glycol **AND** bisacodyl **AND** bisacodyl    Calcium Replacement - Follow Nurse / BPA Driven Protocol    Magnesium Standard Dose Replacement - Follow Nurse / BPA Driven Protocol    nitroglycerin    ondansetron ODT **OR** ondansetron    Phosphorus Replacement - Follow Nurse / BPA Driven Protocol    Potassium Replacement - Follow Nurse / BPA Driven Protocol    sodium chloride    sodium chloride    sodium chloride    ALLERGIES:  Patient has no known allergies.    ROS:  The following systems were reviewed  Constitution:  No fevers, chills, no unintentional weight loss  Skin: no rash, no jaundice  Eyes:  No blurry vision, no eye pain  HENT:  No change in hearing or smell  Resp: Dyspnea without cough  CV: Chest pain  :  No dysuria, hematuria  Musculoskeletal:  No leg cramps or arthralgias  Neuro:  No tremor, no numbness  Psych:  No depression or confusion    Objective resting in bed, chronically ill-appearing but no acute distress, family and nurse at bedside    Vital Signs:   Vitals:    07/31/24 1215 07/31/24 1230 07/31/24 1245 07/31/24 1300   BP: 124/83 134/80 135/79 120/85   BP Location:       Patient Position:       Pulse: (!) 133 117 118 (!) 128   Resp:       Temp:       TempSrc:       SpO2: 94% 95% 94% 94%   Weight:       Height:           Physical Exam:     General Appearance:    Awake and alert, in no acute distress, overweight   Head:    Normocephalic, without obvious abnormality, atraumatic   Throat:   No oral lesions, no thrush, oral mucosa moist   Lungs:     Respirations regular, even and unlabored   Chest Wall:    No abnormalities observed   Abdomen:     Soft, non-tender, nondistended   Rectal:     Deferred  "  Extremities:   Moves all extremities, no edema, no cyanosis       Skin:   No rash, no jaundice, normal palpation       Neurologic:   Cranial nerves 2 - 12 grossly intact  Somewhat anxious       Results Review:   I reviewed the patient's labs and imaging.  CBC    Results from last 7 days   Lab Units 07/31/24  0508 07/30/24  1029   WBC 10*3/mm3 5.31 4.62   HEMOGLOBIN g/dL 8.6* 7.5*   PLATELETS 10*3/mm3 213 226     CMP   Results from last 7 days   Lab Units 07/31/24  1155 07/31/24  0508 07/30/24  1029   SODIUM mmol/L 140 139 140   POTASSIUM mmol/L 3.6 4.0 3.9   CHLORIDE mmol/L 99 100 102   CO2 mmol/L 26.0 31.0* 28.1   BUN mg/dL 8 9 10   CREATININE mg/dL 0.97 1.10 1.23   GLUCOSE mg/dL 118* 98 96   ALBUMIN g/dL  --  3.7  --    BILIRUBIN mg/dL  --  0.5  --    ALK PHOS U/L  --  36*  --    AST (SGOT) U/L  --  19  --    ALT (SGPT) U/L  --  5  --    MAGNESIUM mg/dL  --  1.8  --    PHOSPHORUS mg/dL  --  3.8  --      Cr Clearance Estimated Creatinine Clearance: 100.2 mL/min (by C-G formula based on SCr of 0.97 mg/dL).  Coag   Results from last 7 days   Lab Units 07/31/24  1155 07/31/24  0256 07/30/24 2008 07/30/24  1255   APTT seconds 80.2* 69.2 40.4* 36.0*     HbA1C No results found for: \"HGBA1C\"  Blood Glucose No results found for: \"POCGLU\"  Infection     UA    Results from last 7 days   Lab Units 07/30/24  1431   NITRITE UA  Negative     Imaging Results (Last 72 Hours)       Procedure Component Value Units Date/Time    US Renal Bilateral [[id number]] Collected: 07/30/24 1453     Updated: 07/30/24 1503    Narrative:      Date of Exam: 7/30/2024 2:05 PM EDT    Indication: LUDWIN/CKD.    Comparison: No comparisons available.    Technique: Grayscale and color Doppler ultrasound evaluation of the kidneys and urinary bladder was performed.      Findings:  Right Kidney: Right kidney measures 11.6 cm in long axis. Likely benign cyst in the right kidney measuring up to 2.2 cm. Increased renal cortical echogenicity.  No " hydronephrosis.. No sonographically evident nephrolithiasis.    Left Kidney:  Left kidney measures 9.3 cm in long axis. Mildly increased renal cortical echogenicity. No suspicious appearing lesions.No hydronephrosis.No sonographically evident nephrolithiasis.    Bladder: The bladder appears unremarkable.    Additional findings: Mild perihepatic and pelvic ascites.      Impression:      Impression:  No hydronephrosis.    Increased renal cortical echogenicity which can be seen in medical renal disease.    Small volume ascites.          Electronically Signed: Gildardo Springer    7/30/2024 3:01 PM EDT    Workstation ID: [id number]            ASSESSMENT:  Iron deficiency anemia  Non-STEMI with CAD/stent placement (7/31/24)  CHF  A-fib, history of Watchman  Pulmonary edema  DMII  Thorpe's esophagus without dysplasia  History of TIA/intracranial hemorrhage s/p fall    PLAN:  Patient is a 63-year-old male with a history of A-fib, diabetes, heart failure and Thorpe's esophagus.  He presents with shortness of breath and chest pain.  Found to have a non-STEMI with heart cath today requiring stent placement.  History of Watchman.  GI asked consult for iron deficiency anemia.    5/6/2024 EGD/colonoscopy with Thorpe's esophagus without dysplasia, adenomatous polyps and hemorrhoids.  No evidence of bleeding source identified.  He denies any evidence of overt GI bleeding.  Agree with IV iron supplementation.  Continue daily PPI.  We can plan outpatient video capsule endoscopy upon discharge for further evaluation if needed.  Sounds like patient was planning to have this done earlier this week through his PCP office/CM but this was cancelled for some reason.  Okay for diet as tolerated.  Little for GI to offer at this time, call with questions or concerns.    I discussed the patients findings and my recommendations with the patient.    We appreciate the referral    Electronically signed by AMAYA Begum, 07/31/24, 1:47 PM  EDT.                 Electronically signed by Alfredo Way MD at 24 1813       Mell Mcfarland MD at 24 1243        Consult Orders    1. Hematology & Oncology Inpatient Consult [[id number]] ordered by José Miguel Izquierdo MD at 24 1014                         Hematology/Oncology Inpatient Consultation    Patient name: Chinedu Lopez  : 1960  MRN: [id number]  Referring Provider: Dr. Hernandez  Reason for Consultation: Iron deficiency anemia    Chief complaint: Shortness of breath    History of present illness:    Chinedu Lopez is a 63 y.o. male who presented to [facility] on 2024 with complaints of shortness of breath.  Past medical history of A-fib status post Watchman procedure, hypertension, hyperlipidemia, ischemic cardiomyopathy, CAD status post CABG and on low-dose aspirin, Thorpe's esophagus.  Presented as a transfer from an outside facility with complaints of acute onset chest pain that started the prior evening.  He stated he was preparing to go back to bed after brushing his teeth and started having left-sided sudden onset chest pain, felt like his previous heart attack.  Started to radiate to his left arm which made him concerned.  He was noted to be in A-fib RVR with elevated troponin.  He was started on a Cardizem drip and heparin drip and cardiology was consulted and recommended transfer to our facility for further management.  Patient admitted to having symptoms of chest pain intermittently and shortness of breath intermittently over the past 2 weeks.  He went to see his PCP and he had his medications adjusted and was started on diuretics due to swelling of his bilateral lower extremities and abdominal area.  He also gained approximately 30 pounds of weight over the past few days.  He denied any increased sodium intake.  He had a stress test in 2024 which was okay.  He had also been found to be anemic on recent lab work for which she  had underwent an EGD and colonoscopy in May 2024 which was normal according to him.  He reported that he was supposed to see an hematologist as an outpatient the day of admission.    07/30/24  Hematology/Oncology was consulted for iron deficiency anemia.  At the time of the consultation patient with a WBC of 4.62, hemoglobin 7.5 g/dL, MCV 73.1, platelets 226,000, iron 13, iron sat 3%, TIBC 416.  Review of the chart shows labs from his PCP office dated 1/3/2024 and showing a WBC 6.2, hemoglobin 10.1 g/dL, MCV 75.2, platelets 171,000, 1+ anisocytosis, 1+ microcytes, 2+ hypochromia, 1+ elliptocytes, 1+ teardrop cells.  A stool for occult blood dated 12/20/2023 was negative.  A baseline CBC from 2020 showed normal hemoglobin.    He/She  has a past medical history of Atrial fibrillation, Brain bleed (2019), CAD (coronary artery disease), Hyperlipidemia, Hypertension, Myocardial infarction (2006), Obstructive sleep apnea on CPAP, Presence of Watchman left atrial appendage closure device (1/31/2020), Stroke (2019), and TIA (transient ischemic attack) (11/19/2013).    PCP: Nhung Clark APRN    History:  Past Medical History:   Diagnosis Date    Atrial fibrillation     June, [facility]. Abstracted from [facility].    [facility]    CAD (coronary artery disease)     S/P CABG and PCI. Abstracted from [facility].    Hyperlipidemia     Hypertension     Myocardial infarction 2006    Non-ST elevation MI. Abstracted from [facility].    Obstructive sleep apnea on CPAP     At . Abstracted from [facility].    Presence of Watchman left atrial appendage closure device 1/31/2020    Stroke 2019    TIA (transient ischemic attack) 11/19/2013    Possible TIA- MRI shows small tumor/head. Abstracted from [facility].   ,   Past Surgical History:   Procedure Laterality Date    ANGIOPLASTY  2006    3 srents were sequentially placed in promimal and mid body of the saphennous vein graft  to obtuse marginal branch. Abstracted from [facility].    ATRIAL APPENDAGE EXCLUSION LEFT WITH TRANSESOPHAGEAL ECHOCARDIOGRAM N/A 1/30/2020    Procedure: Atrial Appendage Occlusion;  Surgeon: Angel Willams MD;  Location: [facility] CATH INVASIVE LOCATION;  Service: Cardiovascular    ATRIAL APPENDAGE EXCLUSION LEFT WITH TRANSESOPHAGEAL ECHOCARDIOGRAM N/A 1/30/2020    Procedure: Atrial Appendage Occlusion;  Surgeon: John Sheridan MD;  Location: [facility] CATH INVASIVE LOCATION;  Service: Cardiology    CARDIAC CATHETERIZATION  2000 2003, 2006, 2012. Abstracted from [facility].    CORONARY ARTERY BYPASS GRAFT  2000    Triple. Abstracted from [facility].    MUSCLE REPAIR      Tendons and nerves in right lower extremity. Abstracted from [facility].    OTHER SURGICAL HISTORY  08/2012    RCA mid and proximal- Xience stent x3. Abstracted from [facility].    OTHER SURGICAL HISTORY  09/2016    Head trauma/bleed at [facility]. Abstracted from [facility].   ,   Family History   Problem Relation Age of Onset    Diabetes Mother     Heart attack Father     Diabetes Father     Other Sister         MRSA    Heart disease Other         Positive for Ischemic    Cancer Other     Hypertension Other    ,   Social History     Tobacco Use    Smoking status: Every Day     Current packs/day: 1.00     Average packs/day: 1 pack/day for 40.6 years (40.6 ttl pk-yrs)     Types: Cigarettes     Start date: 1984    Smokeless tobacco: Never    Tobacco comments:     Smoking cessation--encouraged---refuses patch, gum or chantix   Vaping Use    Vaping status: Never Used   Substance Use Topics    Alcohol use: Not Currently    Drug use: No   ,   Medications Prior to Admission   Medication Sig Dispense Refill Last Dose    amiodarone (PACERONE) 200 MG tablet Take 1 tablet by mouth Daily.       bumetanide (BUMEX) 1 MG tablet Take 1 tablet by mouth Daily.       amantadine (SYMMETREL) 100 MG capsule Take 1 capsule by mouth Daily.       amantadine  (SYMMETREL) 100 MG tablet Take 1 tablet by mouth 1 (One) Time.       aspirin 81 MG chewable tablet Chew 1 tablet Daily.       Cholecalciferol (VITAMIN D3) 125 MCG (5000 UT) capsule capsule Take 1 capsule by mouth Daily.       Cyanocobalamin (VITAMIN B 12) 500 MCG tablet Take 1 tablet by mouth Daily.       doxazosin (CARDURA) 1 MG tablet Take 1 tablet by mouth every night at bedtime.       famotidine (PEPCID) 40 MG tablet Take 1 tablet by mouth Daily.       folic acid (FOLVITE) 1 MG tablet Take 1 tablet by mouth Daily.       guaiFENesin (MUCINEX) 600 MG 12 hr tablet Take 1 tablet by mouth Daily.       isosorbide mononitrate (IMDUR) 30 MG 24 hr tablet Take 1 tablet by mouth Daily.       metFORMIN (GLUCOPHAGE) 1000 MG tablet Take 1 tablet by mouth 2 (Two) Times a Day With Meals.       Methylcobalamin 5000 MCG tablet dispersible Place 500 mcg on the tongue Daily.       nitroglycerin (NITROLINGUAL) 0.4 MG/SPRAY spray Place 1 spray under the tongue Every 5 (Five) Minutes As Needed for Chest Pain.       pantoprazole (PROTONIX) 40 MG EC tablet Take 1 tablet by mouth 2 (Two) Times a Day.       potassium chloride (K-DUR,KLOR-CON) 20 MEQ CR tablet Take 1 tablet by mouth 2 (Two) Times a Day.       QUEtiapine (SEROquel) 25 MG tablet Take 1 tablet by mouth Daily.       ranolazine (RANEXA) 500 MG 12 hr tablet Take 1 tablet by mouth 2 (Two) Times a Day.  6     thiamine (VITAMIN B-1) 100 MG tablet Take 1 tablet by mouth Daily.       thiamine (VITAMIN B1) 100 MG tablet Take 1 tablet by mouth Daily.      , Scheduled Meds:  Acetylcysteine, 1,200 mg, Oral, BID  amantadine, 100 mg, Oral, Daily  aspirin, 81 mg, Oral, Daily  bumetanide, 1 mg, Intravenous, Once  ferric gluconate, 125 mg, Intravenous, Once  [START ON 7/31/2024] folic acid, 1 mg, Oral, Daily  guaiFENesin, 600 mg, Oral, Daily  [START ON 7/31/2024] isosorbide mononitrate, 30 mg, Oral, Daily  pantoprazole, 40 mg, Oral, BID AC  ranolazine, 500 mg, Oral, BID  [START ON 7/31/2024]  sodium bicarbonate, 1,300 mg, Oral, Q4H  sodium chloride, 10 mL, Intravenous, Q12H    , Continuous Infusions:  amiodarone, 1 mg/min, Last Rate: 1 mg/min (07/30/24 1539)   Followed by  amiodarone, 0.5 mg/min  heparin, 9.01 Units/kg/hr, Last Rate: 14.21 Units/kg/hr (07/30/24 1635)    , PRN Meds:    acetaminophen **OR** acetaminophen **OR** acetaminophen    senna-docusate sodium **AND** polyethylene glycol **AND** bisacodyl **AND** bisacodyl    Calcium Replacement - Follow Nurse / BPA Driven Protocol    heparin    heparin    Magnesium Standard Dose Replacement - Follow Nurse / BPA Driven Protocol    nitroglycerin    ondansetron ODT **OR** ondansetron    Phosphorus Replacement - Follow Nurse / BPA Driven Protocol    Potassium Replacement - Follow Nurse / BPA Driven Protocol    sodium chloride    sodium chloride   Allergies:  Patient has no known allergies.    Subjective     ROS:  Review of Systems   Constitutional:  Positive for fatigue. Negative for chills and fever.   HENT:  Negative for congestion, drooling, ear discharge, rhinorrhea, sinus pressure and tinnitus.    Eyes:  Negative for photophobia, pain and discharge.   Respiratory:  Negative for apnea, choking and stridor.    Cardiovascular:  Negative for palpitations.   Gastrointestinal:  Negative for abdominal distention, abdominal pain and anal bleeding.   Endocrine: Negative for polydipsia and polyphagia.   Genitourinary:  Negative for decreased urine volume, flank pain and genital sores.   Musculoskeletal:  Negative for gait problem, neck pain and neck stiffness.   Skin:  Negative for color change, rash and wound.   Neurological:  Positive for weakness. Negative for tremors, seizures, syncope, facial asymmetry and speech difficulty.   Hematological:  Negative for adenopathy.   Psychiatric/Behavioral:  Negative for agitation, confusion, hallucinations and self-injury. The patient is not hyperactive.         Objective   Vital Signs:   /71   Pulse 92    "Temp 98.5 °F (36.9 °C) (Oral)   Resp 18   Ht 182.9 cm (72\")   Wt 111 kg (245 lb)   SpO2 94%   BMI 33.23 kg/m²     Physical Exam: (performed by MD)  Physical Exam  Vitals and nursing note reviewed.   Constitutional:       General: He is not in acute distress.     Appearance: He is not diaphoretic.   HENT:      Head: Normocephalic and atraumatic.   Eyes:      General: No scleral icterus.        Right eye: No discharge.         Left eye: No discharge.      Conjunctiva/sclera: Conjunctivae normal.   Neck:      Thyroid: No thyromegaly.   Cardiovascular:      Rate and Rhythm: Normal rate and regular rhythm.      Heart sounds: Normal heart sounds.      No friction rub. No gallop.   Pulmonary:      Effort: Pulmonary effort is normal. No respiratory distress.      Breath sounds: No stridor. No wheezing.   Abdominal:      General: Bowel sounds are normal.      Palpations: Abdomen is soft. There is no mass.      Tenderness: There is no abdominal tenderness. There is no guarding or rebound.   Musculoskeletal:         General: No tenderness. Normal range of motion.      Cervical back: Normal range of motion and neck supple.   Lymphadenopathy:      Cervical: No cervical adenopathy.   Skin:     General: Skin is warm.      Findings: No erythema or rash.   Neurological:      Mental Status: He is alert and oriented to person, place, and time.      Motor: No abnormal muscle tone.   Psychiatric:         Behavior: Behavior normal.         Results Review:  Lab Results (last 48 hours)       Procedure Component Value Units Date/Time    Basic Metabolic Panel [[id number]]  (Normal) Collected: 07/30/24 1029    Specimen: Blood from Arm, Right Updated: 07/30/24 1157     Glucose 96 mg/dL      BUN 10 mg/dL      Creatinine 1.23 mg/dL      Sodium 140 mmol/L      Potassium 3.9 mmol/L      Chloride 102 mmol/L      CO2 28.1 mmol/L      Calcium 9.5 mg/dL      BUN/Creatinine Ratio 8.1     Anion Gap 9.9 mmol/L      eGFR 66.8 mL/min/1.73     " Narrative:      GFR Normal >60  Chronic Kidney Disease <60  Kidney Failure <15      High Sensitivity Troponin T [[id number]]  (Abnormal) Collected: 07/30/24 1029    Specimen: Blood from Arm, Right Updated: 07/30/24 1111     HS Troponin T 299 ng/L     Narrative:      High Sensitive Troponin T Reference Range:  <14.0 ng/L- Negative Female for AMI  <22.0 ng/L- Negative Male for AMI  >=14 - Abnormal Female indicating possible myocardial injury.  >=22 - Abnormal Male indicating possible myocardial injury.   Clinicians would have to utilize clinical acumen, EKG, Troponin, and serial changes to determine if it is an Acute Myocardial Infarction or myocardial injury due to an underlying chronic condition.         Iron Profile [[id number]]  (Abnormal) Collected: 07/30/24 1029    Specimen: Blood from Arm, Right Updated: 07/30/24 1102     Iron 13 mcg/dL      Iron Saturation (TSAT) 3 %      Transferrin 279 mg/dL      TIBC 416 mcg/dL     BNP [[id number]]  (Abnormal) Collected: 07/30/24 1029    Specimen: Blood from Arm, Right Updated: 07/30/24 1102     proBNP 15,763.0 pg/mL     Narrative:      This assay is used as an aid in the diagnosis of individuals suspected of having heart failure. It can be used as an aid in the diagnosis of acute decompensated heart failure (ADHF) in patients presenting with signs and symptoms of ADHF to the emergency department (ED). In addition, NT-proBNP of <300 pg/mL indicates ADHF is not likely.    Age Range Result Interpretation  NT-proBNP Concentration (pg/mL:      <50             Positive            >450                   Gray                 300-450                    Negative             <300    50-75           Positive            >900                  Gray                300-900                  Negative            <300      >75             Positive            >1800                  Gray                300-1800                  Negative            <300    CBC & Differential [[id number]]  (Abnormal)  Collected: 07/30/24 1029    Specimen: Blood Updated: 07/30/24 1042    Narrative:      The following orders were created for panel order CBC & Differential.  Procedure                               Abnormality         Status                     ---------                               -----------         ------                     CBC Auto Differential[[id number]]        Abnormal            Final result               Scan Slide[[id number]]                                                                    Please view results for these tests on the individual orders.    CBC Auto Differential [[id number]]  (Abnormal) Collected: 07/30/24 1029    Specimen: Blood Updated: 07/30/24 1042     WBC 4.62 10*3/mm3      RBC 3.90 10*6/mm3      Hemoglobin 7.5 g/dL      Hematocrit 28.5 %      MCV 73.1 fL      MCH 19.2 pg      MCHC 26.3 g/dL      RDW 21.1 %      RDW-SD 55.1 fl      MPV 10.0 fL      Platelets 226 10*3/mm3      Neutrophil % 66.0 %      Lymphocyte % 19.9 %      Monocyte % 9.1 %      Eosinophil % 1.9 %      Basophil % 0.9 %      Immature Grans % 2.2 %      Neutrophils, Absolute 3.05 10*3/mm3      Lymphocytes, Absolute 0.92 10*3/mm3      Monocytes, Absolute 0.42 10*3/mm3      Eosinophils, Absolute 0.09 10*3/mm3      Basophils, Absolute 0.04 10*3/mm3      Immature Grans, Absolute 0.10 10*3/mm3      nRBC 0.4 /100 WBC              Pending Results:     Imaging Reviewed:   US Renal Bilateral    Result Date: 7/30/2024  Impression: No hydronephrosis. Increased renal cortical echogenicity which can be seen in medical renal disease. Small volume ascites. Electronically Signed: Gildardo Springer  7/30/2024 3:01 PM EDT  Workstation ID: [id number]         Assessment & Plan   ASSESSMENT  Iron deficiency anemia: Workup thus far consistent with iron deficiency anemia.  Per patient report he had an EGD and colonoscopy in May 2024 that was normal.  There are no records for review from the procedure.  There is a stool for occult blood on the chart  from his PCP in early 2024 that was negative. UA negative for blood.     PLAN  Complete anemia workup with additional labs to evaluate for hemolysis and nutritional deficiencies  Monitor CBC and transfuse for hemoglobin less than 7.0 g/dL or less than 8.0 g/dL if symptomatic or to optimize cardiac function.  Monitor closely due to anticoagulation with heparin drip currently.  Replace iron  Consult GI    Electronically signed by AMAYA Jean, 07/30/24, 12:44 PM EDT.    Thank you for this consult. We will be happy to follow along with you.     This is a 63-year-old male with iron deficiency anemia.  Patient had iron studies which confirmed the above.  He had an EGD and colonoscopy which were reportedly normal.  There is no obvious source of iron deficiency so far identified    His physical exam today is unremarkable  Patient has been initiated on IV iron.  Will continue the same  Will monitor his CBCs closely  Will check his urine, look for any other additional deficiencies that can contribute to his anemia  Discussed with patient  Will continue to follow      Electronically signed by Mell Mcfarland MD, 08/01/24, 8:10 PM EDT.       Electronically signed by Mell Mcfarland MD at 08/01/24 2010

## 2024-08-03 ENCOUNTER — READMISSION MANAGEMENT (OUTPATIENT)
Dept: CALL CENTER | Facility: HOSPITAL | Age: 64
End: 2024-08-03
Payer: OTHER GOVERNMENT

## 2024-08-03 VITALS
WEIGHT: 227.6 LBS | RESPIRATION RATE: 21 BRPM | HEIGHT: 72 IN | HEART RATE: 93 BPM | DIASTOLIC BLOOD PRESSURE: 76 MMHG | BODY MASS INDEX: 30.83 KG/M2 | OXYGEN SATURATION: 90 % | SYSTOLIC BLOOD PRESSURE: 125 MMHG | TEMPERATURE: 98.2 F

## 2024-08-03 LAB
ANION GAP SERPL CALCULATED.3IONS-SCNC: 9.2 MMOL/L (ref 5–15)
APTT PPP: 30.2 SECONDS (ref 61–76.5)
BUN SERPL-MCNC: 11 MG/DL (ref 8–23)
BUN/CREAT SERPL: 9.6 (ref 7–25)
CALCIUM SPEC-SCNC: 9.3 MG/DL (ref 8.6–10.5)
CHLORIDE SERPL-SCNC: 100 MMOL/L (ref 98–107)
CO2 SERPL-SCNC: 27.8 MMOL/L (ref 22–29)
CREAT SERPL-MCNC: 1.15 MG/DL (ref 0.76–1.27)
DEPRECATED RDW RBC AUTO: 62.5 FL (ref 37–54)
DIGOXIN SERPL-MCNC: 1.98 NG/ML (ref 0.6–1.2)
EGFRCR SERPLBLD CKD-EPI 2021: 72.4 ML/MIN/1.73
ERYTHROCYTE [DISTWIDTH] IN BLOOD BY AUTOMATED COUNT: 24.1 % (ref 12.3–15.4)
GLUCOSE BLDC GLUCOMTR-MCNC: 108 MG/DL (ref 70–105)
GLUCOSE BLDC GLUCOMTR-MCNC: 126 MG/DL (ref 70–105)
GLUCOSE BLDC GLUCOMTR-MCNC: 95 MG/DL (ref 70–105)
GLUCOSE SERPL-MCNC: 96 MG/DL (ref 65–99)
HCT VFR BLD AUTO: 31.7 % (ref 37.5–51)
HGB BLD-MCNC: 8.7 G/DL (ref 13–17.7)
MAGNESIUM SERPL-MCNC: 2.1 MG/DL (ref 1.6–2.4)
MCH RBC QN AUTO: 20.7 PG (ref 26.6–33)
MCHC RBC AUTO-ENTMCNC: 27.4 G/DL (ref 31.5–35.7)
MCV RBC AUTO: 75.5 FL (ref 79–97)
PHOSPHATE SERPL-MCNC: 2.8 MG/DL (ref 2.5–4.5)
PLATELET # BLD AUTO: 272 10*3/MM3 (ref 140–450)
PMV BLD AUTO: 9.2 FL (ref 6–12)
POTASSIUM SERPL-SCNC: 3.7 MMOL/L (ref 3.5–5.2)
RBC # BLD AUTO: 4.2 10*6/MM3 (ref 4.14–5.8)
SODIUM SERPL-SCNC: 137 MMOL/L (ref 136–145)
WBC NRBC COR # BLD AUTO: 5.38 10*3/MM3 (ref 3.4–10.8)

## 2024-08-03 PROCEDURE — 80162 ASSAY OF DIGOXIN TOTAL: CPT | Performed by: INTERNAL MEDICINE

## 2024-08-03 PROCEDURE — 99232 SBSQ HOSP IP/OBS MODERATE 35: CPT | Performed by: INTERNAL MEDICINE

## 2024-08-03 PROCEDURE — 83735 ASSAY OF MAGNESIUM: CPT | Performed by: INTERNAL MEDICINE

## 2024-08-03 PROCEDURE — 85027 COMPLETE CBC AUTOMATED: CPT | Performed by: INTERNAL MEDICINE

## 2024-08-03 PROCEDURE — 84100 ASSAY OF PHOSPHORUS: CPT | Performed by: INTERNAL MEDICINE

## 2024-08-03 PROCEDURE — 85730 THROMBOPLASTIN TIME PARTIAL: CPT | Performed by: INTERNAL MEDICINE

## 2024-08-03 PROCEDURE — 25010000002 BUMETANIDE PER 0.5 MG: Performed by: INTERNAL MEDICINE

## 2024-08-03 PROCEDURE — 80048 BASIC METABOLIC PNL TOTAL CA: CPT | Performed by: INTERNAL MEDICINE

## 2024-08-03 PROCEDURE — 82948 REAGENT STRIP/BLOOD GLUCOSE: CPT

## 2024-08-03 RX ORDER — DIGOXIN 125 MCG
125 TABLET ORAL
Qty: 30 TABLET | Refills: 0 | Status: SHIPPED | OUTPATIENT
Start: 2024-08-03 | End: 2024-08-03

## 2024-08-03 RX ORDER — ATORVASTATIN CALCIUM 40 MG/1
40 TABLET, FILM COATED ORAL DAILY
Qty: 30 TABLET | Refills: 0 | Status: SHIPPED | OUTPATIENT
Start: 2024-08-03

## 2024-08-03 RX ORDER — DIGOXIN 125 MCG
125 TABLET ORAL
Qty: 30 TABLET | Refills: 0 | Status: SHIPPED | OUTPATIENT
Start: 2024-08-03

## 2024-08-03 RX ORDER — BUMETANIDE 0.25 MG/ML
1 INJECTION INTRAMUSCULAR; INTRAVENOUS ONCE
Status: COMPLETED | OUTPATIENT
Start: 2024-08-03 | End: 2024-08-03

## 2024-08-03 RX ADMIN — METOPROLOL SUCCINATE 100 MG: 50 TABLET, EXTENDED RELEASE ORAL at 08:21

## 2024-08-03 RX ADMIN — POTASSIUM CHLORIDE 20 MEQ: 1500 TABLET, EXTENDED RELEASE ORAL at 08:20

## 2024-08-03 RX ADMIN — RANOLAZINE 500 MG: 500 TABLET, EXTENDED RELEASE ORAL at 08:21

## 2024-08-03 RX ADMIN — BUMETANIDE 1 MG: 1 TABLET ORAL at 08:21

## 2024-08-03 RX ADMIN — BUMETANIDE 1 MG: 0.25 INJECTION INTRAMUSCULAR; INTRAVENOUS at 11:59

## 2024-08-03 RX ADMIN — ASPIRIN 81 MG: 81 TABLET, COATED ORAL at 08:20

## 2024-08-03 RX ADMIN — GUAIFENESIN 600 MG: 600 TABLET, EXTENDED RELEASE ORAL at 08:20

## 2024-08-03 RX ADMIN — AMANTADINE HYDROCHLORIDE 100 MG: 100 CAPSULE ORAL at 08:21

## 2024-08-03 RX ADMIN — EMPAGLIFLOZIN 10 MG: 10 TABLET, FILM COATED ORAL at 08:21

## 2024-08-03 RX ADMIN — FOLIC ACID 1 MG: 1 TABLET ORAL at 08:21

## 2024-08-03 RX ADMIN — ISOSORBIDE MONONITRATE 30 MG: 30 TABLET, EXTENDED RELEASE ORAL at 08:21

## 2024-08-03 RX ADMIN — Medication 10 ML: at 08:22

## 2024-08-03 RX ADMIN — TICAGRELOR 90 MG: 90 TABLET ORAL at 08:21

## 2024-08-03 RX ADMIN — SPIRONOLACTONE 12.5 MG: 25 TABLET ORAL at 08:20

## 2024-08-03 RX ADMIN — PANTOPRAZOLE SODIUM 40 MG: 40 TABLET, DELAYED RELEASE ORAL at 06:21

## 2024-08-03 NOTE — PLAN OF CARE
Goal Outcome Evaluation:         Pt to d/c home with life vest and follow up with cardiology.

## 2024-08-03 NOTE — PROGRESS NOTES
Cardiology Progress Note      PATIENT IDENTIFICATION    Name: Chinedu Lopez  Age: 63 y.o. Sex: male : 1960  MRN: [id number]    Requesting Provider    Brammell, Timothy Duane,*     LOS: 4 days       Reason For Followup:  Unstable angina/non-STEMI  Coronary artery disease  Congestive heart failure  Cardiomyopathy-ischemic  Chronic atrial fibrillation status post watchman      Subjective:    Interval History:  Seen examined.  Chart and labs reviewed.  Patient denies any chest pain pressure heaviness or tightness.  No shortness of breath out of character.  Has been ambulating.  He is anxious to go home.  He does have some tachycardia.  He has LifeVest ready to go.  He has an appointment with heart failure clinic next week and with our office in 2 to 3 weeks.    Review of Systems:  A complete review of systems was undertaken with the pertinent cardiovascular findings listed in history of present illness and all other systems being negative.     Assessment & Plan    Impressions:  Unstable angina/non-STEMI status post urgent PCI ANA SVG-OM this admission  Ischemic cardiomyopathy LVEF 25 to 30% this admission  Chronic atrial fibrillation status post watchman  Congestive heart failure acute systolic-improving  History of hypertension    Recommendations:  Guideline directed medical therapy for congestive heart failure  LifeVest  Monitor rate and rhythm  Plan for EP evaluation if EF does not improve  Dual antiplatelet therapy indefinitely  Follow-up with heart failure clinic as scheduled  Follow-up with cardiology as scheduled  Cardiology status appropriate for discharge to next destination of care when okay with other services.        Objective:    Medication Review:   Scheduled Meds:amantadine, 100 mg, Oral, Daily  aspirin, 81 mg, Oral, Daily  bumetanide, 1 mg, Intravenous, Once  bumetanide, 1 mg, Oral, BID  empagliflozin, 10 mg, Oral, Daily  enoxaparin, 40 mg, Subcutaneous, Daily  folic acid, 1 mg, Oral,  Daily  guaiFENesin, 600 mg, Oral, Daily  insulin lispro, 2-9 Units, Subcutaneous, 4x Daily AC & at Bedtime  isosorbide mononitrate, 30 mg, Oral, Daily  metoprolol succinate XL, 100 mg, Oral, Q12H  pantoprazole, 40 mg, Oral, BID AC  potassium chloride ER, 20 mEq, Oral, BID  ranolazine, 500 mg, Oral, BID  sodium chloride, 10 mL, Intravenous, Q12H  spironolactone, 12.5 mg, Oral, Daily  ticagrelor, 90 mg, Oral, Q12H      Continuous Infusions:   PRN Meds:.  acetaminophen **OR** acetaminophen **OR** acetaminophen    aluminum-magnesium hydroxide-simethicone    atropine    senna-docusate sodium **AND** polyethylene glycol **AND** bisacodyl **AND** bisacodyl    Calcium Replacement - Follow Nurse / BPA Driven Protocol    dextrose    dextrose    glucagon (human recombinant)    Lidocaine HCl gel    Magnesium Standard Dose Replacement - Follow Nurse / BPA Driven Protocol    nitroglycerin    ondansetron ODT **OR** ondansetron    Phosphorus Replacement - Follow Nurse / BPA Driven Protocol    Potassium Replacement - Follow Nurse / BPA Driven Protocol    sodium chloride    sodium chloride    sodium chloride      Atrial fibrillation with RVR         Physical Exam:    General: Alert, cooperative, no distress, appears stated age  Head:  Normocephalic, atraumatic, mucous membranes moist  Eyes:  Conjunctivae/corneas clear, EOMs intact     Neck:  Supple,  no bruit  Lungs:  Coarse and diminished bilaterally  Chest wall: No tenderness  Heart::  Regular rate and rhythm, S1 and S2 normal, 1/6 holosystolic murmur.  No rub or gallop  Abdomen: Soft, nontender, nondistended, bowel sounds active  Extremities: No cyanosis, clubbing.  Edema noted  Pulses: Diminished pedal pulses  Skin:  No rashes or lesions  Neuro/psych: A&O x3. CN II through XII are grossly intact with appropriate affect    Vital Signs:  Vitals:    08/03/24 0213 08/03/24 0342 08/03/24 0519 08/03/24 0815   BP:  137/79  143/97   BP Location:  Left arm  Left arm   Patient Position:   "Sitting  Sitting   Pulse:  91  110   Resp: 19 21     Temp: 98 °F (36.7 °C) 98.6 °F (37 °C)  98.2 °F (36.8 °C)   TempSrc: Oral Oral  Oral   SpO2: 93% 90%     Weight:   103 kg (227 lb 9.6 oz)    Height:         Wt Readings from Last 1 Encounters:   08/03/24 103 kg (227 lb 9.6 oz)       Intake/Output Summary (Last 24 hours) at 8/3/2024 1201  Last data filed at 8/3/2024 1032  Gross per 24 hour   Intake 860 ml   Output 3050 ml   Net -2190 ml         Results Review:     CBC    Results from last 7 days   Lab Units 08/03/24  0420 08/02/24  0237 08/01/24  0254 07/31/24  0508 07/30/24  1029   WBC 10*3/mm3 5.38 7.19 7.44 5.31 4.62   HEMOGLOBIN g/dL 8.7* 8.6* 8.6* 8.6* 7.5*   PLATELETS 10*3/mm3 272 241 260 213 226     Cr Clearance Estimated Creatinine Clearance: 83.8 mL/min (by C-G formula based on SCr of 1.15 mg/dL).  Coag   Results from last 7 days   Lab Units 08/03/24  0420 07/31/24  1155 07/31/24  0256 07/30/24  2008 07/30/24  1255   APTT seconds 30.2* 80.2* 69.2 40.4* 36.0*     HbA1C No results found for: \"HGBA1C\"  Blood Glucose   Glucose   Date/Time Value Ref Range Status   08/03/2024 1158 108 (H) 70 - 105 mg/dL Final     Comment:     Serial Number: [id number]:  [id number]   08/03/2024 1109 126 (H) 70 - 105 mg/dL Final     Comment:     Serial Number: [id number]:  [id number]   08/03/2024 0811 95 70 - 105 mg/dL Final     Comment:     Serial Number: [id number]:  [id number]   08/02/2024 1948 110 (H) 70 - 105 mg/dL Final     Comment:     Serial Number: [id number]:  [id number]   08/02/2024 1636 93 70 - 105 mg/dL Final     Comment:     Serial Number: [id number]:  [id number]   08/02/2024 1113 119 (H) 70 - 105 mg/dL Final     Comment:     Serial Number: [id number]:  [id number]   08/02/2024 0756 139 (H) 70 - 105 mg/dL Final     Comment:     Serial Number: [id number]:  [id number]   08/01/2024 2001 134 (H) 70 - 105 mg/dL Final     Comment:     Serial Number: [id number]:  [id number] " "    Infection     CMP   Results from last 7 days   Lab Units 08/03/24  0420 08/02/24  0237 08/01/24  1347 08/01/24  0254 07/31/24  1155 07/31/24 0508 07/30/24 2008 07/30/24  1029   SODIUM mmol/L 137 137  --  140 140 139  --  140   POTASSIUM mmol/L 3.7 3.8 3.8 3.6 3.6 4.0  --  3.9   CHLORIDE mmol/L 100 99  --  99 99 100  --  102   CO2 mmol/L 27.8 26.5  --  29.4* 26.0 31.0*  --  28.1   BUN mg/dL 11 9  --  8 8 9  --  10   CREATININE mg/dL 1.15 1.06  --  1.08 0.97 1.10  --  1.23   GLUCOSE mg/dL 96 103*  --  107* 118* 98  --  96   ALBUMIN g/dL  --   --   --   --   --  3.7 3.2  --    BILIRUBIN mg/dL  --   --   --   --   --  0.5  --   --    ALK PHOS U/L  --   --   --   --   --  36*  --   --    AST (SGOT) U/L  --   --   --   --   --  19  --   --    ALT (SGPT) U/L  --   --   --   --   --  5  --   --      ABG      UA    Results from last 7 days   Lab Units 07/30/24  1431   NITRITE UA  Negative     DEYSI  No results found for: \"POCMETH\", \"POCAMPHET\", \"POCBARBITUR\", \"POCBENZO\", \"POCCOCAINE\", \"POCOPIATES\", \"POCOXYCODO\", \"POCPHENCYC\", \"POCPROPOXY\", \"POCTHC\", \"POCTRICYC\"  Lysis Labs   Results from last 7 days   Lab Units 08/03/24  0420 08/02/24  0237 08/01/24  0254 07/31/24  1155 07/31/24  0508 07/31/24  0256 07/30/24 2008 07/30/24  1255 07/30/24  1029   APTT seconds 30.2*  --   --  80.2*  --  69.2 40.4* 36.0*  --    HEMOGLOBIN g/dL 8.7* 8.6* 8.6*  --  8.6*  --   --   --  7.5*   PLATELETS 10*3/mm3 272 241 260  --  213  --   --   --  226   CREATININE mg/dL 1.15 1.06 1.08 0.97 1.10  --   --   --  1.23     Radiology(recent) XR Chest 1 View    Result Date: 8/1/2024  Impression: Cardiomegaly with mild pulmonary edema and small bilateral pleural effusions. Electronically Signed: Gildardo Springer  8/1/2024 8:23 PM EDT  Workstation ID: [id number]       Results from last 7 days   Lab Units 07/31/24  0508 07/30/24  1255   CK TOTAL U/L  --  112   HSTROP T ng/L 257* 313*       Imaging Results (Last 24 Hours)       ** No results found for the " last 24 hours. **            Cardiac Studies:  Echo- Results for orders placed during the hospital encounter of 07/30/24    Adult Transthoracic Echo Complete W/ Cont if Necessary Per Protocol    Interpretation Summary    Left ventricular systolic function is severely decreased. Left ventricular ejection fraction appears to be 26 - 30%.    The left ventricular cavity is moderately dilated.    Left ventricular wall thickness is consistent with mild concentric hypertrophy.    Left ventricular diastolic function is consistent with (grade I) impaired relaxation.    Moderately reduced right ventricular systolic function noted.    The right ventricular cavity is moderately dilated.    The left atrial cavity is moderately dilated.    Estimated right ventricular systolic pressure from tricuspid regurgitation is normal (<35 mmHg).    Stress Myoview-  Cath-        Renny Mosher DO  08/03/24  12:01 EDT    Copied information has been reviewed and is current as of 08/03/24

## 2024-08-03 NOTE — PROGRESS NOTES
"NEPHROLOGY PROGRESS NOTE------[facility]    [facility]  [phone]  Diaz Nguyen MD      Patient Care Team:  Nhung Clark APRN as PCP - General (Family Medicine)  Ranjan Do MD as Consulting Physician (Nephrology)      Provider:  Diaz Nguyen MD  Patient Name: Chinedu Lopez  :  1960    SUBJECTIVE:    F/U CRF/CKD/CHF    No angina. No dysuria.     Medication:  amantadine, 100 mg, Oral, Daily  aspirin, 81 mg, Oral, Daily  bumetanide, 1 mg, Intravenous, Once  bumetanide, 1 mg, Oral, BID  empagliflozin, 10 mg, Oral, Daily  enoxaparin, 40 mg, Subcutaneous, Daily  folic acid, 1 mg, Oral, Daily  guaiFENesin, 600 mg, Oral, Daily  insulin lispro, 2-9 Units, Subcutaneous, 4x Daily AC & at Bedtime  isosorbide mononitrate, 30 mg, Oral, Daily  metoprolol succinate XL, 100 mg, Oral, Q12H  pantoprazole, 40 mg, Oral, BID AC  potassium chloride ER, 20 mEq, Oral, BID  ranolazine, 500 mg, Oral, BID  sodium chloride, 10 mL, Intravenous, Q12H  spironolactone, 12.5 mg, Oral, Daily  ticagrelor, 90 mg, Oral, Q12H             OBJECTIVE    Vital Sign Min/Max for last 24 hours  Temp  Min: 98 °F (36.7 °C)  Max: 98.7 °F (37.1 °C)   BP  Min: 125/89  Max: 152/120   Pulse  Min: 91  Max: 140   Resp  Min: 19  Max: 24   SpO2  Min: 79 %  Max: 96 %   No data recorded   Weight  Min: 103 kg (227 lb 9.6 oz)  Max: 104 kg (230 lb 3.2 oz)     Flowsheet Rows      Flowsheet Row First Filed Value   Admission Height 182.9 cm (72\") Documented at 2024 0938   Admission Weight 112 kg (245 lb 13 oz) Documented at 2024 0938            I/O this shift:  In: -   Out: 800 [Urine:800]  I/O last 3 completed shifts:  In:  [P.O.:1820; I.V.:100]  Out: 0 [Urine:5150]    Physical Exam:  General Appearance: alert, appears stated age and cooperative  Head: normocephalic, without obvious abnormality and atraumatic  Eyes: conjunctivae and sclerae normal and no " "icterus  Neck: supple and +MINIMAL JVD  Lungs: +FINE LEFT CRACKLES  Heart: IRREG IRREG +MEDARDO. NO GALLOP, RUB, OR S3  Chest Wall: no abnormalities observed  Abdomen: normal bowel sounds. +OBESITY  Extremities: moves extremities well, +TRACE BILATERAL PRETIBIAL EDEMA, no cyanosis  Skin: no bleeding, bruising or rash. +PALLOR  Neurologic: Alert, and oriented. No focal deficits    Labs:    WBC WBC   Date Value Ref Range Status   08/03/2024 5.38 3.40 - 10.80 10*3/mm3 Final   08/02/2024 7.19 3.40 - 10.80 10*3/mm3 Final   08/01/2024 7.44 3.40 - 10.80 10*3/mm3 Final      HGB Hemoglobin   Date Value Ref Range Status   08/03/2024 8.7 (L) 13.0 - 17.7 g/dL Final   08/02/2024 8.6 (L) 13.0 - 17.7 g/dL Final   08/01/2024 8.6 (L) 13.0 - 17.7 g/dL Final      HCT Hematocrit   Date Value Ref Range Status   08/03/2024 31.7 (L) 37.5 - 51.0 % Final   08/02/2024 32.1 (L) 37.5 - 51.0 % Final   08/01/2024 31.6 (L) 37.5 - 51.0 % Final      Platelets No results found for: \"LABPLAT\"   MCV MCV   Date Value Ref Range Status   08/03/2024 75.5 (L) 79.0 - 97.0 fL Final   08/02/2024 74.3 (L) 79.0 - 97.0 fL Final   08/01/2024 74.2 (L) 79.0 - 97.0 fL Final          Sodium Sodium   Date Value Ref Range Status   08/03/2024 137 136 - 145 mmol/L Final   08/02/2024 137 136 - 145 mmol/L Final   08/01/2024 140 136 - 145 mmol/L Final   07/31/2024 140 136 - 145 mmol/L Final      Potassium Potassium   Date Value Ref Range Status   08/03/2024 3.7 3.5 - 5.2 mmol/L Final   08/02/2024 3.8 3.5 - 5.2 mmol/L Final   08/01/2024 3.8 3.5 - 5.2 mmol/L Final   08/01/2024 3.6 3.5 - 5.2 mmol/L Final   07/31/2024 3.6 3.5 - 5.2 mmol/L Final      Chloride Chloride   Date Value Ref Range Status   08/03/2024 100 98 - 107 mmol/L Final   08/02/2024 99 98 - 107 mmol/L Final   08/01/2024 99 98 - 107 mmol/L Final   07/31/2024 99 98 - 107 mmol/L Final      CO2 CO2   Date Value Ref Range Status   08/03/2024 27.8 22.0 - 29.0 mmol/L Final   08/02/2024 26.5 22.0 - 29.0 mmol/L Final " "  08/01/2024 29.4 (H) 22.0 - 29.0 mmol/L Final   07/31/2024 26.0 22.0 - 29.0 mmol/L Final      BUN BUN   Date Value Ref Range Status   08/03/2024 11 8 - 23 mg/dL Final   08/02/2024 9 8 - 23 mg/dL Final   08/01/2024 8 8 - 23 mg/dL Final   07/31/2024 8 8 - 23 mg/dL Final      Creatinine Creatinine   Date Value Ref Range Status   08/03/2024 1.15 0.76 - 1.27 mg/dL Final   08/02/2024 1.06 0.76 - 1.27 mg/dL Final   08/01/2024 1.08 0.76 - 1.27 mg/dL Final   07/31/2024 0.97 0.76 - 1.27 mg/dL Final      Calcium Calcium   Date Value Ref Range Status   08/03/2024 9.3 8.6 - 10.5 mg/dL Final   08/02/2024 9.4 8.6 - 10.5 mg/dL Final   08/01/2024 9.2 8.6 - 10.5 mg/dL Final   07/31/2024 9.5 8.6 - 10.5 mg/dL Final      PO4 No components found for: \"PO4\"   Albumin No results found for: \"ALBUMIN\"     Magnesium Magnesium   Date Value Ref Range Status   08/03/2024 2.1 1.6 - 2.4 mg/dL Final   08/01/2024 1.7 1.6 - 2.4 mg/dL Final      Uric Acid No components found for: \"URIC ACID\"     Imaging Results (Last 72 Hours)       Procedure Component Value Units Date/Time    XR Chest 1 View [[id number]] Collected: 08/01/24 2021     Updated: 08/01/24 2026    Narrative:      XR CHEST 1 VW    Date of Exam: 8/1/2024 6:00 PM EDT    Indication: chf    Comparison: None available.    Findings:  Mediastinum: Cardiomediastinal silhouette appears enlarged    Lungs: Indistinctness of the pulmonary vasculature with mild pulmonary vascular congestion. Bibasal hazy opacities.    Pleura: Small bilateral pleural effusions. No pneumothorax.    Bones and soft tissues: Median sternotomy.        Impression:      Impression:  Cardiomegaly with mild pulmonary edema and small bilateral pleural effusions.      Electronically Signed: Gildardo Springer    8/1/2024 8:23 PM EDT    Workstation ID: [id number]            Results for orders placed during the hospital encounter of 07/30/24    XR Chest 1 View    Narrative  XR CHEST 1 VW    Date of Exam: 8/1/2024 6:00 PM " EDT    Indication: chf    Comparison: None available.    Findings:  Mediastinum: Cardiomediastinal silhouette appears enlarged    Lungs: Indistinctness of the pulmonary vasculature with mild pulmonary vascular congestion. Bibasal hazy opacities.    Pleura: Small bilateral pleural effusions. No pneumothorax.    Bones and soft tissues: Median sternotomy.    Impression  Impression:  Cardiomegaly with mild pulmonary edema and small bilateral pleural effusions.      Electronically Signed: Gildardo Springer  8/1/2024 8:23 PM EDT  Workstation ID: [id number]      Results for orders placed during the hospital encounter of 01/20/23    Duplex Carotid Ultrasound CAR    Interpretation Summary    Right internal carotid artery plaque without significant stenosis.    Left internal carotid artery moderate stenosis.        ASSESSMENT / PLAN      Atrial fibrillation with RVR      CRF/CKD------Nonoliguric. Appears to have CRF/CKD STG 2.   CRF/CKD STG 2 most likely secondary to HTN NS vs. Early DM nephropathy. Follow with gentle diuresis. Stable post cardiac cath. No NSAIDs     2. ANEMIA------PRBCs per Hem/Onc     3. VOLUME OVERLOAD/EDEMA/MILD ASCITES-----Oral bumex     4. ATRIAL FIBRILLATION WITH RVR------Watchman in place. Per , Cardiology     5. CAD WITH H/O CABG/PCI AND WITH CURRENT ANGINA------Cardiac cath/PCI/Stent done per , Cardiology     6. H/O CVA/TIA/HEAD TRAUMA WITH BRAIN BLEED     7. HYPERLIPIDEMIA----Not on Statin.       8. GERD/PUD PROPHYLAXIS-----On PPI. Benefits outweigh risks despite CKD     9. OBESITY/DEISY    CR stable  Volume better  Continue oral bumex  Extra IV bumex today  OK for d/c with office follow up in 3-4 weeks      Diaz Nguyen MD  [facility]  [phone]  08/03/24  11:42 EDT

## 2024-08-03 NOTE — PROGRESS NOTES
[facility] MEDICINE SERVICE  DAILY PROGRESS NOTE    NAME: Chinedu Lopez  : 1960  MRN: [id number]      LOS: 4 days     PROVIDER OF SERVICE: Timothy Duane Brammell, MD    Chief Complaint: Atrial fibrillation with RVR    Subjective:     Interval History:  History taken from: patient  Patient Complaints: Patient still without any shortness of breath.  Weight has been improved with the addition of digoxin by cardiology.  He denies any palpitations or any associated chest discomfort.  Denies any gastric bowel or urinary symptoms.  Awaiting discharge.  No other acute issues.    Review of Systems:   Review of Systems   All other systems reviewed and are negative.      Objective:     Vital Signs  Temp:  [98 °F (36.7 °C)-98.7 °F (37.1 °C)] 98.2 °F (36.8 °C)  Heart Rate:  [] 93  Resp:  [19-22] 21  BP: (125-152)/() 125/76   Body mass index is 30.87 kg/m².    Physical Exam  Physical Exam  Vitals reviewed.   Cardiovascular:      Rate and Rhythm: Normal rate and regular rhythm.   Pulmonary:      Effort: Pulmonary effort is normal.      Breath sounds: Normal breath sounds.   Abdominal:      General: Bowel sounds are normal.      Palpations: Abdomen is soft.   Musculoskeletal:         General: No swelling.   Neurological:      General: No focal deficit present.      Mental Status: He is alert.         Scheduled Meds   amantadine, 100 mg, Oral, Daily  aspirin, 81 mg, Oral, Daily  bumetanide, 1 mg, Oral, BID  empagliflozin, 10 mg, Oral, Daily  enoxaparin, 40 mg, Subcutaneous, Daily  folic acid, 1 mg, Oral, Daily  guaiFENesin, 600 mg, Oral, Daily  insulin lispro, 2-9 Units, Subcutaneous, 4x Daily AC & at Bedtime  isosorbide mononitrate, 30 mg, Oral, Daily  metoprolol succinate XL, 100 mg, Oral, Q12H  pantoprazole, 40 mg, Oral, BID AC  potassium chloride ER, 20 mEq, Oral, BID  ranolazine, 500 mg, Oral, BID  sodium chloride, 10 mL, Intravenous, Q12H  spironolactone, 12.5 mg, Oral, Daily  ticagrelor, 90 mg,  Oral, Q12H       PRN Meds     acetaminophen **OR** acetaminophen **OR** acetaminophen    aluminum-magnesium hydroxide-simethicone    atropine    senna-docusate sodium **AND** polyethylene glycol **AND** bisacodyl **AND** bisacodyl    Calcium Replacement - Follow Nurse / BPA Driven Protocol    dextrose    dextrose    glucagon (human recombinant)    Lidocaine HCl gel    Magnesium Standard Dose Replacement - Follow Nurse / BPA Driven Protocol    nitroglycerin    ondansetron ODT **OR** ondansetron    Phosphorus Replacement - Follow Nurse / BPA Driven Protocol    Potassium Replacement - Follow Nurse / BPA Driven Protocol    sodium chloride    sodium chloride    sodium chloride   Infusions         Diagnostic Data    Results from last 7 days   Lab Units 08/03/24  0420 07/31/24  1155 07/31/24  0508   WBC 10*3/mm3 5.38   < > 5.31   HEMOGLOBIN g/dL 8.7*   < > 8.6*   HEMATOCRIT % 31.7*   < > 32.1*   PLATELETS 10*3/mm3 272   < > 213   GLUCOSE mg/dL 96   < > 98   CREATININE mg/dL 1.15   < > 1.10   BUN mg/dL 11   < > 9   SODIUM mmol/L 137   < > 139   POTASSIUM mmol/L 3.7   < > 4.0   AST (SGOT) U/L  --   --  19   ALT (SGPT) U/L  --   --  5   ALK PHOS U/L  --   --  36*   BILIRUBIN mg/dL  --   --  0.5   ANION GAP mmol/L 9.2   < > 8.0    < > = values in this interval not displayed.       XR Chest 1 View    Result Date: 8/1/2024  Impression: Cardiomegaly with mild pulmonary edema and small bilateral pleural effusions. Electronically Signed: Gildardo Springer  8/1/2024 8:23 PM EDT  Workstation ID: [id number]           Assessment/Plan:   Acute nontransmural myocardial infarct  Atrial fibs with RVR  Acute on chronic diastolic/systolic congestive heart failure  Iron deficiency  Anemia  Acute renal injury  History of watchman's procedure  History of CNS bleeding in the past  History of coronary artery disease  Thorpe's esophagus  Type 2 DM  Ischemic cardiomyopathy    Plan.  At digitoxin to discharge home regiment.  Follow-up with cardiology  as an outpatient.  Active and Resolved Problems  Active Hospital Problems    Diagnosis  POA    **Atrial fibrillation with RVR [I48.91]  Yes      Resolved Hospital Problems   No resolved problems to display.           VTE Prophylaxis:  Pharmacologic VTE prophylaxis orders are present.         Code status is   Code Status and Medical Interventions: CPR (Attempt to Resuscitate); Full Support   Ordered at: 07/30/24 0958     Code Status (Patient has no pulse and is not breathing):    CPR (Attempt to Resuscitate)     Medical Interventions (Patient has pulse or is breathing):    Full Support       Plan for disposition:home in today    Time: 30 minutes    Signature: Electronically signed by Timothy Duane Brammell, MD, 08/03/24, 14:04 EDT.  [facility] Hospitalist Team

## 2024-08-04 ENCOUNTER — READMISSION MANAGEMENT (OUTPATIENT)
Dept: CALL CENTER | Facility: HOSPITAL | Age: 64
End: 2024-08-04
Payer: OTHER GOVERNMENT

## 2024-08-04 NOTE — OUTREACH NOTE
AMI Week 1 Survey      Flowsheet Row Responses   [facility] facility patient discharged from? [facility]   Does the patient have one of the following disease processes/diagnoses(primary or secondary)? Acute MI (STEMI,NSTEMI)   Week 1 attempt successful? No   Unsuccessful attempts Attempt 1   Revoke Readmitted            RABIA SUTTON - Registered Nurse

## 2024-08-04 NOTE — CASE MANAGEMENT/SOCIAL WORK
Case Management Discharge Note      Final Note: Home with spouse and [employer]       Durable Medical Equipment Coordination complete.      Service Provider Selected Services Address Phone Fax Patient Preferred    [employer] [address] , [place] [address] [phone] --               Transportation Services  Private: Car    Final Discharge Disposition Code: 01 - home or self-care

## 2024-08-04 NOTE — OUTREACH NOTE
Prep Survey      Flowsheet Row Responses   Nondenominational facility patient discharged from? [facility]   Is LACE score < 7 ? No   Eligibility Readm Mgmt   Discharge diagnosis Acute nontransmural myocardial infarct   Does the patient have one of the following disease processes/diagnoses(primary or secondary)? Acute MI (STEMI,NSTEMI)   Does the patient have Home health ordered? No   Is there a DME ordered? Yes   What DME was ordered? [facility]   Prep survey completed? Yes            RABIA SUTTON - Registered Nurse

## 2024-08-05 NOTE — PAYOR COMM NOTE
"This is discharge notification for Chinedu Beasley LADY  Reference/Auth # [id number]   Pt discharged routine to home on 8/3/24.    GHULAM Schneider, RN, CCM  Utilization Review Nurse  [facility]  Direct & confidential phone # [phone]  Fax # [phone]    Chinedu Beasley (63 y.o. Male)       Date of Birth   1960    Social Security Number       Address   [address] [place] [address] [place] [address]    Home Phone   [phone]    MRN   [id number]       Taoism   None    Marital Status                               Admission Date   7/30/24    Admission Type   Urgent    Admitting Provider   Orlando Whitt MD    Attending Provider       Department, Room/Bed   [facility] CARDIOVASCULAR CARE UNIT, 4883/       Discharge Date   8/3/2024    Discharge Disposition   Home or Self Care    Discharge Destination   Home                              Attending Provider: (none)   Allergies: No Known Allergies    Isolation: None   Infection: None   Code Status: Prior    Ht: 182.9 cm (72\")   Wt: 103 kg (227 lb 9.6 oz)    Admission Cmt: None   Principal Problem: Atrial fibrillation with RVR [I48.91]                   Active Insurance as of 7/30/2024       Primary Coverage       Payor Plan Insurance Group Employer/Plan Group    [facility] [id number]       Payor Plan Address Payor Plan Phone Number Payor Plan Fax Number Effective Dates    [address] [address]   4/1/2022 - None Entered    [facility] [id number]         Subscriber Name Subscriber Birth Date Member ID       CHINEDU BEASLEY LADY 1960 [id number]               Secondary Coverage       Payor Plan Insurance Group Employer/Plan Group    MEDICARE MEDICARE A ONLY        Payor Plan Address Payor Plan Phone Number Payor Plan Fax Number Effective Dates    [address] [phone]  4/1/2022 - None Entered    [facility] [id number]         Subscriber Name Subscriber Birth Date Member ID       CHINEDU BEASLEY " 1960 [id number]                     Emergency Contacts        (Rel.) Home Phone Work Phone Mobile Phone    KISHORE LOPEZ (Spouse) [phone] -- [phone]                 Discharge Summary        Brammell, Timothy Duane, MD at 24 1302                       [facility] Medicine Services  Discharge Summary    Date of Service: 2024  Patient Name: Chinedu Lopez  : 1960  MRN: [id number]    Date of Admission: 2024  Discharge Diagnosis:       Acute nontransmural myocardial infarct  Atrial fibs with RVR  Acute on chronic diastolic/systolic congestive heart failure  Iron deficiency  Anemia  Acute renal injury  History of watchman's procedure  History of CNS bleeding in the past  History of coronary artery disease  Thorpe's esophagus  Type 2 DM  Ischemic cardiomyopathy          Date of Discharge: 2024  Primary Care Physician: Nhung Clark APRN      Presenting Problem:   Atrial fibrillation with RVR [I48.91]    Active and Resolved Hospital Problems:  Active Hospital Problems    Diagnosis POA    **Atrial fibrillation with RVR [I48.91] Yes      Resolved Hospital Problems   No resolved problems to display.         Hospital Course     HPI:  Per the H&P dated 2024    Hospital Course:   This is a older than stated age 63-year-old white male who presented with issues as discussed in history and physical.  He was afebrile during his hospitalization.  Oxygenation was maintained on room air.  Blood pressure is well-controlled.  Urine eosinophils returned being chest x-ray showed cardiomegaly and evidence of mild pulmonary edema.  Renal ultrasound showed no evidence of hydronephrosis with increased renal cortical echogenicity bilaterally.  Initial electrolytes were nonremarkable.  His creatinine was 1.23.  proBNP was elevated in excess of 15,000.  Iron saturation returned being 3%.  Ferritin level returned and 22.  He displayed a significant anemia on  presentation.  Troponins were elevated and he underwent coronary artery catheterization and stenting as discussed in procedure report. Cardiology with attempts at rate control for his atrial fibs which was difficult to maintain.  Thyroid functions appear normal with a TSH of 3.5.  B12 and folic acid levels normal.  Protein electrophoresis returned showing no evidence of monoclonal dermopathy.  Echocardiogram was performed that showed a decreased ejection fraction at approximately 25 to 30%.  He was seen in evaluation by hematology for his noted anemia.  It was thought that he would require outpatient follow-up for additional iron therapy.  It was thought that any need for gastroenterology evaluation be reviewed by primary care with gastroenterology not feeling that any acute intervention was needed at present.  He was arranged for a Life[employer] therapy to use at time of discharge.  It was thought by cardiology in light of his persistent atrial fibs that his amiodarone should be discontinued.  He otherwise will follow-up with cardiology as noted with any needed attempts at afterload reduction and further medication adjustments.              Reasons For Change In Medications and Indications for New Medications:      Day of Discharge     Vital Signs:  Temp:  [98 °F (36.7 °C)-98.7 °F (37.1 °C)] 98.2 °F (36.8 °C)  Heart Rate:  [] 93  Resp:  [19-22] 21  BP: (125-152)/() 125/76    Physical Exam:  Physical Exam  Constitutional:       Appearance: Normal appearance. He is obese.   HENT:      Head: Normocephalic.   Cardiovascular:      Rate and Rhythm: Tachycardia present. Rhythm irregular.   Pulmonary:      Effort: Pulmonary effort is normal.      Breath sounds: Normal breath sounds.   Abdominal:      General: Bowel sounds are normal.      Palpations: Abdomen is soft.      Tenderness: There is no abdominal tenderness.   Musculoskeletal:         General: No swelling.   Neurological:      Mental Status: He is alert.  Mental status is at baseline.   Psychiatric:         Behavior: Behavior normal.            Pertinent  and/or Most Recent Results     LAB RESULTS:      Lab 08/03/24  0420 08/02/24  0237 08/01/24  0254 07/31/24  1155 07/31/24  0508 07/31/24  0256 07/30/24 2008 07/30/24  1255 07/30/24  1029   WBC 5.38 7.19 7.44  --  5.31  --   --   --  4.62   HEMOGLOBIN 8.7* 8.6* 8.6*  --  8.6*  --   --   --  7.5*   HEMATOCRIT 31.7* 32.1* 31.6*  --  32.1*  --   --   --  28.5*   PLATELETS 272 241 260  --  213  --   --   --  226   NEUTROS ABS  --   --   --   --   --   --   --   --  3.05   IMMATURE GRANS (ABS)  --   --   --   --   --   --   --   --  0.10*   LYMPHS ABS  --   --   --   --   --   --   --   --  0.92   MONOS ABS  --   --   --   --   --   --   --   --  0.42   EOS ABS  --   --   --   --   --   --   --   --  0.09   MCV 75.5* 74.3* 74.2*  --  74.7*  --   --   --  73.1*   LDH  --   --   --   --   --   --   --  150  --    APTT 30.2*  --   --  80.2*  --  69.2 40.4* 36.0*  --          Lab 08/03/24  0420 08/02/24  0237 08/01/24  1347 08/01/24 0254 07/31/24 1155 07/31/24  0508 07/30/24  1255   SODIUM 137 137  --  140 140 139  --    POTASSIUM 3.7 3.8 3.8 3.6 3.6 4.0  --    CHLORIDE 100 99  --  99 99 100  --    CO2 27.8 26.5  --  29.4* 26.0 31.0*  --    ANION GAP 9.2 11.5  --  11.6 15.0 8.0  --    BUN 11 9  --  8 8 9  --    CREATININE 1.15 1.06  --  1.08 0.97 1.10  --    EGFR 72.4 79.8  --  78.1 88.8 76.4  --    GLUCOSE 96 103*  --  107* 118* 98  --    CALCIUM 9.3 9.4  --  9.2 9.5 9.4  --    IONIZED CALCIUM  --   --   --   --   --  1.22  --    MAGNESIUM 2.1  --   --  1.7  --  1.8  --    PHOSPHORUS 2.8  --   --  3.3  --  3.8  --    TSH  --   --   --   --   --   --  3.510         Lab 07/31/24  0508 07/30/24 2008   TOTAL PROTEIN 6.3  --    ALBUMIN 3.7 3.2   GLOBULIN 2.6  --    ALT (SGPT) 5  --    AST (SGOT) 19  --    BILIRUBIN 0.5  --    ALK PHOS 36*  --          Lab 07/31/24  0508 07/30/24  1255 07/30/24  1029   PROBNP  --   --   15,763.0*   HSTROP T 257* 313* 299*         Lab 08/01/24  0254 07/31/24  0508   CHOLESTEROL 124 114   LDL CHOL 61 51   HDL CHOL 47 46   TRIGLYCERIDES 84 88         Lab 07/30/24  1446 07/30/24  1255 07/30/24  1029   IRON  --   --  13*   IRON SATURATION (TSAT)  --   --  3*   TIBC  --   --  416   TRANSFERRIN  --   --  279   FERRITIN  --  22.80*  --    FOLATE >20.00  --   --    VITAMIN B 12 569  --   --    ABO TYPING  --  B  --    RH TYPING  --  Positive  --    ANTIBODY SCREEN  --  Negative  --          Brief Urine Lab Results  (Last result in the past 365 days)        Color   Clarity   Blood   Leuk Est   Nitrite   Protein   CREAT   Urine HCG        07/30/24 1431 Yellow   Clear   Negative   Negative   Negative   Negative                 Microbiology Results (last 10 days)       Procedure Component Value - Date/Time    Eosinophil Smear - Urine, Urine, Clean Catch [[id number]]  (Normal) Collected: 07/30/24 1431    Lab Status: Final result Specimen: Urine, Clean Catch Updated: 07/30/24 1915     Eosinophil Smear 0 % EOS/100 Cells             XR Chest 1 View    Result Date: 8/1/2024  Impression: Impression: Cardiomegaly with mild pulmonary edema and small bilateral pleural effusions. Electronically Signed: Gildardo Springer  8/1/2024 8:23 PM EDT  Workstation ID: [id number]    US Renal Bilateral    Result Date: 7/30/2024  Impression: Impression: No hydronephrosis. Increased renal cortical echogenicity which can be seen in medical renal disease. Small volume ascites. Electronically Signed: Gildardo Springer  7/30/2024 3:01 PM EDT  Workstation ID: [id number]     Results for orders placed during the hospital encounter of 01/20/23    Duplex Carotid Ultrasound CAR    Interpretation Summary    Right internal carotid artery plaque without significant stenosis.    Left internal carotid artery moderate stenosis.      Results for orders placed during the hospital encounter of 01/20/23    Duplex Carotid Ultrasound CAR    Interpretation Summary     Right internal carotid artery plaque without significant stenosis.    Left internal carotid artery moderate stenosis.      Results for orders placed during the hospital encounter of 07/30/24    Adult Transthoracic Echo Complete W/ Cont if Necessary Per Protocol    Interpretation Summary    Left ventricular systolic function is severely decreased. Left ventricular ejection fraction appears to be 26 - 30%.    The left ventricular cavity is moderately dilated.    Left ventricular wall thickness is consistent with mild concentric hypertrophy.    Left ventricular diastolic function is consistent with (grade I) impaired relaxation.    Moderately reduced right ventricular systolic function noted.    The right ventricular cavity is moderately dilated.    The left atrial cavity is moderately dilated.    Estimated right ventricular systolic pressure from tricuspid regurgitation is normal (<35 mmHg).      Labs Pending at Discharge:      Procedures Performed  Procedure(s):  Left Heart Cath possible PCI  Stent ANA coronary  Percutaneous Coronary Intervention         Consults:   Consults       Date and Time Order Name Status Description    7/30/2024  5:17 PM Inpatient Gastroenterology Consult Completed     7/30/2024 10:14 AM Hematology & Oncology Inpatient Consult Completed     7/30/2024 10:14 AM Inpatient Nephrology Consult Completed               Discharge Details        Discharge Medications        New Medications        Instructions Start Date   atorvastatin 40 MG tablet  Commonly known as: Lipitor   40 mg, Oral, Daily      Brilinta 90 MG tablet tablet  Generic drug: ticagrelor   90 mg, Oral, Every 12 Hours Scheduled      digoxin 125 MCG tablet  Commonly known as: Lanoxin   125 mcg, Oral, Daily Digoxin      Jardiance 10 MG tablet tablet  Generic drug: empagliflozin   10 mg, Oral, Daily      metoprolol succinate  MG 24 hr tablet  Commonly known as: TOPROL-XL   100 mg, Oral, Every 12 Hours Scheduled      spironolactone  25 MG tablet  Commonly known as: ALDACTONE   12.5 mg, Oral, Daily             Continue These Medications        Instructions Start Date   amantadine 100 MG capsule  Commonly known as: SYMMETREL   100 mg, Oral, Daily      amantadine 100 MG tablet  Commonly known as: SYMMETREL   100 mg, Oral, Once      aspirin 81 MG chewable tablet   81 mg, Oral, Daily      bumetanide 1 MG tablet  Commonly known as: BUMEX   1 mg, Oral, Daily      doxazosin 1 MG tablet  Commonly known as: CARDURA   1 mg, Oral, Every Night at Bedtime      famotidine 40 MG tablet  Commonly known as: PEPCID   40 mg, Oral, Daily      folic acid 1 MG tablet  Commonly known as: FOLVITE   1 mg, Oral, Daily      guaiFENesin 600 MG 12 hr tablet  Commonly known as: MUCINEX   600 mg, Oral, Daily      isosorbide mononitrate 30 MG 24 hr tablet  Commonly known as: IMDUR   30 mg, Oral, Daily      metFORMIN 1000 MG tablet  Commonly known as: GLUCOPHAGE   1,000 mg, Oral, 2 Times Daily With Meals      Methylcobalamin 5000 MCG tablet dispersible   500 mcg, Translingual, Daily      nitroglycerin 0.4 MG/SPRAY spray  Commonly known as: NITROLINGUAL   1 spray, Sublingual, Every 5 Minutes PRN      pantoprazole 40 MG EC tablet  Commonly known as: PROTONIX   40 mg, Oral, 2 Times Daily      potassium chloride 20 MEQ CR tablet  Commonly known as: KLOR-CON M20   20 mEq, Oral, 2 Times Daily      ranolazine 500 MG 12 hr tablet  Commonly known as: RANEXA   500 mg, Oral, 2 Times Daily      thiamine 100 MG tablet  Commonly known as: VITAMIN B-1   100 mg, Oral, Daily      Vitamin B 12 500 MCG tablet   500 mcg, Oral, Daily      vitamin D3 125 MCG (5000 UT) capsule capsule   5,000 Units, Oral, Daily             Stop These Medications      amiodarone 200 MG tablet  Commonly known as: PACERONE     QUEtiapine 25 MG tablet  Commonly known as: SEROquel     thiamine 100 MG tablet  Commonly known as: VITAMIN B1              No Known Allergies      Discharge Disposition: home  Home or Self  Care    Diet:  Hospital:  Diet Order   Procedures    Diet: Cardiac, Diabetic; Healthy Heart (2-3 Na+); Consistent Carbohydrate; Fluid Consistency: Thin (IDDSI 0)         Discharge Activity:         CODE STATUS:  Code Status and Medical Interventions: CPR (Attempt to Resuscitate); Full Support   Ordered at: 07/30/24 0958     Code Status (Patient has no pulse and is not breathing):    CPR (Attempt to Resuscitate)     Medical Interventions (Patient has pulse or is breathing):    Full Support         Future Appointments   Date Time Provider Department Center   8/14/2024  8:00 AM Richard Thomas APRN [facility] None   8/28/2024  1:30 PM Renny Mosher DO [facility]   10/21/2024 11:45 AM Angel Willams MD [facility]       Additional Instructions for the Follow-ups that You Need to Schedule       Discharge Follow-up with PCP   As directed       Currently Documented PCP:    Nhung Clark APRN    PCP Phone Number:    [phone]     Follow Up Details: one week        Discharge Follow-up with Specialty: cardiology; 2 Weeks   As directed      Specialty: cardiology   Follow Up: 2 Weeks        Discharge Follow-up with Specified Provider: hematology; 2 Weeks   As directed      To: hematology   Follow Up: 2 Weeks        Discharge Follow-up with Specified Provider: nephrology; 1 Month   As directed      To: nephrology   Follow Up: 1 Month        Basic Metabolic Panel    Aug 08, 2024 (Approximate)      Release to patient: Routine Release                Time spent on Discharge including face to face service:  >30 minutes    Signature: Electronically signed by Timothy Duane Brammell, MD, 08/03/24, 14:01 EDT.  [facility] Hospitalist Team     Electronically signed by Brammell, Timothy Duane, MD at 08/03/24 5491

## 2024-08-07 ENCOUNTER — TELEPHONE (OUTPATIENT)
Dept: ONCOLOGY | Facility: CLINIC | Age: 64
End: 2024-08-07
Payer: OTHER GOVERNMENT

## 2024-08-09 ENCOUNTER — TELEPHONE (OUTPATIENT)
Dept: CARDIAC REHAB | Facility: HOSPITAL | Age: 64
End: 2024-08-09
Payer: OTHER GOVERNMENT

## 2024-08-09 DIAGNOSIS — I50.42 CHRONIC COMBINED SYSTOLIC AND DIASTOLIC CONGESTIVE HEART FAILURE: Primary | ICD-10-CM

## 2024-08-09 NOTE — TELEPHONE ENCOUNTER
Cardiac Rehab: left voicemail with our contact # on both of patient's phone numbers to discuss cardiac rehab.

## 2024-08-13 PROBLEM — S09.90XA HEAD INJURY: Status: ACTIVE | Noted: 2024-08-13

## 2024-08-13 PROBLEM — E88.810 METABOLIC SYNDROME X: Status: ACTIVE | Noted: 2024-08-13

## 2024-08-13 PROBLEM — Z87.19 H/O GASTROESOPHAGEAL REFLUX (GERD): Chronic | Status: ACTIVE | Noted: 2024-08-13

## 2024-08-13 PROBLEM — I48.91 ATRIAL FIBRILLATION WITH RVR: Status: RESOLVED | Noted: 2024-07-30 | Resolved: 2024-08-13

## 2024-08-13 PROBLEM — I27.81 CHRONIC COR PULMONALE: Status: ACTIVE | Noted: 2024-08-13

## 2024-08-13 PROBLEM — I50.42 CHRONIC COMBINED SYSTOLIC AND DIASTOLIC CHF (CONGESTIVE HEART FAILURE): Chronic | Status: ACTIVE | Noted: 2024-08-13

## 2024-08-13 PROBLEM — I25.10 ISCHEMIC DILATED CARDIOMYOPATHY DUE TO CORONARY ARTERY DISEASE: Chronic | Status: ACTIVE | Noted: 2024-08-13

## 2024-08-13 PROBLEM — I25.5 ISCHEMIC DILATED CARDIOMYOPATHY DUE TO CORONARY ARTERY DISEASE: Chronic | Status: ACTIVE | Noted: 2024-08-13

## 2024-08-13 PROBLEM — D64.9 NORMOCYTIC ANEMIA: Chronic | Status: ACTIVE | Noted: 2024-08-13

## 2024-08-13 NOTE — PROGRESS NOTES
"Cardiology Heart Failure Clinic Note  Richard \"Wilton\" Martha CONDE [facility]    Patient ID: Chinedu Lopez  is a 63 y.o. male.    Encounter Date:08/14/2024    HPI:  63-year-old  male patient of Dr. Mosher with a PMH of atherosclerotic coronary artery disease s/p 2000 CABG X 3 as well as subsequent PCI\stents including 2006 PCI/stent x 3 sequentially placed promimal and mid SVG - OM1 & Aug 2012 RCA mid and proximal- Xience stent x3.  Had NSTEMI 31 July 2024 and had subsequent now s/p 7/31/24 PCI and stenting of the midportion of  SVG to OM1  w/  Xience ANA 3.0 x 18 mm with newly diagnosed ischemic dilated cardiomyopathy with chronic combined systolic and diastolic heart failure (HFrEF) [LVIDD 6.4 cm ] (TTE 7/30/2024 showed EF of 26-30% and grade 1 DD.  Moderate RV reduction i.e. 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 Sheridan And underwent the aforementioned PCI\ANA.  He presents after discharge to the [facility] heart failure clinic on 8/14/2024 for his initial visit since discharge from the hospital patient has not been having any chest discomfort he has however been having to get up and urinate multiple times each night which is been bothering him recently.             Assessment:    Diagnoses and all orders for this visit:    1. Ischemic dilated cardiomyopathy due to ASCAD (Primary)  Overview:  A, LVIDD 6.4 cm   B. chronic combined systolic and diastolic heart failure (HFrEF)   C. (TTE 7/30/24) EF of 26-30% and grade 1 DD.  D. No devices      2. Chronic combined systolic and diastolic HF " (HFrEF)  Overview:  A.  (TTE 7/30/24) EF of 26-30% and grade 1 DD.  B. No devices      3. ASCAD  Overview:  A.  s/p 2000 CABG X 3   B. subsequent PCI\stents            I. s/p 2006 PCI/stent x 3 sequentially placed promimal and mid SVG - OM1           II  s/p Aug 2012 RCA mid and proximal- Xience stent x3.           III. Had NSTEMI 30 July 2024                   1. s/p 7/31/24 PCI and stenting of the midportion of  SVG to OM1                                A. w/ a Xience ANA 3.0 x 18 mm   C.  Significantly reduced biventricular function      4. 3 A paroxysmal atrial fibrillation  Overview:  A.  rate controlled on metoprolol and digoxin,   B. anticoagulated via a watchman's device            I.  [id number] 2 =   C. h/o January 2020 of OLESYA occlusion Dr. Willams    Orders:  -     TSH; Future  -     TSH; Standing  -     TSH    5. Injury of head, sequela  Overview:   Hx of 9/2016 Head trauma/bleed Tx @ [facility],      6. Cerebrovascular accident (CVA), unspecified mechanism    7. Normocytic anemia  -     CBC & Differential; Future  -     CBC & Differential; Standing  -     CBC & Differential    8. Metabolic syndrome X  Overview:  A.  Benign essential hypertension  B.  Mixed dyslipidemia  C.  Elevated BMI           I.  BMI 30.9 kg/m²  D.  T2DM    Orders:  -     Hemoglobin A1c; Future    9. Type 2 diabetes mellitus without complication, without long-term current use of insulin  -     Hemoglobin A1c; Future    10. H/O gastroesophageal reflux (GERD)    11. Presence of Watchman left atrial appendage closure device    12. MOLINA (dyspnea on exertion)  -     Magnesium; Future  -     Basic Metabolic Panel; Future  -     proBNP; Future  -     ECG 12 Lead; Future  -     Magnesium; Standing  -     Magnesium  -     Basic Metabolic Panel; Standing  -     Basic Metabolic Panel  -     proBNP; Standing  -     proBNP  -     Basic Metabolic Panel; Future  -     proBNP; Future  -     proBNP    13. Hyperkalemia  -     Basic Metabolic Panel;  Future    Other orders  -     furosemide (LASIX) injection 60 mg  -     carvedilol (Coreg) 25 MG tablet; Take 1.5 tablets by mouth 2 (Two) Times a Day With Meals. Hold for systolic BP < 100 mmHg or HR < 60 BPM unless you have PPM or ICD  Dispense: 180 tablet; Refill: 0  -     sodium zirconium cyclosilicate (LOKELMA) packet 10 g  -     Furosemide (Furoscix) 80 MG/10ML Cartridge Kit; Inject 10 mL under the skin into the appropriate area as directed Once When Specified for 1 dose. Do not take until discussed with provider  Dispense: 8 each; Refill: 0  -     potassium chloride (KLOR-CON M20) 20 MEQ CR tablet; Take 1 tablet by mouth Daily.  Dispense: 90 tablet; Refill: 2  -     bumetanide (BUMEX) 1 MG tablet; Take 1 tablet by mouth 2 (Two) Times a Day.  Dispense: 180 tablet; Refill: 2        [id number] Score: 6       Plan/discussion    Volume overload    Heart Failure Core Measures addressed    Type of Overload  Chronic combined systolic and diastolic HF (HFrEF)     Most recent EF:  (TTE 7/30/24) EF of 26-30% and grade 1 DD.    [place] Heart association Class & Stage : IIb    HF Meds Pre Visit    Beta Blocker: Toprol- mg every 12 hours  ARNI/ACE/ARB: Entresto 0.5 tab low dose q12h  SGLT 2 inhibitors: Jardiance 10 mg daily  Diuretics: Bumex 1 mg twice daily  K-Dur 20 mill equivalents twice daily  Aldosterone Antagonist: Spironolactone 12.5 mg daily  Digitalis: Digoxin 0.125 mg daily  Vasodilators & Nitrates: Imdur 30 mg daily and SL NTG PRN    HF Meds Post Visit    Beta Blocker: Coreg 37.5 mg every 12 hours  Hold for systolic less than 100 heart rate less than 60  ARNI/ACE/ARB: Entresto 24/26 mg every 12 hours  SGLT 2 inhibitors: Jardiance 10 mg daily  Diuretics: Bumex 1 mg twice daily  Decreasing K-Dur to 20 mEQ daily  Furoscix: Ordering 8 kits for at home use  Patient instructed not to utilize unless cleared by provider  Aldosterone Antagonist: Spironolactone 12.5 mg daily  Digitalis: Digoxin 0.125 mg  daily  Nitrates: Imdur 30 mg daily with as needed SL NTG        Ranexa 500 mg twice daily  Brilinta 90 mg every 12  Aspirin 81 mg daily      Cardiac medicines reviewed with risk, benefits, and necessity of each discussed.       _________________________________________________________    Discussion    Already on heart failure core measures given his significant reduction in ejection fraction current heart failure medications including beta-blocker, ARNI, SGLT2, diuretic,  Aldactone even a nitrate  Are on staff pharmacist is working to make medications more affordable including Brilinta, Entresto, and Jardiance particularly  Labs showing be hyperkalemic on 8/14/2024  Lokelma 10 mg while still here in the clinic repeat labs on 8/19/2024  Decrease potassium  Given the fact he is mildly hypertensive we will go ahead and change beta-blocker over to Coreg and make it an increased dose at 37.5 mg twice daily with parameters  Fairing his abnormal TSH 5.7 to his primary care Nhung CONDE  He has a pending follow-up appointment with nephrology Dr. Do  Labs show him to be a stage II CKD with an EGFR of 66, BUN of 15 creatinine 1.2 and to be mildly volume overloaded today with a proBNP of 6,016  He will be getting an additional 60 mg of IV Lasix here in the clinic 8/14/2024  will go ahead and increase his Entresto to low-dose every 12 H  Will get follow-up labs on 8/19/2024  Next follow-up appointment in the heart failure clinic will be in about 3 weeks since he has an appointment with Dr. Mosher in the interim  He obviously should continue DAPT given his recent PCI\stent  Would add typical heart failure nursing interventions including:        A. Fluid restriction at less than 2000 cc daily       B. Daily weights        C.  1 g low-sodium diet        I did the following activities:preparing for the visit, with Chinedu Lopez  including reviewing tests, once pt arrived in clinic I also performed a medically  appropriate examination and/or evaluation , I personally spent considerable time counseling and educating the patient/family/caregiver, ordering medications, tests, or procedures, referring and communicating with other health care professionals , and documenting information in the medical record. I estimate including preparation 45 minutes             Subjective:     Chief Complaint   Patient presents with    Congestive Heart Failure       ROS:  Constitutional: + weakness, + fatigue persist at the same level as the hospitalization, No fever, rigors, chills   Eyes: No recent vision changes, eye pain   ENT/oropharynx: No recent difficulty swallowing, sore throat, epistaxis, changes in hearing   Cardiovascular: No recent chest pain, chest tightness, palpitations except as noted in HPI, paroxysmal nocturnal dyspnea, orthopnea, diaphoresis, dizziness & no pre or daily syncopal episodes   Respiratory: Mild at rest occasional shortness of breath, + dyspnea on exertion currently at minimal level about 25 feet, no significant productive cough, no wheezing and no hemoptysis   Gastrointestinal: No abdominal pain, nausea, vomiting, diarrhea, bloody stools   Genitourinary: No hematuria, dysuria other than increased frequency   Neurological: No change in typical headache pattern, no new onset of tremors, numbness,  or hemiparesis    Musculoskeletal: No change in typical cramps, myalgias, no new joint pain, joint swelling   Integument: No recent rash, + mild lower extremity edema      Patient Active Problem List   Diagnosis    ASCAD    Hypertension    Neck mass    3 A paroxysmal atrial fibrillation    Cerebrovascular accident (CVA)    Type 2 diabetes mellitus without complication, without long-term current use of insulin    Other hyperlipidemia    Presence of Watchman left atrial appendage closure device    Long term (current) use of antithrombotics/antiplatelets    Ischemic dilated cardiomyopathy due to ASCAD    Chronic combined  systolic and diastolic HF (HFrEF)    Moderate chronic cor pulmonale    Metabolic syndrome X    h/o Head injury    H/O gastroesophageal reflux (GERD)    Normocytic anemia    Hyperkalemia       Past Medical History:   Diagnosis Date    Atrial fibrillation     June, [facility]. Abstracted from [facility].    Brain bleed 2019 October 14th fell - Hospital on 17th for 3 weeks    CAD (coronary artery disease)     S/P CABG and PCI. Abstracted from [facility].    Hyperlipidemia     Hypertension     Myocardial infarction 2006    Non-ST elevation MI. Abstracted from [facility].    Obstructive sleep apnea on CPAP     At . Abstracted from [facility].    Presence of Watchman left atrial appendage closure device 1/31/2020    Stroke 2019    TIA (transient ischemic attack) 11/19/2013    Possible TIA- MRI shows small tumor/head. Abstracted from [facility].       Past Surgical History:   Procedure Laterality Date    ANGIOPLASTY  2006    3 srents were sequentially placed in promimal and mid body of the saphennous vein graft to obtuse marginal branch. Abstracted from [facility].    ATRIAL APPENDAGE EXCLUSION LEFT WITH TRANSESOPHAGEAL ECHOCARDIOGRAM N/A 1/30/2020    Procedure: Atrial Appendage Occlusion;  Surgeon: Angel Willams MD;  Location: [facility] CATH INVASIVE LOCATION;  Service: Cardiovascular    ATRIAL APPENDAGE EXCLUSION LEFT WITH TRANSESOPHAGEAL ECHOCARDIOGRAM N/A 1/30/2020    Procedure: Atrial Appendage Occlusion;  Surgeon: John Sheridan MD;  Location: [facility] CATH INVASIVE LOCATION;  Service: Cardiology    CARDIAC CATHETERIZATION  2000 2003, 2006, 2012. Abstracted from [facility].    CARDIAC CATHETERIZATION N/A 7/31/2024    Procedure: Left Heart Cath possible PCI;  Surgeon: José Miguel Izquierdo MD;  Location: [facility] CATH INVASIVE LOCATION;  Service: Cardiovascular;  Laterality: N/A;    CARDIAC CATHETERIZATION N/A 7/31/2024    Procedure: Stent ANA coronary;  Surgeon: José Miguel Izquierdo MD;  Location:   [facility] CATH INVASIVE LOCATION;  Service: Cardiovascular;  Laterality: N/A;    CARDIAC CATHETERIZATION N/A 7/31/2024    Procedure: Percutaneous Coronary Intervention;  Surgeon: José Miguel Izquierdo MD;  Location: [facility] CATH INVASIVE LOCATION;  Service: Cardiovascular;  Laterality: N/A;    CORONARY ARTERY BYPASS GRAFT  2000    Triple. Abstracted from [facility].    MUSCLE REPAIR      Tendons and nerves in right lower extremity. Abstracted from [facility].    OTHER SURGICAL HISTORY  08/2012    RCA mid and proximal- Xience stent x3. Abstracted from [facility].    OTHER SURGICAL HISTORY  09/2016    Head trauma/bleed at [facility]. Abstracted from [facility].       Social History     Socioeconomic History    Marital status:    Tobacco Use    Smoking status: Every Day     Current packs/day: 1.00     Average packs/day: 1 pack/day for 40.6 years (40.6 ttl pk-yrs)     Types: Cigarettes     Start date: 1984    Smokeless tobacco: Never    Tobacco comments:     Smoking cessation--encouraged---refuses patch, gum or chantix   Vaping Use    Vaping status: Never Used   Substance and Sexual Activity    Alcohol use: Not Currently    Drug use: No    Sexual activity: Defer       No Known Allergies      Current Outpatient Medications:     amantadine (SYMMETREL) 100 MG capsule, Take 1 capsule by mouth Daily., Disp: , Rfl:     aspirin 81 MG EC tablet, Take 1 tablet by mouth Daily., Disp: , Rfl:     atorvastatin (Lipitor) 40 MG tablet, Take 1 tablet by mouth Daily., Disp: 30 tablet, Rfl: 0    bumetanide (BUMEX) 1 MG tablet, Take 1 tablet by mouth 2 (Two) Times a Day., Disp: 180 tablet, Rfl: 2    Cholecalciferol (VITAMIN D3) 125 MCG (5000 UT) capsule capsule, Take 1 capsule by mouth Daily., Disp: , Rfl:     Cyanocobalamin (VITAMIN B 12) 500 MCG tablet, Take 1 tablet by mouth Daily., Disp: , Rfl:     digoxin (Lanoxin) 125 MCG tablet, Take 1 tablet by mouth Daily., Disp: 30 tablet, Rfl: 0    doxazosin (CARDURA) 1 MG tablet, Take 1 tablet by  mouth every night at bedtime., Disp: , Rfl:     empagliflozin (JARDIANCE) 10 MG tablet tablet, Take 1 tablet by mouth Daily., Disp: 30 tablet, Rfl: 0    famotidine (PEPCID) 40 MG tablet, Take 1 tablet by mouth Daily., Disp: , Rfl:     folic acid (FOLVITE) 1 MG tablet, Take 1 tablet by mouth Daily., Disp: , Rfl:     guaiFENesin (MUCINEX) 600 MG 12 hr tablet, Take 1 tablet by mouth Daily., Disp: , Rfl:     isosorbide mononitrate (IMDUR) 30 MG 24 hr tablet, Take 1 tablet by mouth Daily., Disp: , Rfl:     metFORMIN (GLUCOPHAGE) 1000 MG tablet, Take 1 tablet by mouth 2 (Two) Times a Day With Meals., Disp: , Rfl:     metoprolol succinate XL (TOPROL-XL) 100 MG 24 hr tablet, Take 1 tablet by mouth Every 12 (Twelve) Hours., Disp: 60 tablet, Rfl: 0    nitroglycerin (NITROLINGUAL) 0.4 MG/SPRAY spray, Place 1 spray under the tongue Every 5 (Five) Minutes As Needed for Chest Pain., Disp: , Rfl:     pantoprazole (PROTONIX) 40 MG EC tablet, Take 1 tablet by mouth 2 (Two) Times a Day., Disp: , Rfl:     [START ON 8/16/2024] potassium chloride (KLOR-CON M20) 20 MEQ CR tablet, Take 1 tablet by mouth Daily., Disp: 90 tablet, Rfl: 2    ranolazine (RANEXA) 500 MG 12 hr tablet, Take 1 tablet by mouth 2 (Two) Times a Day., Disp: , Rfl: 6    sacubitril-valsartan (Entresto) 24-26 MG tablet, Take 1 tablet by mouth 2 (Two) Times a Day. Indications: Cardiac Failure, Disp: , Rfl:     spironolactone (ALDACTONE) 25 MG tablet, Take 0.5 tablets by mouth Daily., Disp: 30 tablet, Rfl: 0    ticagrelor (BRILINTA) 90 MG tablet tablet, Take 1 tablet by mouth Every 12 (Twelve) Hours., Disp: 60 tablet, Rfl: 0    carvedilol (Coreg) 25 MG tablet, Take 1.5 tablets by mouth 2 (Two) Times a Day With Meals. Hold for systolic BP < 100 mmHg or HR < 60 BPM unless you have PPM or ICD, Disp: 180 tablet, Rfl: 0    Furosemide (Furoscix) 80 MG/10ML Cartridge Kit, Inject 10 mL under the skin into the appropriate area as directed Once When Specified for 1 dose. Do not take  until discussed with provider, Disp: 8 each, Rfl: 0  No current facility-administered medications for this encounter.    Immunization History   Administered Date(s) Administered    COVID-19 F23 (PFIZER) 12YRS+ (COMIRNATY) 11/03/2023       Most recent EKG as reviewed:  Procedures     Most recent echo as reviewed:  Results for orders placed during the hospital encounter of 07/30/24    Adult Transthoracic Echo Complete W/ Cont if Necessary Per Protocol    Interpretation Summary    Left ventricular systolic function is severely decreased. Left ventricular ejection fraction appears to be 26 - 30%.    The left ventricular cavity is moderately dilated.    Left ventricular wall thickness is consistent with mild concentric hypertrophy.    Left ventricular diastolic function is consistent with (grade I) impaired relaxation.    Moderately reduced right ventricular systolic function noted.    The right ventricular cavity is moderately dilated.    The left atrial cavity is moderately dilated.    Estimated right ventricular systolic pressure from tricuspid regurgitation is normal (<35 mmHg).      Most recent stress test results:  Results for orders placed during the hospital encounter of 03/14/24    Stress Test With Myocardial Perfusion One Day    Interpretation Summary    Myocardial perfusion imaging indicates a small-sized infarct located in the apex with no significant ischemia noted.    Left ventricular ejection fraction is mildly reduced (Calculated EF = 40%).    Impressions are consistent with a low risk study.    Abnormal LV wall motion consistent with moderate hypokinesis and global hypokinesis.    There is no prior study available for comparison.    Findings consistent with an indeterminate ECG stress test.      Most recent cardiac catheterization results:  Results for orders placed during the hospital encounter of 07/30/24    Cardiac Catheterization/Vascular Study    Conclusion  Table formatting from the original result  was not included.  Cardiac Catheterization with PCI Report    Chinedu Lopez  [id number]  7/31/2024  @PCP@    He underwent cardiac catheterization and percutaneous coronary intervention    Indications for the procedure include: acute coronary syndrome.  Severe chest pain  Acute coronary syndrome  Non-ST elevation myocardial infarction  Atrial fibrillation with rapid response  Worsening cardiomyopathy  Severe LV dysfunction  Congestive heart failure    Procedure Details:  The risks, benefits, complications, treatment options, and expected outcomes were discussed with the patient. The patient and/or family concurred with the proposed plan, giving informed consent.    After informed consent the patient was brought to the cath lab after appropriate IV hydration was begun and oral premedication was given. He was further sedated with fentanyl. He was prepped and draped in the usual manner. Using the modified Seldinger access technique, a 6 Uzbek sheath was placed in the femoral artery. A left heart catheterization with coronary arteriography was performed.  We used a AR mod diagnostic catheter to selectively engage the right coronary artery and we used a 5 Uzbek IMT catheter due to selective engagement of the LIMA to the LAD, findings are discussed below.    The decision was made to proceed with intervention. He received Heparin as per protocol. The details of the intervention are as follows:    We used a left coronary bypass guide catheter with guide liner support to get selective access to the vein graft to the marginal lesion was initially predilated multiple attempts using a guide liner support secondary to extensive calcification and difficult to cross the lesion with a balloon secondary to tortuosity and calcification initially with a 1 5 balloon eventually with a 2 oh balloon and then upgraded to 3 with balloon and stented with a 3.0 x 18 mm diastolic bleeding stent and lesion was postdilated with 4.0 x 12 mm  noncompliant balloon up to 16 keven.  Patient tolerated procedure well with no immediate possible complication plan to obtain hemostasis by manual pressure procedural anticoagulation was heparin patient received 180 mg of Brilinta at the end of the procedure.    After the procedure was completed, sedation was stopped and the sheaths and catheters were all removed according to established hospital protocols.    Conscious sedation:  Conscious sedation was performed according to protocol.  I supervised and directed an independent trained observer with the assistance of monitoring the patient's level of consciousness and physiologic status throughout the procedure.  Intraoperative service time was 90 minutes.    Findings:    Hemodynamics Central aortic pressure systolic 120 diastolic of 66 the mean pressure of 89 mmHg  LV end-diastolic pressure of 29 mmHg  There was no gradient across the aortic valve on the pullback of the pigtail catheter  Left Main Left main has diffuse 90% stenosis provides a small caliber marginal branch otherwise both LAD and left circumflex artery 100% occluded in the ostial portion  RCA Large-caliber vessel dominant vessel  Right coronary artery is a dominant vessel  Patent stent in the right coronary artery with no evidence of any significant in-stent restenosis  Right coronary artery provides a large caliber PDA and PLB branches that are angiographically normal  % occluded in the ostial portion  Circ 100% occluded in the ostial portion  SVG(s) Vein graft presumed to be marginal branch is 100% occluded in the ostial portion    Vein graft to the second marginal branch is patent with mid body angiographic 90% stenosis likely the culprit vessel for patient symptoms of non-ST ovation myocardial infarction likely this lesion is in-stent restenosis within the prior stent  JUANA LIMA to LAD is patent without any significant stenosis involving the ostium/body of the anastomotic site.  No significant  disease involving the LAD after the LIMA touchdown  LV Not done  Coronary dominance Right coronary artery    Interventions/Vessels Successful PCI and stenting of the midportion of the vein graft to the marginal branch with placement of Xience drug-eluting stent  Guides/Wires [employer]  Devices Xience drug-eluting stent 3.0 x 18 mm  Post % Stenosis  0  Pre-procedure SHANNON flow  10  Post procedure SHANNON flow  3  Closure Device None  Complications None    Estimated Blood Loss:  Minimal    Specimens: None    Complications:  None; patient tolerated the procedure well.    Disposition: PACU - hemodynamically stable.    Condition: stable    Impressions:  Native severe two-vessel coronary artery disease with 100% occlusion of the ostium of the LAD and 100% occlusion of the ostium of the left circumflex  Patent vein graft to the marginal with severe 90% stenosis involving the midportion of the vein graft succinyl treated with placement of a Xience drug-eluting stent  Patent LIMA to the LAD  Patent stent in the right coronary artery with no significant in-stent restenosis    Recommendations:  Aspirin 81 mg p.o. once a day  Brilinta 90 mg p.o. twice daily  Continued aggressive risk factor modification  Observe patient in CVU bed overnight  Close monitoring of renal function  Optimize medical therapy for cardiomyopathy and rate control for atrial fibrillation  Patient currently being seen by nephrology and hematology oncology for underlying anemia and renal failure  Test results reviewed and discussed with patient and family  Further recommendation based on patient course        Imaging:    Results for orders placed during the hospital encounter of 07/30/24    XR Chest 1 View    Narrative  XR CHEST 1 VW    Date of Exam: 8/1/2024 6:00 PM EDT    Indication: chf    Comparison: None available.    Findings:  Mediastinum: Cardiomediastinal silhouette appears enlarged    Lungs: Indistinctness of the pulmonary vasculature with mild pulmonary  vascular congestion. Bibasal hazy opacities.    Pleura: Small bilateral pleural effusions. No pneumothorax.    Bones and soft tissues: Median sternotomy.    Impression  Impression:  Cardiomegaly with mild pulmonary edema and small bilateral pleural effusions.      Electronically Signed: Gildardo Springer  8/1/2024 8:23 PM EDT  Workstation ID: [id number]       Results for orders placed during the hospital encounter of 07/30/24    US Renal Bilateral    Narrative  Date of Exam: 7/30/2024 2:05 PM EDT    Indication: LUDWIN/CKD.    Comparison: No comparisons available.    Technique: Grayscale and color Doppler ultrasound evaluation of the kidneys and urinary bladder was performed.      Findings:  Right Kidney: Right kidney measures 11.6 cm in long axis. Likely benign cyst in the right kidney measuring up to 2.2 cm. Increased renal cortical echogenicity.  No hydronephrosis.. No sonographically evident nephrolithiasis.    Left Kidney:  Left kidney measures 9.3 cm in long axis. Mildly increased renal cortical echogenicity. No suspicious appearing lesions.No hydronephrosis.No sonographically evident nephrolithiasis.    Bladder: The bladder appears unremarkable.    Additional findings: Mild perihepatic and pelvic ascites.    Impression  Impression:  No hydronephrosis.    Increased renal cortical echogenicity which can be seen in medical renal disease.    Small volume ascites.          Electronically Signed: Gildardo Springer  7/30/2024 3:01 PM EDT  Workstation ID: [id number]          Historical data copied forward from previous encounters in EMR including the history, exam, and assessment/plan has been reviewed and is unchanged except as I have noted and otherwise indicated.      Objective:         /80   Pulse 94   Resp 16   Wt 94 kg (207 lb 3.2 oz)   SpO2 98%   BMI 28.10 kg/m²     Physical Examination    General:  Well-developed, mildly overly well-nourished, cooperative, no acute distress, appears much older than stated age    Neuro:  A&O x3. No significantly obvious focal neuro deficet    Psych:  mildly flattened affect    HENT:  Normocephalic, atraumatic, moist mucous membranes , Normal ear placement,Throat not injected   Eyes:  PERRLA, Conjunctivae not injected, EOM's intact, wears spectacles stating his vision, conjunctiva does not appear significantly injected   Neck:  Supple, no lymph adenopathy nor thyromegaly no JVD or bruit    Lungs:    Symmetrical rise & fall of chest with baseline respiratory pattern. To auscultation lungs are Clear bilaterally, faint late phase expiratory wheezes, no rhonchi or rales are noted, bases bilaterally   Chest wall:  No significant tenderness when palpated. PMI @ 6th ICS MAL    Heart:  irregular rate and rhythm, S1 and S2 normal,, Gr I-II/VI systolic ejection murmur best heard at the apex , no obvious rub, click or gallop.    Abdomen:  non-distended, non-tender, bowel sounds noted in the 4 quadrants of the abdomen, no significant central abdominal adipose tissue identified however with a mild noted amount   Extremities:  Equal color motion temperature and sensitivity, Rapid capillary refill noted within the nailbeds of fingers and toes bilaterally trace lower extremity edema.    Pulses:  2+ and symmetric all extremities, rapid capillary refill    Skin:  No obvious rashes, significant lesions identified, warm dry and of normal turgor      In-Office Procedure(s):  No orders to display        Lab Review:   Hospital Outpatient Visit on 08/14/2024   Component Date Value    QT Interval 08/14/2024 315     QTC Interval 08/14/2024 377    Hospital Outpatient Visit on 08/14/2024   Component Date Value    Magnesium 08/14/2024 1.9     Glucose 08/14/2024 119 (H)     BUN 08/14/2024 15     Creatinine 08/14/2024 1.24     Sodium 08/14/2024 140     Potassium 08/14/2024 5.5 (H)     Chloride 08/14/2024 105     CO2 08/14/2024 22.6     Calcium 08/14/2024 10.1     BUN/Creatinine Ratio 08/14/2024 12.1     Anion Gap  08/14/2024 12.4     eGFR 08/14/2024 66.1     proBNP 08/14/2024 6,016.0 (H)     TSH 08/14/2024 5.660 (H)     WBC 08/14/2024 7.18     RBC 08/14/2024 5.17     Hemoglobin 08/14/2024 10.8 (L)     Hematocrit 08/14/2024 40.7     MCV 08/14/2024 78.7 (L)     MCH 08/14/2024 20.9 (L)     MCHC 08/14/2024 26.5 (L)     RDW 08/14/2024 23.9 (H)     RDW-SD 08/14/2024 67.2 (H)     MPV 08/14/2024 9.0     Platelets 08/14/2024 461 (H)     Neutrophil % 08/14/2024 78.8 (H)     Lymphocyte % 08/14/2024 12.3 (L)     Monocyte % 08/14/2024 6.0     Eosinophil % 08/14/2024 1.9     Basophil % 08/14/2024 0.7     Immature Grans % 08/14/2024 0.3     Neutrophils, Absolute 08/14/2024 5.66     Lymphocytes, Absolute 08/14/2024 0.88     Monocytes, Absolute 08/14/2024 0.43     Eosinophils, Absolute 08/14/2024 0.14     Basophils, Absolute 08/14/2024 0.05     Immature Grans, Absolute 08/14/2024 0.02     nRBC 08/14/2024 0.0    No results displayed because visit has over 200 results.      Hospital Outpatient Visit on 03/14/2024   Component Date Value     CV STRESS PROTOCOL 1 03/14/2024 Pharmacologic     Stage 1 03/14/2024 1.0     Duration Min Stage 1 03/14/2024 0     Duration Sec Stage 1 03/14/2024 10     Stress Dose Regadenoson * 03/14/2024 0.40     Stress Comments Stage 1 03/14/2024 10 sec bolus injection     Baseline HR 03/14/2024 108     Baseline BP 03/14/2024 144/88     Peak HR 03/14/2024 117     Peak BP 03/14/2024 130/80     Recovery HR 03/14/2024 129     Recovery BP 03/14/2024 150/96     Target HR (85%) 03/14/2024 135     Max. Pred. HR (100%) 03/14/2024 159     Percent Max Pred HR 03/14/2024 73.58     Percent Target HR 03/14/2024 87     Nuc Stress EF 03/14/2024 40     BH CV REST NUCLEAR ISOTO* 03/14/2024 11.9     BH CV STRESS NUCLEAR ISO* 03/14/2024 33.7      Recent labs reviewed and interpreted for clinical significance and application    I went over each of the labs individually with Mr. Marc while he was still here in heart failure clinic at  "[facility]            It is a pleasure to be involved in this patient's cardiovascular care relating to their heart failure.  Please feel free to call me with any questions or concerns.    Richard \"Wilton\" Martha CONDE, [facility]  Heart failure program clinical provider    Richard Thomas, AMAYA   08/13/24  .  "

## 2024-08-14 ENCOUNTER — HOSPITAL ENCOUNTER (OUTPATIENT)
Facility: HOSPITAL | Age: 64
Discharge: HOME | End: 2024-08-14
Payer: OTHER GOVERNMENT

## 2024-08-14 ENCOUNTER — HOSPITAL ENCOUNTER (OUTPATIENT)
Dept: CARDIOLOGY | Facility: HOSPITAL | Age: 64
Discharge: HOME | End: 2024-08-14
Payer: OTHER GOVERNMENT

## 2024-08-14 ENCOUNTER — DISEASE STATE MANAGEMENT VISIT (OUTPATIENT)
Facility: HOSPITAL | Age: 64
End: 2024-08-14
Payer: OTHER GOVERNMENT

## 2024-08-14 ENCOUNTER — TELEPHONE (OUTPATIENT)
Dept: CARDIAC REHAB | Facility: HOSPITAL | Age: 64
End: 2024-08-14
Payer: OTHER GOVERNMENT

## 2024-08-14 VITALS
RESPIRATION RATE: 16 BRPM | OXYGEN SATURATION: 98 % | HEART RATE: 94 BPM | SYSTOLIC BLOOD PRESSURE: 134 MMHG | WEIGHT: 207.2 LBS | BODY MASS INDEX: 28.1 KG/M2 | DIASTOLIC BLOOD PRESSURE: 80 MMHG

## 2024-08-14 DIAGNOSIS — E88.810 METABOLIC SYNDROME X: ICD-10-CM

## 2024-08-14 DIAGNOSIS — I25.810 CORONARY ARTERY DISEASE INVOLVING CORONARY BYPASS GRAFT OF NATIVE HEART, UNSPECIFIED WHETHER ANGINA PRESENT: Chronic | ICD-10-CM

## 2024-08-14 DIAGNOSIS — D64.9 NORMOCYTIC ANEMIA: ICD-10-CM

## 2024-08-14 DIAGNOSIS — I25.5 ISCHEMIC DILATED CARDIOMYOPATHY DUE TO CORONARY ARTERY DISEASE: Primary | Chronic | ICD-10-CM

## 2024-08-14 DIAGNOSIS — I25.10 ISCHEMIC DILATED CARDIOMYOPATHY DUE TO CORONARY ARTERY DISEASE: Primary | Chronic | ICD-10-CM

## 2024-08-14 DIAGNOSIS — I63.9 CEREBROVASCULAR ACCIDENT (CVA), UNSPECIFIED MECHANISM: Chronic | ICD-10-CM

## 2024-08-14 DIAGNOSIS — E87.5 HYPERKALEMIA: ICD-10-CM

## 2024-08-14 DIAGNOSIS — R06.09 DOE (DYSPNEA ON EXERTION): ICD-10-CM

## 2024-08-14 DIAGNOSIS — E11.9 TYPE 2 DIABETES MELLITUS WITHOUT COMPLICATION, WITHOUT LONG-TERM CURRENT USE OF INSULIN: Chronic | ICD-10-CM

## 2024-08-14 DIAGNOSIS — S09.90XS INJURY OF HEAD, SEQUELA: ICD-10-CM

## 2024-08-14 DIAGNOSIS — Z87.19 H/O GASTROESOPHAGEAL REFLUX (GERD): Chronic | ICD-10-CM

## 2024-08-14 DIAGNOSIS — I50.42 CHRONIC COMBINED SYSTOLIC AND DIASTOLIC CHF (CONGESTIVE HEART FAILURE): Chronic | ICD-10-CM

## 2024-08-14 DIAGNOSIS — I48.0 PAROXYSMAL ATRIAL FIBRILLATION: Chronic | ICD-10-CM

## 2024-08-14 DIAGNOSIS — Z95.818 PRESENCE OF WATCHMAN LEFT ATRIAL APPENDAGE CLOSURE DEVICE: Chronic | ICD-10-CM

## 2024-08-14 LAB
ANION GAP SERPL CALCULATED.3IONS-SCNC: 12.4 MMOL/L (ref 5–15)
BASOPHILS # BLD AUTO: 0.05 10*3/MM3 (ref 0–0.2)
BASOPHILS NFR BLD AUTO: 0.7 % (ref 0–1.5)
BUN SERPL-MCNC: 15 MG/DL (ref 8–23)
BUN/CREAT SERPL: 12.1 (ref 7–25)
CALCIUM SPEC-SCNC: 10.1 MG/DL (ref 8.6–10.5)
CHLORIDE SERPL-SCNC: 105 MMOL/L (ref 98–107)
CO2 SERPL-SCNC: 22.6 MMOL/L (ref 22–29)
CREAT SERPL-MCNC: 1.24 MG/DL (ref 0.76–1.27)
DEPRECATED RDW RBC AUTO: 67.2 FL (ref 37–54)
EGFRCR SERPLBLD CKD-EPI 2021: 66.1 ML/MIN/1.73
EOSINOPHIL # BLD AUTO: 0.14 10*3/MM3 (ref 0–0.4)
EOSINOPHIL NFR BLD AUTO: 1.9 % (ref 0.3–6.2)
ERYTHROCYTE [DISTWIDTH] IN BLOOD BY AUTOMATED COUNT: 23.9 % (ref 12.3–15.4)
GLUCOSE SERPL-MCNC: 119 MG/DL (ref 65–99)
HBA1C MFR BLD: 5.92 % (ref 4.8–5.6)
HCT VFR BLD AUTO: 40.7 % (ref 37.5–51)
HGB BLD-MCNC: 10.8 G/DL (ref 13–17.7)
IMM GRANULOCYTES # BLD AUTO: 0.02 10*3/MM3 (ref 0–0.05)
IMM GRANULOCYTES NFR BLD AUTO: 0.3 % (ref 0–0.5)
LYMPHOCYTES # BLD AUTO: 0.88 10*3/MM3 (ref 0.7–3.1)
LYMPHOCYTES NFR BLD AUTO: 12.3 % (ref 19.6–45.3)
MAGNESIUM SERPL-MCNC: 1.9 MG/DL (ref 1.6–2.4)
MCH RBC QN AUTO: 20.9 PG (ref 26.6–33)
MCHC RBC AUTO-ENTMCNC: 26.5 G/DL (ref 31.5–35.7)
MCV RBC AUTO: 78.7 FL (ref 79–97)
MONOCYTES # BLD AUTO: 0.43 10*3/MM3 (ref 0.1–0.9)
MONOCYTES NFR BLD AUTO: 6 % (ref 5–12)
NEUTROPHILS NFR BLD AUTO: 5.66 10*3/MM3 (ref 1.7–7)
NEUTROPHILS NFR BLD AUTO: 78.8 % (ref 42.7–76)
NRBC BLD AUTO-RTO: 0 /100 WBC (ref 0–0.2)
NT-PROBNP SERPL-MCNC: 6016 PG/ML (ref 0–900)
PLATELET # BLD AUTO: 461 10*3/MM3 (ref 140–450)
PMV BLD AUTO: 9 FL (ref 6–12)
POTASSIUM SERPL-SCNC: 5.5 MMOL/L (ref 3.5–5.2)
QT INTERVAL: 315 MS
QTC INTERVAL: 377 MS
RBC # BLD AUTO: 5.17 10*6/MM3 (ref 4.14–5.8)
SODIUM SERPL-SCNC: 140 MMOL/L (ref 136–145)
TSH SERPL DL<=0.05 MIU/L-ACNC: 5.66 UIU/ML (ref 0.27–4.2)
WBC NRBC COR # BLD AUTO: 7.18 10*3/MM3 (ref 3.4–10.8)

## 2024-08-14 PROCEDURE — 83735 ASSAY OF MAGNESIUM: CPT | Performed by: NURSE PRACTITIONER

## 2024-08-14 PROCEDURE — 96374 THER/PROPH/DIAG INJ IV PUSH: CPT

## 2024-08-14 PROCEDURE — 80048 BASIC METABOLIC PNL TOTAL CA: CPT | Performed by: NURSE PRACTITIONER

## 2024-08-14 PROCEDURE — 85025 COMPLETE CBC W/AUTO DIFF WBC: CPT | Performed by: NURSE PRACTITIONER

## 2024-08-14 PROCEDURE — 83036 HEMOGLOBIN GLYCOSYLATED A1C: CPT | Performed by: NURSE PRACTITIONER

## 2024-08-14 PROCEDURE — 83880 ASSAY OF NATRIURETIC PEPTIDE: CPT | Performed by: NURSE PRACTITIONER

## 2024-08-14 PROCEDURE — 84443 ASSAY THYROID STIM HORMONE: CPT | Performed by: NURSE PRACTITIONER

## 2024-08-14 PROCEDURE — 93005 ELECTROCARDIOGRAM TRACING: CPT | Performed by: NURSE PRACTITIONER

## 2024-08-14 PROCEDURE — 25010000002 FUROSEMIDE PER 20 MG: Performed by: NURSE PRACTITIONER

## 2024-08-14 RX ORDER — FUROSEMIDE INJECTION 80 MG/ 10 ML 8 MG/ML
80 INJECTION SUBCUTANEOUS
Qty: 8 EACH | Refills: 0 | Status: SHIPPED | OUTPATIENT
Start: 2024-08-14 | End: 2024-08-14

## 2024-08-14 RX ORDER — NITROGLYCERIN 0.4 MG/1
0.4 TABLET SUBLINGUAL
COMMUNITY

## 2024-08-14 RX ORDER — SACUBITRIL AND VALSARTAN 24; 26 MG/1; MG/1
1 TABLET, FILM COATED ORAL 2 TIMES DAILY
Qty: 180 TABLET | Refills: 3 | Status: SHIPPED | OUTPATIENT
Start: 2024-08-14

## 2024-08-14 RX ORDER — ASPIRIN 81 MG/1
81 TABLET ORAL DAILY
COMMUNITY

## 2024-08-14 RX ORDER — POTASSIUM CHLORIDE 20 MEQ/1
20 TABLET, EXTENDED RELEASE ORAL DAILY
Qty: 90 TABLET | Refills: 2 | Status: SHIPPED | OUTPATIENT
Start: 2024-08-16

## 2024-08-14 RX ORDER — CARVEDILOL 25 MG/1
37.5 TABLET ORAL 2 TIMES DAILY WITH MEALS
Qty: 180 TABLET | Refills: 0 | Status: SHIPPED | OUTPATIENT
Start: 2024-08-14 | End: 2024-08-19 | Stop reason: ALTCHOICE

## 2024-08-14 RX ORDER — NITROGLYCERIN 400 UG/1
1 SPRAY ORAL
COMMUNITY
End: 2024-08-14

## 2024-08-14 RX ORDER — SACUBITRIL AND VALSARTAN 24; 26 MG/1; MG/1
1 TABLET, FILM COATED ORAL 2 TIMES DAILY
COMMUNITY
End: 2024-08-14 | Stop reason: SDUPTHER

## 2024-08-14 RX ORDER — FUROSEMIDE 10 MG/ML
60 INJECTION INTRAMUSCULAR; INTRAVENOUS ONCE
Status: COMPLETED | OUTPATIENT
Start: 2024-08-14 | End: 2024-08-14

## 2024-08-14 RX ORDER — BUMETANIDE 1 MG/1
1 TABLET ORAL 2 TIMES DAILY
Qty: 180 TABLET | Refills: 2 | Status: SHIPPED | OUTPATIENT
Start: 2024-08-14

## 2024-08-14 RX ADMIN — FUROSEMIDE 60 MG: 10 INJECTION, SOLUTION INTRAMUSCULAR; INTRAVENOUS at 10:06

## 2024-08-14 NOTE — PROGRESS NOTES
[facility] HEART FAILURE CLINIC - PHARMACY SERVICE    Patient Name: Chinedu Lopez  :1960  Age: 63 y.o.  Sex: male  Primary Cardiologist: Nomi (24)Imer (10/21/24)  Nephrology: n/a  PCP: jermaine Clark     HFrEF with EF 26-30% (Last Echo: 24).    NYHA Class II  B      The patient's last EKG was reviewed from 24 and shows a QTcB of 377 ms.     ICD: no    CKD: Stage 2 eGFR 60-89 mL/min and Kidney Damage     BMP          2024    02:37 8/3/2024    04:20 2024    07:54   BMP   BUN 9  11  15    Creatinine 1.06  1.15  1.24    Sodium 137  137  140    Potassium 3.8  3.7  5.5    Chloride 99  100  105    CO2 26.5  27.8  22.6    Calcium 9.4  9.3  10.1        Lab Results   Component Value Date    EGFR 66.1 2024    EGFR 72.4 2024    EGFR 79.8 2024       Lab Results   Component Value Date    PROBNP 6,016.0 (H) 2024    PROBNP 15,763.0 (H) 2024       Recent Vitals         8/3/2024 8/3/2024 8/3/2024   8/14/24    BP: -- 143/97 125/76 140/82    Pulse: -- 110 93 90    Temp: -- 98.2 °F (36.8 °C) --     Weight: 103 kg (227 lb 9.6 oz) -- --     BMI (Calculated): 30.9 -- --              -CHF-specific BB:      Pre Visit Dose:  Metoprolol succinate  mg every 12 hours    Post Visit Dose: Carvedilol 37.5 mg PO BID     - Target Dose: Carvedilol 25 mg PO BID (<85 kg) or 50 mg PO BID (>85 kg).     - Goal HR of 50s to 60s.     - Patient should be seen every 10 to 14 days for a pulse check with plans for up-titration until target heart rate is achieved.        -ACE/ARB/ARNI:     Pre Visit Dose: Sacubitril / valsartan 26 mg 0.5 tablet every 12 hours     Post Visit Dose: Sacubitril/valsartan 24/26 mg BID    - Target Dose: Sacubitril/valsartan 97/103 mg PO BID    - Patient should have a follow-up appointment every 2 to 4 weeks for a hemodynamic check with possible up-titration to target dose.         -SGLT2 inhibitor therapy: (DM) -    -Will draw A1C with  Monday's labs for possible future adjustment to 25 mg daily    Pre Visit Dose: Empagliflozin 10 mg PO daily    Post Visit Dose: Empagliflozin 10 mg PO daily    - Target Dose: Empagliflozin 25 mg PO daily (HF + DM) : CrCl > 20 mL/min which has shown benefit in patients with HF       -MRA:     Pre Visit Dose: Spironolactone 12.5 mg daily    Post Visit Dose: Spironolactone 12.5 mg daily    - Target Dose: Spironolactone 25-50 mg PO daily    - K is not < 5 mEq/L and SCr is </= 2.5 mg/dL in male and eGFR > 30 mL/min/1.73m3    Lab Results   Component Value Date    K 5.5 (H) 08/14/2024       Lab Results   Component Value Date    CREATININE 1.24 08/14/2024         -DIURETIC:     Pre Visit Dose: Bumetanide 1 mg BID (Has been taking at 9 am and 9 pm) + KCL 20 mEq BID    Post Visit Dose: Bumetanide 1 mg BID (instructed patient to take at 6 pm to help with night getting up at night)+ KCL 20 mEq daily + Furoscix PRN      -MAGNESIUM:     Mag is not > 2 mg/dL    Lab Results   Component Value Date    MG 1.9 08/14/2024    MG 2.1 08/03/2024    MG 1.7 08/01/2024       Pre Visit Dose: None    Post Visit Dose: None      -ANTICOAGULATION:     Watchman Device    [id number] SCORE   [id number] Score for Atrial Fibrillation Stroke Risk  Age in Years: <65  Sex: Male  CHF History: Yes  Hypertension History: Yes  Stroke/TIA/Thromboembolism History: Yes  Vascular Disease History: Yes  Diabetes History: Yes  [id number] Score: 6  Stroke risks -: 6 Points. Risk of ischemic stroke: 9.7%. Risk of stroke/TIA/embolism: 13.6%         -OTHER CV MEDS:     Pre Visit Dose: atorvastatin 40 mg HS, digoxin 125 mcg daily, ticagrelor 90 mg every 12 hours, isosorbide mononitrate 30 mg daily, aspirin 81 mg daily, nitroglycerin PRN, and ranolazine 500 mg every 12 hours    Post Visit Dose: no changes      -Clinic Administered Medications:     Furosemide 60 mg IV + Lokelma 10 gm         Patient Assistance:      1.  Submit to [employer] portal  2. Patient stated  Brilinta copay is $107.50.  Ran test claim for Brilinta $5 co pay card and was successful with insurance  3. Patient stated both empagliflozin and sacubitril/valsartan are both $350 per month ( provided $10 copay card for empagliflozin and sacubitril/valsartan)  4.  #112 samples provided of sacubitril/ valsartan  5. #28 samples provided of empagliflozin           PLAN:  Initiation/Discontinuation/Dose Adjustment: Discontinue metoprolol and switch to carvedilol, increase sacubitril / valsartan, decrease potassium   Education provided: Discussed medication changes  Coordination of Care: Refer TSH to PCP and submit to [employer] portal  Refills: none      Thank you for allowing me to participate in the care of your patient.    Norberto Bueno, PharmD  [facility]  08/14/24  07:57 EDT

## 2024-08-14 NOTE — ADDENDUM NOTE
Encounter addended by: Richard Thomas APRN on: 8/14/2024 4:10 PM   Actions taken: Order list changed, Diagnosis association updated

## 2024-08-14 NOTE — LETTER
"August 14, 2024     AMAYA Lo  [address] [facility]  [address]  [place] [id number]    Patient: Chinedu Lopez   YOB: 1960   Date of Visit: 8/14/2024     Dear AMAYA Lo:       Thank you for referring Chinedu Lopez to me for evaluation. Below are the relevant portions of my assessment and plan of care.    If you have questions, please do not hesitate to call me. I look forward to following Chinedu along with you.         Sincerely,        AMAYA Sarmiento        CC: Renny Mosher, Richard Moran APRN  08/14/24 1016  Addend  Cardiology Heart Failure Clinic Note  Richard \"Wilton\" Martha CONDE, [facility]    Patient ID: Chinedu Lopez  is a 63 y.o. male.    Encounter Date:08/14/2024    HPI:  63-year-old  male patient of Dr. Mosher with a PMH of atherosclerotic coronary artery disease s/p 2000 CABG X 3 as well as subsequent PCI\stents including 2006 PCI/stent x 3 sequentially placed promimal and mid SVG - OM1 & Aug 2012 RCA mid and proximal- Xience stent x3.  Had NSTEMI 31 July 2024 and had subsequent now s/p 7/31/24 PCI and stenting of the midportion of  SVG to OM1  w/  Xience ANA 3.0 x 18 mm with newly diagnosed ischemic dilated cardiomyopathy with chronic combined systolic and diastolic heart failure (HFrEF) [LVIDD 6.4 cm ] (TTE 7/30/2024 showed EF of 26-30% and grade 1 DD.  Moderate RV reduction i.e. moderate cor pulmonale, paroxysmal atrial fibrillation rate controlled on metoprolol and digoxin, and anticoagulated via a watchman's device with a h/o of OLESYA occlusion in January 2020 with an additional Hx of 9/2016 Head trauma/bleed Tx @ [facility], GERD, metabolic syndrome with hypertension, dyslipidemia, obesity, T2DM, as noted he recently had the non-ST segment elevation back on 7/30/2024 presented to [facility] where he was taken to Cath Lab by Dr. Sheridan And underwent the aforementioned PCI\ANA.  He presents after discharge to the " [facility] heart failure clinic on 8/14/2024 for his initial visit since discharge from the hospital patient has not been having any chest discomfort he has however been having to get up and urinate multiple times each night which is been bothering him recently.             Assessment:    Diagnoses and all orders for this visit:    1. Ischemic dilated cardiomyopathy due to ASCAD (Primary)  Overview:  A, LVIDD 6.4 cm   B. chronic combined systolic and diastolic heart failure (HFrEF)   C. (TTE 7/30/24) EF of 26-30% and grade 1 DD.  D. No devices      2. Chronic combined systolic and diastolic HF (HFrEF)  Overview:  A.  (TTE 7/30/24) EF of 26-30% and grade 1 DD.  B. No devices      3. ASCAD  Overview:  A.  s/p 2000 CABG X 3   B. subsequent PCI\stents            I. s/p 2006 PCI/stent x 3 sequentially placed promimal and mid SVG - OM1           II  s/p Aug 2012 RCA mid and proximal- Xience stent x3.           III. Had NSTEMI 30 July 2024                   1. s/p 7/31/24 PCI and stenting of the midportion of  SVG to OM1                                A. w/ a Xience ANA 3.0 x 18 mm   C.  Significantly reduced biventricular function      4. 3 A paroxysmal atrial fibrillation  Overview:  A.  rate controlled on metoprolol and digoxin,   B. anticoagulated via a watchman's device            I.  [id number] 2 =   C. h/o January 2020 of OLESYA occlusion Dr. Willams    Orders:  -     TSH; Future  -     TSH; Standing  -     TSH    5. Injury of head, sequela  Overview:   Hx of 9/2016 Head trauma/bleed Tx @ [facility],      6. Cerebrovascular accident (CVA), unspecified mechanism    7. Normocytic anemia  -     CBC & Differential; Future  -     CBC & Differential; Standing  -     CBC & Differential    8. Metabolic syndrome X  Overview:  A.  Benign essential hypertension  B.  Mixed dyslipidemia  C.  Elevated BMI           I.  BMI 30.9 kg/m²  D.  T2DM    Orders:  -     Hemoglobin A1c; Future    9. Type 2 diabetes mellitus without  complication, without long-term current use of insulin  -     Hemoglobin A1c; Future    10. H/O gastroesophageal reflux (GERD)    11. Presence of Watchman left atrial appendage closure device    12. MOLINA (dyspnea on exertion)  -     Magnesium; Future  -     Basic Metabolic Panel; Future  -     proBNP; Future  -     ECG 12 Lead; Future  -     Magnesium; Standing  -     Magnesium  -     Basic Metabolic Panel; Standing  -     Basic Metabolic Panel  -     proBNP; Standing  -     proBNP  -     Basic Metabolic Panel; Future  -     proBNP; Future  -     proBNP    13. Hyperkalemia  -     Basic Metabolic Panel; Future    Other orders  -     furosemide (LASIX) injection 60 mg  -     carvedilol (Coreg) 25 MG tablet; Take 1.5 tablets by mouth 2 (Two) Times a Day With Meals. Hold for systolic BP < 100 mmHg or HR < 60 BPM unless you have PPM or ICD  Dispense: 180 tablet; Refill: 0  -     sodium zirconium cyclosilicate (LOKELMA) packet 10 g  -     Furosemide (Furoscix) 80 MG/10ML Cartridge Kit; Inject 10 mL under the skin into the appropriate area as directed Once When Specified for 1 dose. Do not take until discussed with provider  Dispense: 8 each; Refill: 0  -     potassium chloride (KLOR-CON M20) 20 MEQ CR tablet; Take 1 tablet by mouth Daily.  Dispense: 90 tablet; Refill: 2  -     bumetanide (BUMEX) 1 MG tablet; Take 1 tablet by mouth 2 (Two) Times a Day.  Dispense: 180 tablet; Refill: 2        [id number] Score: 6       Plan/discussion    Volume overload    Heart Failure Core Measures addressed    Type of Overload  Chronic combined systolic and diastolic HF (HFrEF)     Most recent EF:  (TTE 7/30/24) EF of 26-30% and grade 1 DD.    [place] Heart association Class & Stage : IIb    HF Meds Pre Visit    Beta Blocker: Toprol- mg every 12 hours  ARNI/ACE/ARB: Entresto 0.5 tab low dose q12h  SGLT 2 inhibitors: Jardiance 10 mg daily  Diuretics: Bumex 1 mg twice daily  K-Dur 20 mill equivalents twice daily  Aldosterone  Antagonist: Spironolactone 12.5 mg daily  Digitalis: Digoxin 0.125 mg daily  Vasodilators & Nitrates: Imdur 30 mg daily and SL NTG PRN    HF Meds Post Visit    Beta Blocker: Coreg 37.5 mg every 12 hours  Hold for systolic less than 100 heart rate less than 60  ARNI/ACE/ARB: Entresto 24/26 mg every 12 hours  SGLT 2 inhibitors: Jardiance 10 mg daily  Diuretics: Bumex 1 mg twice daily  Decreasing K-Dur to 20 mEQ daily  Furoscix: Ordering 8 kits for at home use  Patient instructed not to utilize unless cleared by provider  Aldosterone Antagonist: Spironolactone 12.5 mg daily  Digitalis: Digoxin 0.125 mg daily  Nitrates: Imdur 30 mg daily with as needed SL NTG        Ranexa 500 mg twice daily  Brilinta 90 mg every 12  Aspirin 81 mg daily      Cardiac medicines reviewed with risk, benefits, and necessity of each discussed.       _________________________________________________________    Discussion    Already on heart failure core measures given his significant reduction in ejection fraction current heart failure medications including beta-blocker, ARNI, SGLT2, diuretic,  Aldactone even a nitrate  Are on staff pharmacist is working to make medications more affordable including Brilinta, Entresto, and Jardiance particularly  Labs showing be hyperkalemic on 8/14/2024  Lokelma 10 mg while still here in the clinic repeat labs on 8/19/2024  Decrease potassium  Given the fact he is mildly hypertensive we will go ahead and change beta-blocker over to Coreg and make it an increased dose at 37.5 mg twice daily with parameters  Fairing his abnormal TSH 5.7 to his primary care Nhung CONDE  He has a pending follow-up appointment with nephrology Dr. Do  Labs show him to be a stage II CKD with an EGFR of 66, BUN of 15 creatinine 1.2 and to be mildly volume overloaded today with a proBNP of 6,016  He will be getting an additional 60 mg of IV Lasix here in the clinic 8/14/2024  will go ahead and increase his Entresto to  low-dose every 12 H  Will get follow-up labs on 8/19/2024  Next follow-up appointment in the heart failure clinic will be in about 3 weeks since he has an appointment with Dr. Mosher in the interim  He obviously should continue DAPT given his recent PCI\stent  Would add typical heart failure nursing interventions including:        A. Fluid restriction at less than 2000 cc daily       B. Daily weights        C.  1 g low-sodium diet        I did the following activities:preparing for the visit, with Chinedu Lopez  including reviewing tests, once pt arrived in clinic I also performed a medically appropriate examination and/or evaluation , I personally spent considerable time counseling and educating the patient/family/caregiver, ordering medications, tests, or procedures, referring and communicating with other health care professionals , and documenting information in the medical record. I estimate including preparation 45 minutes             Subjective:     Chief Complaint   Patient presents with   • Congestive Heart Failure       ROS:  Constitutional: + weakness, + fatigue persist at the same level as the hospitalization, No fever, rigors, chills   Eyes: No recent vision changes, eye pain   ENT/oropharynx: No recent difficulty swallowing, sore throat, epistaxis, changes in hearing   Cardiovascular: No recent chest pain, chest tightness, palpitations except as noted in HPI, paroxysmal nocturnal dyspnea, orthopnea, diaphoresis, dizziness & no pre or daily syncopal episodes   Respiratory: Mild at rest occasional shortness of breath, + dyspnea on exertion currently at minimal level about 25 feet, no significant productive cough, no wheezing and no hemoptysis   Gastrointestinal: No abdominal pain, nausea, vomiting, diarrhea, bloody stools   Genitourinary: No hematuria, dysuria other than increased frequency   Neurological: No change in typical headache pattern, no new onset of tremors, numbness,  or hemiparesis     Musculoskeletal: No change in typical cramps, myalgias, no new joint pain, joint swelling   Integument: No recent rash, + mild lower extremity edema      Patient Active Problem List   Diagnosis   • ASCAD   • Hypertension   • Neck mass   • 3 A paroxysmal atrial fibrillation   • Cerebrovascular accident (CVA)   • Type 2 diabetes mellitus without complication, without long-term current use of insulin   • Other hyperlipidemia   • Presence of Watchman left atrial appendage closure device   • Long term (current) use of antithrombotics/antiplatelets   • Ischemic dilated cardiomyopathy due to ASCAD   • Chronic combined systolic and diastolic HF (HFrEF)   • Moderate chronic cor pulmonale   • Metabolic syndrome X   • h/o Head injury   • H/O gastroesophageal reflux (GERD)   • Normocytic anemia   • Hyperkalemia       Past Medical History:   Diagnosis Date   • Atrial fibrillation     June, [facility]. Abstracted from [employer]   • Brain bleed 2019 October 14th fell - Hospital on 17th for 3 weeks   • CAD (coronary artery disease)     S/P CABG and PCI. Abstracted from [employer]   • Hyperlipidemia    • Hypertension    • Myocardial infarction 2006    Non-ST elevation MI. Abstracted from [employer]   • Obstructive sleep apnea on CPAP     At . Abstracted from [employer]   • Presence of Watchman left atrial appendage closure device 1/31/2020   • Stroke 2019   • TIA (transient ischemic attack) 11/19/2013    Possible TIA- MRI shows small tumor/head. Abstracted from [employer]       Past Surgical History:   Procedure Laterality Date   • ANGIOPLASTY  2006    3 srents were sequentially placed in promimal and mid body of the saphennous vein graft to obtuse marginal branch. Abstracted from [employer]   • ATRIAL APPENDAGE EXCLUSION LEFT WITH TRANSESOPHAGEAL ECHOCARDIOGRAM N/A 1/30/2020    Procedure: Atrial Appendage Occlusion;  Surgeon: Angel Willams MD;  Location: [facility] CATH INVASIVE LOCATION;  Service: Cardiovascular   •  ATRIAL APPENDAGE EXCLUSION LEFT WITH TRANSESOPHAGEAL ECHOCARDIOGRAM N/A 1/30/2020    Procedure: Atrial Appendage Occlusion;  Surgeon: John Sheridan MD;  [facility];  Service: Cardiology   • CARDIAC CATHETERIZATION  2000 2003, 2006, 2012. Abstracted from [employer].   • CARDIAC CATHETERIZATION N/A 7/31/2024    Procedure: Left Heart Cath possible PCI;  Surgeon: José Miguel Izquierdo MD;  [facility];  Service: Cardiovascular;  Laterality: N/A;   • CARDIAC CATHETERIZATION N/A 7/31/2024    Procedure: Stent ANA coronary;  Surgeon: José Miguel Izquierdo MD;  Location:  [facility] CATH INVASIVE LOCATION;  Service: Cardiovascular;  Laterality: N/A;   • CARDIAC CATHETERIZATION N/A 7/31/2024    Procedure: Percutaneous Coronary Intervention;  Surgeon: José Miguel Izquierdo MD;  [facility];  Service: Cardiovascular;  Laterality: N/A;   • CORONARY ARTERY BYPASS GRAFT  2000    Triple. Abstracted from [employer].   • MUSCLE REPAIR      Tendons and nerves in right lower extremity. Abstracted from [employer].   • OTHER SURGICAL HISTORY  08/2012    RCA mid and proximal- Xience stent x3. Abstracted from [employer].   • OTHER SURGICAL HISTORY  09/2016    Head trauma/bleed at [facility]. Abstracted from [employer].       Social History     Socioeconomic History   • Marital status:    Tobacco Use   • Smoking status: Every Day     Current packs/day: 1.00     Average packs/day: 1 pack/day for 40.6 years (40.6 ttl pk-yrs)     Types: Cigarettes     Start date: 1984   • Smokeless tobacco: Never   • Tobacco comments:     Smoking cessation--encouraged---refuses patch, gum or chantix   Vaping Use   • Vaping status: Never Used   Substance and Sexual Activity   • Alcohol use: Not Currently   • Drug use: No   • Sexual activity: Defer       No Known Allergies      Current Outpatient Medications:   •  amantadine (SYMMETREL) 100 MG capsule, Take 1  capsule by mouth Daily., Disp: , Rfl:   •  aspirin 81 MG EC tablet, Take 1 tablet by mouth Daily., Disp: , Rfl:   •  atorvastatin (Lipitor) 40 MG tablet, Take 1 tablet by mouth Daily., Disp: 30 tablet, Rfl: 0  •  bumetanide (BUMEX) 1 MG tablet, Take 1 tablet by mouth 2 (Two) Times a Day., Disp: 180 tablet, Rfl: 2  •  Cholecalciferol (VITAMIN D3) 125 MCG (5000 UT) capsule capsule, Take 1 capsule by mouth Daily., Disp: , Rfl:   •  Cyanocobalamin (VITAMIN B 12) 500 MCG tablet, Take 1 tablet by mouth Daily., Disp: , Rfl:   •  digoxin (Lanoxin) 125 MCG tablet, Take 1 tablet by mouth Daily., Disp: 30 tablet, Rfl: 0  •  doxazosin (CARDURA) 1 MG tablet, Take 1 tablet by mouth every night at bedtime., Disp: , Rfl:   •  empagliflozin (JARDIANCE) 10 MG tablet tablet, Take 1 tablet by mouth Daily., Disp: 30 tablet, Rfl: 0  •  famotidine (PEPCID) 40 MG tablet, Take 1 tablet by mouth Daily., Disp: , Rfl:   •  folic acid (FOLVITE) 1 MG tablet, Take 1 tablet by mouth Daily., Disp: , Rfl:   •  guaiFENesin (MUCINEX) 600 MG 12 hr tablet, Take 1 tablet by mouth Daily., Disp: , Rfl:   •  isosorbide mononitrate (IMDUR) 30 MG 24 hr tablet, Take 1 tablet by mouth Daily., Disp: , Rfl:   •  metFORMIN (GLUCOPHAGE) 1000 MG tablet, Take 1 tablet by mouth 2 (Two) Times a Day With Meals., Disp: , Rfl:   •  metoprolol succinate XL (TOPROL-XL) 100 MG 24 hr tablet, Take 1 tablet by mouth Every 12 (Twelve) Hours., Disp: 60 tablet, Rfl: 0  •  nitroglycerin (NITROLINGUAL) 0.4 MG/SPRAY spray, Place 1 spray under the tongue Every 5 (Five) Minutes As Needed for Chest Pain., Disp: , Rfl:   •  pantoprazole (PROTONIX) 40 MG EC tablet, Take 1 tablet by mouth 2 (Two) Times a Day., Disp: , Rfl:   •  [START ON 8/16/2024] potassium chloride (KLOR-CON M20) 20 MEQ CR tablet, Take 1 tablet by mouth Daily., Disp: 90 tablet, Rfl: 2  •  ranolazine (RANEXA) 500 MG 12 hr tablet, Take 1 tablet by mouth 2 (Two) Times a Day., Disp: , Rfl: 6  •  sacubitril-valsartan  (Entresto) 24-26 MG tablet, Take 1 tablet by mouth 2 (Two) Times a Day. Indications: Cardiac Failure, Disp: , Rfl:   •  spironolactone (ALDACTONE) 25 MG tablet, Take 0.5 tablets by mouth Daily., Disp: 30 tablet, Rfl: 0  •  ticagrelor (BRILINTA) 90 MG tablet tablet, Take 1 tablet by mouth Every 12 (Twelve) Hours., Disp: 60 tablet, Rfl: 0  •  carvedilol (Coreg) 25 MG tablet, Take 1.5 tablets by mouth 2 (Two) Times a Day With Meals. Hold for systolic BP < 100 mmHg or HR < 60 BPM unless you have PPM or ICD, Disp: 180 tablet, Rfl: 0  •  Furosemide (Furoscix) 80 MG/10ML Cartridge Kit, Inject 10 mL under the skin into the appropriate area as directed Once When Specified for 1 dose. Do not take until discussed with provider, Disp: 8 each, Rfl: 0  No current facility-administered medications for this encounter.    Immunization History   Administered Date(s) Administered   • COVID-19 F23 (PFIZER) 12YRS+ (COMIRNATY) 11/03/2023       Most recent EKG as reviewed:  Procedures     Most recent echo as reviewed:  Results for orders placed during the hospital encounter of 07/30/24    Adult Transthoracic Echo Complete W/ Cont if Necessary Per Protocol    Interpretation Summary  •  Left ventricular systolic function is severely decreased. Left ventricular ejection fraction appears to be 26 - 30%.  •  The left ventricular cavity is moderately dilated.  •  Left ventricular wall thickness is consistent with mild concentric hypertrophy.  •  Left ventricular diastolic function is consistent with (grade I) impaired relaxation.  •  Moderately reduced right ventricular systolic function noted.  •  The right ventricular cavity is moderately dilated.  •  The left atrial cavity is moderately dilated.  •  Estimated right ventricular systolic pressure from tricuspid regurgitation is normal (<35 mmHg).      Most recent stress test results:  Results for orders placed during the hospital encounter of 03/14/24    Stress Test With Myocardial Perfusion  One Day    Interpretation Summary  •  Myocardial perfusion imaging indicates a small-sized infarct located in the apex with no significant ischemia noted.  •  Left ventricular ejection fraction is mildly reduced (Calculated EF = 40%).  •  Impressions are consistent with a low risk study.  •  Abnormal LV wall motion consistent with moderate hypokinesis and global hypokinesis.  •  There is no prior study available for comparison.  •  Findings consistent with an indeterminate ECG stress test.      Most recent cardiac catheterization results:  Results for orders placed during the hospital encounter of 07/30/24    Cardiac Catheterization/Vascular Study    Conclusion  Table formatting from the original result was not included.  Cardiac Catheterization with PCI Report    Chinedu Lopez  [id number]  7/31/2024  @PCP@    He underwent cardiac catheterization and percutaneous coronary intervention    Indications for the procedure include: acute coronary syndrome.  Severe chest pain  Acute coronary syndrome  Non-ST elevation myocardial infarction  Atrial fibrillation with rapid response  Worsening cardiomyopathy  Severe LV dysfunction  Congestive heart failure    Procedure Details:  The risks, benefits, complications, treatment options, and expected outcomes were discussed with the patient. The patient and/or family concurred with the proposed plan, giving informed consent.    After informed consent the patient was brought to the cath lab after appropriate IV hydration was begun and oral premedication was given. He was further sedated with fentanyl. He was prepped and draped in the usual manner. Using the modified Seldinger access technique, a 6 Surinamese sheath was placed in the femoral artery. A left heart catheterization with coronary arteriography was performed.  We used a AR mod diagnostic catheter to selectively engage the right coronary artery and we used a 5 Surinamese IMT catheter due to selective engagement of the LIMA to the  LAD, findings are discussed below.    The decision was made to proceed with intervention. He received Heparin as per protocol. The details of the intervention are as follows:    We used a left coronary bypass guide catheter with guide liner support to get selective access to the vein graft to the marginal lesion was initially predilated multiple attempts using a guide liner support secondary to extensive calcification and difficult to cross the lesion with a balloon secondary to tortuosity and calcification initially with a 1 5 balloon eventually with a 2 oh balloon and then upgraded to 3 with balloon and stented with a 3.0 x 18 mm diastolic bleeding stent and lesion was postdilated with 4.0 x 12 mm noncompliant balloon up to 16 keven.  Patient tolerated procedure well with no immediate possible complication plan to obtain hemostasis by manual pressure procedural anticoagulation was heparin patient received 180 mg of Brilinta at the end of the procedure.    After the procedure was completed, sedation was stopped and the sheaths and catheters were all removed according to established hospital protocols.    Conscious sedation:  Conscious sedation was performed according to protocol.  I supervised and directed an independent trained observer with the assistance of monitoring the patient's level of consciousness and physiologic status throughout the procedure.  Intraoperative service time was 90 minutes.    Findings:    Hemodynamics Central aortic pressure systolic 120 diastolic of 66 the mean pressure of 89 mmHg  LV end-diastolic pressure of 29 mmHg  There was no gradient across the aortic valve on the pullback of the pigtail catheter  Left Main Left main has diffuse 90% stenosis provides a small caliber marginal branch otherwise both LAD and left circumflex artery 100% occluded in the ostial portion  RCA Large-caliber vessel dominant vessel  Right coronary artery is a dominant vessel  Patent stent in the right coronary  artery with no evidence of any significant in-stent restenosis  Right coronary artery provides a large caliber PDA and PLB branches that are angiographically normal  % occluded in the ostial portion  Circ 100% occluded in the ostial portion  SVG(s) Vein graft presumed to be marginal branch is 100% occluded in the ostial portion    Vein graft to the second marginal branch is patent with mid body angiographic 90% stenosis likely the culprit vessel for patient symptoms of non-ST ovation myocardial infarction likely this lesion is in-stent restenosis within the prior stent  JUANA LIMA to LAD is patent without any significant stenosis involving the ostium/body of the anastomotic site.  No significant disease involving the LAD after the LIMA touchdown  LV Not done  Coronary dominance Right coronary artery    Interventions/Vessels Successful PCI and stenting of the midportion of the vein graft to the marginal branch with placement of Xience drug-eluting stent  Guides/Wires [employer]  Devices Xience drug-eluting stent 3.0 x 18 mm  Post % Stenosis  0  Pre-procedure SHANNON flow  10  Post procedure SHANNON flow  3  Closure Device None  Complications None    Estimated Blood Loss:  Minimal    Specimens: None    Complications:  None; patient tolerated the procedure well.    Disposition: PACU - hemodynamically stable.    Condition: stable    Impressions:  Native severe two-vessel coronary artery disease with 100% occlusion of the ostium of the LAD and 100% occlusion of the ostium of the left circumflex  Patent vein graft to the marginal with severe 90% stenosis involving the midportion of the vein graft succinyl treated with placement of a Xience drug-eluting stent  Patent LIMA to the LAD  Patent stent in the right coronary artery with no significant in-stent restenosis    Recommendations:  Aspirin 81 mg p.o. once a day  Brilinta 90 mg p.o. twice daily  Continued aggressive risk factor modification  Observe patient in CVU bed  overnight  Close monitoring of renal function  Optimize medical therapy for cardiomyopathy and rate control for atrial fibrillation  Patient currently being seen by nephrology and hematology oncology for underlying anemia and renal failure  Test results reviewed and discussed with patient and family  Further recommendation based on patient course        Imaging:    Results for orders placed during the hospital encounter of 07/30/24    XR Chest 1 View    Narrative  XR CHEST 1 VW    Date of Exam: 8/1/2024 6:00 PM EDT    Indication: chf    Comparison: None available.    Findings:  Mediastinum: Cardiomediastinal silhouette appears enlarged    Lungs: Indistinctness of the pulmonary vasculature with mild pulmonary vascular congestion. Bibasal hazy opacities.    Pleura: Small bilateral pleural effusions. No pneumothorax.    Bones and soft tissues: Median sternotomy.    Impression  Impression:  Cardiomegaly with mild pulmonary edema and small bilateral pleural effusions.      Electronically Signed: Gildardo Springer  8/1/2024 8:23 PM EDT  Workstation ID: [id number]       Results for orders placed during the hospital encounter of 07/30/24    US Renal Bilateral    Narrative  Date of Exam: 7/30/2024 2:05 PM EDT    Indication: LUDWIN/CKD.    Comparison: No comparisons available.    Technique: Grayscale and color Doppler ultrasound evaluation of the kidneys and urinary bladder was performed.      Findings:  Right Kidney: Right kidney measures 11.6 cm in long axis. Likely benign cyst in the right kidney measuring up to 2.2 cm. Increased renal cortical echogenicity.  No hydronephrosis.. No sonographically evident nephrolithiasis.    Left Kidney:  Left kidney measures 9.3 cm in long axis. Mildly increased renal cortical echogenicity. No suspicious appearing lesions.No hydronephrosis.No sonographically evident nephrolithiasis.    Bladder: The bladder appears unremarkable.    Additional findings: Mild perihepatic and pelvic  ascites.    Impression  Impression:  No hydronephrosis.    Increased renal cortical echogenicity which can be seen in medical renal disease.    Small volume ascites.          Electronically Signed: Gildardo Springer  7/30/2024 3:01 PM EDT  Workstation ID: [id number]          Historical data copied forward from previous encounters in EMR including the history, exam, and assessment/plan has been reviewed and is unchanged except as I have noted and otherwise indicated.      Objective:         /80   Pulse 94   Resp 16   Wt 94 kg (207 lb 3.2 oz)   SpO2 98%   BMI 28.10 kg/m²     Physical Examination    General:  Well-developed, mildly overly well-nourished, cooperative, no acute distress, appears much older than stated age   Neuro:  A&O x3. No significantly obvious focal neuro deficet    Psych:  mildly flattened affect    HENT:  Normocephalic, atraumatic, moist mucous membranes , Normal ear placement,Throat not injected   Eyes:  PERRLA, Conjunctivae not injected, EOM's intact, wears spectacles stating his vision, conjunctiva does not appear significantly injected   Neck:  Supple, no lymph adenopathy nor thyromegaly no JVD or bruit    Lungs:    Symmetrical rise & fall of chest with baseline respiratory pattern. To auscultation lungs are Clear bilaterally, faint late phase expiratory wheezes, no rhonchi or rales are noted, bases bilaterally   Chest wall:  No significant tenderness when palpated. PMI @ 6th ICS MAL    Heart:  irregular rate and rhythm, S1 and S2 normal,, Gr I-II/VI systolic ejection murmur best heard at the apex , no obvious rub, click or gallop.    Abdomen:  non-distended, non-tender, bowel sounds noted in the 4 quadrants of the abdomen, no significant central abdominal adipose tissue identified however with a mild noted amount   Extremities:  Equal color motion temperature and sensitivity, Rapid capillary refill noted within the nailbeds of fingers and toes bilaterally trace lower extremity edema.     Pulses:  2+ and symmetric all extremities, rapid capillary refill    Skin:  No obvious rashes, significant lesions identified, warm dry and of normal turgor      In-Office Procedure(s):  No orders to display        Lab Review:   Hospital Outpatient Visit on 08/14/2024   Component Date Value   • QT Interval 08/14/2024 315    • QTC Interval 08/14/2024 377    Hospital Outpatient Visit on 08/14/2024   Component Date Value   • Magnesium 08/14/2024 1.9    • Glucose 08/14/2024 119 (H)    • BUN 08/14/2024 15    • Creatinine 08/14/2024 1.24    • Sodium 08/14/2024 140    • Potassium 08/14/2024 5.5 (H)    • Chloride 08/14/2024 105    • CO2 08/14/2024 22.6    • Calcium 08/14/2024 10.1    • BUN/Creatinine Ratio 08/14/2024 12.1    • Anion Gap 08/14/2024 12.4    • eGFR 08/14/2024 66.1    • proBNP 08/14/2024 6,016.0 (H)    • TSH 08/14/2024 5.660 (H)    • WBC 08/14/2024 7.18    • RBC 08/14/2024 5.17    • Hemoglobin 08/14/2024 10.8 (L)    • Hematocrit 08/14/2024 40.7    • MCV 08/14/2024 78.7 (L)    • MCH 08/14/2024 20.9 (L)    • MCHC 08/14/2024 26.5 (L)    • RDW 08/14/2024 23.9 (H)    • RDW-SD 08/14/2024 67.2 (H)    • MPV 08/14/2024 9.0    • Platelets 08/14/2024 461 (H)    • Neutrophil % 08/14/2024 78.8 (H)    • Lymphocyte % 08/14/2024 12.3 (L)    • Monocyte % 08/14/2024 6.0    • Eosinophil % 08/14/2024 1.9    • Basophil % 08/14/2024 0.7    • Immature Grans % 08/14/2024 0.3    • Neutrophils, Absolute 08/14/2024 5.66    • Lymphocytes, Absolute 08/14/2024 0.88    • Monocytes, Absolute 08/14/2024 0.43    • Eosinophils, Absolute 08/14/2024 0.14    • Basophils, Absolute 08/14/2024 0.05    • Immature Grans, Absolute 08/14/2024 0.02    • nRBC 08/14/2024 0.0    No results displayed because visit has over 200 results.      Hospital Outpatient Visit on 03/14/2024   Component Date Value   •  CV STRESS PROTOCOL 1 03/14/2024 Pharmacologic    • Stage 1 03/14/2024 1.0    • Duration Min Stage 1 03/14/2024 0    • Duration Sec Stage 1 03/14/2024  "10    • Stress Dose Regadenoson * 03/14/2024 0.40    • Stress Comments Stage 1 03/14/2024 10 sec bolus injection    • Baseline HR 03/14/2024 108    • Baseline BP 03/14/2024 144/88    • Peak HR 03/14/2024 117    • Peak BP 03/14/2024 130/80    • Recovery HR 03/14/2024 129    • Recovery BP 03/14/2024 150/96    • Target HR (85%) 03/14/2024 135    • Max. Pred. HR (100%) 03/14/2024 159    • Percent Max Pred HR 03/14/2024 73.58    • Percent Target HR 03/14/2024 87    • Nuc Stress EF 03/14/2024 40    • BH CV REST NUCLEAR ISOTO* 03/14/2024 11.9    • BH CV STRESS NUCLEAR ISO* 03/14/2024 33.7      Recent labs reviewed and interpreted for clinical significance and application    I went over each of the labs individually with Mr. Marc while he was still here in heart failure clinic at [facility], [place]            It is a pleasure to be involved in this patient's cardiovascular care relating to their heart failure.  Please feel free to call me with any questions or concerns.    Richard \"Wilton\" Martha CONDE, [facility]  Heart failure program clinical provider    Richard Thomas, AMAYA   08/13/24  .  "

## 2024-08-15 ENCOUNTER — TELEPHONE (OUTPATIENT)
Facility: HOSPITAL | Age: 64
End: 2024-08-15
Payer: OTHER GOVERNMENT

## 2024-08-15 NOTE — TELEPHONE ENCOUNTER
Heart Failure Management  Prior Authorization Pending Determination     Prior Authorization for [employer] is/was pending determination.    Approval Start Date: 8/15/24  Approval End Date: pending  Authorization / Reference / Case Number: [id number]    CoverMyMeds Key: [id number]    Norberto Bueno PharmD, [facility]  Ambulatory Care Clinical Pharmacist  8/15/2024  09:05 EDT

## 2024-08-16 PROBLEM — T45.4X5A ADVERSE EFFECT OF IRON: Status: ACTIVE | Noted: 2024-08-16

## 2024-08-16 PROBLEM — D50.9 IRON DEFICIENCY ANEMIA: Status: ACTIVE | Noted: 2024-08-16

## 2024-08-19 ENCOUNTER — TELEPHONE (OUTPATIENT)
Dept: ONCOLOGY | Facility: CLINIC | Age: 64
End: 2024-08-19
Payer: OTHER GOVERNMENT

## 2024-08-19 ENCOUNTER — HOSPITAL ENCOUNTER (EMERGENCY)
Facility: HOSPITAL | Age: 64
Discharge: HOME | End: 2024-08-19
Attending: EMERGENCY MEDICINE | Admitting: EMERGENCY MEDICINE
Payer: OTHER GOVERNMENT

## 2024-08-19 ENCOUNTER — APPOINTMENT (OUTPATIENT)
Dept: GENERAL RADIOLOGY | Facility: HOSPITAL | Age: 64
End: 2024-08-19
Payer: OTHER GOVERNMENT

## 2024-08-19 ENCOUNTER — HOSPITAL ENCOUNTER (OUTPATIENT)
Facility: HOSPITAL | Age: 64
Discharge: HOME | End: 2024-08-19
Payer: OTHER GOVERNMENT

## 2024-08-19 ENCOUNTER — HOSPITAL ENCOUNTER (OUTPATIENT)
Dept: CARDIOLOGY | Facility: HOSPITAL | Age: 64
Discharge: HOME | End: 2024-08-19
Payer: OTHER GOVERNMENT

## 2024-08-19 VITALS
BODY MASS INDEX: 26.58 KG/M2 | DIASTOLIC BLOOD PRESSURE: 80 MMHG | RESPIRATION RATE: 18 BRPM | HEIGHT: 72 IN | HEART RATE: 79 BPM | OXYGEN SATURATION: 99 % | TEMPERATURE: 97.8 F | WEIGHT: 196.21 LBS | SYSTOLIC BLOOD PRESSURE: 106 MMHG

## 2024-08-19 VITALS
OXYGEN SATURATION: 97 % | SYSTOLIC BLOOD PRESSURE: 116 MMHG | HEART RATE: 124 BPM | DIASTOLIC BLOOD PRESSURE: 64 MMHG | RESPIRATION RATE: 16 BRPM | BODY MASS INDEX: 26.72 KG/M2 | WEIGHT: 197 LBS

## 2024-08-19 DIAGNOSIS — I50.42 CHRONIC COMBINED SYSTOLIC AND DIASTOLIC CHF (CONGESTIVE HEART FAILURE): Chronic | ICD-10-CM

## 2024-08-19 DIAGNOSIS — E87.5 HYPERKALEMIA: Primary | ICD-10-CM

## 2024-08-19 DIAGNOSIS — I48.0 PAROXYSMAL ATRIAL FIBRILLATION: Chronic | ICD-10-CM

## 2024-08-19 DIAGNOSIS — I48.91 ATRIAL FIBRILLATION WITH RAPID VENTRICULAR RESPONSE: Primary | ICD-10-CM

## 2024-08-19 LAB
ALBUMIN SERPL-MCNC: 4.7 G/DL (ref 3.5–5.2)
ALBUMIN/GLOB SERPL: 1.6 G/DL
ALP SERPL-CCNC: 46 U/L (ref 39–117)
ALT SERPL W P-5'-P-CCNC: 11 U/L (ref 1–41)
ANION GAP SERPL CALCULATED.3IONS-SCNC: 14.9 MMOL/L (ref 5–15)
ANION GAP SERPL CALCULATED.3IONS-SCNC: 16.2 MMOL/L (ref 5–15)
APTT PPP: 28.4 SECONDS (ref 61–76.5)
AST SERPL-CCNC: 16 U/L (ref 1–40)
BASOPHILS # BLD AUTO: 0.04 10*3/MM3 (ref 0–0.2)
BASOPHILS NFR BLD AUTO: 0.6 % (ref 0–1.5)
BILIRUB SERPL-MCNC: 0.7 MG/DL (ref 0–1.2)
BUN SERPL-MCNC: 26 MG/DL (ref 8–23)
BUN SERPL-MCNC: 26 MG/DL (ref 8–23)
BUN/CREAT SERPL: 14.4 (ref 7–25)
BUN/CREAT SERPL: 14.7 (ref 7–25)
CALCIUM SPEC-SCNC: 10.2 MG/DL (ref 8.6–10.5)
CALCIUM SPEC-SCNC: 10.3 MG/DL (ref 8.6–10.5)
CHLORIDE SERPL-SCNC: 100 MMOL/L (ref 98–107)
CHLORIDE SERPL-SCNC: 99 MMOL/L (ref 98–107)
CO2 SERPL-SCNC: 21.8 MMOL/L (ref 22–29)
CO2 SERPL-SCNC: 24.1 MMOL/L (ref 22–29)
CREAT SERPL-MCNC: 1.77 MG/DL (ref 0.76–1.27)
CREAT SERPL-MCNC: 1.8 MG/DL (ref 0.76–1.27)
DEPRECATED RDW RBC AUTO: 62.9 FL (ref 37–54)
DIGOXIN SERPL-MCNC: 1.25 NG/ML (ref 0.6–1.2)
EGFRCR SERPLBLD CKD-EPI 2021: 42.3 ML/MIN/1.73
EGFRCR SERPLBLD CKD-EPI 2021: 43.2 ML/MIN/1.73
EOSINOPHIL # BLD AUTO: 0.05 10*3/MM3 (ref 0–0.4)
EOSINOPHIL NFR BLD AUTO: 0.8 % (ref 0.3–6.2)
ERYTHROCYTE [DISTWIDTH] IN BLOOD BY AUTOMATED COUNT: 23.6 % (ref 12.3–15.4)
GEN 5 2HR TROPONIN T REFLEX: 69 NG/L
GLOBULIN UR ELPH-MCNC: 2.9 GM/DL
GLUCOSE SERPL-MCNC: 185 MG/DL (ref 65–99)
GLUCOSE SERPL-MCNC: 187 MG/DL (ref 65–99)
HCT VFR BLD AUTO: 41.5 % (ref 37.5–51)
HGB BLD-MCNC: 11.6 G/DL (ref 13–17.7)
IMM GRANULOCYTES # BLD AUTO: 0.02 10*3/MM3 (ref 0–0.05)
IMM GRANULOCYTES NFR BLD AUTO: 0.3 % (ref 0–0.5)
INR PPP: 1.05 (ref 0.93–1.1)
LYMPHOCYTES # BLD AUTO: 0.77 10*3/MM3 (ref 0.7–3.1)
LYMPHOCYTES NFR BLD AUTO: 12.5 % (ref 19.6–45.3)
MAGNESIUM SERPL-MCNC: 1.6 MG/DL (ref 1.6–2.4)
MCH RBC QN AUTO: 21.1 PG (ref 26.6–33)
MCHC RBC AUTO-ENTMCNC: 28 G/DL (ref 31.5–35.7)
MCV RBC AUTO: 75.6 FL (ref 79–97)
MONOCYTES # BLD AUTO: 0.4 10*3/MM3 (ref 0.1–0.9)
MONOCYTES NFR BLD AUTO: 6.5 % (ref 5–12)
NEUTROPHILS NFR BLD AUTO: 4.89 10*3/MM3 (ref 1.7–7)
NEUTROPHILS NFR BLD AUTO: 79.3 % (ref 42.7–76)
NRBC BLD AUTO-RTO: 0 /100 WBC (ref 0–0.2)
NT-PROBNP SERPL-MCNC: 2731 PG/ML (ref 0–900)
PLATELET # BLD AUTO: 418 10*3/MM3 (ref 140–450)
PMV BLD AUTO: 9.2 FL (ref 6–12)
POTASSIUM SERPL-SCNC: 4.9 MMOL/L (ref 3.5–5.2)
POTASSIUM SERPL-SCNC: 4.9 MMOL/L (ref 3.5–5.2)
PROT SERPL-MCNC: 7.6 G/DL (ref 6–8.5)
PROTHROMBIN TIME: 11.4 SECONDS (ref 9.6–11.7)
RBC # BLD AUTO: 5.49 10*6/MM3 (ref 4.14–5.8)
SODIUM SERPL-SCNC: 138 MMOL/L (ref 136–145)
SODIUM SERPL-SCNC: 138 MMOL/L (ref 136–145)
TROPONIN T DELTA: -11 NG/L
TROPONIN T SERPL HS-MCNC: 80 NG/L
WBC NRBC COR # BLD AUTO: 6.17 10*3/MM3 (ref 3.4–10.8)

## 2024-08-19 PROCEDURE — 80053 COMPREHEN METABOLIC PANEL: CPT | Performed by: NURSE PRACTITIONER

## 2024-08-19 PROCEDURE — 25010000002 MAGNESIUM SULFATE 2 GM/50ML SOLUTION: Performed by: NURSE PRACTITIONER

## 2024-08-19 PROCEDURE — 80162 ASSAY OF DIGOXIN TOTAL: CPT | Performed by: NURSE PRACTITIONER

## 2024-08-19 PROCEDURE — 93005 ELECTROCARDIOGRAM TRACING: CPT | Performed by: NURSE PRACTITIONER

## 2024-08-19 PROCEDURE — 84484 ASSAY OF TROPONIN QUANT: CPT

## 2024-08-19 PROCEDURE — 83735 ASSAY OF MAGNESIUM: CPT | Performed by: NURSE PRACTITIONER

## 2024-08-19 PROCEDURE — 36415 COLL VENOUS BLD VENIPUNCTURE: CPT | Performed by: NURSE PRACTITIONER

## 2024-08-19 PROCEDURE — 93005 ELECTROCARDIOGRAM TRACING: CPT | Performed by: EMERGENCY MEDICINE

## 2024-08-19 PROCEDURE — 99285 EMERGENCY DEPT VISIT HI MDM: CPT | Performed by: INTERNAL MEDICINE

## 2024-08-19 PROCEDURE — 85025 COMPLETE CBC W/AUTO DIFF WBC: CPT

## 2024-08-19 PROCEDURE — G0463 HOSPITAL OUTPT CLINIC VISIT: HCPCS

## 2024-08-19 PROCEDURE — 96365 THER/PROPH/DIAG IV INF INIT: CPT

## 2024-08-19 PROCEDURE — 71045 X-RAY EXAM CHEST 1 VIEW: CPT

## 2024-08-19 PROCEDURE — 85610 PROTHROMBIN TIME: CPT

## 2024-08-19 PROCEDURE — 85730 THROMBOPLASTIN TIME PARTIAL: CPT

## 2024-08-19 PROCEDURE — 36415 COLL VENOUS BLD VENIPUNCTURE: CPT

## 2024-08-19 PROCEDURE — 99284 EMERGENCY DEPT VISIT MOD MDM: CPT

## 2024-08-19 PROCEDURE — 83880 ASSAY OF NATRIURETIC PEPTIDE: CPT | Performed by: NURSE PRACTITIONER

## 2024-08-19 PROCEDURE — 96375 TX/PRO/DX INJ NEW DRUG ADDON: CPT

## 2024-08-19 PROCEDURE — 93005 ELECTROCARDIOGRAM TRACING: CPT

## 2024-08-19 RX ORDER — SODIUM CHLORIDE 0.9 % (FLUSH) 0.9 %
10 SYRINGE (ML) INJECTION AS NEEDED
Status: DISCONTINUED | OUTPATIENT
Start: 2024-08-19 | End: 2024-08-19 | Stop reason: HOSPADM

## 2024-08-19 RX ORDER — METOPROLOL SUCCINATE 100 MG/1
100 TABLET, EXTENDED RELEASE ORAL DAILY
Qty: 30 TABLET | Refills: 0 | Status: SHIPPED | OUTPATIENT
Start: 2024-08-19

## 2024-08-19 RX ORDER — MAGNESIUM SULFATE HEPTAHYDRATE 40 MG/ML
2 INJECTION, SOLUTION INTRAVENOUS ONCE
Status: COMPLETED | OUTPATIENT
Start: 2024-08-19 | End: 2024-08-19

## 2024-08-19 RX ORDER — METOPROLOL TARTRATE 1 MG/ML
5 INJECTION, SOLUTION INTRAVENOUS ONCE
Status: COMPLETED | OUTPATIENT
Start: 2024-08-19 | End: 2024-08-19

## 2024-08-19 RX ADMIN — MAGNESIUM SULFATE HEPTAHYDRATE 2 G: 40 INJECTION, SOLUTION INTRAVENOUS at 14:27

## 2024-08-19 RX ADMIN — METOPROLOL TARTRATE 5 MG: 1 INJECTION, SOLUTION INTRAVENOUS at 13:13

## 2024-08-19 NOTE — ED PROVIDER NOTES
Subjective     Provider in Triage Note  Due to significant overcrowding in the emergency department patient was initially seen and evaluated in triage.  Provider in triage recommended patient placement in the treatment area to initiate therapy and movement to an ER bed as soon as possible.    Patient is a 63-year-old male who presents by private vehicle known history of atrial fibrillation was recently admitted and currently has a LifeVest in place.  He has had intermittent dizziness over the weekend.  No associated chest pain or shortness of breath no dizziness at that moment states he was bending rolling up a water hose when it happened.     History of Present Illness    Review of Systems   Constitutional:  Negative for fever.   HENT:  Negative for congestion.    Respiratory:  Negative for cough and shortness of breath.    Cardiovascular:  Negative for chest pain.   Gastrointestinal:  Negative for abdominal pain and vomiting.   Neurological:  Positive for dizziness. Negative for syncope and weakness.   Psychiatric/Behavioral:  Negative for confusion.        Past Medical History:   Diagnosis Date    Atrial fibrillation     June, H. Abstracted from [email or web address].    Brain bleed 2019 October 14th fell - Hospital on 17th for 3 weeks    CAD (coronary artery disease)     S/P CABG and PCI. Abstracted from [facility].    Hyperlipidemia     Hypertension     Myocardial infarction 2006    Non-ST elevation MI. Abstracted from [email or web address].    Obstructive sleep apnea on CPAP     At HS. Abstracted from [facility].    Presence of [facility]man left atrial appendage closure device 1/31/2020    Stroke 2019    TIA (transient ischemic attack) 11/19/2013    Possible TIA- MRI shows small tumor/head. Abstracted from [email or web address].       No Known Allergies    Past Surgical History:   Procedure Laterality Date    ANGIOPLASTY  2006    3 srents were sequentially placed in promimal and mid body of the saphennous vein graft to obtuse marginal  branch. Abstracted from [facility].    ATRIAL APPENDAGE EXCLUSION LEFT WITH TRANSESOPHAGEAL ECHOCARDIOGRAM N/A 1/30/2020    Procedure: Atrial Appendage Occlusion;  Surgeon: Angel Willams MD;  Location: [facility] CATH INVASIVE LOCATION;  Service: Cardiovascular    ATRIAL APPENDAGE EXCLUSION LEFT WITH TRANSESOPHAGEAL ECHOCARDIOGRAM N/A 1/30/2020    Procedure: Atrial Appendage Occlusion;  Surgeon: John Sheridan MD;  Location: [facility] CATH INVASIVE LOCATION;  Service: Cardiology    CARDIAC CATHETERIZATION  2000 2003, 2006, 2012. Abstracted from [facility].    CARDIAC CATHETERIZATION N/A 7/31/2024    Procedure: Left Heart Cath possible PCI;  Surgeon: José Miguel Izquierdo MD;  Location: [facility] CATH INVASIVE LOCATION;  Service: Cardiovascular;  Laterality: N/A;    CARDIAC CATHETERIZATION N/A 7/31/2024    Procedure: Stent ANA coronary;  Surgeon: José Miguel Izquierdo MD;  Location: [facility] CATH INVASIVE LOCATION;  Service: Cardiovascular;  Laterality: N/A;    CARDIAC CATHETERIZATION N/A 7/31/2024    Procedure: Percutaneous Coronary Intervention;  Surgeon: José Miguel Izquierdo MD;  Location: [facility] CATH INVASIVE LOCATION;  Service: Cardiovascular;  Laterality: N/A;    CORONARY ARTERY BYPASS GRAFT  2000    Triple. Abstracted from [facility].    MUSCLE REPAIR      Tendons and nerves in right lower extremity. Abstracted from [facility].    OTHER SURGICAL HISTORY  08/2012    RCA mid and proximal- Xience stent x3. Abstracted from [facility].    OTHER SURGICAL HISTORY  09/2016    Head trauma/bleed at [facility]. Abstracted from [facility].       Family History   Problem Relation Age of Onset    Diabetes Mother     Heart attack Father     Diabetes Father     Other Sister         MRSA    Heart disease Other         Positive for Ischemic    Cancer Other     Hypertension Other        Social History     Socioeconomic History    Marital status:    Tobacco Use    Smoking status: Every Day     Current packs/day:  "1.00     Average packs/day: 1 pack/day for 40.6 years (40.6 ttl pk-yrs)     Types: Cigarettes     Start date: 1984    Smokeless tobacco: Never    Tobacco comments:     Smoking cessation--encouraged---refuses patch, gum or chantix   Vaping Use    Vaping status: Never Used   Substance and Sexual Activity    Alcohol use: Not Currently    Drug use: No    Sexual activity: Defer       /70   Pulse 84   Temp 97.8 °F (36.6 °C) (Oral)   Resp 18   Ht 182.9 cm (72\")   Wt 89 kg (196 lb 3.4 oz)   SpO2 99%   BMI 26.61 kg/m²       Objective   Physical Exam  Vitals and nursing note reviewed.   Constitutional:       Appearance: Normal appearance.   HENT:      Head: Normocephalic and atraumatic.      Mouth/Throat:      Mouth: Mucous membranes are moist.   Cardiovascular:      Rate and Rhythm: Tachycardia present. Rhythm irregular.      Heart sounds: Normal heart sounds.   Pulmonary:      Effort: Pulmonary effort is normal. No respiratory distress.      Breath sounds: Normal breath sounds.   Abdominal:      Palpations: Abdomen is soft.      Tenderness: There is no abdominal tenderness.   Skin:     General: Skin is warm and dry.   Neurological:      Mental Status: He is alert and oriented to person, place, and time.         Procedures           ED Course      Results for orders placed or performed during the hospital encounter of 08/19/24   Comprehensive Metabolic Panel    Specimen: Blood   Result Value Ref Range    Glucose 185 (H) 65 - 99 mg/dL    BUN 26 (H) 8 - 23 mg/dL    Creatinine 1.80 (H) 0.76 - 1.27 mg/dL    Sodium 138 136 - 145 mmol/L    Potassium 4.9 3.5 - 5.2 mmol/L    Chloride 100 98 - 107 mmol/L    CO2 21.8 (L) 22.0 - 29.0 mmol/L    Calcium 10.2 8.6 - 10.5 mg/dL    Total Protein 7.6 6.0 - 8.5 g/dL    Albumin 4.7 3.5 - 5.2 g/dL    ALT (SGPT) 11 1 - 41 U/L    AST (SGOT) 16 1 - 40 U/L    Alkaline Phosphatase 46 39 - 117 U/L    Total Bilirubin 0.7 0.0 - 1.2 mg/dL    Globulin 2.9 gm/dL    A/G Ratio 1.6 g/dL    " BUN/Creatinine Ratio 14.4 7.0 - 25.0    Anion Gap 16.2 (H) 5.0 - 15.0 mmol/L    eGFR 42.3 (L) >60.0 mL/min/1.73   Protime-INR    Specimen: Blood   Result Value Ref Range    Protime 11.4 9.6 - 11.7 Seconds    INR 1.05 0.93 - 1.10   aPTT    Specimen: Blood   Result Value Ref Range    PTT 28.4 (L) 61.0 - 76.5 seconds   High Sensitivity Troponin T    Specimen: Blood   Result Value Ref Range    HS Troponin T 80 (C) <22 ng/L   CBC Auto Differential    Specimen: Blood   Result Value Ref Range    WBC 6.17 3.40 - 10.80 10*3/mm3    RBC 5.49 4.14 - 5.80 10*6/mm3    Hemoglobin 11.6 (L) 13.0 - 17.7 g/dL    Hematocrit 41.5 37.5 - 51.0 %    MCV 75.6 (L) 79.0 - 97.0 fL    MCH 21.1 (L) 26.6 - 33.0 pg    MCHC 28.0 (L) 31.5 - 35.7 g/dL    RDW 23.6 (H) 12.3 - 15.4 %    RDW-SD 62.9 (H) 37.0 - 54.0 fl    MPV 9.2 6.0 - 12.0 fL    Platelets 418 140 - 450 10*3/mm3    Neutrophil % 79.3 (H) 42.7 - 76.0 %    Lymphocyte % 12.5 (L) 19.6 - 45.3 %    Monocyte % 6.5 5.0 - 12.0 %    Eosinophil % 0.8 0.3 - 6.2 %    Basophil % 0.6 0.0 - 1.5 %    Immature Grans % 0.3 0.0 - 0.5 %    Neutrophils, Absolute 4.89 1.70 - 7.00 10*3/mm3    Lymphocytes, Absolute 0.77 0.70 - 3.10 10*3/mm3    Monocytes, Absolute 0.40 0.10 - 0.90 10*3/mm3    Eosinophils, Absolute 0.05 0.00 - 0.40 10*3/mm3    Basophils, Absolute 0.04 0.00 - 0.20 10*3/mm3    Immature Grans, Absolute 0.02 0.00 - 0.05 10*3/mm3    nRBC 0.0 0.0 - 0.2 /100 WBC   High Sensitivity Troponin T 2Hr    Specimen: Blood   Result Value Ref Range    HS Troponin T 69 (C) <22 ng/L    Troponin T Delta -11 (L) >=-4 - <+4 ng/L   ECG 12 Lead Other; afib.   Result Value Ref Range    QT Interval 292 ms    QTC Interval 409 ms     XR Chest 1 View    Result Date: 8/19/2024  Impression: No acute process. Electronically Signed: Pantera Danielson MD  8/19/2024 1:01 PM EDT  Workstation ID: [id number]                                          Medical Decision Making  Amount and/or Complexity of Data Reviewed  ECG/medicine tests:  ordered.      Patient had the above evaluation.  Results were discussed with the patient.  My interpretation of chest x-ray shows no infiltrate or effusion.  EKG shows atrial fibrillation with a rate of 118.  Initial troponin was 80 and repeat troponin was 69.  This is significantly improved from recent troponin levels.  White blood cell count is normal.  CMP is fairly unremarkable.  Dr. Mosher with cardiology saw the patient in the emergency room.  He gave the patient a dose of Lopressor as well as magnesium.  His heart rate has improved into the 90s.  He is still in atrial fibrillation.  Patient states he feels well.  Dr. Mosher states patient is okay to be discharged and follow-up in the office.  Patient is agreeable with this plan.  He had stopped taking his metoprolol 100 mg prior to this episode.  He will restart this medication and follow-up in the office.      Final diagnoses:   Atrial fibrillation with rapid ventricular response       ED Disposition  ED Disposition       ED Disposition   Discharge    Condition   Stable    Comment   --               Renny Mosher, [address] [place] [facility]  [phone]    Call in 1 day           Medication List        Changed      bumetanide 1 MG tablet  Commonly known as: BUMEX  Take 1 tablet by mouth 2 (Two) Times a Day.  What changed: additional instructions                 Tushar Camargo MD  08/19/24 [id number]

## 2024-08-19 NOTE — PROGRESS NOTES
Patient came in to the clinic to get his prescheduled labs.  Said he is felt very bad for about the last 48 hours super fatigued, and generally worse.  After some discussion it was determined that apparently when he had been taken off Toprol and switch to Coreg he went to his pharmacy and the pharmacist told him of the side effects of the Coreg which prompted him not to take it.  ECG was obtained which showed him to be in A-fib RVR heart rate 143 bpm.  Additional labs were also drawn proBNP had been reduced from 6,016 about 6 days ago to 2,731 his glucose was elevated at 187, his dig level was 1.25 magnesium was at 1.6 sodium and potassium chloride were all normal at 138, 4.9 and 99 respectively BUNs of 26 creatinine is 1.8 EGFR at 43 cc\min which is a reduction from the 66 that was last noted when here in the heart failure clinic.    Vital signs show blood pressure 116/64 heart rate at 140 respirations 16 SpO2 at 97 current weight was 197 pounds    Disposition to the emergency department spoke with initially Merly Adhikari thinking he was a  Dr. Metz pt she is patient. But  after record review determination of belong to Dr. Mosher I did text him and inform him of the current situation, he tells me that the pt is also followed by Dr. Willams,   I will also send a copy of his ECG & his LifeVest information with him to the ED.

## 2024-08-19 NOTE — CONSULTS
Cardiology Consult Note      REQUESTING PHYSICIAN    Tushar Camargo MD    PATIENT IDENTIFICATION  Name: Chinedu Lopez  Age: 63 y.o.  Sex: male  :  1960  MRN: [id number]             REASON FOR CONSULTATION:  63-year-old male known to Dr. Willams and Dr. Mosher with past medical history of coronary artery disease status post CABG in , PCI in .  Last heart cath  showed no vessels amenable to PCI.  He is status post watchman implant with WILD  showing EF 60%, mild MR/TR and complete seal of left atrial appendage closure device.  Additional past medical history of hypertension, dyslipidemia, diabetes mellitus 2, TIA, intracranial hemorrhage status post fall in , anemia.    Patient was admitted to this facility 2024 with complaint of chest pain.  He ruled in for non-ST elevation MI and was also found to be in atrial fibrillation with rapid ventricular response as well as congestive heart failure.  Heart cath performed showed native two-vessel coronary artery disease with 100% occlusion ostium of the LAD and 100% occlusion of the ostium of the left circumflex.  Patent vein graft to marginal with severe 90% stenosis involving midportion of the vein graft successfully treated with drug-eluting stent.  Patent LIMA-LAD, patent stent in the RCA with no significant in-stent restenosis.  TTE revealed severely decreased LV systolic function with EF 26-30%, grade 1 DD.  Patient was fitted with a LifeVest and goal-directed medical therapy for LV dysfunction was instituted.      CC:  Fatigue  Elevated heart rate    HISTORY OF PRESENT ILLNESS:   Patient presented to the congestive heart failure clinic for prescheduled labs and reported feeling poorly over the past 48 hours.  He describes significant fatigue and shortness of breath.  The patient reports that he had been out in his yard doing some menial work and then began rolling up a garden hose when he felt his heart rate elevate.  The patient  has been followed by the congestive heart failure clinic.  At last office visit his metoprolol succinate was changed to carvedilol.  When the patient went to the pharmacy to  the medication, the pharmacist told him that the carvedilol may interact with some of his existing medications.  The patient became concerned and did not take it.  He did not resume his metoprolol succinate either and was therefore off beta-blocker therapy for approximately 4 days.  He is currently wearing a LifeVest and states that it did alarm, but did not shock.  In the ED, telemetry reveals atrial fibrillation with rapid ventricular response with rates of 107 to as high as 144 bpm.  He denies any chest pain, pressure, shortness of breath, dizziness or lightheadedness.  No lower extremity edema, near syncopal or syncopal episodes.  The patient was seen in tandem with Dr. Mosher.      REVIEW OF SYSTEMS:  Pertinent items are noted in HPI, all other systems reviewed and negative    OBJECTIVE   Creatinine 1.77 (1.24)  Digoxin 1.25  proBNP 2731  Magnesium 1.6      ASSESSMENT  Atrial fibrillation with rapid ventricular response  Acute kidney injury  Hypomagnesemia  Acute on chronic heart failure with reduced ejection fraction  Severe multivessel coronary artery disease with prior CABG  Severe ischemic cardiomyopathy  Recent PCI SVG just marginal 7/30/2024  History of paroxysmal atrial fibrillation  Status post left atrial planted closure device implant with complete seal per follow-up WILD  History of TIA  History of intracranial hemorrhage    PLAN  Patient was given 5 mg IV Lopressor in order to decrease rate and possibly chemically convert back to sinus rhythm.  If rate improves, will restart patient's metoprolol succinate at 100 mg daily.  Will give 2 g IV magnesium now  Resume patient's home cardiac medications    CHF Guideline Directed Medical Therapy  Beta Blocker:   ARNI/ACE/ARB:   SGLT 2 inhibitors:   Diuretics:   Aldosterone  "Antagonist:   Vasodilators & Nitrates:     Vital Signs  Visit Vitals  /82   Pulse 113   Temp 97.8 °F (36.6 °C) (Oral)   Resp 18   Ht 182.9 cm (72\")   Wt 89 kg (196 lb 3.4 oz)   SpO2 99%   BMI 26.61 kg/m²     Oxygen Therapy  SpO2: 99 %  Flowsheet Rows      Flowsheet Row First Filed Value   Admission Height 182.9 cm (72\") Documented at 08/19/2024 1222   Admission Weight 89 kg (196 lb 3.4 oz) Documented at 08/19/2024 1222          Intake & Output (last 3 days)       None          Lines, Drains & Airways       Active LDAs       Name Placement date Placement time Site Days    Peripheral IV 08/19/24 1257 Right Antecubital 08/19/24  1257  Antecubital  less than 1                    MEDICAL HISTORY    Past Medical History:   Diagnosis Date    Atrial fibrillation     June, [facility]. Abstracted from [facility].    Brain bleed 2019 October 14th fell - Hospital on 17th for 3 weeks    CAD (coronary artery disease)     S/P CABG and PCI. Abstracted from [facility].    Hyperlipidemia     Hypertension     Myocardial infarction 2006    Non-ST elevation MI. Abstracted from [facility].    Obstructive sleep apnea on CPAP     At . Abstracted from [facility].    Presence of Watchman left atrial appendage closure device 1/31/2020    Stroke 2019    TIA (transient ischemic attack) 11/19/2013    Possible TIA- MRI shows small tumor/head. Abstracted from [facility].        SURGICAL HISTORY    Past Surgical History:   Procedure Laterality Date    ANGIOPLASTY  2006    3 srents were sequentially placed in promimal and mid body of the saphennous vein graft to obtuse marginal branch. Abstracted from [facility].    ATRIAL APPENDAGE EXCLUSION LEFT WITH TRANSESOPHAGEAL ECHOCARDIOGRAM N/A 1/30/2020    Procedure: Atrial Appendage Occlusion;  Surgeon: Angel Willams MD;  Location: [facility] CATH INVASIVE LOCATION;  Service: Cardiovascular    ATRIAL APPENDAGE EXCLUSION LEFT WITH TRANSESOPHAGEAL ECHOCARDIOGRAM N/A 1/30/2020    Procedure: Atrial " Appendage Occlusion;  Surgeon: John Sheridan MD;  Location: [facility] CATH INVASIVE LOCATION;  Service: Cardiology    CARDIAC CATHETERIZATION  2000 2003, 2006, 2012. Abstracted from [facility].    CARDIAC CATHETERIZATION N/A 7/31/2024    Procedure: Left Heart Cath possible PCI;  Surgeon: José Miguel Izquierdo MD;  Location: [facility] CATH INVASIVE LOCATION;  Service: Cardiovascular;  Laterality: N/A;    CARDIAC CATHETERIZATION N/A 7/31/2024    Procedure: Stent ANA coronary;  Surgeon: José Miguel Izquierdo MD;  Location: [facility] CATH INVASIVE LOCATION;  Service: Cardiovascular;  Laterality: N/A;    CARDIAC CATHETERIZATION N/A 7/31/2024    Procedure: Percutaneous Coronary Intervention;  Surgeon: José Miguel Izquierdo MD;  Location: [facility] CATH INVASIVE LOCATION;  Service: Cardiovascular;  Laterality: N/A;    CORONARY ARTERY BYPASS GRAFT  2000    Triple. Abstracted from [facility].    MUSCLE REPAIR      Tendons and nerves in right lower extremity. Abstracted from [facility].    OTHER SURGICAL HISTORY  08/2012    RCA mid and proximal- Xience stent x3. Abstracted from [facility] [facility].    OTHER SURGICAL HISTORY  09/2016    Head trauma/bleed at [facility]. Abstracted from [facility] [employer] [facility].        FAMILY HISTORY    Family History   Problem Relation Age of Onset    Diabetes Mother     Heart attack Father     Diabetes Father     Other Sister         MRSA    Heart disease Other         Positive for Ischemic    Cancer Other     Hypertension Other        SOCIAL HISTORY    Social History     Tobacco Use    Smoking status: Every Day     Current packs/day: 1.00     Average packs/day: 1 pack/day for 40.6 years (40.6 ttl pk-yrs)     Types: Cigarettes     Start date: 1984    Smokeless tobacco: Never    Tobacco comments:     Smoking cessation--encouraged---refuses patch, gum or chantix   Substance Use Topics    Alcohol use: Not Currently        ALLERGIES    No Known Allergies           /82   Pulse 113   Temp 97.8 °F (36.6 °C)  "(Oral)   Resp 18   Ht 182.9 cm (72\")   Wt 89 kg (196 lb 3.4 oz)   SpO2 99%   BMI 26.61 kg/m²   Intake/Output last 3 shifts:  No intake/output data recorded.  Intake/Output this shift:  No intake/output data recorded.    PHYSICAL EXAM:    General: Alert, cooperative, no distress, appears stated age  Head:  Normocephalic, atraumatic, mucous membranes moist  Eyes:  Conjunctivae/corneas clear, EOM's intact     Neck:  Supple,  no adenopathy; no JVD or bruit  Lungs: Clear to auscultation bilaterally, no wheezes, rhonchi or rales are noted  Chest wall: No tenderness  Heart::  Irregularly irregular rate and rhythm, S1 and S2 normal, no murmur, rub or gallop  Abdomen: Soft, nontender, nondistended, bowel sounds active  Extremities: No cyanosis, clubbing, or edema   Pulses: 2+ and symmetric all extremities  Skin:  No rashes or lesions  Neuro/psych: A&O x3. CN II through XII are grossly intact with appropriate affect      Scheduled Meds:           Continuous Infusions:         PRN Meds:      [COMPLETED] Insert Peripheral IV **AND** sodium chloride        Results Review:     I reviewed the patient's new clinical results.    CBC    Results from last 7 days   Lab Units 08/19/24  1257 08/14/24  0754   WBC 10*3/mm3 6.17 7.18   HEMOGLOBIN g/dL 11.6* 10.8*   PLATELETS 10*3/mm3 418 461*     Cr Clearance Estimated Creatinine Clearance: 55.2 mL/min (A) (by C-G formula based on SCr of 1.77 mg/dL (H)).  Coag   Results from last 7 days   Lab Units 08/19/24  1257   INR  1.05   APTT seconds 28.4*     HbA1C   Lab Results   Component Value Date    HGBA1C 5.92 (H) 08/14/2024     Blood Glucose No results found for: \"POCGLU\"  Infection     CMP   Results from last 7 days   Lab Units 08/19/24  1043 08/14/24  0754   SODIUM mmol/L 138  138 140   POTASSIUM mmol/L 4.9  4.9 5.5*   CHLORIDE mmol/L 100  99 105   CO2 mmol/L 21.8*  24.1 22.6   BUN mg/dL 26*  26* 15   CREATININE mg/dL 1.80*  1.77* 1.24   GLUCOSE mg/dL 185*  187* 119*   ALBUMIN " "g/dL 4.7  --    BILIRUBIN mg/dL 0.7  --    ALK PHOS U/L 46  --    AST (SGOT) U/L 16  --    ALT (SGPT) U/L 11  --      ABG      UA      DEYSI  No results found for: \"POCMETH\", \"POCAMPHET\", \"POCBARBITUR\", \"POCBENZO\", \"POCCOCAINE\", \"POCOPIATES\", \"POCOXYCODO\", \"POCPHENCYC\", \"POCPROPOXY\", \"POCTHC\", \"POCTRICYC\"  [facility] Labs   Results from last 7 days   Lab Units 08/19/24  1257 08/19/24  1043 08/14/24  0754   INR  1.05  --   --    APTT seconds 28.4*  --   --    HEMOGLOBIN g/dL 11.6*  --  10.8*   PLATELETS 10*3/mm3 418  --  461*   CREATININE mg/dL  --  1.80*  1.77* 1.24     Radiology(recent) XR Chest 1 View    Result Date: 8/19/2024  Impression: No acute process. Electronically Signed: Pantera Danielson MD  8/19/2024 1:01 PM EDT  Workstation ID: [id number]       Results from last 7 days   Lab Units 08/19/24  1043   HSTROP T ng/L 80*       X-rays, labs reviewed personally by physician.    ECG/EMG Results (most recent)       Procedure Component Value Units Date/Time    ECG 12 Lead Other; afib. [[id number]] Collected: 08/19/24 1226     Updated: 08/19/24 1227     QT Interval 292 ms      QTC Interval 409 ms     Narrative:      HEART TIRT=205  bpm  RR Zbhtdste=649  ms  AZ Interval=  ms  P Horizontal Axis=  deg  P Front Axis=  deg  QRSD Interval=91  ms  QT Mwtvzvps=574  ms  LGqQ=190  ms  QRS Axis=59  deg  T Wave Axis=249  deg  - ABNORMAL ECG -  Atrial fibrillation  Anteroseptal infarct, old  Repol abnrm, severe global ischemia (LM/MVD)  When compared with ECG of 19-Aug-2024 11:11:13,  Significant rate decrease  Date and Time of Study:2024-08-19 12:26:04              Medication Review:   I have reviewed the patient's current medication list  Scheduled Meds:   Continuous Infusions:   PRN Meds:.  [COMPLETED] Insert Peripheral IV **AND** sodium chloride    Imaging:  Imaging Results (Last 72 Hours)       Procedure Component Value Units Date/Time    XR Chest 1 View [[id number]] Collected: 08/19/24 1300     Updated: 08/19/24 1303    Narrative:      " "XR CHEST 1 VW    Date of Exam: 8/19/2024 12:53 PM EDT    Indication: dizziness    Comparison: 8/1/2024    Findings:  Heart size normal. Status post sternotomy and CABG. Lungs are without consolidation. Negative for pneumothorax or pleural effusion. Osseous structures appear intact.      Impression:      Impression:  No acute process.      Electronically Signed: Pantera Danielson MD    8/19/2024 1:01 PM EDT    Workstation ID: [id number]              AMAYA Alcocer  08/19/24  13:26 EDT       EMR Dragon/Transcription:   \"Dictated utilizing Dragon dictation\".                 Electronically signed by AMAYA Alcocer, 08/19/24, 1:26 PM EDT.  Copied text in this note has been reviewed by me and is accurate as of 08/19/24.    Cardiology attending:  Seen and examined.  Chart and labs reviewed. Independent interpretations of cardiac testing was performed. History and exam findings are verified with above changes noted.  Assessment and plan notated by APC after being formulated by attending consultant.  Note that greater than 50% of the time spent in care of the patient was provided by attending consultant.    Contacted by heart failure clinic.  Patient was sent to the emergency department for evaluation for rapid atrial fibrillation.  Patient was in for a lab check and was noted to have rapid atrial fibrillation.  He reports that he was feeling fine until this past weekend when he was rolling up his water hose at home.  He noted that his heart rate got very fast and his LifeVest alarmed that he was going to be delivered a shock.  He stopped doing the physical labor and diverted the shock.     Patient was evaluated by me in the emergency department.  He was in rapid atrial fibrillation.  We administered Lopressor IV which brought patient's heart rate down from the 140s to the high 90s low 100s.  Patient had been on metoprolol succinate.  Decision was made to change the patient by heart failure clinic to carvedilol to " try and optimize LVEF.  Patient was advised by his local pharmacy of potential drug interactions so he did not take the carvedilol nor the metoprolol for the past several days.  We will place the patient back on his metoprolol.  He was on 100 mg daily.  Cardiology status appears appropriate for discharge when okay with other services.    Physical Exam:    General: Alert, cooperative, no distress, appears stated age  Head:  Normocephalic, atraumatic, mucous membranes moist  Eyes:  Conjunctivae/corneas clear, EOM's intact     Neck:  Supple,  no bruit  Lungs:            Coarse and diminished bilaterally  Chest wall: No tenderness  Heart::  Irregular rate and rhythm, S1 and S2 normal, 1/6 holosystolic murmur.  No rub or gallop  Abdomen: Soft, nontender, nondistended bowel sounds active  Extremities: No cyanosis, clubbing, or edema  Pulses: Diminished pedal pulses  Skin:  No rashes or lesions  Neuro/psych: A&O x3. CN II through XII are grossly intact with appropriate affect

## 2024-08-19 NOTE — DISCHARGE INSTRUCTIONS
Follow-up with your cardiologist.  Return to the emergency room for any new or worsening symptoms or if you have any other questions or concerns.  Restart your metoprolol 100 mg as previously prescribed.

## 2024-08-20 ENCOUNTER — TELEPHONE (OUTPATIENT)
Dept: ONCOLOGY | Facility: CLINIC | Age: 64
End: 2024-08-20
Payer: OTHER GOVERNMENT

## 2024-08-20 LAB
QT INTERVAL: 292 MS
QTC INTERVAL: 409 MS

## 2024-08-22 ENCOUNTER — OFFICE VISIT (OUTPATIENT)
Dept: CARDIAC REHAB | Facility: HOSPITAL | Age: 64
End: 2024-08-22
Payer: OTHER GOVERNMENT

## 2024-08-22 DIAGNOSIS — I50.42 CHRONIC COMBINED SYSTOLIC AND DIASTOLIC CONGESTIVE HEART FAILURE: ICD-10-CM

## 2024-08-22 LAB
QT INTERVAL: 287 MS
QTC INTERVAL: 443 MS

## 2024-08-22 PROCEDURE — 93798 PHYS/QHP OP CAR RHAB W/ECG: CPT

## 2024-08-27 ENCOUNTER — TELEPHONE (OUTPATIENT)
Dept: ONCOLOGY | Facility: CLINIC | Age: 64
End: 2024-08-27
Payer: OTHER GOVERNMENT

## 2024-08-28 ENCOUNTER — OFFICE VISIT (OUTPATIENT)
Dept: CARDIOLOGY | Facility: CLINIC | Age: 64
End: 2024-08-28
Payer: OTHER GOVERNMENT

## 2024-08-28 VITALS
HEART RATE: 110 BPM | DIASTOLIC BLOOD PRESSURE: 62 MMHG | HEIGHT: 72 IN | WEIGHT: 194 LBS | SYSTOLIC BLOOD PRESSURE: 102 MMHG | BODY MASS INDEX: 26.28 KG/M2 | OXYGEN SATURATION: 97 %

## 2024-08-28 DIAGNOSIS — I10 PRIMARY HYPERTENSION: Chronic | ICD-10-CM

## 2024-08-28 DIAGNOSIS — Z95.818 PRESENCE OF WATCHMAN LEFT ATRIAL APPENDAGE CLOSURE DEVICE: Chronic | ICD-10-CM

## 2024-08-28 DIAGNOSIS — I25.810 CORONARY ARTERY DISEASE INVOLVING CORONARY BYPASS GRAFT OF NATIVE HEART, UNSPECIFIED WHETHER ANGINA PRESENT: Chronic | ICD-10-CM

## 2024-08-28 DIAGNOSIS — I25.5 ISCHEMIC CARDIOMYOPATHY: Primary | ICD-10-CM

## 2024-08-28 DIAGNOSIS — Z79.02 LONG TERM (CURRENT) USE OF ANTITHROMBOTICS/ANTIPLATELETS: ICD-10-CM

## 2024-08-28 NOTE — PROGRESS NOTES
Cardiology Office Visit      Encounter Date:  08/28/2024    Patient ID:   Chinedu Lopez is a 63 y.o. male.    Reason For Followup:  Coronary artery disease  Atrial fibrillation    Brief Clinical History:  Dear Dr. Clark, AMAYA Shah    I had the pleasure of seeing Chinedu Lopez today. As you are well aware, this is a 63 y.o. male with a known history of ischemic heart disease. He additionally has a history of paroxysmal atrial fibrillation status post watchman placement, dyslipidemia, hypertension with hypertensive cardiovascular disease,  tobacco abuse disorder and antiplatelet therapy. He presents today for followup on the above conditions.     Interval History:  He denies chest pain, pressure, heaviness or tightness.  He denies any shortness of breath out of character.  He denies any PND, orthopnea or palpitations.  He denies any syncope or near-syncope.  He reports feeling well from a cardiac perspective.      02/28/2024        He did not get stress test done because he had a GI procedure very close to the stress so he cancelled. Now his GI procedure is scheduled out May. Will reschedule stress test. He continues to have issues with his hands being sore and stiff.    08/28/2024        He was hospitalized in July/August 2024 with a NSTEMI. He underwent PCI ANA SVG-OMB. He was found to have ischemic cardiomyopathy and was placed in a lifevest. About 2 weeks after discharge, he returned with rapid atrial fibrillation. He was given negative chonotropic agents and his heart rate was controlled and he was sent home from the ER. There was some confusion about his beta blocker but he is back on metoprolol currently.He has an appointment with EP in the coming weeks for afib management and possible ICD evaluation.    He is on GDMT with Entresto, Toprol, and aldactone. His heart rate is poorly controlled at present even with Toprol and digoxin.     He feels fine today after he had discontinued his Ranexa,  "isosorbide, and decreased his diuretic.      Assessment & Plan     Impressions:  Coronary artery disease status post coronary arterial bypass grafting and subsequent PCI and stenting.      Most recent catheterization in May of 2016 demonstrating no disease amenable to percutaneous or surgical revascularization.  Paroxysmal atrial fibrillation status post watchman implantation  Hypertension with hypertensive cardiovascular disease.  Dyslipidemia.   Antiplatelet therapy.  Tobacco abuse disorder  Renal insufficiency     Recommendations:  Continuation of his current cardiovascular regimen at present time.     This includes antiplatelet, antihypertensive, statin, and antianginals.  Follow-up with nephrology   Follow-up with EP as scheduled  Follow-up in 3 months time sooner should there be difficulties  Smoking cessation.    Diagnoses and all orders for this visit:    1. Ischemic cardiomyopathy (Primary)  -     Adult Transthoracic Echo Complete W/ Cont if Necessary Per Protocol; Future    2. Coronary artery disease involving coronary bypass graft of native heart, unspecified whether angina present  Overview:  A.  s/p 2000 CABG X 3   B. subsequent PCI\stents            I. s/p 2006 PCI/stent x 3 sequentially placed promimal and mid SVG - OM1           II  s/p Aug 2012 RCA mid and proximal- Xience stent x3.           III. Had NSTEMI 30 July 2024                   1. s/p 7/31/24 PCI and stenting of the midportion of  SVG to OM1                                A. w/ a Xience ANA 3.0 x 18 mm   C.  Significantly reduced biventricular function      3. Primary hypertension    4. Presence of Watchman left atrial appendage closure device    5. Long term (current) use of antithrombotics/antiplatelets              Objective:    Vitals:  Vitals:    08/28/24 1325   BP: 102/62   BP Location: Left arm   Pulse: 110   SpO2: 97%   Weight: 88 kg (194 lb)   Height: 182.9 cm (72\")         Body mass index is 26.31 kg/m².      Physical " Exam:    General: Alert, cooperative, no distress, appears stated age  Head:  Normocephalic, atraumatic, mucous membranes moist  Eyes:  Conjunctiva/corneas clear, EOM's intact     Neck:  Supple,  no bruit    Lungs: Clear to auscultation bilaterally, no wheezes rhonchi rales are noted  Chest wall: No tenderness  Heart::  Regular rate and rhythm, S1 and S2 normal, 1/6 holosystolic murmur.  No rub or gallop  Abdomen: Soft, non-tender, nondistended bowel sounds active  Extremities: No cyanosis, clubbing, or edema  Pulses: 2+ and symmetric all extremities  Skin:  No rashes or lesions  Neuro/psych: A&O x3. CN II through XII are grossly intact with appropriate affect      Allergies:  No Known Allergies    Medication Review:     Current Outpatient Medications:     amantadine (SYMMETREL) 100 MG capsule, Take 1 capsule by mouth Daily., Disp: , Rfl:     aspirin 81 MG EC tablet, Take 1 tablet by mouth Daily., Disp: , Rfl:     atorvastatin (Lipitor) 40 MG tablet, Take 1 tablet by mouth Daily., Disp: 30 tablet, Rfl: 0    bumetanide (BUMEX) 1 MG tablet, Take 1 tablet by mouth 2 (Two) Times a Day. (Patient taking differently: Take 0.5 tablets by mouth 2 (Two) Times a Day. Take second dose by 6 pm every evening), Disp: 180 tablet, Rfl: 2    Cholecalciferol (VITAMIN D3) 125 MCG (5000 UT) capsule capsule, Take 1 capsule by mouth Daily., Disp: , Rfl:     Cyanocobalamin (VITAMIN B 12) 500 MCG tablet, Take 1 tablet by mouth Daily., Disp: , Rfl:     digoxin (Lanoxin) 125 MCG tablet, Take 1 tablet by mouth Daily., Disp: 30 tablet, Rfl: 0    empagliflozin (JARDIANCE) 10 MG tablet tablet, Take 1 tablet by mouth Daily. (Patient taking differently: Take 0.5 tablets by mouth 2 (Two) Times a Day.), Disp: 30 tablet, Rfl: 0    famotidine (PEPCID) 40 MG tablet, Take 1 tablet by mouth Daily., Disp: , Rfl:     folic acid (FOLVITE) 1 MG tablet, Take 1 tablet by mouth Daily., Disp: , Rfl:     guaiFENesin (MUCINEX) 600 MG 12 hr tablet, Take 1 tablet by  mouth Daily., Disp: , Rfl:     metFORMIN (GLUCOPHAGE) 1000 MG tablet, Take 1 tablet by mouth 2 (Two) Times a Day With Meals., Disp: , Rfl:     metoprolol succinate XL (Toprol XL) 100 MG 24 hr tablet, Take 1 tablet by mouth Daily., Disp: 30 tablet, Rfl: 0    nitroglycerin (NITROSTAT) 0.4 MG SL tablet, Place 1 tablet under the tongue Every 5 (Five) Minutes As Needed for Chest Pain. Take no more than 3 doses in 15 minutes., Disp: , Rfl:     pantoprazole (PROTONIX) 40 MG EC tablet, Take 1 tablet by mouth 2 (Two) Times a Day., Disp: , Rfl:     potassium chloride (KLOR-CON M20) 20 MEQ CR tablet, Take 1 tablet by mouth Daily., Disp: 90 tablet, Rfl: 2    sacubitril-valsartan (Entresto) 24-26 MG tablet, Take 1 tablet by mouth 2 (Two) Times a Day. Indications: Cardiac Failure, Disp: 180 tablet, Rfl: 3    spironolactone (ALDACTONE) 25 MG tablet, Take 0.5 tablets by mouth Daily., Disp: 30 tablet, Rfl: 0    ticagrelor (BRILINTA) 90 MG tablet tablet, Take 1 tablet by mouth Every 12 (Twelve) Hours., Disp: 60 tablet, Rfl: 0    Family History:  Family History   Problem Relation Age of Onset    Diabetes Mother     Heart attack Father     Diabetes Father     Other Sister         MRSA    Heart disease Other         Positive for Ischemic    Cancer Other     Hypertension Other        Past Medical History:  Past Medical History:   Diagnosis Date    Atrial fibrillation     June, [facility]. Abstracted from [facility].    Brain bleed 2019 October 14th [facility] on 17th for 3 weeks    CAD (coronary artery disease)     S/P CABG and PCI. Abstracted from [facility].    Hyperlipidemia     Hypertension     Myocardial infarction 2006    Non-ST elevation MI. Abstracted from [facility].    Obstructive sleep apnea on CPAP     At . Abstracted from [facility].    Presence of Watchman left atrial appendage closure device 01/31/2020    Stroke 2019    TIA (transient ischemic attack) 11/19/2013    Possible TIA- MRI shows small tumor/head. Abstracted  from [facility].       Past Surgical History:  Past Surgical History:   Procedure Laterality Date    ANGIOPLASTY  2006    3 srents were sequentially placed in promimal and mid body of the saphennous vein graft to obtuse marginal branch. Abstracted from [facility].    ATRIAL APPENDAGE EXCLUSION LEFT WITH TRANSESOPHAGEAL ECHOCARDIOGRAM N/A 01/30/2020    Procedure: Atrial Appendage Occlusion;  Surgeon: Angel Willams MD;  Location: [facility] CATH INVASIVE LOCATION;  Service: Cardiovascular    ATRIAL APPENDAGE EXCLUSION LEFT WITH TRANSESOPHAGEAL ECHOCARDIOGRAM N/A 01/30/2020    Procedure: Atrial Appendage Occlusion;  Surgeon: John Sheridan MD;  Location: [facility] CATH INVASIVE LOCATION;  Service: Cardiology    CARDIAC CATHETERIZATION  2000 2003, 2006, 2012. Abstracted from [facility].    CARDIAC CATHETERIZATION N/A 07/31/2024    Procedure: Left Heart Cath possible PCI;  Surgeon: José Miguel Izquierdo MD;  Location: [facility] CATH INVASIVE LOCATION;  Service: Cardiovascular;  Laterality: N/A;    CARDIAC CATHETERIZATION N/A 07/31/2024    Procedure: Stent ANA coronary;  Surgeon: José Miguel Izquierdo MD;  Location: [facility] CATH INVASIVE LOCATION;  Service: Cardiovascular;  Laterality: N/A;    CARDIAC CATHETERIZATION N/A 07/31/2024    Procedure: Percutaneous Coronary Intervention;  Surgeon: José Miguel Izquierdo MD;  Location: [facility] CATH INVASIVE LOCATION;  Service: Cardiovascular;  Laterality: N/A;    CORONARY ARTERY BYPASS GRAFT  2000    Triple. Abstracted from [facility].    MUSCLE REPAIR      Tendons and nerves in right lower extremity. Abstracted from [facility].    OTHER SURGICAL HISTORY  08/2012    RCA mid and proximal- Xience stent x3. Abstracted from [facility].    OTHER SURGICAL HISTORY  09/2016    Head trauma/bleed at [facility]. Abstracted from [facility].       Social History:  Social History     Socioeconomic History    Marital status:    Tobacco Use    Smoking status: Every Day     Current  packs/day: 1.00     Average packs/day: 1 pack/day for 40.7 years (40.7 ttl pk-yrs)     Types: Cigarettes     Start date: 1984     Passive exposure: Current    Smokeless tobacco: Never    Tobacco comments:     Smoking cessation--encouraged---refuses patch, gum or chantix   Vaping Use    Vaping status: Never Used   Substance and Sexual Activity    Alcohol use: Not Currently    Drug use: No    Sexual activity: Yes     Partners: Female     Birth control/protection: None       Review of Systems:  The following systems were reviewed as they relate to the cardiovascular system: Constitutional, Eyes, ENT, Cardiovascular, Respiratory, Gastrointestinal, Integumentary, Neurological, Psychiatric, Hematologic, Endocrine, Musculoskeletal, and Genitourinary. The pertinent cardiovascular findings are reported above with all other cardiovascular points within those systems being negative.    Diagnostic Study Review:     Current Electrocardiogram:  Procedures no new EKG.  EKG dated 19 August 2024 demonstrates atrial fibrillation with a ventricular rate of 118 bpm.    Laboratory Data:  Lab Results   Component Value Date    GLUCOSE 187 (H) 08/19/2024    GLUCOSE 185 (H) 08/19/2024    BUN 26 (H) 08/19/2024    BUN 26 (H) 08/19/2024    CREATININE 1.77 (H) 08/19/2024    CREATININE 1.80 (H) 08/19/2024    EGFRIFNONA 93 01/29/2020    BCR 14.7 08/19/2024    BCR 14.4 08/19/2024    K 4.9 08/19/2024    K 4.9 08/19/2024    CO2 24.1 08/19/2024    CO2 21.8 (L) 08/19/2024    CALCIUM 10.3 08/19/2024    CALCIUM 10.2 08/19/2024    PROTENTOTREF 6.0 07/30/2024    ALBUMIN 4.7 08/19/2024    LABIL2 1.1 07/30/2024    AST 16 08/19/2024    ALT 11 08/19/2024     Lab Results   Component Value Date    GLUCOSE 187 (H) 08/19/2024    GLUCOSE 185 (H) 08/19/2024    CALCIUM 10.3 08/19/2024    CALCIUM 10.2 08/19/2024     08/19/2024     08/19/2024    K 4.9 08/19/2024    K 4.9 08/19/2024    CO2 24.1 08/19/2024    CO2 21.8 (L) 08/19/2024    CL 99 08/19/2024    CL  100 08/19/2024    BUN 26 (H) 08/19/2024    BUN 26 (H) 08/19/2024    CREATININE 1.77 (H) 08/19/2024    CREATININE 1.80 (H) 08/19/2024    EGFRIFNONA 93 01/29/2020    BCR 14.7 08/19/2024    BCR 14.4 08/19/2024    ANIONGAP 14.9 08/19/2024    ANIONGAP 16.2 (H) 08/19/2024     Lab Results   Component Value Date    WBC 6.17 08/19/2024    HGB 11.6 (L) 08/19/2024    HCT 41.5 08/19/2024    MCV 75.6 (L) 08/19/2024     08/19/2024     Lab Results   Component Value Date    CHOL 124 08/01/2024    TRIG 84 08/01/2024    HDL 47 08/01/2024    LDL 61 08/01/2024     Lab Results   Component Value Date    HGBA1C 5.92 (H) 08/14/2024     Lab Results   Component Value Date    INR 1.05 08/19/2024    INR 1.73 (L) 01/31/2020    INR 1.71 (L) 01/29/2020    PROTIME 11.4 08/19/2024    PROTIME 16.8 (L) 01/31/2020    PROTIME 16.6 (L) 01/29/2020       Most Recent Echo:  Results for orders placed during the hospital encounter of 07/30/24    Adult Transthoracic Echo Complete W/ Cont if Necessary Per Protocol    Interpretation Summary    Left ventricular systolic function is severely decreased. Left ventricular ejection fraction appears to be 26 - 30%.    The left ventricular cavity is moderately dilated.    Left ventricular wall thickness is consistent with mild concentric hypertrophy.    Left ventricular diastolic function is consistent with (grade I) impaired relaxation.    Moderately reduced right ventricular systolic function noted.    The right ventricular cavity is moderately dilated.    The left atrial cavity is moderately dilated.    Estimated right ventricular systolic pressure from tricuspid regurgitation is normal (<35 mmHg).       Most Recent Stress Test:  Results for orders placed during the hospital encounter of 03/14/24    Stress Test With Myocardial Perfusion One Day    Interpretation Summary    Myocardial perfusion imaging indicates a small-sized infarct located in the apex with no significant ischemia noted.    Left ventricular  "ejection fraction is mildly reduced (Calculated EF = 40%).    Impressions are consistent with a low risk study.    Abnormal LV wall motion consistent with moderate hypokinesis and global hypokinesis.    There is no prior study available for comparison.    Findings consistent with an indeterminate ECG stress test.       Most Recent Cardiac Catheterization:   No results found for this or any previous visit.       NOTE:     Today,08/28/2024 ,the following portions of the patient's note were reviewed, confirmed and/or updated as appropriate: History of present illness/Interval history, physical examination, assessment & plan, allergies, current medications, past family history, past medical history, past social history, past surgical history and problem list.    Labs pertinent to today's visit on 08/28/2024 (including but not limited to CBC, CMP, and lipid profiles) were requested from the patient's primary care provider/hospital/clinical laboratory.  If the labs were available for the visit, they were reviewed with the patient.  If they were not available, when received, special interest will be made to the labs pertinent to this visit.  The patient's most recent \"in-house\" labs are noted below and have been reviewed.  Outside labs pertinent to this visit are scanned into the record and have been reviewed.    Discussions held today, 08/28/2024,regarding procedures included risk, benefits, and options including but not limited to: Death, MI, stroke, pain, bleeding, infection, and possible need for vascular/thoracic/cardiothoracic surgery.    Copied information within this note was reviewed and is current as of 08/28/2024.    Assessment and plan noted herein represents the current plan of care as of 08/28/2024.    Significant resources from our office and staff are inherent in engaging this patient today,08/28/2024,and in a continuous and active collaborative plan of care related to their chronic cardiovascular " conditions outlined below.  The management of these conditions requires the direction of our service with specialized clinical knowledge, skills, and experience.  This collaborative care includes but is not limited to patient education, expectations and responsibilities, shared decision making around therapeutic goals, and shared commitments to achieve those goals.

## 2024-08-28 NOTE — PATIENT INSTRUCTIONS
Call to scheduled appointment with Dr Do  Follow-up with Imer as scheduled.  Follow-up with Nomi 3 months  2-D echo 1st week of November

## 2024-08-29 ENCOUNTER — TELEPHONE (OUTPATIENT)
Dept: CARDIOLOGY | Facility: CLINIC | Age: 64
End: 2024-08-29
Payer: OTHER GOVERNMENT

## 2024-08-29 NOTE — TELEPHONE ENCOUNTER
----- Message from Sophia WANG sent at 8/29/2024  1:21 PM EDT -----  Can you schedule this pt soon?  ----- Message -----  From: Angel Willams MD  Sent: 8/28/2024   5:31 PM EDT  To: Sophia Bradford MA    Please bring this  patient to my office      DR HESTER  ----- Message -----  From: Renny Mosher DO  Sent: 8/28/2024   1:51 PM EDT  To: MD Tavo Christiney, this patient was in the hospital with a NSTEMI and had PCI of a vein graft in July/August. He has CHF and is in a lifevest. He has afib that is poorly controlled. Is there a role for AF ablation sooner rather than later in this patient or does he need to wait until he has his follow-up echo and then get ICD and then ablation??    Thanks

## 2024-08-30 ENCOUNTER — TREATMENT (OUTPATIENT)
Dept: CARDIAC REHAB | Facility: HOSPITAL | Age: 64
End: 2024-08-30
Payer: OTHER GOVERNMENT

## 2024-08-30 ENCOUNTER — HOSPITAL ENCOUNTER (OUTPATIENT)
Dept: ONCOLOGY | Facility: HOSPITAL | Age: 64
Discharge: HOME | End: 2024-08-30
Payer: OTHER GOVERNMENT

## 2024-08-30 ENCOUNTER — TELEPHONE (OUTPATIENT)
Dept: ONCOLOGY | Facility: CLINIC | Age: 64
End: 2024-08-30

## 2024-08-30 VITALS
DIASTOLIC BLOOD PRESSURE: 76 MMHG | SYSTOLIC BLOOD PRESSURE: 125 MMHG | HEIGHT: 72 IN | BODY MASS INDEX: 27.01 KG/M2 | OXYGEN SATURATION: 99 % | WEIGHT: 199.4 LBS | TEMPERATURE: 98 F | RESPIRATION RATE: 18 BRPM | HEART RATE: 66 BPM

## 2024-08-30 DIAGNOSIS — I50.42 CHRONIC COMBINED SYSTOLIC AND DIASTOLIC CONGESTIVE HEART FAILURE: Primary | ICD-10-CM

## 2024-08-30 DIAGNOSIS — T45.4X5D ADVERSE EFFECT OF IRON, SUBSEQUENT ENCOUNTER: Primary | ICD-10-CM

## 2024-08-30 DIAGNOSIS — D50.9 IRON DEFICIENCY ANEMIA, UNSPECIFIED IRON DEFICIENCY ANEMIA TYPE: ICD-10-CM

## 2024-08-30 PROCEDURE — 25810000003 SODIUM CHLORIDE 0.9 % SOLUTION 250 ML FLEX CONT: Performed by: INTERNAL MEDICINE

## 2024-08-30 PROCEDURE — 25810000003 SODIUM CHLORIDE 0.9 % SOLUTION: Performed by: INTERNAL MEDICINE

## 2024-08-30 PROCEDURE — 25010000002 DIPHENHYDRAMINE PER 50 MG: Performed by: INTERNAL MEDICINE

## 2024-08-30 PROCEDURE — 96375 TX/PRO/DX INJ NEW DRUG ADDON: CPT

## 2024-08-30 PROCEDURE — 93798 PHYS/QHP OP CAR RHAB W/ECG: CPT

## 2024-08-30 PROCEDURE — 25010000002 NA FERRIC GLUC CPLX PER 12.5 MG: Performed by: INTERNAL MEDICINE

## 2024-08-30 PROCEDURE — 96366 THER/PROPH/DIAG IV INF ADDON: CPT

## 2024-08-30 PROCEDURE — 96365 THER/PROPH/DIAG IV INF INIT: CPT

## 2024-08-30 PROCEDURE — 96367 TX/PROPH/DG ADDL SEQ IV INF: CPT

## 2024-08-30 RX ORDER — SODIUM CHLORIDE 9 MG/ML
20 INJECTION, SOLUTION INTRAVENOUS ONCE
Status: COMPLETED | OUTPATIENT
Start: 2024-08-30 | End: 2024-08-30

## 2024-08-30 RX ORDER — FAMOTIDINE 10 MG/ML
20 INJECTION, SOLUTION INTRAVENOUS ONCE
Status: COMPLETED | OUTPATIENT
Start: 2024-08-30 | End: 2024-08-30

## 2024-08-30 RX ORDER — ACETAMINOPHEN 325 MG/1
650 TABLET ORAL ONCE
Status: COMPLETED | OUTPATIENT
Start: 2024-08-30 | End: 2024-08-30

## 2024-08-30 RX ORDER — ATORVASTATIN CALCIUM 40 MG/1
40 TABLET, FILM COATED ORAL DAILY
Qty: 90 TABLET | Refills: 3 | Status: SHIPPED | OUTPATIENT
Start: 2024-08-30

## 2024-08-30 RX ADMIN — DIPHENHYDRAMINE HYDROCHLORIDE 25 MG: 50 INJECTION, SOLUTION INTRAMUSCULAR; INTRAVENOUS at 10:59

## 2024-08-30 RX ADMIN — FAMOTIDINE 20 MG: 10 INJECTION, SOLUTION INTRAVENOUS at 10:55

## 2024-08-30 RX ADMIN — SODIUM CHLORIDE 20 ML/HR: 9 INJECTION, SOLUTION INTRAVENOUS at 10:53

## 2024-08-30 RX ADMIN — SODIUM CHLORIDE 250 MG: 9 INJECTION, SOLUTION INTRAVENOUS at 11:25

## 2024-08-30 RX ADMIN — ACETAMINOPHEN 650 MG: 325 TABLET ORAL at 10:51

## 2024-08-30 NOTE — PROGRESS NOTES
Pt here for C1d1 of Ferrlecit. IV started in RAC #22g. Pt denies any questions at this time. Pt requesting to leave at 215. Pt has appt with cardiac rehab at 230. Explained to pt if any side effects develop to go to the nearest er. Christine Connelly notified.

## 2024-08-30 NOTE — PROGRESS NOTES
HEMATOLOGY ONCOLOGY OUTPATIENT FOLLOW UP       Patient name: Chinedu Lopez  : 1960  MRN: [id number]  Primary Care Physician: Nhung Clark APRN  Referring Physician: Nhung Clark,*  Reason For Consult:         History of Present Illness:  Chinedu Lopez is a 63 y.o. male who presented to [facility] on 2024 with complaints of shortness of breath.  Past medical history of A-fib status post Watchman procedure, hypertension, hyperlipidemia, ischemic cardiomyopathy, CAD status post CABG and on low-dose aspirin, Thorpe's esophagus.  Presented as a transfer from an outside facility with complaints of acute onset chest pain that started the prior evening.  He stated he was preparing to go back to bed after brushing his teeth and started having left-sided sudden onset chest pain, felt like his previous heart attack.  Started to radiate to his left arm which made him concerned.  He was noted to be in A-fib RVR with elevated troponin.  He was started on a Cardizem drip and heparin drip and cardiology was consulted and recommended transfer to our facility for further management.  Patient admitted to having symptoms of chest pain intermittently and shortness of breath intermittently over the past 2 weeks.  He went to see his PCP and he had his medications adjusted and was started on diuretics due to swelling of his bilateral lower extremities and abdominal area.  He also gained approximately 30 pounds of weight over the past few days.  He denied any increased sodium intake.  He had a stress test in 2024 which was okay.  He had also been found to be anemic on recent lab work for which she had underwent an EGD and colonoscopy in May 2024 which was normal according to him.  He reported that he was supposed to see an hematologist as an outpatient the day of admission.     24  Hematology/Oncology was consulted for iron deficiency anemia.  At the time of the consultation  patient with a WBC of 4.62, hemoglobin 7.5 g/dL, MCV 73.1, platelets 226,000, iron 13, iron sat 3%, TIBC 416.  Review of the chart shows labs from his PCP office dated 1/3/2024 and showing a WBC 6.2, hemoglobin 10.1 g/dL, MCV 75.2, platelets 171,000, 1+ anisocytosis, 1+ microcytes, 2+ hypochromia, 1+ elliptocytes, 1+ teardrop cells.  A stool for occult blood dated 12/20/2023 was negative.  A baseline CBC from 2020 showed normal hemoglobin.    7/30/2024 anemia workup:  Ferritin 22.80, iron 13, iron sat 3%, TIBC 416  Vitamin B12 569, folate greater than 20  Haptoglobin 241,   No observed M spike    7/30/2024-ferric gluconate 125 mg IV  8/30/2024-ferric gluconate 250 mg IV     He/She  has a past medical history of Atrial fibrillation, Brain bleed (2019), CAD (coronary artery disease), Hyperlipidemia, Hypertension, Myocardial infarction (2006), Obstructive sleep apnea on CPAP, Presence of Watchman left atrial appendage closure device (1/31/2020), Stroke (2019), and TIA (transient ischemic attack) (11/19/2013).      Subjective:  Chinedu is here today for his hospital follow-up.  He reports that he has been improving since his discharge.  He has already began receiving additional IV iron replacement here in our office and reports he has 2 additional IV treatments to go.  He continue to follow-up with his cardiologist and with cardiac rehab for his recent MI.  He notes that he has follow-up scheduled with his gastroenterologist today after this appointment.  He denies any current signs or symptoms of bleeding.  States that he does bruise easily and that he has bruises on his arms from his recent hospital stay.  He does have fatigue but reports this has been improving and he is even been able to get out and do some activity in his yard.    Past Medical History:   Diagnosis Date    Atrial fibrillation     June, H. Abstracted from [facility].    Brain bleed 2019 October 14th fell - Hospital on 17th for 3 weeks     CAD (coronary artery disease)     S/P CABG and PCI. Abstracted from [facility].    Hyperlipidemia     Hypertension     Myocardial infarction 2006    Non-ST elevation MI. Abstracted from [facility].    Obstructive sleep apnea on CPAP     At HS. Abstracted from [facility].    Presence of Watchman left atrial appendage closure device 01/31/2020    Stroke 2019    TIA (transient ischemic attack) 11/19/2013    Possible TIA- MRI shows small tumor/head. Abstracted from [facility].       Past Surgical History:   Procedure Laterality Date    ANGIOPLASTY  2006    3 srents were sequentially placed in promimal and mid body of the saphennous vein graft to obtuse marginal branch. [facility]    ATRIAL APPENDAGE EXCLUSION LEFT WITH TRANSESOPHAGEAL ECHOCARDIOGRAM N/A 01/30/2020    Procedure: Atrial Appendage Occlusion;  Surgeon: Angel Willams MD;  Location: [facility] CATH INVASIVE LOCATION;  Service: Cardiovascular    ATRIAL APPENDAGE EXCLUSION LEFT WITH TRANSESOPHAGEAL ECHOCARDIOGRAM N/A 01/30/2020    Procedure: Atrial Appendage Occlusion;  Surgeon: John Sheridan MD;  Location: [facility] CATH INVASIVE LOCATION;  Service: Cardiology    CARDIAC CATHETERIZATION  2000 2003, 2006, 2012. [facility]    CARDIAC CATHETERIZATION N/A 07/31/2024    Procedure: Left Heart Cath possible PCI;  Surgeon: José Miguel Izquierdo MD;  Location: [facility] CATH INVASIVE LOCATION;  Service: Cardiovascular;  Laterality: N/A;    CARDIAC CATHETERIZATION N/A 07/31/2024    Procedure: Stent ANA coronary;  Surgeon: José Miguel Izquierdo MD;  Location: [facility] CATH INVASIVE LOCATION;  Service: Cardiovascular;  Laterality: N/A;    CARDIAC CATHETERIZATION N/A 07/31/2024    Procedure: Percutaneous Coronary Intervention;  Surgeon: José Miguel Izquierdo MD;  Location: [facility] CATH INVASIVE LOCATION;  Service: Cardiovascular;  Laterality: N/A;    CORONARY ARTERY BYPASS GRAFT  2000    Triple. Abstracted from [facility].     MUSCLE REPAIR      Tendons and nerves in right lower extremity. Abstracted from [facility].    OTHER SURGICAL HISTORY  08/2012    RCA mid and proximal- Xience stent x3. Abstracted from [facility].    OTHER SURGICAL HISTORY  09/2016    Head trauma/bleed at [facility]. Abstracted from [facility].         Current Outpatient Medications:     amantadine (SYMMETREL) 100 MG capsule, Take 1 capsule by mouth Daily., Disp: , Rfl:     aspirin 81 MG EC tablet, Take 1 tablet by mouth Daily., Disp: , Rfl:     atorvastatin (Lipitor) 40 MG tablet, Take 1 tablet by mouth Daily., Disp: 90 tablet, Rfl: 3    bumetanide (BUMEX) 1 MG tablet, Take 1 tablet by mouth 2 (Two) Times a Day. (Patient taking differently: Take 0.5 tablets by mouth 2 (Two) Times a Day. Take second dose by 6 pm every evening), Disp: 180 tablet, Rfl: 2    Cholecalciferol (VITAMIN D3) 125 MCG (5000 UT) capsule capsule, Take 1 capsule by mouth Daily., Disp: , Rfl:     Cyanocobalamin (VITAMIN B 12) 500 MCG tablet, Take 1 tablet by mouth Daily., Disp: , Rfl:     digoxin (Lanoxin) 125 MCG tablet, Take 1 tablet by mouth Daily., Disp: 30 tablet, Rfl: 0    digoxin (Lanoxin) 125 MCG tablet, Take 1 tablet by mouth Daily., Disp: 30 tablet, Rfl: 0    empagliflozin (JARDIANCE) 10 MG tablet tablet, Take 1 tablet by mouth Daily. (Patient taking differently: Take 0.5 tablets by mouth 2 (Two) Times a Day.), Disp: 30 tablet, Rfl: 0    famotidine (PEPCID) 40 MG tablet, Take 1 tablet by mouth Daily., Disp: , Rfl:     folic acid (FOLVITE) 1 MG tablet, Take 1 tablet by mouth Daily., Disp: , Rfl:     guaiFENesin (MUCINEX) 600 MG 12 hr tablet, Take 1 tablet by mouth Daily., Disp: , Rfl:     metFORMIN (GLUCOPHAGE) 1000 MG tablet, Take 1 tablet by mouth 2 (Two) Times a Day With Meals., Disp: , Rfl:     metoprolol succinate XL (Toprol XL) 100 MG 24 hr tablet, Take 1 tablet by mouth Daily., Disp: 30 tablet, Rfl: 0    nitroglycerin (NITROSTAT) 0.4 MG SL tablet, Place 1 tablet under the tongue Every 5  "(Five) Minutes As Needed for Chest Pain. Take no more than 3 doses in 15 minutes., Disp: , Rfl:     pantoprazole (PROTONIX) 40 MG EC tablet, Take 1 tablet by mouth 2 (Two) Times a Day., Disp: , Rfl:     potassium chloride (KLOR-CON M20) 20 MEQ CR tablet, Take 1 tablet by mouth Daily., Disp: 90 tablet, Rfl: 2    sacubitril-valsartan (Entresto) 24-26 MG tablet, Take 1 tablet by mouth 2 (Two) Times a Day. Indications: Cardiac Failure, Disp: 180 tablet, Rfl: 3    spironolactone (ALDACTONE) 25 MG tablet, Take 0.5 tablets by mouth Daily., Disp: 30 tablet, Rfl: 0    ticagrelor (BRILINTA) 90 MG tablet tablet, Take 1 tablet by mouth Every 12 (Twelve) Hours., Disp: 180 tablet, Rfl: 3    No Known Allergies    Family History   Problem Relation Age of Onset    Diabetes Mother     Heart attack Father     Diabetes Father     Other Sister         MRSA    Heart disease Other         Positive for Ischemic    Cancer Other     Hypertension Other        Cancer-related family history includes Cancer in an other family member.    Social History     Tobacco Use    Smoking status: Every Day     Current packs/day: 1.00     Average packs/day: 1 pack/day for 40.7 years (40.7 ttl pk-yrs)     Types: Cigarettes     Start date: 1984     Passive exposure: Current    Smokeless tobacco: Never    Tobacco comments:     Smoking cessation--encouraged---refuses patch, gum or chantix   Vaping Use    Vaping status: Never Used   Substance Use Topics    Alcohol use: Not Currently    Drug use: No     Social History     Social History Narrative    Not on file        8/30/2024.    ROS:   Review of Systems   Constitutional:  Positive for fatigue.   Gastrointestinal:  Negative for blood in stool.   Genitourinary:  Negative for hematuria.   Hematological:  Bruises/bleeds easily.       Objective:  Vital signs:  Vitals:    09/03/24 1058   BP: 108/80   Pulse: 72   Resp: 18   Temp: 98.2 °F (36.8 °C)   SpO2: 100%   Weight: 89.4 kg (197 lb)   Height: 182.9 cm (72\") "   PainSc: 0-No pain     Body mass index is 26.72 kg/m².  ECOG  (1) Restricted in physically strenuous activity, ambulatory and able to do work of light nature    Physical Exam:   Physical Exam  Vitals reviewed.   Constitutional:       General: He is not in acute distress.     Appearance: Normal appearance.   HENT:      Head: Normocephalic and atraumatic.   Eyes:      Extraocular Movements: Extraocular movements intact.   Cardiovascular:      Rate and Rhythm: Normal rate. Rhythm irregular.   Pulmonary:      Effort: Pulmonary effort is normal. No respiratory distress.   Abdominal:      General: Bowel sounds are normal.      Palpations: Abdomen is soft.   Musculoskeletal:         General: Normal range of motion.      Cervical back: Normal range of motion.   Skin:     General: Skin is warm.   Neurological:      Mental Status: He is alert and oriented to person, place, and time.   Psychiatric:         Mood and Affect: Mood normal.         Behavior: Behavior normal.         Lab Results - Last 18 Months   Lab Units 09/03/24  1044 08/19/24  1257 08/14/24  0754   WBC 10*3/mm3 6.90 6.17 7.18   HEMOGLOBIN g/dL 11.9* 11.6* 10.8*   HEMATOCRIT % 42.4 41.5 40.7   PLATELETS 10*3/mm3 244 418 461*   MCV fL 77.8* 75.6* 78.7*     Lab Results - Last 18 Months   Lab Units 08/19/24  1043 08/14/24  0754 08/03/24  0420 07/31/24  1155 07/31/24  0508   SODIUM mmol/L 138  138 140 137   < > 139   POTASSIUM mmol/L 4.9  4.9 5.5* 3.7   < > 4.0   CHLORIDE mmol/L 100  99 105 100   < > 100   CO2 mmol/L 21.8*  24.1 22.6 27.8   < > 31.0*   BUN mg/dL 26*  26* 15 11   < > 9   CREATININE mg/dL 1.80*  1.77* 1.24 1.15   < > 1.10   CALCIUM mg/dL 10.2  10.3 10.1 9.3   < > 9.4   BILIRUBIN mg/dL 0.7  --   --   --  0.5   ALK PHOS U/L 46  --   --   --  36*   ALT (SGPT) U/L 11  --   --   --  5   AST (SGOT) U/L 16  --   --   --  19   GLUCOSE mg/dL 185*  187* 119* 96   < > 98    < > = values in this interval not displayed.       Lab Results   Component Value  "Date    GLUCOSE 187 (H) 08/19/2024    GLUCOSE 185 (H) 08/19/2024    BUN 26 (H) 08/19/2024    BUN 26 (H) 08/19/2024    CREATININE 1.77 (H) 08/19/2024    CREATININE 1.80 (H) 08/19/2024    EGFRIFNONA 93 01/29/2020    BCR 14.7 08/19/2024    BCR 14.4 08/19/2024    K 4.9 08/19/2024    K 4.9 08/19/2024    CO2 24.1 08/19/2024    CO2 21.8 (L) 08/19/2024    CALCIUM 10.3 08/19/2024    CALCIUM 10.2 08/19/2024    PROTENTOTREF 6.0 07/30/2024    ALBUMIN 4.7 08/19/2024    LABIL2 1.1 07/30/2024    AST 16 08/19/2024    ALT 11 08/19/2024       Lab Results - Last 18 Months   Lab Units 08/19/24  1257 08/03/24  0420 07/31/24  1155   INR  1.05  --   --    APTT seconds 28.4* 30.2* 80.2*       Lab Results   Component Value Date    IRON 13 (L) 07/30/2024    TIBC 416 07/30/2024    FERRITIN 22.80 (L) 07/30/2024       Lab Results   Component Value Date    FOLATE >20.00 07/30/2024       No results found for: \"OCCULTBLD\"    Lab Results   Component Value Date    RETICCTPCT 2.07 (H) 07/30/2024     Lab Results   Component Value Date    [id number] 569 07/30/2024     Lab Results   Component Value Date    SPEP Comment 07/30/2024     LDH   Date Value Ref Range Status   07/30/2024 150 135 - 225 U/L Final     Uric Acid   Date Value Ref Range Status   07/30/2024 5.7 3.4 - 7.0 mg/dL Final     No results found for: \"VIVIANA\", \"RF\", \"SEDRATE\"  Lab Results   Component Value Date    HAPTOGLOBIN 241 (H) 07/30/2024     Lab Results   Component Value Date    PTT 28.4 (L) 08/19/2024    INR 1.05 08/19/2024     No results found for: \"\"  No results found for: \"CEA\"  No components found for: \"CA-19-9\"  No results found for: \"PSA\"  No results found for: \"SEDRATE\"    Assessment & Plan     Iron deficiency anemia: Workup thus far consistent with iron deficiency anemia.  Per patient report he had an EGD and colonoscopy in May 2024 that was normal.  There are no records for review from the procedure.  There is a stool for occult blood on the chart from his PCP in early 2024 " that was negative. UA negative for blood.  Hemoglobin today has improved to 11.9 g/dL, MCV 77.8.  Reviewed anemia workup with the patient that was consistent with iron deficiency anemia.     PLAN  Reviewed anemia workup as above  Continue IV iron replacement as planned  Consulted with GI and no plans for repeat endoscopy at this time but consider video capsule endoscopy for iron deficiency anemia persists  Discussed with patient  Follow-up with Dr. Mcfarland in 6 weeks or sooner if needed      I spent 30 minutes caring for Chinedu on this date of service. This time includes time spent by me in the following activities:preparing for the visit, reviewing tests, obtaining and/or reviewing a separately obtained history, performing a medically appropriate examination and/or evaluation , counseling and educating the patient/family/caregiver, ordering medications, tests, or procedures, referring and communicating with other health care professionals , and documenting information in the medical record   Thank you very much for providing the opportunity to participate in this patient’s care. Please do not hesitate to call if there are any other questions.

## 2024-08-30 NOTE — TELEPHONE ENCOUNTER
Caller: KISHORE CORDERO    Relationship: Emergency Contact    Best call back number: [phone]    Who are you requesting to speak with (clinical staff, provider,  specific staff member): scheduling    Do you know the name of the person who called: kishore cordero - pt's spouse    What was the call regarding: pt's spouse is f/u in regards to ivig infusion appts. she advised that he was supposed to be scheduled for 08/30 @ 10am

## 2024-09-03 ENCOUNTER — OFFICE (OUTPATIENT)
Age: 64
End: 2024-09-03
Payer: COMMERCIAL

## 2024-09-03 ENCOUNTER — LAB (OUTPATIENT)
Dept: LAB | Facility: HOSPITAL | Age: 64
End: 2024-09-03
Payer: OTHER GOVERNMENT

## 2024-09-03 ENCOUNTER — OFFICE VISIT (OUTPATIENT)
Dept: ONCOLOGY | Facility: CLINIC | Age: 64
End: 2024-09-03
Payer: OTHER GOVERNMENT

## 2024-09-03 ENCOUNTER — OFFICE (OUTPATIENT)
Dept: URBAN - METROPOLITAN AREA CLINIC 64 | Facility: CLINIC | Age: 64
End: 2024-09-03
Payer: COMMERCIAL

## 2024-09-03 VITALS
HEART RATE: 74 BPM | SYSTOLIC BLOOD PRESSURE: 100 MMHG | HEART RATE: 74 BPM | HEIGHT: 72 IN | SYSTOLIC BLOOD PRESSURE: 100 MMHG | WEIGHT: 202.2 LBS | WEIGHT: 202.2 LBS | HEART RATE: 74 BPM | SYSTOLIC BLOOD PRESSURE: 100 MMHG | WEIGHT: 202.2 LBS | SYSTOLIC BLOOD PRESSURE: 100 MMHG | WEIGHT: 202.2 LBS | HEIGHT: 72 IN | DIASTOLIC BLOOD PRESSURE: 70 MMHG | HEART RATE: 74 BPM | DIASTOLIC BLOOD PRESSURE: 70 MMHG | DIASTOLIC BLOOD PRESSURE: 70 MMHG | DIASTOLIC BLOOD PRESSURE: 70 MMHG | WEIGHT: 202.2 LBS | SYSTOLIC BLOOD PRESSURE: 100 MMHG | HEART RATE: 74 BPM | HEIGHT: 72 IN | SYSTOLIC BLOOD PRESSURE: 100 MMHG | HEIGHT: 72 IN | HEIGHT: 72 IN | HEIGHT: 72 IN | WEIGHT: 202.2 LBS | DIASTOLIC BLOOD PRESSURE: 70 MMHG | SYSTOLIC BLOOD PRESSURE: 100 MMHG | DIASTOLIC BLOOD PRESSURE: 70 MMHG | HEART RATE: 74 BPM | HEART RATE: 74 BPM | WEIGHT: 202.2 LBS | DIASTOLIC BLOOD PRESSURE: 70 MMHG | HEIGHT: 72 IN

## 2024-09-03 VITALS
TEMPERATURE: 98.2 F | WEIGHT: 197 LBS | HEIGHT: 72 IN | SYSTOLIC BLOOD PRESSURE: 108 MMHG | RESPIRATION RATE: 18 BRPM | DIASTOLIC BLOOD PRESSURE: 80 MMHG | BODY MASS INDEX: 26.68 KG/M2 | HEART RATE: 72 BPM | OXYGEN SATURATION: 100 %

## 2024-09-03 DIAGNOSIS — D50.9 IRON DEFICIENCY ANEMIA, UNSPECIFIED: ICD-10-CM

## 2024-09-03 DIAGNOSIS — D50.9 IRON DEFICIENCY ANEMIA, UNSPECIFIED IRON DEFICIENCY ANEMIA TYPE: Primary | ICD-10-CM

## 2024-09-03 LAB
ALBUMIN SERPL-MCNC: 4.7 G/DL (ref 3.5–5.2)
ALBUMIN/GLOB SERPL: 1.7 G/DL
ALP SERPL-CCNC: 41 U/L (ref 39–117)
ALT SERPL W P-5'-P-CCNC: 12 U/L (ref 1–41)
ANION GAP SERPL CALCULATED.3IONS-SCNC: 16.6 MMOL/L (ref 5–15)
AST SERPL-CCNC: 22 U/L (ref 1–40)
BASOPHILS # BLD AUTO: 0.02 10*3/MM3 (ref 0–0.2)
BASOPHILS NFR BLD AUTO: 0.3 % (ref 0–1.5)
BILIRUB SERPL-MCNC: 0.5 MG/DL (ref 0–1.2)
BUN SERPL-MCNC: 16 MG/DL (ref 8–23)
BUN/CREAT SERPL: 12.3 (ref 7–25)
CALCIUM SPEC-SCNC: 10.2 MG/DL (ref 8.6–10.5)
CHLORIDE SERPL-SCNC: 102 MMOL/L (ref 98–107)
CO2 SERPL-SCNC: 23.4 MMOL/L (ref 22–29)
CREAT SERPL-MCNC: 1.3 MG/DL (ref 0.76–1.27)
DEPRECATED RDW RBC AUTO: 66.5 FL (ref 37–54)
EGFRCR SERPLBLD CKD-EPI 2021: 62.5 ML/MIN/1.73
EOSINOPHIL # BLD AUTO: 0.2 10*3/MM3 (ref 0–0.4)
EOSINOPHIL NFR BLD AUTO: 2.9 % (ref 0.3–6.2)
ERYTHROCYTE [DISTWIDTH] IN BLOOD BY AUTOMATED COUNT: 25.1 % (ref 12.3–15.4)
GLOBULIN UR ELPH-MCNC: 2.7 GM/DL
GLUCOSE SERPL-MCNC: 115 MG/DL (ref 65–99)
HCT VFR BLD AUTO: 42.4 % (ref 37.5–51)
HGB BLD-MCNC: 11.9 G/DL (ref 13–17.7)
HOLD SPECIMEN: NORMAL
LYMPHOCYTES # BLD AUTO: 1.01 10*3/MM3 (ref 0.7–3.1)
LYMPHOCYTES NFR BLD AUTO: 14.6 % (ref 19.6–45.3)
MCH RBC QN AUTO: 21.8 PG (ref 26.6–33)
MCHC RBC AUTO-ENTMCNC: 28.1 G/DL (ref 31.5–35.7)
MCV RBC AUTO: 77.8 FL (ref 79–97)
MONOCYTES # BLD AUTO: 0.5 10*3/MM3 (ref 0.1–0.9)
MONOCYTES NFR BLD AUTO: 7.2 % (ref 5–12)
NEUTROPHILS NFR BLD AUTO: 5.17 10*3/MM3 (ref 1.7–7)
NEUTROPHILS NFR BLD AUTO: 75 % (ref 42.7–76)
PLATELET # BLD AUTO: 244 10*3/MM3 (ref 140–450)
PMV BLD AUTO: 9.6 FL (ref 6–12)
POTASSIUM SERPL-SCNC: 4.6 MMOL/L (ref 3.5–5.2)
PROT SERPL-MCNC: 7.4 G/DL (ref 6–8.5)
RBC # BLD AUTO: 5.45 10*6/MM3 (ref 4.14–5.8)
SODIUM SERPL-SCNC: 142 MMOL/L (ref 136–145)
WBC NRBC COR # BLD AUTO: 6.9 10*3/MM3 (ref 3.4–10.8)

## 2024-09-03 PROCEDURE — 99214 OFFICE O/P EST MOD 30 MIN: CPT | Performed by: NURSE PRACTITIONER

## 2024-09-03 PROCEDURE — 99214 OFFICE O/P EST MOD 30 MIN: CPT | Performed by: INTERNAL MEDICINE

## 2024-09-03 PROCEDURE — 80053 COMPREHEN METABOLIC PANEL: CPT | Performed by: NURSE PRACTITIONER

## 2024-09-03 PROCEDURE — 85025 COMPLETE CBC W/AUTO DIFF WBC: CPT

## 2024-09-03 PROCEDURE — 36415 COLL VENOUS BLD VENIPUNCTURE: CPT

## 2024-09-04 ENCOUNTER — TREATMENT (OUTPATIENT)
Dept: CARDIAC REHAB | Facility: HOSPITAL | Age: 64
End: 2024-09-04
Payer: OTHER GOVERNMENT

## 2024-09-04 DIAGNOSIS — I50.42 CHRONIC COMBINED SYSTOLIC AND DIASTOLIC CONGESTIVE HEART FAILURE: Primary | ICD-10-CM

## 2024-09-04 PROCEDURE — 93798 PHYS/QHP OP CAR RHAB W/ECG: CPT

## 2024-09-05 ENCOUNTER — TELEPHONE (OUTPATIENT)
Dept: CARDIOLOGY | Facility: CLINIC | Age: 64
End: 2024-09-05
Payer: OTHER GOVERNMENT

## 2024-09-05 NOTE — TELEPHONE ENCOUNTER
Caller: KISHORE BEASLEY    Relationship to patient: Emergency Contact    Best call back number:  [phone]    Patient is needing: PATIENTS WIFE RETURNING CALL TO [place]. SHE THOUGHT IT MIGHT BE TO CHANGE Replaced by [facility]

## 2024-09-06 ENCOUNTER — TREATMENT (OUTPATIENT)
Dept: CARDIAC REHAB | Facility: HOSPITAL | Age: 64
End: 2024-09-06
Payer: OTHER GOVERNMENT

## 2024-09-06 ENCOUNTER — HOSPITAL ENCOUNTER (OUTPATIENT)
Dept: ONCOLOGY | Facility: HOSPITAL | Age: 64
Discharge: HOME | End: 2024-09-06
Payer: OTHER GOVERNMENT

## 2024-09-06 VITALS
HEIGHT: 72 IN | BODY MASS INDEX: 26.84 KG/M2 | WEIGHT: 198.2 LBS | RESPIRATION RATE: 16 BRPM | HEART RATE: 60 BPM | TEMPERATURE: 98.2 F | OXYGEN SATURATION: 99 % | SYSTOLIC BLOOD PRESSURE: 112 MMHG | DIASTOLIC BLOOD PRESSURE: 67 MMHG

## 2024-09-06 DIAGNOSIS — T45.4X5D ADVERSE EFFECT OF IRON, SUBSEQUENT ENCOUNTER: Primary | ICD-10-CM

## 2024-09-06 DIAGNOSIS — D50.9 IRON DEFICIENCY ANEMIA, UNSPECIFIED IRON DEFICIENCY ANEMIA TYPE: ICD-10-CM

## 2024-09-06 DIAGNOSIS — I50.42 CHRONIC COMBINED SYSTOLIC AND DIASTOLIC CONGESTIVE HEART FAILURE: Primary | ICD-10-CM

## 2024-09-06 PROCEDURE — 25810000003 SODIUM CHLORIDE 0.9 % SOLUTION: Performed by: INTERNAL MEDICINE

## 2024-09-06 PROCEDURE — 25010000002 DIPHENHYDRAMINE PER 50 MG: Performed by: INTERNAL MEDICINE

## 2024-09-06 PROCEDURE — 93798 PHYS/QHP OP CAR RHAB W/ECG: CPT

## 2024-09-06 PROCEDURE — 96375 TX/PRO/DX INJ NEW DRUG ADDON: CPT

## 2024-09-06 PROCEDURE — 96367 TX/PROPH/DG ADDL SEQ IV INF: CPT

## 2024-09-06 PROCEDURE — 25010000002 NA FERRIC GLUC CPLX PER 12.5 MG: Performed by: INTERNAL MEDICINE

## 2024-09-06 PROCEDURE — 25810000003 SODIUM CHLORIDE 0.9 % SOLUTION 250 ML FLEX CONT: Performed by: INTERNAL MEDICINE

## 2024-09-06 PROCEDURE — 96366 THER/PROPH/DIAG IV INF ADDON: CPT

## 2024-09-06 PROCEDURE — 96365 THER/PROPH/DIAG IV INF INIT: CPT

## 2024-09-06 RX ORDER — ACETAMINOPHEN 325 MG/1
650 TABLET ORAL ONCE
Status: COMPLETED | OUTPATIENT
Start: 2024-09-06 | End: 2024-09-06

## 2024-09-06 RX ORDER — SODIUM CHLORIDE 9 MG/ML
20 INJECTION, SOLUTION INTRAVENOUS ONCE
Status: COMPLETED | OUTPATIENT
Start: 2024-09-06 | End: 2024-09-06

## 2024-09-06 RX ORDER — FAMOTIDINE 10 MG/ML
20 INJECTION, SOLUTION INTRAVENOUS ONCE
Status: COMPLETED | OUTPATIENT
Start: 2024-09-06 | End: 2024-09-06

## 2024-09-06 RX ADMIN — ACETAMINOPHEN 650 MG: 325 TABLET ORAL at 11:05

## 2024-09-06 RX ADMIN — SODIUM CHLORIDE 250 MG: 9 INJECTION, SOLUTION INTRAVENOUS at 11:26

## 2024-09-06 RX ADMIN — DIPHENHYDRAMINE HYDROCHLORIDE 25 MG: 50 INJECTION, SOLUTION INTRAMUSCULAR; INTRAVENOUS at 11:04

## 2024-09-06 RX ADMIN — SODIUM CHLORIDE 20 ML/HR: 9 INJECTION, SOLUTION INTRAVENOUS at 11:04

## 2024-09-06 RX ADMIN — FAMOTIDINE 20 MG: 10 INJECTION INTRAVENOUS at 11:06

## 2024-09-06 NOTE — PROGRESS NOTES
"Cardiology Heart Failure Clinic Note  Richard \"Wilton\" Martha [facility]    Patient ID: Chinedu Lopez  is a 63 y.o. male.    Encounter Date:09/09/2024    HPI:  63-year-old  male patient of Dr. Mosher with a PMH of atherosclerotic coronary artery disease s/p 2000 CABG X 3 as well as subsequent PCI\stents including 2006 PCI/stent x 3 sequentially placed promimal and mid SVG - OM1 & Aug 2012 RCA mid and proximal- Xience stent x3.  Had NSTEMI 31 July 2024 and had subsequent now s/p 7/31/24 PCI and stenting of the midportion of  SVG to OM1  w/  Xience ANA 3.0 x 18 mm with newly diagnosed ischemic dilated cardiomyopathy with chronic combined systolic and diastolic heart failure (HFrEF) [LVIDD 6.4 cm ] (TTE 7/30/2024 showed EF of 26-30% and grade 1 DD.  Moderate RV reduction i.e. 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 Sheridan And underwent the aforementioned PCI\ANA.  He presents after discharge to the [facility], operated by [facility] heart failure clinic on 8/14/2024 for his initial visit since discharge from the hospital patient has not been having any chest discomfort he has however been having to get up and urinate multiple times each night which is been bothering him recently. Comes back for 2nd visit on 9/9/24 since he was last here he has seen multiple providers including his primary cardiologist, an electrophysiologist, as well as his hematology/oncologist and his primary care several changes some had come up on his meds.  He feels much better than he did on his last visit likely attributed to the fact that " his hemoglobin is improved significantly as he is following up with hematology and oncology.           Assessment:   Diagnoses and all orders for this visit:    1. Ischemic dilated cardiomyopathy due to ASCAD (Primary)  Overview:  A, LVIDD 6.4 cm   B. chronic combined systolic and diastolic heart failure (HFrEF)   C. (TTE 7/30/24) EF of 26-30% and grade 1 DD.  D. No devices      2. Chronic combined systolic and diastolic HF (HFrEF)  Overview:  A.  (TTE 7/30/24) EF of 26-30% and grade 1 DD.  B. No devices      3. Moderate chronic cor pulmonale  Overview:  Moderately reduced RV systolic function noted on TTE 7/31/2024      4. Coronary artery disease involving coronary bypass graft of native heart, unspecified whether angina present  Overview:  A.  s/p 2000 CABG X 3   B. subsequent PCI\stents            I. s/p 2006 PCI/stent x 3 sequentially placed promimal and mid SVG - OM1           II  s/p Aug 2012 RCA mid and proximal- Xience stent x3.           III. Had NSTEMI 30 July 2024                   1. s/p 7/31/24 PCI and stenting of the midportion of  SVG to OM1                                A. w/ a Xience ANA 3.0 x 18 mm   C.  Significantly reduced biventricular function      5. 3 A paroxysmal atrial fibrillation  Overview:  A.  rate controlled on metoprolol and digoxin,   B. anticoagulated via a watchman's device            I.  [id number] 2 =   C. h/o January 2020 of OLESYA occlusion Dr. Willams      6. Presence of Watchman left atrial appendage closure device    7. Metabolic syndrome X  Overview:  A.  Benign essential hypertension  B.  Mixed dyslipidemia  C.  Elevated BMI           I.  BMI 30.9 kg/m²  D.  T2DM      8. Type 2 diabetes mellitus without complication, without long-term current use of insulin    9. Iron deficiency anemia, unspecified iron deficiency anemia type  -     CBC & Differential; Future  -     CBC & Differential; Standing  -     CBC & Differential    10. Long term (current) use of  antithrombotics/antiplatelets    11. Neck mass    12. Other hyperlipidemia    13. Cerebrovascular accident (CVA), unspecified mechanism    14. H/O gastroesophageal reflux (GERD)    15. Injury of head, sequela  Overview:   Hx of 9/2016 Head trauma/bleed Tx @ [facility],      16. Primary hypertension    17. MOLINA (dyspnea on exertion)  -     Basic Metabolic Panel; Future  -     CBC & Differential; Future  -     proBNP; Future  -     Magnesium; Future  -     Basic Metabolic Panel; Standing  -     Basic Metabolic Panel  -     proBNP; Standing  -     proBNP  -     Cancel: Basic Metabolic Panel; Standing  -     Cancel: Basic Metabolic Panel  -     CBC & Differential; Standing  -     CBC & Differential  -     Cancel: proBNP; Standing  -     Cancel: proBNP  -     Magnesium; Standing  -     Magnesium  -     Digoxin Level; Future  -     Digoxin Level; Standing  -     Digoxin Level    18. Hyperkalemia  -     Basic Metabolic Panel; Standing  -     Basic Metabolic Panel    Other orders  -     Magnesium Oxide 400 MG capsule; Take 1 capsule by mouth Daily.  Dispense: 90 each; Refill: 2        [id number] Score: 6       Plan/discussion    Volume overload    Heart Failure Core Measures    Type of Overload  Chronic combined systolic and diastolic HF (HFrEF)      Most recent EF:  (TTE 7/30/24) EF of 26-30% and grade 1 DD.     [place] Heart association Class & Stage : IIb    HF Meds    Beta Blocker: metop  mg every 12 hours  Hold for systolic less than 100 heart rate less than 60  ARNI/ACE/ARB: Entresto 24/26 mg every 12 hours  SGLT 2 inhibitors: Jardiance 10 mg daily  Diuretics: Bumex 0.5 mg twice daily increased back to 1 mg BID 9/9/24  Decreasing K-Dur to 20 mEQ daily  Furoscix: Ordering 8 kits for at home use (currently appealing)  Patient instructed not to utilize unless cleared by provider  Aldosterone Antagonist: Spironolactone 12.5 mg daily  Digitalis: Digoxin 0.125 mg daily  Nitrates: as needed SL NTG           Mag Ox started  9/9/24  Brilinta 90 mg every 12  Aspirin 81 mg daily        Cardiac medicines reviewed with risk, benefits, and necessity of each discussed with myself and a [facility].         _________________________________________________________     Discussion     Already on heart failure core measures given his significant reduction in ejection fraction current heart failure medications including beta-blocker, ARNI, SGLT2, diuretic,  Aldactone even a nitrate  Are on staff pharmacist is working to make medications more affordable including Brilinta, Entresto, and Jardiance particularly  9/9/2024 Jardiance has been an issue and was only taking half a tab currently asked to take 10 mg daily  He additionally was taking only half dose of the prescribed max 9/9/2024  Labs showing be hyperkalemic on 8/14/2024  Lokelma 10 mg while still here in the clinic repeat labs on 8/19/2024  Decrease potassium  Potassium 9/9/2024 was at 4.4  Previously given the fact he was mildly hypertensive I had changed beta-blocker over to Coreg and increased dose at 37.5 mg q12H with parameters  Changed back to metoprolol 100 mg every 12 by Dr. Mosher his cardiologist left this in place on 9/9/2024  Noting his abnormal TSH 5.7 to his primary care [place] he is seeing @ 3:30 9/9/24  He has a pending follow-up appointment with nephrology Dr. Do  Labs show him to be a stage II CKD with an EGFR of 66, BUN of 15 creatinine 1.2 and to be mildly volume overloaded today with a proBNP of 6,016  He will be getting an additional 60 mg of IV Lasix here in the clinic 8/14/2024  Labs on 8/16/2024 did show a doubling of his proBNP up to 4938  Labs 9/9/24 staying in a stage III CKD with a EGFR at 58, BUN of 14 creatinine 1.4 proBNP was elevated today to 4938  By physical exam not volume overloaded however labs do indicate he is I elected not to provide him any IV diuresis today and I am regetting labs in a couple of weeks  Entresto to remain at  low-dose every 12 H  Next follow-up appointment in the heart failure clinic will be in about 4 weeks since he has an appointment with Dr. Mosher in the interim  We will additionally get a transesophageal echocardiogram assist in answering the question regarding questions whether or not he might have a small clot located inside his Watchman device and the WILD is still pending on 9/9/2024 scheduled be with Dr. Willams  He obviously should continue DAPT given his recent PCI\stent  Would continue with typical heart failure nursing interventions including:                          A. Fluid restriction at less than 2000 cc daily                          B. Daily weights                          C.  1 g low-sodium diet        I did the following activities preparing for the visit with Chinedu Lopez  including: reviewing tests, once pt arrived in clinic I also performed a medically appropriate examination and/or evaluation, I personally spent considerable time counseling and educating the patient/family/caregiver, ordering medications, tests, or procedures, referring and communicating with other health care professionals, and documenting information in the medical record. I estimate including preparation 30 minutes              Subjective:     Chief Complaint   Patient presents with    Congestive Heart Failure       ROS:    Constitutional: + weakness, + fatigue persist at the same level as when he was recently hospitalization, No fever, rigors, chills   Eyes: No recent vision changes, eye pain   ENT/oropharynx: No recent difficulty swallowing, sore throat, epistaxis, changes in hearing   Cardiovascular: No recent chest pain, chest tightness, palpitations except as noted in HPI, paroxysmal nocturnal dyspnea, orthopnea, diaphoresis, dizziness & no pre or daily syncopal episodes   Respiratory: Mild at rest occasional shortness of breath, + dyspnea on exertion currently at minimal level but now> 25 feet, no significant  productive cough, no wheezing and no hemoptysis   Gastrointestinal: No abdominal pain, nausea, vomiting, diarrhea, bloody stools   Genitourinary: No hematuria, dysuria other than increased frequency   Neurological: No change in typical headache pattern, no new onset of tremors, numbness,  or hemiparesis    Musculoskeletal: No change in typical cramps, myalgias, no new joint pain, joint swelling   Integument: No recent rash, no significant lower extremity edema issues      Patient Active Problem List   Diagnosis    ASCAD    Hypertension    Neck mass    3 A paroxysmal atrial fibrillation    Cerebrovascular accident (CVA)    Type 2 diabetes mellitus without complication, without long-term current use of insulin    Other hyperlipidemia    Presence of Watchman left atrial appendage closure device    Long term (current) use of antithrombotics/antiplatelets    Ischemic dilated cardiomyopathy due to ASCAD    Chronic combined systolic and diastolic HF (HFrEF)    Moderate chronic cor pulmonale    Metabolic syndrome X    h/o Head injury    H/O gastroesophageal reflux (GERD)    Normocytic anemia    Hyperkalemia    Iron deficiency anemia    Adverse effect of iron       Past Medical History:   Diagnosis Date    Atrial fibrillation     June, [facility]. Abstracted from [facility].    Brain bleed 2019 October 14th [facility] on 17th for 3 weeks    CAD (coronary artery disease)     S/P CABG and PCI. Abstracted from [facility].    Hyperlipidemia     Hypertension     Myocardial infarction 2006    Non-ST elevation MI. Abstracted from [facility].    Obstructive sleep apnea on CPAP     At . Abstracted from [facility].    Presence of Watchman left atrial appendage closure device 01/31/2020    Stroke 2019    TIA (transient ischemic attack) 11/19/2013    Possible TIA- MRI shows small tumor/head. Abstracted from [facility].       Past Surgical History:   Procedure Laterality Date    ANGIOPLASTY  2006    3 srents were sequentially placed  in promimal and mid body of the saphennous vein graft to obtuse marginal branch. Abstracted from [facility].    ATRIAL APPENDAGE EXCLUSION LEFT WITH TRANSESOPHAGEAL ECHOCARDIOGRAM N/A 01/30/2020    Procedure: Atrial Appendage Occlusion;  Surgeon: Angel Willams MD;  Location: [facility] CATH INVASIVE LOCATION;  Service: Cardiovascular    ATRIAL APPENDAGE EXCLUSION LEFT WITH TRANSESOPHAGEAL ECHOCARDIOGRAM N/A 01/30/2020    Procedure: Atrial Appendage Occlusion;  Surgeon: John Sheridan MD;  Location: [facility] CATH INVASIVE LOCATION;  Service: Cardiology    CARDIAC CATHETERIZATION  2000 2003, 2006, 2012. Abstracted from [facility].    CARDIAC CATHETERIZATION N/A 07/31/2024    Procedure: Left Heart Cath possible PCI;  Surgeon: José Miguel Izquierdo MD;  Location: [facility] CATH INVASIVE LOCATION;  Service: Cardiovascular;  Laterality: N/A;    CARDIAC CATHETERIZATION N/A 07/31/2024    Procedure: Stent ANA coronary;  Surgeon: José Miguel Izquierdo MD;  Location: [facility] CATH INVASIVE LOCATION;  Service: Cardiovascular;  Laterality: N/A;    CARDIAC CATHETERIZATION N/A 07/31/2024    Procedure: Percutaneous Coronary Intervention;  Surgeon: José Miguel Izquierdo MD;  Location: [facility] CATH INVASIVE LOCATION;  Service: Cardiovascular;  Laterality: N/A;    CORONARY ARTERY BYPASS GRAFT  2000    Triple. Abstracted from [facility].    MUSCLE REPAIR      Tendons and nerves in right lower extremity. Abstracted from [facility] [facility].    OTHER SURGICAL HISTORY  08/2012    RCA mid and proximal- Xience stent x3. Abstracted from [facility].    OTHER SURGICAL HISTORY  09/2016    Head trauma/bleed at [facility]. Abstracted from [facility].       Social History     Socioeconomic History    Marital status:    Tobacco Use    Smoking status: Every Day     Current packs/day: 1.00     Average packs/day: 1 pack/day for 40.7 years (40.7 ttl pk-yrs)     Types: Cigarettes     Start date: 1/1/1984     Passive exposure: Current    Smokeless  tobacco: Never    Tobacco comments:     Smoking cessation--encouraged---refuses patch, gum or chantix   Vaping Use    Vaping status: Never Used   Substance and Sexual Activity    Alcohol use: Not Currently    Drug use: No    Sexual activity: Yes     Partners: Female     Birth control/protection: None       No Known Allergies      Current Outpatient Medications:     amantadine (SYMMETREL) 100 MG capsule, Take 1 capsule by mouth Daily., Disp: , Rfl:     aspirin 81 MG EC tablet, Take 1 tablet by mouth Daily., Disp: , Rfl:     atorvastatin (Lipitor) 40 MG tablet, Take 1 tablet by mouth Daily., Disp: 90 tablet, Rfl: 3    bumetanide (BUMEX) 1 MG tablet, Take 1 tablet by mouth 2 (Two) Times a Day., Disp: 180 tablet, Rfl: 2    Cholecalciferol (VITAMIN D3) 125 MCG (5000 UT) capsule capsule, Take 1 capsule by mouth Daily., Disp: , Rfl:     Cyanocobalamin (VITAMIN B 12) 500 MCG tablet, Take 1 tablet by mouth Daily., Disp: , Rfl:     digoxin (Lanoxin) 125 MCG tablet, Take 1 tablet by mouth Daily., Disp: 30 tablet, Rfl: 0    empagliflozin (JARDIANCE) 10 MG tablet tablet, Take 1 tablet by mouth Daily., Disp: 30 tablet, Rfl: 0    famotidine (PEPCID) 40 MG tablet, Take 1 tablet by mouth Daily., Disp: , Rfl:     folic acid (FOLVITE) 1 MG tablet, Take 1 tablet by mouth Daily., Disp: , Rfl:     guaiFENesin (MUCINEX) 600 MG 12 hr tablet, Take 1 tablet by mouth Daily., Disp: , Rfl:     metFORMIN (GLUCOPHAGE) 1000 MG tablet, Take 1 tablet by mouth 2 (Two) Times a Day With Meals., Disp: , Rfl:     metoprolol succinate XL (Toprol XL) 100 MG 24 hr tablet, Take 1 tablet by mouth Daily., Disp: 30 tablet, Rfl: 0    nitroglycerin (NITROSTAT) 0.4 MG SL tablet, Place 1 tablet under the tongue Every 5 (Five) Minutes As Needed for Chest Pain. Take no more than 3 doses in 15 minutes., Disp: , Rfl:     pantoprazole (PROTONIX) 40 MG EC tablet, Take 1 tablet by mouth 2 (Two) Times a Day., Disp: , Rfl:     potassium chloride (KLOR-CON M20) 20 MEQ CR  tablet, Take 1 tablet by mouth Daily., Disp: 90 tablet, Rfl: 2    sacubitril-valsartan (Entresto) 24-26 MG tablet, Take 1 tablet by mouth 2 (Two) Times a Day. Indications: Cardiac Failure, Disp: 180 tablet, Rfl: 3    spironolactone (ALDACTONE) 25 MG tablet, Take 0.5 tablets by mouth Daily., Disp: 30 tablet, Rfl: 0    ticagrelor (BRILINTA) 90 MG tablet tablet, Take 1 tablet by mouth Every 12 (Twelve) Hours., Disp: 180 tablet, Rfl: 3    Magnesium Oxide 400 MG capsule, Take 1 capsule by mouth Daily., Disp: 90 each, Rfl: 2    Immunization History   Administered Date(s) Administered    COVID-19 F23 (PFIZER) 12YRS+ (COMIRNATY) 11/03/2023         Most recent EKG as reviewed:  Procedures     Most recent echo as reviewed:  Results for orders placed during the hospital encounter of 07/30/24    Adult Transthoracic Echo Complete W/ Cont if Necessary Per Protocol    Interpretation Summary    Left ventricular systolic function is severely decreased. Left ventricular ejection fraction appears to be 26 - 30%.    The left ventricular cavity is moderately dilated.    Left ventricular wall thickness is consistent with mild concentric hypertrophy.    Left ventricular diastolic function is consistent with (grade I) impaired relaxation.    Moderately reduced right ventricular systolic function noted.    The right ventricular cavity is moderately dilated.    The left atrial cavity is moderately dilated.    Estimated right ventricular systolic pressure from tricuspid regurgitation is normal (<35 mmHg).      Most recent stress test results:  Results for orders placed during the hospital encounter of 03/14/24    Stress Test With Myocardial Perfusion One Day    Interpretation Summary    Myocardial perfusion imaging indicates a small-sized infarct located in the apex with no significant ischemia noted.    Left ventricular ejection fraction is mildly reduced (Calculated EF = 40%).    Impressions are consistent with a low risk study.    Abnormal  LV wall motion consistent with moderate hypokinesis and global hypokinesis.    There is no prior study available for comparison.    Findings consistent with an indeterminate ECG stress test.      Most recent cardiac catheterization results:  Results for orders placed during the hospital encounter of 07/30/24    Cardiac Catheterization/Vascular Study    Conclusion  Table formatting from the original result was not included.  Cardiac Catheterization with PCI Report    Chinedu Lopez  [id number]  7/31/2024  @PCP@    He underwent cardiac catheterization and percutaneous coronary intervention    Indications for the procedure include: acute coronary syndrome.  Severe chest pain  Acute coronary syndrome  Non-ST elevation myocardial infarction  Atrial fibrillation with rapid response  Worsening cardiomyopathy  Severe LV dysfunction  Congestive heart failure    Procedure Details:  The risks, benefits, complications, treatment options, and expected outcomes were discussed with the patient. The patient and/or family concurred with the proposed plan, giving informed consent.    After informed consent the patient was brought to the cath lab after appropriate IV hydration was begun and oral premedication was given. He was further sedated with fentanyl. He was prepped and draped in the usual manner. Using the modified Seldinger access technique, a 6 Slovenian sheath was placed in the femoral artery. A left heart catheterization with coronary arteriography was performed.  We used a AR mod diagnostic catheter to selectively engage the right coronary artery and we used a 5 Slovenian IMT catheter due to selective engagement of the LIMA to the LAD, findings are discussed below.    The decision was made to proceed with intervention. He received Heparin as per protocol. The details of the intervention are as follows:    We used a left coronary bypass guide catheter with guide liner support to get selective access to the vein graft to the  marginal lesion was initially predilated multiple attempts using a guide liner support secondary to extensive calcification and difficult to cross the lesion with a balloon secondary to tortuosity and calcification initially with a 1 5 balloon eventually with a 2 oh balloon and then upgraded to 3 with balloon and stented with a 3.0 x 18 mm diastolic bleeding stent and lesion was postdilated with 4.0 x 12 mm noncompliant balloon up to 16 keven.  Patient tolerated procedure well with no immediate possible complication plan to obtain hemostasis by manual pressure procedural anticoagulation was heparin patient received 180 mg of Brilinta at the end of the procedure.    After the procedure was completed, sedation was stopped and the sheaths and catheters were all removed according to established hospital protocols.    Conscious sedation:  Conscious sedation was performed according to protocol.  I supervised and directed an independent trained observer with the assistance of monitoring the patient's level of consciousness and physiologic status throughout the procedure.  Intraoperative service time was 90 minutes.    Findings:    Hemodynamics Central aortic pressure systolic 120 diastolic of 66 the mean pressure of 89 mmHg  LV end-diastolic pressure of 29 mmHg  There was no gradient across the aortic valve on the pullback of the pigtail catheter  Left Main Left main has diffuse 90% stenosis provides a small caliber marginal branch otherwise both LAD and left circumflex artery 100% occluded in the ostial portion  RCA Large-caliber vessel dominant vessel  Right coronary artery is a dominant vessel  Patent stent in the right coronary artery with no evidence of any significant in-stent restenosis  Right coronary artery provides a large caliber PDA and PLB branches that are angiographically normal  % occluded in the ostial portion  Circ 100% occluded in the ostial portion  SVG(s) Vein graft presumed to be marginal branch  is 100% occluded in the ostial portion    Vein graft to the second marginal branch is patent with mid body angiographic 90% stenosis likely the culprit vessel for patient symptoms of non-ST ovation myocardial infarction likely this lesion is in-stent restenosis within the prior stent  JUANA LIMA to LAD is patent without any significant stenosis involving the ostium/body of the anastomotic site.  No significant disease involving the LAD after the LIMA touchdown  LV Not done  Coronary dominance Right coronary artery    Interventions/Vessels Successful PCI and stenting of the midportion of the vein graft to the marginal branch with placement of Xience drug-eluting stent  Guides/Wires [employer]  Devices Xience drug-eluting stent 3.0 x 18 mm  Post % Stenosis  0  Pre-procedure SHANNON flow  10  Post procedure SHANNON flow  3  Closure Device None  Complications None    Estimated Blood Loss:  Minimal    Specimens: None    Complications:  None; patient tolerated the procedure well.    Disposition: PACU - hemodynamically stable.    Condition: stable    Impressions:  Native severe two-vessel coronary artery disease with 100% occlusion of the ostium of the LAD and 100% occlusion of the ostium of the left circumflex  Patent vein graft to the marginal with severe 90% stenosis involving the midportion of the vein graft succinyl treated with placement of a Xience drug-eluting stent  Patent LIMA to the LAD  Patent stent in the right coronary artery with no significant in-stent restenosis    Recommendations:  Aspirin 81 mg p.o. once a day  Brilinta 90 mg p.o. twice daily  Continued aggressive risk factor modification  Observe patient in CVU bed overnight  Close monitoring of renal function  Optimize medical therapy for cardiomyopathy and rate control for atrial fibrillation  Patient currently being seen by nephrology and hematology oncology for underlying anemia and renal failure  Test results reviewed and discussed with patient and  family  Further recommendation based on patient course        Historical data copied forward from previous encounters in EMR including the history, exam, and assessment/plan has been reviewed and is unchanged except as I have noted and otherwise indicated.      Objective:         /79   Pulse 74   Resp 16   Wt 91.1 kg (200 lb 12.8 oz)   SpO2 97%   BMI 27.23 kg/m²     General:  Well-developed, mildly overly well-nourished, cooperative, no acute distress, appears much older than stated age   Neuro:  A&O x3. No significantly obvious focal neuro deficet    Psych:  mildly flattened affect    HENT:  Normocephalic, atraumatic, moist mucous membranes , Normal ear placement,Throat not injected   Eyes:  PERRLA, Conjunctivae not injected, EOM's intact, wears spectacles stating his vision, conjunctiva does not appear significantly injected   Neck:  Supple, no lymph adenopathy nor thyromegaly no JVD or bruit    Lungs:    Symmetrical rise & fall of chest with baseline respiratory pattern.  lungs remain clear to auscultation bilaterally, noted a very faint late phase expiratory wheezes, no rhonchi or rales are noted, bases bilaterally are not diminished   Chest wall:  No significant tenderness when palpated. PMI @ 6th ICS MAL    Heart:  irregular rate and rhythm, S1 and S2 normal,, Gr I-II/VI systolic ejection murmur best heard at the apex , no obvious rub, click or gallop.    Abdomen:  non-distended, non-tender, bowel sounds noted in the 4 quadrants of the abdomen, no significant central abdominal adipose tissue identified however with a mild noted amount   Extremities:  Equal color motion temperature and sensitivity, Rapid capillary refill noted within the nailbeds of fingers and toes bilaterally trace if any at all lower extremity edema.    Pulses:  2+ and symmetric all extremities, rapid capillary refill    Skin:  No obvious rashes, significant lesions identified, warm dry and of normal turgor      In-Office  Procedure(s):  No orders to display        Imaging:    Results for orders placed during the hospital encounter of 08/19/24    XR Chest 1 View    Narrative  XR CHEST 1 VW    Date of Exam: 8/19/2024 12:53 PM EDT    Indication: dizziness    Comparison: 8/1/2024    Findings:  Heart size normal. Status post sternotomy and CABG. Lungs are without consolidation. Negative for pneumothorax or pleural effusion. Osseous structures appear intact.    Impression  Impression:  No acute process.      Electronically Signed: Pantera Danielson MD  8/19/2024 1:01 PM EDT  Workstation ID: [id number]       Results for orders placed during the hospital encounter of 07/30/24    US Renal Bilateral    Narrative  Date of Exam: 7/30/2024 2:05 PM EDT    Indication: LUDWIN/CKD.    Comparison: No comparisons available.    Technique: Grayscale and color Doppler ultrasound evaluation of the kidneys and urinary bladder was performed.      Findings:  Right Kidney: Right kidney measures 11.6 cm in long axis. Likely benign cyst in the right kidney measuring up to 2.2 cm. Increased renal cortical echogenicity.  No hydronephrosis.. No sonographically evident nephrolithiasis.    Left Kidney:  Left kidney measures 9.3 cm in long axis. Mildly increased renal cortical echogenicity. No suspicious appearing lesions.No hydronephrosis.No sonographically evident nephrolithiasis.    Bladder: The bladder appears unremarkable.    Additional findings: Mild perihepatic and pelvic ascites.    Impression  Impression:  No hydronephrosis.    Increased renal cortical echogenicity which can be seen in medical renal disease.    Small volume ascites.          Electronically Signed: Gildardo Springer  7/30/2024 3:01 PM EDT  Workstation ID: [id number]          Lab Review:   Hospital Outpatient Visit on 09/09/2024   Component Date Value    Glucose 09/09/2024 119 (H)     BUN 09/09/2024 14     Creatinine 09/09/2024 1.38 (H)     Sodium 09/09/2024 141     Potassium 09/09/2024 4.4     Chloride  09/09/2024 103     CO2 09/09/2024 22.9     Calcium 09/09/2024 10.1     BUN/Creatinine Ratio 09/09/2024 10.1     Anion Gap 09/09/2024 15.1 (H)     eGFR 09/09/2024 58.2 (L)     proBNP 09/09/2024 4,938.0 (H)     Magnesium 09/09/2024 1.6     WBC 09/09/2024 7.14     RBC 09/09/2024 5.44     Hemoglobin 09/09/2024 12.1 (L)     Hematocrit 09/09/2024 48.3     MCV 09/09/2024 88.8     MCH 09/09/2024 22.2 (L)     MCHC 09/09/2024 25.1 (L)     RDW 09/09/2024 26.5 (H)     RDW-SD 09/09/2024 83.7 (H)     MPV 09/09/2024 9.3     Platelets 09/09/2024 263     Neutrophil % 09/09/2024 77.1 (H)     Lymphocyte % 09/09/2024 13.6 (L)     Monocyte % 09/09/2024 6.7     Eosinophil % 09/09/2024 1.8     Basophil % 09/09/2024 0.4     Immature Grans % 09/09/2024 0.4     Neutrophils, Absolute 09/09/2024 5.50     Lymphocytes, Absolute 09/09/2024 0.97     Monocytes, Absolute 09/09/2024 0.48     Eosinophils, Absolute 09/09/2024 0.13     Basophils, Absolute 09/09/2024 0.03     Immature Grans, Absolute 09/09/2024 0.03     nRBC 09/09/2024 0.4 (H)     Digoxin 09/09/2024 1.16    Lab on 09/03/2024   Component Date Value    Glucose 09/03/2024 115 (H)     BUN 09/03/2024 16     Creatinine 09/03/2024 1.30 (H)     Sodium 09/03/2024 142     Potassium 09/03/2024 4.6     Chloride 09/03/2024 102     CO2 09/03/2024 23.4     Calcium 09/03/2024 10.2     Total Protein 09/03/2024 7.4     Albumin 09/03/2024 4.7     ALT (SGPT) 09/03/2024 12     AST (SGOT) 09/03/2024 22     Alkaline Phosphatase 09/03/2024 41     Total Bilirubin 09/03/2024 0.5     Globulin 09/03/2024 2.7     A/G Ratio 09/03/2024 1.7     BUN/Creatinine Ratio 09/03/2024 12.3     Anion Gap 09/03/2024 16.6 (H)     eGFR 09/03/2024 62.5     WBC 09/03/2024 6.90     RBC 09/03/2024 5.45     Hemoglobin 09/03/2024 11.9 (L)     Hematocrit 09/03/2024 42.4     MCV 09/03/2024 77.8 (L)     MCH 09/03/2024 21.8 (L)     MCHC 09/03/2024 28.1 (L)     RDW 09/03/2024 25.1 (H)     RDW-SD 09/03/2024 66.5 (H)     MPV 09/03/2024 9.6      Platelets 09/03/2024 244     Neutrophil % 09/03/2024 75.0     Lymphocyte % 09/03/2024 14.6 (L)     Monocyte % 09/03/2024 7.2     Eosinophil % 09/03/2024 2.9     Basophil % 09/03/2024 0.3     Neutrophils, Absolute 09/03/2024 5.17     Lymphocytes, Absolute 09/03/2024 1.01     Monocytes, Absolute 09/03/2024 0.50     Eosinophils, Absolute 09/03/2024 0.20     Basophils, Absolute 09/03/2024 0.02     Extra Tube 09/03/2024 Hold for add-ons.    Hospital Outpatient Visit on 08/19/2024   Component Date Value    QT Interval 08/19/2024 287     QTC Interval 08/19/2024 443    Hospital Outpatient Visit on 08/19/2024   Component Date Value    Magnesium 08/19/2024 1.6     Glucose 08/19/2024 187 (H)     BUN 08/19/2024 26 (H)     Creatinine 08/19/2024 1.77 (H)     Sodium 08/19/2024 138     Potassium 08/19/2024 4.9     Chloride 08/19/2024 99     CO2 08/19/2024 24.1     Calcium 08/19/2024 10.3     BUN/Creatinine Ratio 08/19/2024 14.7     Anion Gap 08/19/2024 14.9     eGFR 08/19/2024 43.2 (L)     proBNP 08/19/2024 2,731.0 (H)     Digoxin 08/19/2024 1.25 (H)    Admission on 08/19/2024, Discharged on 08/19/2024   Component Date Value    QT Interval 08/19/2024 292     QTC Interval 08/19/2024 409     Glucose 08/19/2024 185 (H)     BUN 08/19/2024 26 (H)     Creatinine 08/19/2024 1.80 (H)     Sodium 08/19/2024 138     Potassium 08/19/2024 4.9     Chloride 08/19/2024 100     CO2 08/19/2024 21.8 (L)     Calcium 08/19/2024 10.2     Total Protein 08/19/2024 7.6     Albumin 08/19/2024 4.7     ALT (SGPT) 08/19/2024 11     AST (SGOT) 08/19/2024 16     Alkaline Phosphatase 08/19/2024 46     Total Bilirubin 08/19/2024 0.7     Globulin 08/19/2024 2.9     A/G Ratio 08/19/2024 1.6     BUN/Creatinine Ratio 08/19/2024 14.4     Anion Gap 08/19/2024 16.2 (H)     eGFR 08/19/2024 42.3 (L)     Protime 08/19/2024 11.4     INR 08/19/2024 1.05     PTT 08/19/2024 28.4 (L)     HS Troponin T 08/19/2024 80 (C)     WBC 08/19/2024 6.17     RBC 08/19/2024 5.49      Hemoglobin 08/19/2024 11.6 (L)     Hematocrit 08/19/2024 41.5     MCV 08/19/2024 75.6 (L)     MCH 08/19/2024 21.1 (L)     MCHC 08/19/2024 28.0 (L)     RDW 08/19/2024 23.6 (H)     RDW-SD 08/19/2024 62.9 (H)     MPV 08/19/2024 9.2     Platelets 08/19/2024 418     Neutrophil % 08/19/2024 79.3 (H)     Lymphocyte % 08/19/2024 12.5 (L)     Monocyte % 08/19/2024 6.5     Eosinophil % 08/19/2024 0.8     Basophil % 08/19/2024 0.6     Immature Grans % 08/19/2024 0.3     Neutrophils, Absolute 08/19/2024 4.89     Lymphocytes, Absolute 08/19/2024 0.77     Monocytes, Absolute 08/19/2024 0.40     Eosinophils, Absolute 08/19/2024 0.05     Basophils, Absolute 08/19/2024 0.04     Immature Grans, Absolute 08/19/2024 0.02     nRBC 08/19/2024 0.0     HS Troponin T 08/19/2024 69 (C)     Troponin T Delta 08/19/2024 -11 (L)    Hospital Outpatient Visit on 08/14/2024   Component Date Value    QT Interval 08/14/2024 315     QTC Interval 08/14/2024 377    Hospital Outpatient Visit on 08/14/2024   Component Date Value    Magnesium 08/14/2024 1.9     Glucose 08/14/2024 119 (H)     BUN 08/14/2024 15     Creatinine 08/14/2024 1.24     Sodium 08/14/2024 140     Potassium 08/14/2024 5.5 (H)     Chloride 08/14/2024 105     CO2 08/14/2024 22.6     Calcium 08/14/2024 10.1     BUN/Creatinine Ratio 08/14/2024 12.1     Anion Gap 08/14/2024 12.4     eGFR 08/14/2024 66.1     proBNP 08/14/2024 6,016.0 (H)     TSH 08/14/2024 5.660 (H)     WBC 08/14/2024 7.18     RBC 08/14/2024 5.17     Hemoglobin 08/14/2024 10.8 (L)     Hematocrit 08/14/2024 40.7     MCV 08/14/2024 78.7 (L)     MCH 08/14/2024 20.9 (L)     MCHC 08/14/2024 26.5 (L)     RDW 08/14/2024 23.9 (H)     RDW-SD 08/14/2024 67.2 (H)     MPV 08/14/2024 9.0     Platelets 08/14/2024 461 (H)     Neutrophil % 08/14/2024 78.8 (H)     Lymphocyte % 08/14/2024 12.3 (L)     Monocyte % 08/14/2024 6.0     Eosinophil % 08/14/2024 1.9     Basophil % 08/14/2024 0.7     Immature Grans % 08/14/2024 0.3     Neutrophils,  "Absolute 08/14/2024 5.66     Lymphocytes, Absolute 08/14/2024 0.88     Monocytes, Absolute 08/14/2024 0.43     Eosinophils, Absolute 08/14/2024 0.14     Basophils, Absolute 08/14/2024 0.05     Immature Grans, Absolute 08/14/2024 0.02     nRBC 08/14/2024 0.0     Hemoglobin A1C 08/14/2024 5.92 (H)    No results displayed because visit has over 200 results.      Hospital Outpatient Visit on 03/14/2024   Component Date Value    BH CV STRESS PROTOCOL 1 03/14/2024 Pharmacologic     Stage 1 03/14/2024 1.0     Duration Min Stage 1 03/14/2024 0     Duration Sec Stage 1 03/14/2024 10     Stress Dose Regadenoson * 03/14/2024 0.40     Stress Comments Stage 1 03/14/2024 10 sec bolus injection     Baseline HR 03/14/2024 108     Baseline BP 03/14/2024 144/88     Peak HR 03/14/2024 117     Peak BP 03/14/2024 130/80     Recovery HR 03/14/2024 129     Recovery BP 03/14/2024 150/96     Target HR (85%) 03/14/2024 135     Max. Pred. HR (100%) 03/14/2024 159     Percent Max Pred HR 03/14/2024 73.58     Percent Target HR 03/14/2024 87     Nuc Stress EF 03/14/2024 40     BH CV REST NUCLEAR ISOTO* 03/14/2024 11.9     BH CV STRESS NUCLEAR ISO* 03/14/2024 33.7      Recent labs reviewed and interpreted for clinical significance and application    Went over each of the labs individually while the patient was still here in the clinic          It is a pleasure to be involved in this patient's cardiovascular care relating to their heart failure.  Please feel free to call me with any questions or concerns.    Richard \"Wilton\" Martha CONDE, [facility]  Heart failure program clinical provider    AMAYA Sarmiento   09/06/24  .  "

## 2024-09-09 ENCOUNTER — DISEASE STATE MANAGEMENT VISIT (OUTPATIENT)
Facility: HOSPITAL | Age: 64
End: 2024-09-09
Payer: OTHER GOVERNMENT

## 2024-09-09 ENCOUNTER — HOSPITAL ENCOUNTER (OUTPATIENT)
Facility: HOSPITAL | Age: 64
Discharge: HOME | End: 2024-09-09
Admitting: NURSE PRACTITIONER
Payer: OTHER GOVERNMENT

## 2024-09-09 ENCOUNTER — OFFICE VISIT (OUTPATIENT)
Dept: CARDIOLOGY | Facility: CLINIC | Age: 64
End: 2024-09-09
Payer: OTHER GOVERNMENT

## 2024-09-09 VITALS
OXYGEN SATURATION: 100 % | WEIGHT: 204 LBS | DIASTOLIC BLOOD PRESSURE: 57 MMHG | BODY MASS INDEX: 27.63 KG/M2 | SYSTOLIC BLOOD PRESSURE: 116 MMHG | HEART RATE: 93 BPM | HEIGHT: 72 IN

## 2024-09-09 VITALS
WEIGHT: 200.8 LBS | OXYGEN SATURATION: 97 % | HEART RATE: 74 BPM | BODY MASS INDEX: 27.23 KG/M2 | DIASTOLIC BLOOD PRESSURE: 79 MMHG | RESPIRATION RATE: 16 BRPM | SYSTOLIC BLOOD PRESSURE: 127 MMHG

## 2024-09-09 DIAGNOSIS — I10 PRIMARY HYPERTENSION: ICD-10-CM

## 2024-09-09 DIAGNOSIS — E87.5 HYPERKALEMIA: ICD-10-CM

## 2024-09-09 DIAGNOSIS — E11.9 TYPE 2 DIABETES MELLITUS WITHOUT COMPLICATION, WITHOUT LONG-TERM CURRENT USE OF INSULIN: ICD-10-CM

## 2024-09-09 DIAGNOSIS — I25.10 ISCHEMIC DILATED CARDIOMYOPATHY DUE TO CORONARY ARTERY DISEASE: Primary | Chronic | ICD-10-CM

## 2024-09-09 DIAGNOSIS — I50.42 CHRONIC COMBINED SYSTOLIC AND DIASTOLIC CHF (CONGESTIVE HEART FAILURE): ICD-10-CM

## 2024-09-09 DIAGNOSIS — I48.21 PERMANENT ATRIAL FIBRILLATION: ICD-10-CM

## 2024-09-09 DIAGNOSIS — R22.1 NECK MASS: ICD-10-CM

## 2024-09-09 DIAGNOSIS — I25.810 CORONARY ARTERY DISEASE INVOLVING CORONARY BYPASS GRAFT OF NATIVE HEART, UNSPECIFIED WHETHER ANGINA PRESENT: ICD-10-CM

## 2024-09-09 DIAGNOSIS — I25.5 ISCHEMIC DILATED CARDIOMYOPATHY DUE TO CORONARY ARTERY DISEASE: Primary | ICD-10-CM

## 2024-09-09 DIAGNOSIS — I63.9 CEREBROVASCULAR ACCIDENT (CVA), UNSPECIFIED MECHANISM: Chronic | ICD-10-CM

## 2024-09-09 DIAGNOSIS — I25.5 ISCHEMIC DILATED CARDIOMYOPATHY DUE TO CORONARY ARTERY DISEASE: Primary | Chronic | ICD-10-CM

## 2024-09-09 DIAGNOSIS — I27.81 CHRONIC COR PULMONALE: ICD-10-CM

## 2024-09-09 DIAGNOSIS — E78.49 OTHER HYPERLIPIDEMIA: ICD-10-CM

## 2024-09-09 DIAGNOSIS — Z79.02 LONG TERM (CURRENT) USE OF ANTITHROMBOTICS/ANTIPLATELETS: ICD-10-CM

## 2024-09-09 DIAGNOSIS — Z87.19 H/O GASTROESOPHAGEAL REFLUX (GERD): Chronic | ICD-10-CM

## 2024-09-09 DIAGNOSIS — E11.9 TYPE 2 DIABETES MELLITUS WITHOUT COMPLICATION, WITHOUT LONG-TERM CURRENT USE OF INSULIN: Chronic | ICD-10-CM

## 2024-09-09 DIAGNOSIS — S09.90XS INJURY OF HEAD, SEQUELA: ICD-10-CM

## 2024-09-09 DIAGNOSIS — I50.42 CHRONIC COMBINED SYSTOLIC AND DIASTOLIC CHF (CONGESTIVE HEART FAILURE): Chronic | ICD-10-CM

## 2024-09-09 DIAGNOSIS — I25.10 ISCHEMIC DILATED CARDIOMYOPATHY DUE TO CORONARY ARTERY DISEASE: Primary | ICD-10-CM

## 2024-09-09 DIAGNOSIS — D50.9 IRON DEFICIENCY ANEMIA, UNSPECIFIED IRON DEFICIENCY ANEMIA TYPE: ICD-10-CM

## 2024-09-09 DIAGNOSIS — I10 PRIMARY HYPERTENSION: Chronic | ICD-10-CM

## 2024-09-09 DIAGNOSIS — R06.09 DOE (DYSPNEA ON EXERTION): ICD-10-CM

## 2024-09-09 DIAGNOSIS — Z95.818 PRESENCE OF WATCHMAN LEFT ATRIAL APPENDAGE CLOSURE DEVICE: Chronic | ICD-10-CM

## 2024-09-09 DIAGNOSIS — Z95.818 PRESENCE OF WATCHMAN LEFT ATRIAL APPENDAGE CLOSURE DEVICE: ICD-10-CM

## 2024-09-09 DIAGNOSIS — I25.810 CORONARY ARTERY DISEASE INVOLVING CORONARY BYPASS GRAFT OF NATIVE HEART, UNSPECIFIED WHETHER ANGINA PRESENT: Chronic | ICD-10-CM

## 2024-09-09 DIAGNOSIS — E88.810 METABOLIC SYNDROME X: ICD-10-CM

## 2024-09-09 DIAGNOSIS — I48.0 PAROXYSMAL ATRIAL FIBRILLATION: Chronic | ICD-10-CM

## 2024-09-09 LAB
ANION GAP SERPL CALCULATED.3IONS-SCNC: 15.1 MMOL/L (ref 5–15)
BASOPHILS # BLD AUTO: 0.03 10*3/MM3 (ref 0–0.2)
BASOPHILS NFR BLD AUTO: 0.4 % (ref 0–1.5)
BUN SERPL-MCNC: 14 MG/DL (ref 8–23)
BUN/CREAT SERPL: 10.1 (ref 7–25)
CALCIUM SPEC-SCNC: 10.1 MG/DL (ref 8.6–10.5)
CHLORIDE SERPL-SCNC: 103 MMOL/L (ref 98–107)
CO2 SERPL-SCNC: 22.9 MMOL/L (ref 22–29)
CREAT SERPL-MCNC: 1.38 MG/DL (ref 0.76–1.27)
DEPRECATED RDW RBC AUTO: 83.7 FL (ref 37–54)
DIGOXIN SERPL-MCNC: 1.16 NG/ML (ref 0.6–1.2)
EGFRCR SERPLBLD CKD-EPI 2021: 58.2 ML/MIN/1.73
EOSINOPHIL # BLD AUTO: 0.13 10*3/MM3 (ref 0–0.4)
EOSINOPHIL NFR BLD AUTO: 1.8 % (ref 0.3–6.2)
ERYTHROCYTE [DISTWIDTH] IN BLOOD BY AUTOMATED COUNT: 26.5 % (ref 12.3–15.4)
GLUCOSE SERPL-MCNC: 119 MG/DL (ref 65–99)
HCT VFR BLD AUTO: 48.3 % (ref 37.5–51)
HGB BLD-MCNC: 12.1 G/DL (ref 13–17.7)
IMM GRANULOCYTES # BLD AUTO: 0.03 10*3/MM3 (ref 0–0.05)
IMM GRANULOCYTES NFR BLD AUTO: 0.4 % (ref 0–0.5)
LYMPHOCYTES # BLD AUTO: 0.97 10*3/MM3 (ref 0.7–3.1)
LYMPHOCYTES NFR BLD AUTO: 13.6 % (ref 19.6–45.3)
MAGNESIUM SERPL-MCNC: 1.6 MG/DL (ref 1.6–2.4)
MCH RBC QN AUTO: 22.2 PG (ref 26.6–33)
MCHC RBC AUTO-ENTMCNC: 25.1 G/DL (ref 31.5–35.7)
MCV RBC AUTO: 88.8 FL (ref 79–97)
MONOCYTES # BLD AUTO: 0.48 10*3/MM3 (ref 0.1–0.9)
MONOCYTES NFR BLD AUTO: 6.7 % (ref 5–12)
NEUTROPHILS NFR BLD AUTO: 5.5 10*3/MM3 (ref 1.7–7)
NEUTROPHILS NFR BLD AUTO: 77.1 % (ref 42.7–76)
NRBC BLD AUTO-RTO: 0.4 /100 WBC (ref 0–0.2)
NT-PROBNP SERPL-MCNC: 4938 PG/ML (ref 0–900)
PLATELET # BLD AUTO: 263 10*3/MM3 (ref 140–450)
PMV BLD AUTO: 9.3 FL (ref 6–12)
POTASSIUM SERPL-SCNC: 4.4 MMOL/L (ref 3.5–5.2)
RBC # BLD AUTO: 5.44 10*6/MM3 (ref 4.14–5.8)
SODIUM SERPL-SCNC: 141 MMOL/L (ref 136–145)
WBC NRBC COR # BLD AUTO: 7.14 10*3/MM3 (ref 3.4–10.8)

## 2024-09-09 PROCEDURE — G0463 HOSPITAL OUTPT CLINIC VISIT: HCPCS

## 2024-09-09 PROCEDURE — 80162 ASSAY OF DIGOXIN TOTAL: CPT | Performed by: NURSE PRACTITIONER

## 2024-09-09 PROCEDURE — 85025 COMPLETE CBC W/AUTO DIFF WBC: CPT | Performed by: NURSE PRACTITIONER

## 2024-09-09 PROCEDURE — 99214 OFFICE O/P EST MOD 30 MIN: CPT | Performed by: INTERNAL MEDICINE

## 2024-09-09 PROCEDURE — 83735 ASSAY OF MAGNESIUM: CPT | Performed by: NURSE PRACTITIONER

## 2024-09-09 PROCEDURE — 83880 ASSAY OF NATRIURETIC PEPTIDE: CPT | Performed by: NURSE PRACTITIONER

## 2024-09-09 PROCEDURE — 80048 BASIC METABOLIC PNL TOTAL CA: CPT | Performed by: NURSE PRACTITIONER

## 2024-09-09 PROCEDURE — 93000 ELECTROCARDIOGRAM COMPLETE: CPT | Performed by: INTERNAL MEDICINE

## 2024-09-09 RX ORDER — SODIUM CHLORIDE 9 MG/ML
80 INJECTION, SOLUTION INTRAVENOUS CONTINUOUS
OUTPATIENT
Start: 2024-09-09

## 2024-09-09 RX ORDER — CALCIUM CARBONATE/VITAMIN D3 500-10/5ML
1 LIQUID (ML) ORAL DAILY
Qty: 90 EACH | Refills: 2 | Status: SHIPPED | OUTPATIENT
Start: 2024-09-09

## 2024-09-09 NOTE — PROGRESS NOTES
[facility], [place] HEART FAILURE CLINIC - PHARMACY SERVICE    Patient Name: Chinedu Lopez  :1960  Age: 63 y.o.  Sex: male  Primary Cardiologist: Imer Mosher (just saw 24)  Nephrology: N/A  PCP: AMAYA Clark     HFrEF with EF 26-30% (Last Echo: 24).    NYHA Class II  B      The patient's last EKG was reviewed from 24 and shows a QTcB of 409 ms.     ICD: LifeVest 2024. Being evaluated for possible ICD need w/ Dr. Willams. TTE pending scheduling.     CKD: Stage 3a eGFR 45-59 mL/min    BMP          2024    10:43 9/3/2024    10:44 2024    10:23   BMP   BUN 26     26  16  14    Creatinine 1.77     1.80  1.30  1.38    Sodium 138     138  142  141    Potassium 4.9     4.9  4.6  4.4    Chloride 99     100  102  103    CO2 24.1     21.8  23.4  22.9    Calcium 10.3     10.2  10.2  10.1        Lab Results   Component Value Date    EGFR 58.2 (L) 2024    EGFR 62.5 2024    EGFR 43.2 (L) 2024    EGFR 42.3 (L) 2024       Lab Results   Component Value Date    PROBNP 4,938.0 (H) 2024    PROBNP 2,731.0 (H) 2024    PROBNP 6,016.0 (H) 2024       Recent Vitals         2024       BP: 112/67 116/57 127/79     Pulse: 60 93 74     Weight: -- 92.5 kg (204 lb) 91.1 kg (200 lb 12.8 oz)     BMI (Calculated): -- 27.7 27.2              -CHF-specific BB:      Pre Visit Dose: Metoprolol succinate  mg PO BID    Post Visit Dose: Metoprolol succinate  mg PO BID     - Target Dose: Metoprolol succinate  mg PO daily.     - Goal HR of 50s to 60s.     - Patient should be seen every 10 to 14 days for a pulse check with plans for up-titration until target heart rate is achieved.        -ACE/ARB/ARNI:     Pre Visit Dose: Sacubitril/valsartan 24/26 mg BID    Post Visit Dose: Sacubitril/valsartan 24/26 mg BID    - Target Dose: Sacubitril/valsartan 97/103 mg PO BID    - Patient should have a follow-up appointment every 2 to 4 weeks for a  hemodynamic check with possible up-titration to target dose.         -SGLT2 inhibitor therapy:    Pre Visit Dose:  Empagliflozin 5 mg PO daily - patient unable to recall who originally instructed him to cut Jardiance in half    Post Visit Dose: Empagliflozin 10 mg PO daily    - Target Dose: Empagliflozin 25 mg PO daily (HF + DM) : CrCl > 20 mL/min which has shown benefit in patients with HF       -MRA:     Pre Visit Dose: Spironolactone 12.5 mg daily    Post Visit Dose: Spironolactone 12.5 mg daily    - Target Dose: Spironolactone 25-50 mg PO daily    - K is < 5 mEq/L and SCr is </= 2.5 mg/dL in male and eGFR > 30 mL/min/1.73m3    Lab Results   Component Value Date    K 4.4 09/09/2024       Lab Results   Component Value Date    CREATININE 1.38 (H) 09/09/2024       -GFR 30 to 50 mL/min: Initial 12.5 mg daily or every other day, may double every 4 weeks if KCL remains < 5 mEq/L  -GFR < 30 mL/min: Use not recommended: HF clinical trials excluded if Scr > 2.5 mg/dL    -DIURETIC:     Pre Visit Dose: Bumetanide 0.5 mg BID + KCl 20 mEq BID    Post Visit Dose: Bumetanide 1 mg BID + KCl 20 mEq BID      -MAGNESIUM:     Mag is not > 2 mg/dL    Lab Results   Component Value Date    MG 1.6 09/09/2024    MG 1.6 08/19/2024    MG 1.9 08/14/2024       Pre Visit Dose: None    Post Visit Dose: Magnesium oxide 400 mg daily    -If Magnesium 1.6-1.9 mg/dL, Initiate Magnesium 400 mg PO daily  -If Magnesium is less than 1.6 mg/dL, Initiate Magnesium 400 mg PO BID    -ANTICOAGULATION:     Watchman Device    [id number] SCORE   [id number] Score for Atrial Fibrillation Stroke Risk  Age in Years: <65  Sex: Male  CHF History: Yes  Hypertension History: Yes  Stroke/TIA/Thromboembolism History: Yes  Vascular Disease History: Yes  Diabetes History: Yes  [id number] Score: 6  Stroke risks -: 6 Points. Risk of ischemic stroke: 9.7%. Risk of stroke/TIA/embolism: 13.6%     -OTHER CV MEDS:     Pre Visit Dose: atorvastatin 40 mg HS, digoxin 125 mcg  daily, ticagrelor 90 mg  BID, aspirin 81 mg daily, and nitroglycerin PRN    Post Visit Dose: No changes      -Clinic Administered Medications:     None    Patient Assistance:      Copay cards of Brilinta and Jardiance utilized by patient. Submitted benefits investigation for Furoscix to patient's insurance and denied. Appeal form obtained and signed by patient, so will submit appeal.      PLAN:  Initiation/Discontinuation/Dose Adjustment: Increase bumetanide to 1 mg BID and adjust Jardiance to 10 mg daily. Initiate magensium oxide 400 mg daily.   Education provided: Education provided on all medication changes and dose adjustments.   Coordination of Care: None  Refills: New script sent in for magnesium oxide.       Thank you for allowing me to participate in the care of your patient.    Jihan Betancourt, PharmD  [facility] Heart Failure [facility]  09/09/24  13:43 EDT

## 2024-09-09 NOTE — PROGRESS NOTES
CC--stroke with intracranial bleed    Sub--63-year-old male patient well-known to me had progressive coronary artery disease and underwent stenting  Cardiac catheterization done recently revealed  Native severe two-vessel coronary artery disease with 100% occlusion of the ostium of the LAD and 100% occlusion of the ostium of the left circumflex  Patent vein graft to the marginal with severe 90% stenosis involving the midportion of the vein graft succinyl treated with placement of a Xience drug-eluting stent  Patent LIMA to the LAD  Patent stent in the right coronary artery with no significant in-stent restenosis    Patient underwent intervention in July 2024 and developed severe LV dysfunction with EF less than 30% and placed on LifeVest and comes in for follow-up.  Patient feels much better and denies any new symptoms  Patient has permanent AF With watchman implantation 2020 with prior history of bypass surgery and has history of hypertension diabetes hyperlipidemia and had prior history of stroke intracranial bleed.        Past Medical History:   Diagnosis Date   • Atrial fibrillation (CMS/[facility])     June, [facility]. Abstracted from [employer].   • Brain bleed (CMS/[facility]) 2019 October 14th [facility] on 17th for 3 weeks   • CAD (coronary artery disease)     S/P CABG and PCI. Abstracted from [employer].   • Hyperlipidemia    • Hypertension    • Myocardial infarction (CMS/[facility]) 2006    Non-ST elevation MI. Abstracted from [employer].   • Obstructive sleep apnea on CPAP     At . Abstracted from [employer].   • Polycythemia vera (CMS/[facility])    • Presence of Watchman left atrial appendage closure device 1/31/2020   • TIA (transient ischemic attack) 11/19/2013    Possible TIA- MRI shows small tumor/head. Abstracted from [employer].     Past Surgical History:   Procedure Laterality Date   • ANGIOPLASTY  2006    3 srents were sequentially placed in promimal and mid body of the saphennous vein graft to obtuse marginal branch.  Abstracted from [employer].   • ATRIAL APPENDAGE EXCLUSION LEFT WITH TRANSESOPHAGEAL ECHOCARDIOGRAM N/A 1/30/2020    Procedure: Atrial Appendage Occlusion;  Surgeon: Angel Willams MD;  Location: [facility] CATH INVASIVE LOCATION;  Service: Cardiovascular   • ATRIAL APPENDAGE EXCLUSION LEFT WITH TRANSESOPHAGEAL ECHOCARDIOGRAM N/A 1/30/2020    Procedure: Atrial Appendage Occlusion;  Surgeon: John Sheridan MD;  Location: [facility] CATH INVASIVE LOCATION;  Service: Cardiology   • CARDIAC CATHETERIZATION  2000    2003, 2006, 2012. Abstracted from [employer].   • CORONARY ARTERY BYPASS GRAFT  2000    Triple. Abstracted from [facility].   • MUSCLE REPAIR      Tendons and nerves in right lower extremity. Abstracted from [employer].   • OTHER SURGICAL HISTORY  08/2012    RCA mid and proximal- Xience stent x3. Abstracted from [facility].   • OTHER SURGICAL HISTORY  09/2016    Head trauma/bleed at [facility]. Abstracted from [employer].         Physical Exam    General:      well developed, well nourished, in no acute distress.    Head:      normocephalic and atraumatic.    Eyes:      PERRL/EOM intact, conjunctivae and sclerae clear without nystagmus.    Neck:      no  thyromegaly, trachea central with normal respiratory effort  Lungs:      clear bilaterally to auscultation.    Heart:       irregular rate and rhythm, S1, S2 without murmurs, rubs, or gallops  Skin:      intact without lesions or rashes.    Psych:      alert and cooperative; normal mood and affect; normal attention span and concentration.        Assessment plan    Progressive coronary artery disease needing stenting clinically doing well without further angina on aspirin and Brilinta  Development of severe LV dysfunction from recent progressive coronary disease on LifeVest currently patient being treated with Entresto, Aldactone and Toprol and Jardiance and Bumex  Post watchman implantation in 2020  Schedule patient for WILD to exclude any delayed device  related thrombosis and evaluation of LV function to decide on ICD therapy in future based on LV ejection fraction improvement  Remote history of stroke and intracranial bleed  Hypertension controlled with current regimen which includes Aldactone and Toprol  Hyperlipidemia on atorvastatin  Diabetes on metformin and Jardiance  Medications reviewed and follow-up appointments made    Orders for WILD placed    Procedure labs include creatinine of 1.3 with normal potassium and hemoglobin 11.9 with normal platelets and liver function tests are normal        ECG 12 Lead    Date/Time: 9/9/2024 8:46 AM  Performed by: Angel Willams MD    Authorized by: Angel Willams MD  Comparison: compared with previous ECG   Similar to previous ECG  Rhythm: atrial fibrillation  Rate: normal  Conduction: conduction normal  QRS axis: right  Other findings: non-specific ST-T wave changes      Electronically signed by Angel Willams MD, 09/09/24, 8:46 AM EDT.

## 2024-09-11 ENCOUNTER — TREATMENT (OUTPATIENT)
Dept: CARDIAC REHAB | Facility: HOSPITAL | Age: 64
End: 2024-09-11
Payer: OTHER GOVERNMENT

## 2024-09-11 DIAGNOSIS — I50.42 CHRONIC COMBINED SYSTOLIC AND DIASTOLIC CONGESTIVE HEART FAILURE: Primary | ICD-10-CM

## 2024-09-11 PROCEDURE — 93798 PHYS/QHP OP CAR RHAB W/ECG: CPT

## 2024-09-13 ENCOUNTER — TREATMENT (OUTPATIENT)
Dept: CARDIAC REHAB | Facility: HOSPITAL | Age: 64
End: 2024-09-13
Payer: OTHER GOVERNMENT

## 2024-09-13 ENCOUNTER — HOSPITAL ENCOUNTER (OUTPATIENT)
Dept: ONCOLOGY | Facility: HOSPITAL | Age: 64
Discharge: HOME | End: 2024-09-13
Payer: OTHER GOVERNMENT

## 2024-09-13 VITALS
RESPIRATION RATE: 16 BRPM | HEART RATE: 67 BPM | HEIGHT: 72 IN | TEMPERATURE: 98 F | WEIGHT: 199.4 LBS | OXYGEN SATURATION: 99 % | DIASTOLIC BLOOD PRESSURE: 74 MMHG | SYSTOLIC BLOOD PRESSURE: 121 MMHG | BODY MASS INDEX: 27.01 KG/M2

## 2024-09-13 DIAGNOSIS — I50.42 CHRONIC COMBINED SYSTOLIC AND DIASTOLIC CONGESTIVE HEART FAILURE: Primary | ICD-10-CM

## 2024-09-13 DIAGNOSIS — D50.9 IRON DEFICIENCY ANEMIA, UNSPECIFIED IRON DEFICIENCY ANEMIA TYPE: ICD-10-CM

## 2024-09-13 DIAGNOSIS — T45.4X5D ADVERSE EFFECT OF IRON, SUBSEQUENT ENCOUNTER: Primary | ICD-10-CM

## 2024-09-13 PROCEDURE — 96367 TX/PROPH/DG ADDL SEQ IV INF: CPT

## 2024-09-13 PROCEDURE — 96366 THER/PROPH/DIAG IV INF ADDON: CPT

## 2024-09-13 PROCEDURE — 96375 TX/PRO/DX INJ NEW DRUG ADDON: CPT

## 2024-09-13 PROCEDURE — 25810000003 SODIUM CHLORIDE 0.9 % SOLUTION 250 ML FLEX CONT: Performed by: INTERNAL MEDICINE

## 2024-09-13 PROCEDURE — 93798 PHYS/QHP OP CAR RHAB W/ECG: CPT

## 2024-09-13 PROCEDURE — 25010000002 NA FERRIC GLUC CPLX PER 12.5 MG: Performed by: INTERNAL MEDICINE

## 2024-09-13 PROCEDURE — 96365 THER/PROPH/DIAG IV INF INIT: CPT

## 2024-09-13 PROCEDURE — 25810000003 SODIUM CHLORIDE 0.9 % SOLUTION: Performed by: INTERNAL MEDICINE

## 2024-09-13 PROCEDURE — 25010000002 DIPHENHYDRAMINE PER 50 MG: Performed by: INTERNAL MEDICINE

## 2024-09-13 RX ORDER — SODIUM CHLORIDE 9 MG/ML
20 INJECTION, SOLUTION INTRAVENOUS ONCE
Status: COMPLETED | OUTPATIENT
Start: 2024-09-13 | End: 2024-09-13

## 2024-09-13 RX ORDER — ACETAMINOPHEN 325 MG/1
650 TABLET ORAL ONCE
Status: COMPLETED | OUTPATIENT
Start: 2024-09-13 | End: 2024-09-13

## 2024-09-13 RX ORDER — FAMOTIDINE 10 MG/ML
20 INJECTION, SOLUTION INTRAVENOUS ONCE
Status: COMPLETED | OUTPATIENT
Start: 2024-09-13 | End: 2024-09-13

## 2024-09-13 RX ADMIN — ACETAMINOPHEN 650 MG: 325 TABLET ORAL at 10:12

## 2024-09-13 RX ADMIN — SODIUM CHLORIDE 250 MG: 9 INJECTION, SOLUTION INTRAVENOUS at 10:40

## 2024-09-13 RX ADMIN — FAMOTIDINE 20 MG: 10 INJECTION INTRAVENOUS at 10:12

## 2024-09-13 RX ADMIN — DIPHENHYDRAMINE HYDROCHLORIDE 25 MG: 50 INJECTION, SOLUTION INTRAMUSCULAR; INTRAVENOUS at 10:18

## 2024-09-13 RX ADMIN — SODIUM CHLORIDE 20 ML/HR: 9 INJECTION, SOLUTION INTRAVENOUS at 10:12

## 2024-09-18 ENCOUNTER — APPOINTMENT (OUTPATIENT)
Dept: CARDIAC REHAB | Facility: HOSPITAL | Age: 64
End: 2024-09-18
Payer: OTHER GOVERNMENT

## 2024-09-20 ENCOUNTER — APPOINTMENT (OUTPATIENT)
Dept: CARDIAC REHAB | Facility: HOSPITAL | Age: 64
End: 2024-09-20
Payer: OTHER GOVERNMENT

## 2024-09-25 ENCOUNTER — TREATMENT (OUTPATIENT)
Dept: CARDIAC REHAB | Facility: HOSPITAL | Age: 64
End: 2024-09-25
Payer: OTHER GOVERNMENT

## 2024-09-25 DIAGNOSIS — I50.42 CHRONIC COMBINED SYSTOLIC AND DIASTOLIC CONGESTIVE HEART FAILURE: Primary | ICD-10-CM

## 2024-09-25 PROCEDURE — 93798 PHYS/QHP OP CAR RHAB W/ECG: CPT

## 2024-09-27 ENCOUNTER — TREATMENT (OUTPATIENT)
Dept: CARDIAC REHAB | Facility: HOSPITAL | Age: 64
End: 2024-09-27
Payer: OTHER GOVERNMENT

## 2024-09-27 DIAGNOSIS — I50.42 CHRONIC COMBINED SYSTOLIC AND DIASTOLIC CONGESTIVE HEART FAILURE: Primary | ICD-10-CM

## 2024-09-27 PROCEDURE — 93798 PHYS/QHP OP CAR RHAB W/ECG: CPT

## 2024-10-02 ENCOUNTER — TREATMENT (OUTPATIENT)
Dept: CARDIAC REHAB | Facility: HOSPITAL | Age: 64
End: 2024-10-02
Payer: OTHER GOVERNMENT

## 2024-10-02 DIAGNOSIS — I50.42 CHRONIC COMBINED SYSTOLIC AND DIASTOLIC CONGESTIVE HEART FAILURE: Primary | ICD-10-CM

## 2024-10-02 PROCEDURE — 93798 PHYS/QHP OP CAR RHAB W/ECG: CPT

## 2024-10-04 ENCOUNTER — TREATMENT (OUTPATIENT)
Dept: CARDIAC REHAB | Facility: HOSPITAL | Age: 64
End: 2024-10-04
Payer: OTHER GOVERNMENT

## 2024-10-04 DIAGNOSIS — I50.42 CHRONIC COMBINED SYSTOLIC AND DIASTOLIC CONGESTIVE HEART FAILURE: Primary | ICD-10-CM

## 2024-10-04 PROCEDURE — 93798 PHYS/QHP OP CAR RHAB W/ECG: CPT

## 2024-10-09 ENCOUNTER — TREATMENT (OUTPATIENT)
Dept: CARDIAC REHAB | Facility: HOSPITAL | Age: 64
End: 2024-10-09
Payer: OTHER GOVERNMENT

## 2024-10-09 DIAGNOSIS — I50.42 CHRONIC COMBINED SYSTOLIC AND DIASTOLIC CONGESTIVE HEART FAILURE: Primary | ICD-10-CM

## 2024-10-09 PROCEDURE — 93798 PHYS/QHP OP CAR RHAB W/ECG: CPT

## 2024-10-11 ENCOUNTER — TREATMENT (OUTPATIENT)
Dept: CARDIAC REHAB | Facility: HOSPITAL | Age: 64
End: 2024-10-11
Payer: OTHER GOVERNMENT

## 2024-10-11 DIAGNOSIS — I50.42 CHRONIC COMBINED SYSTOLIC AND DIASTOLIC CONGESTIVE HEART FAILURE: Primary | ICD-10-CM

## 2024-10-11 PROCEDURE — 93798 PHYS/QHP OP CAR RHAB W/ECG: CPT

## 2024-10-15 ENCOUNTER — HOSPITAL ENCOUNTER (OUTPATIENT)
Dept: CARDIOLOGY | Facility: HOSPITAL | Age: 64
Setting detail: HOSPITAL OUTPATIENT SURGERY
Discharge: HOME | End: 2024-10-15
Payer: OTHER GOVERNMENT

## 2024-10-15 ENCOUNTER — ANESTHESIA EVENT (OUTPATIENT)
Dept: CARDIOLOGY | Facility: HOSPITAL | Age: 64
End: 2024-10-15
Payer: OTHER GOVERNMENT

## 2024-10-15 ENCOUNTER — ANESTHESIA (OUTPATIENT)
Dept: CARDIOLOGY | Facility: HOSPITAL | Age: 64
End: 2024-10-15
Payer: OTHER GOVERNMENT

## 2024-10-15 VITALS
RESPIRATION RATE: 14 BRPM | HEART RATE: 82 BPM | BODY MASS INDEX: 27.81 KG/M2 | WEIGHT: 198.63 LBS | SYSTOLIC BLOOD PRESSURE: 135 MMHG | OXYGEN SATURATION: 98 % | HEIGHT: 71 IN | DIASTOLIC BLOOD PRESSURE: 74 MMHG | TEMPERATURE: 97.6 F

## 2024-10-15 DIAGNOSIS — I25.10 ISCHEMIC DILATED CARDIOMYOPATHY DUE TO CORONARY ARTERY DISEASE: ICD-10-CM

## 2024-10-15 DIAGNOSIS — I48.21 PERMANENT ATRIAL FIBRILLATION: ICD-10-CM

## 2024-10-15 DIAGNOSIS — I25.5 ISCHEMIC DILATED CARDIOMYOPATHY DUE TO CORONARY ARTERY DISEASE: ICD-10-CM

## 2024-10-15 DIAGNOSIS — Z95.818 PRESENCE OF WATCHMAN LEFT ATRIAL APPENDAGE CLOSURE DEVICE: ICD-10-CM

## 2024-10-15 LAB — LV EF 2D ECHO EST: 55 %

## 2024-10-15 PROCEDURE — 93312 ECHO TRANSESOPHAGEAL: CPT

## 2024-10-15 PROCEDURE — 93325 DOPPLER ECHO COLOR FLOW MAPG: CPT

## 2024-10-15 PROCEDURE — 25010000002 LIDOCAINE PF 2% 2 % SOLUTION: Performed by: NURSE ANESTHETIST, CERTIFIED REGISTERED

## 2024-10-15 PROCEDURE — 25010000002 PROPOFOL 1000 MG/100ML EMULSION: Performed by: NURSE ANESTHETIST, CERTIFIED REGISTERED

## 2024-10-15 PROCEDURE — 93321 DOPPLER ECHO F-UP/LMTD STD: CPT

## 2024-10-15 PROCEDURE — 25810000003 SODIUM CHLORIDE 0.9 % SOLUTION: Performed by: NURSE ANESTHETIST, CERTIFIED REGISTERED

## 2024-10-15 RX ORDER — ONDANSETRON 2 MG/ML
4 INJECTION INTRAMUSCULAR; INTRAVENOUS ONCE AS NEEDED
OUTPATIENT
Start: 2024-10-15

## 2024-10-15 RX ORDER — FLUMAZENIL 0.1 MG/ML
0.2 INJECTION INTRAVENOUS AS NEEDED
OUTPATIENT
Start: 2024-10-15 | End: 2024-10-16

## 2024-10-15 RX ORDER — PROPOFOL 10 MG/ML
INJECTION, EMULSION INTRAVENOUS AS NEEDED
Status: DISCONTINUED | OUTPATIENT
Start: 2024-10-15 | End: 2024-10-15 | Stop reason: SURG

## 2024-10-15 RX ORDER — DIPHENHYDRAMINE HYDROCHLORIDE 50 MG/ML
12.5 INJECTION INTRAMUSCULAR; INTRAVENOUS
OUTPATIENT
Start: 2024-10-15

## 2024-10-15 RX ORDER — OXYCODONE HYDROCHLORIDE 5 MG/1
5 TABLET ORAL ONCE AS NEEDED
OUTPATIENT
Start: 2024-10-15

## 2024-10-15 RX ORDER — LABETALOL HYDROCHLORIDE 5 MG/ML
5 INJECTION, SOLUTION INTRAVENOUS
OUTPATIENT
Start: 2024-10-15

## 2024-10-15 RX ORDER — IPRATROPIUM BROMIDE AND ALBUTEROL SULFATE 2.5; .5 MG/3ML; MG/3ML
3 SOLUTION RESPIRATORY (INHALATION) ONCE AS NEEDED
OUTPATIENT
Start: 2024-10-15

## 2024-10-15 RX ORDER — EPHEDRINE SULFATE 5 MG/ML
5 INJECTION INTRAVENOUS ONCE AS NEEDED
OUTPATIENT
Start: 2024-10-15

## 2024-10-15 RX ORDER — OXYCODONE HYDROCHLORIDE 5 MG/1
10 TABLET ORAL EVERY 4 HOURS PRN
OUTPATIENT
Start: 2024-10-15

## 2024-10-15 RX ORDER — HYDROMORPHONE HYDROCHLORIDE 1 MG/ML
0.5 INJECTION, SOLUTION INTRAMUSCULAR; INTRAVENOUS; SUBCUTANEOUS
OUTPATIENT
Start: 2024-10-15

## 2024-10-15 RX ORDER — NALOXONE HCL 0.4 MG/ML
0.4 VIAL (ML) INJECTION AS NEEDED
OUTPATIENT
Start: 2024-10-15 | End: 2024-10-16

## 2024-10-15 RX ORDER — LIDOCAINE HYDROCHLORIDE 20 MG/ML
INJECTION, SOLUTION EPIDURAL; INFILTRATION; INTRACAUDAL; PERINEURAL AS NEEDED
Status: DISCONTINUED | OUTPATIENT
Start: 2024-10-15 | End: 2024-10-15 | Stop reason: SURG

## 2024-10-15 RX ORDER — DIPHENHYDRAMINE HYDROCHLORIDE 50 MG/ML
12.5 INJECTION INTRAMUSCULAR; INTRAVENOUS ONCE AS NEEDED
OUTPATIENT
Start: 2024-10-15

## 2024-10-15 RX ORDER — HYDRALAZINE HYDROCHLORIDE 20 MG/ML
5 INJECTION INTRAMUSCULAR; INTRAVENOUS
OUTPATIENT
Start: 2024-10-15

## 2024-10-15 RX ORDER — SODIUM CHLORIDE 9 MG/ML
INJECTION, SOLUTION INTRAVENOUS CONTINUOUS PRN
Status: DISCONTINUED | OUTPATIENT
Start: 2024-10-15 | End: 2024-10-15 | Stop reason: SURG

## 2024-10-15 RX ADMIN — SODIUM CHLORIDE: 9 INJECTION, SOLUTION INTRAVENOUS at 12:00

## 2024-10-15 RX ADMIN — LIDOCAINE HYDROCHLORIDE 60 MG: 20 INJECTION, SOLUTION EPIDURAL; INFILTRATION; INTRACAUDAL; PERINEURAL at 12:02

## 2024-10-15 RX ADMIN — PROPOFOL INJECTABLE EMULSION 100 MG: 10 INJECTION, EMULSION INTRAVENOUS at 12:02

## 2024-10-15 NOTE — ANESTHESIA POSTPROCEDURE EVALUATION
Patient: Chinedu Lopez    Procedure Summary       Date: 10/15/24 Room / Location: [facility]    Anesthesia Start: 1200 Anesthesia Stop: 1208    Procedure: ADULT TRANSESOPHAGEAL ECHO (WILD) W/ CONT IF NECESSARY PER PROTOCOL Diagnosis:       Ischemic dilated cardiomyopathy due to coronary artery disease      Presence of Watchman left atrial appendage closure device      Permanent atrial fibrillation      (Arrhythmia)    Scheduled Providers: Angel Willams MD Provider: Yousif Foley MD    Anesthesia Type: general ASA Status: 3            Anesthesia Type: general    Vitals  Vitals Value Taken Time   /89 10/15/24 1225   Temp     Pulse 78 10/15/24 1246   Resp 14 10/15/24 1207   SpO2 97 % 10/15/24 1246   Vitals shown include unfiled device data.        Post Anesthesia Care and Evaluation    Patient location during evaluation: PACU  Patient participation: complete - patient participated  Level of consciousness: awake  Pain scale: See nurse's notes for pain score.  Pain management: adequate    Airway patency: patent  Anesthetic complications: No anesthetic complications  PONV Status: none  Cardiovascular status: acceptable  Respiratory status: acceptable and spontaneous ventilation  Hydration status: acceptable    Comments: Patient seen and examined postoperatively; vital signs stable; SpO2 greater than or equal to 90%; cardiopulmonary status stable; nausea/vomiting adequately controlled; pain adequately controlled; no apparent anesthesia complications; patient discharged from anesthesia care when discharge criteria were met

## 2024-10-15 NOTE — ANESTHESIA PREPROCEDURE EVALUATION
Anesthesia Evaluation     Patient summary reviewed and Nursing notes reviewed   no history of anesthetic complications:   NPO Solid Status: > 8 hours  NPO Liquid Status: > 8 hours           Airway   Mallampati: II  TM distance: >3 FB  Neck ROM: full  No difficulty expected  Dental    (+) edentulous    Pulmonary - normal exam   (+) a smoker Current,sleep apnea  Cardiovascular - normal exam    (+) hypertension, CAD, CABG, dysrhythmias Atrial Fib, CHF , hyperlipidemia      Neuro/Psych  (+) CVA residual symptoms  GI/Hepatic/Renal/Endo    (+) diabetes mellitus    Musculoskeletal     Abdominal  - normal exam   Substance History      OB/GYN          Other                          Anesthesia Plan    ASA 3     general     intravenous induction     Anesthetic plan, risks, benefits, and alternatives have been provided, discussed and informed consent has been obtained with: patient.    Plan discussed with CRNA.

## 2024-10-15 NOTE — DISCHARGE INSTRUCTIONS
WILD DC Instructions    The medication, which was used to put the patient to sleep, will be acting in your body for the next twenty-four (24) hours, so you might feel a little sleepy; this feeling will slowly wear off.  Because the medicine is still in your system for the next twenty-four (24) hours, the adult patient SHOULD NOT:    Drive a car, operate machinery, or power tools  Drink any alcoholic drinks (not even beer)  Make any important decisions such as to sign important papers    We strongly suggest that a responsible adult be with the patient the rest of the day.    Any problems with:    EXCESSIVE MUCOUS  SPITTING UP BLOOD  SORE THROAT AT MORE THAN 72 HOURS    NOTIFY DR. Willams -[phone] OR CALL THE [facility] [phone].

## 2024-10-15 NOTE — H&P
CC--stroke with intracranial bleed     Sub--63-year-old male patient well-known to me had progressive coronary artery disease and underwent stenting  Cardiac catheterization done recently revealed  Native severe two-vessel coronary artery disease with 100% occlusion of the ostium of the LAD and 100% occlusion of the ostium of the left circumflex  Patent vein graft to the marginal with severe 90% stenosis involving the midportion of the vein graft succinyl treated with placement of a Xience drug-eluting stent  Patent LIMA to the LAD  Patent stent in the right coronary artery with no significant in-stent restenosis     Patient underwent intervention in July 2024 and developed severe LV dysfunction with EF less than 30% and placed on LifeVest and comes in for follow-up.  Patient feels much better and denies any new symptoms  Patient has permanent AF With watchman implantation 2020 with prior history of bypass surgery and has history of hypertension diabetes hyperlipidemia and had prior history of stroke intracranial bleed.                Past Medical History:   Diagnosis Date    Atrial fibrillation (CMS/[facility])       June, [facility]. Abstracted from [facility].    Brain bleed (CMS/HCC) 2019 October 14th [facility] on 17th for 3 weeks    CAD (coronary artery disease)       S/P CABG and PCI. Abstracted from [facility].    Hyperlipidemia      Hypertension      Myocardial infarction (CMS/[facility]) 2006     Non-ST elevation MI. Abstracted from [facility].    Obstructive sleep apnea on CPAP       At . Abstracted from [facility].    Polycythemia vera (CMS/[facility])      Presence of Watchman left atrial appendage closure device 1/31/2020    TIA (transient ischemic attack) 11/19/2013     Possible TIA- MRI shows small tumor/head. Abstracted from [facility].            Past Surgical History:   Procedure Laterality Date    ANGIOPLASTY   2006     3 srents were sequentially placed in promimal and mid body of the saphennous vein graft to  obtuse marginal branch. Abstracted from [facility].    ATRIAL APPENDAGE EXCLUSION LEFT WITH TRANSESOPHAGEAL ECHOCARDIOGRAM N/A 1/30/2020     Procedure: Atrial Appendage Occlusion;  Surgeon: Angel Willams MD;  Location: [facility] CATH INVASIVE LOCATION;  Service: Cardiovascular    ATRIAL APPENDAGE EXCLUSION LEFT WITH TRANSESOPHAGEAL ECHOCARDIOGRAM N/A 1/30/2020     Procedure: Atrial Appendage Occlusion;  Surgeon: John Sheridan MD;  Location: [facility] CATH INVASIVE LOCATION;  Service: Cardiology    CARDIAC CATHETERIZATION   2000 2003, 2006, 2012. Abstracted from [facility].    CORONARY ARTERY BYPASS GRAFT   2000     Triple. Abstracted from [facility].    MUSCLE REPAIR         Tendons and nerves in right lower extremity. Abstracted from [facility].    OTHER SURGICAL HISTORY   08/2012     RCA mid and proximal- Xience stent x3. Abstracted from [facility].    OTHER SURGICAL HISTORY   09/2016     Head trauma/bleed at [facility]. Abstracted from [facility].            Physical Exam     General:      well developed, well nourished, in no acute distress.    Head:      normocephalic and atraumatic.    Eyes:      PERRL/EOM intact, conjunctivae and sclerae clear without nystagmus.    Neck:      no  thyromegaly, trachea central with normal respiratory effort  Lungs:      clear bilaterally to auscultation.    Heart:       irregular rate and rhythm, S1, S2 without murmurs, rubs, or gallops  Skin:      intact without lesions or rashes.    Psych:      alert and cooperative; normal mood and affect; normal attention span and concentration.          Assessment plan     Progressive coronary artery disease needing stenting clinically doing well without further angina on aspirin and Brilinta  Development of severe LV dysfunction from recent progressive coronary disease on LifeVest currently patient being treated with Entresto, Aldactone and Toprol and Jardiance and Bumex  Post watchman implantation in 2020  Schedule patient for  WILD to exclude any delayed device related thrombosis and evaluation of LV function to decide on ICD therapy in future based on LV ejection fraction improvement  Remote history of stroke and intracranial bleed  Hypertension controlled with current regimen which includes Aldactone and Toprol  Hyperlipidemia on atorvastatin  Diabetes on metformin and Jardiance  Medications reviewed and follow-up appointments made     Orders for WILD placed     Procedure labs include creatinine of 1.3 with normal potassium and hemoglobin 11.9 with normal platelets and liver function tests are normal      Electronically signed by Angel Willams MD, 10/15/24, 11:26 AM EDT.

## 2024-10-16 ENCOUNTER — APPOINTMENT (OUTPATIENT)
Dept: CARDIAC REHAB | Facility: HOSPITAL | Age: 64
End: 2024-10-16
Payer: OTHER GOVERNMENT

## 2024-10-18 ENCOUNTER — TREATMENT (OUTPATIENT)
Dept: CARDIAC REHAB | Facility: HOSPITAL | Age: 64
End: 2024-10-18
Payer: OTHER GOVERNMENT

## 2024-10-18 DIAGNOSIS — I50.42 CHRONIC COMBINED SYSTOLIC AND DIASTOLIC CONGESTIVE HEART FAILURE: Primary | ICD-10-CM

## 2024-10-18 PROCEDURE — 93798 PHYS/QHP OP CAR RHAB W/ECG: CPT

## 2024-10-21 ENCOUNTER — OFFICE VISIT (OUTPATIENT)
Dept: CARDIOLOGY | Facility: CLINIC | Age: 64
End: 2024-10-21
Payer: OTHER GOVERNMENT

## 2024-10-21 VITALS
HEART RATE: 95 BPM | BODY MASS INDEX: 26.68 KG/M2 | WEIGHT: 197 LBS | OXYGEN SATURATION: 100 % | HEIGHT: 72 IN | SYSTOLIC BLOOD PRESSURE: 135 MMHG | DIASTOLIC BLOOD PRESSURE: 81 MMHG

## 2024-10-21 DIAGNOSIS — Z95.818 PRESENCE OF WATCHMAN LEFT ATRIAL APPENDAGE CLOSURE DEVICE: ICD-10-CM

## 2024-10-21 DIAGNOSIS — I48.21 PERMANENT ATRIAL FIBRILLATION: Primary | ICD-10-CM

## 2024-10-21 DIAGNOSIS — I10 PRIMARY HYPERTENSION: ICD-10-CM

## 2024-10-21 DIAGNOSIS — I25.810 CORONARY ARTERY DISEASE INVOLVING CORONARY BYPASS GRAFT OF NATIVE HEART, UNSPECIFIED WHETHER ANGINA PRESENT: ICD-10-CM

## 2024-10-21 PROCEDURE — 99213 OFFICE O/P EST LOW 20 MIN: CPT | Performed by: INTERNAL MEDICINE

## 2024-10-21 NOTE — PROGRESS NOTES
C--stroke with intracranial bleed    Sub--63-year-old male patient underwent WILD which revealed    WILD  Findings     LV systolic function normalized with EF more than 50%  Moderate left atrial enlargement without any shunt or clot in the left atrial appendage is occluded by the previously placed Watchman device without any delayed thrombosis and there is no leak  Mild central MR and no effusion  Mild TR noted with mild pulm hypertension        Procedure done  Transesophageal echocardiography     Plan  LV ejection fraction normalized and EF more than 50%  LifeVest can be removed    After WILD LifeVest removed and patient came for follow-up and no new symptoms    Recent history attached below for reference    63-year-old male patient well-known to me had progressive coronary artery disease and underwent stenting  Cardiac catheterization done recently revealed  Native severe two-vessel coronary artery disease with 100% occlusion of the ostium of the LAD and 100% occlusion of the ostium of the left circumflex  Patent vein graft to the marginal with severe 90% stenosis involving the midportion of the vein graft succinyl treated with placement of a Xience drug-eluting stent  Patent LIMA to the LAD  Patent stent in the right coronary artery with no significant in-stent restenosis    Patient underwent intervention in July 2024 and developed severe LV dysfunction with EF less than 30% and placed on LifeVest and comes in for follow-up.  Patient feels much better and denies any new symptoms  Patient has permanent AF With watchman implantation 2020 with prior history of bypass surgery and has history of hypertension diabetes hyperlipidemia and had prior history of stroke intracranial bleed.        Past Medical History:   Diagnosis Date    Atrial fibrillation (CMS/HCC)     June, [facility] from [facility].    Brain bleed (CMS/HCC) 2019 October 14th [facility] on 17th for 3 weeks    CAD (coronary artery disease)      S/P CABG and PCI. Abstracted from [facility].    Hyperlipidemia     Hypertension     Myocardial infarction (CMS/[facility]) 2006    Non-ST elevation MI. Abstracted from [facility].    Obstructive sleep apnea on CPAP     At HS. Abstracted from [facility].    Polycythemia vera (CMS/[facility])     Presence of Watchman left atrial appendage closure device 1/31/2020    TIA (transient ischemic attack) 11/19/2013    Possible TIA- MRI shows small tumor/head. Abstracted from [facility].     Past Surgical History:   Procedure Laterality Date    ANGIOPLASTY  2006    3 srents were sequentially placed in promimal and mid body of the saphennous vein graft to obtuse marginal branch. Abstracted from [facility].    ATRIAL APPENDAGE EXCLUSION LEFT WITH TRANSESOPHAGEAL ECHOCARDIOGRAM N/A 1/30/2020    Procedure: Atrial Appendage Occlusion;  Surgeon: Angel Willams MD;  Location: [facility] CATH INVASIVE LOCATION;  Service: Cardiovascular    ATRIAL APPENDAGE EXCLUSION LEFT WITH TRANSESOPHAGEAL ECHOCARDIOGRAM N/A 1/30/2020    Procedure: Atrial Appendage Occlusion;  Surgeon: John Sheridan MD;  Location: [facility] CATH INVASIVE LOCATION;  Service: Cardiology    CARDIAC CATHETERIZATION  2000    2003, 2006, 2012. Abstracted from [facility].    CORONARY ARTERY BYPASS GRAFT  2000    Triple. Abstracted from [facility].    MUSCLE REPAIR      Tendons and nerves in right lower extremity. Abstracted from [facility].    OTHER SURGICAL HISTORY  08/2012    RCA mid and proximal- Xience stent x3. Abstracted from [facility].    OTHER SURGICAL HISTORY  09/2016    Head trauma/bleed at [facility]. Abstracted from [facility].         Physical Exam    General:      well developed, well nourished, in no acute distress.    Head:      normocephalic and atraumatic.    Eyes:      PERRL/EOM intact, conjunctivae and sclerae clear without nystagmus.    Neck:      no  thyromegaly, trachea central with normal respiratory effort  Lungs:      clear bilaterally to auscultation.     Heart:       regular rate and rhythm, S1, S2 without murmurs, rubs, or gallops  Skin:      intact without lesions or rashes.    Psych:      alert and cooperative; normal mood and affect; normal attention span and concentration.              Assessment plan    Recent creatinine of 1.38, potassium 4.4.  Hemoglobin and platelets are normal  WILD revealed excellent seal of Watchman device without any delayed thrombosis and no leak and EF is normalized and LifeVest removed  Post watchman implantation 2020  Remote history of stroke and intracranial bleed without recurrence  Hypertension controlled with Aldactone and Toprol  Hyperlipidemia on atorvastatin  Diabetes on metformin and Jardiance  Progressive coronary disease post stenting and normalization of EF with medical treatment and stenting and LifeVest remote  Currently patient on aspirin and ticagrelor  Patient on Jardiance, digoxin, Toprol, Entresto and Aldactone on adequate medical treatment  Medications reviewed and follow-up appointments made      Electronically signed by Angel Willams MD, 10/21/24, 11:48 AM EDT.

## 2024-10-22 ENCOUNTER — LAB (OUTPATIENT)
Dept: LAB | Facility: HOSPITAL | Age: 64
End: 2024-10-22
Payer: OTHER GOVERNMENT

## 2024-10-22 ENCOUNTER — OFFICE VISIT (OUTPATIENT)
Dept: ONCOLOGY | Facility: CLINIC | Age: 64
End: 2024-10-22
Payer: OTHER GOVERNMENT

## 2024-10-22 VITALS
BODY MASS INDEX: 26.55 KG/M2 | OXYGEN SATURATION: 99 % | HEIGHT: 72 IN | SYSTOLIC BLOOD PRESSURE: 123 MMHG | TEMPERATURE: 98 F | HEART RATE: 83 BPM | WEIGHT: 196 LBS | DIASTOLIC BLOOD PRESSURE: 74 MMHG

## 2024-10-22 DIAGNOSIS — D50.9 IRON DEFICIENCY ANEMIA, UNSPECIFIED IRON DEFICIENCY ANEMIA TYPE: Primary | ICD-10-CM

## 2024-10-22 LAB
BASOPHILS # BLD AUTO: 0.02 10*3/MM3 (ref 0–0.2)
BASOPHILS NFR BLD AUTO: 0.3 % (ref 0–1.5)
DEPRECATED RDW RBC AUTO: 71.8 FL (ref 37–54)
EOSINOPHIL # BLD AUTO: 0.13 10*3/MM3 (ref 0–0.4)
EOSINOPHIL NFR BLD AUTO: 2 % (ref 0.3–6.2)
ERYTHROCYTE [DISTWIDTH] IN BLOOD BY AUTOMATED COUNT: 23.6 % (ref 12.3–15.4)
FERRITIN SERPL-MCNC: 29.7 NG/ML (ref 30–400)
HCT VFR BLD AUTO: 46.3 % (ref 37.5–51)
HGB BLD-MCNC: 13.8 G/DL (ref 13–17.7)
HOLD SPECIMEN: NORMAL
IRON 24H UR-MRATE: 29 MCG/DL (ref 59–158)
IRON SATN MFR SERPL: 7 % (ref 20–50)
LYMPHOCYTES # BLD AUTO: 0.95 10*3/MM3 (ref 0.7–3.1)
LYMPHOCYTES NFR BLD AUTO: 14.7 % (ref 19.6–45.3)
MCH RBC QN AUTO: 25.8 PG (ref 26.6–33)
MCHC RBC AUTO-ENTMCNC: 29.8 G/DL (ref 31.5–35.7)
MCV RBC AUTO: 86.7 FL (ref 79–97)
MONOCYTES # BLD AUTO: 0.43 10*3/MM3 (ref 0.1–0.9)
MONOCYTES NFR BLD AUTO: 6.7 % (ref 5–12)
NEUTROPHILS NFR BLD AUTO: 4.92 10*3/MM3 (ref 1.7–7)
NEUTROPHILS NFR BLD AUTO: 76.3 % (ref 42.7–76)
PLATELET # BLD AUTO: 232 10*3/MM3 (ref 140–450)
PMV BLD AUTO: 9.1 FL (ref 6–12)
RBC # BLD AUTO: 5.34 10*6/MM3 (ref 4.14–5.8)
TIBC SERPL-MCNC: 389 MCG/DL (ref 298–536)
TRANSFERRIN SERPL-MCNC: 261 MG/DL (ref 200–360)
WBC NRBC COR # BLD AUTO: 6.45 10*3/MM3 (ref 3.4–10.8)

## 2024-10-22 PROCEDURE — 83540 ASSAY OF IRON: CPT | Performed by: PHYSICIAN ASSISTANT

## 2024-10-22 PROCEDURE — 85025 COMPLETE CBC W/AUTO DIFF WBC: CPT

## 2024-10-22 PROCEDURE — 84466 ASSAY OF TRANSFERRIN: CPT | Performed by: PHYSICIAN ASSISTANT

## 2024-10-22 PROCEDURE — 36415 COLL VENOUS BLD VENIPUNCTURE: CPT

## 2024-10-22 PROCEDURE — 82728 ASSAY OF FERRITIN: CPT | Performed by: PHYSICIAN ASSISTANT

## 2024-10-22 NOTE — PROGRESS NOTES
HEMATOLOGY ONCOLOGY OUTPATIENT FOLLOW UP       Patient name: Chinedu Lopez  : 1960  MRN: [id number]  Primary Care Physician: Nhung Clark APRN  Referring Physician: Nhung Clark,*  Reason For Consult:         History of Present Illness:  Chinedu Lopez is a 63 y.o. male who presented to [facility] on 2024 with complaints of shortness of breath.  Past medical history of A-fib status post Watchman procedure, hypertension, hyperlipidemia, ischemic cardiomyopathy, CAD status post CABG and on low-dose aspirin, Thorpe's esophagus.  Presented as a transfer from an outside facility with complaints of acute onset chest pain that started the prior evening.  He stated he was preparing to go back to bed after brushing his teeth and started having left-sided sudden onset chest pain, felt like his previous heart attack.  Started to radiate to his left arm which made him concerned.  He was noted to be in A-fib RVR with elevated troponin.  He was started on a Cardizem drip and heparin drip and cardiology was consulted and recommended transfer to our facility for further management.  Patient admitted to having symptoms of chest pain intermittently and shortness of breath intermittently over the past 2 weeks.  He went to see his PCP and he had his medications adjusted and was started on diuretics due to swelling of his bilateral lower extremities and abdominal area.  He also gained approximately 30 pounds of weight over the past few days.  He denied any increased sodium intake.  He had a stress test in 2024 which was okay.  He had also been found to be anemic on recent lab work for which she had underwent an EGD and colonoscopy in May 2024 which was normal according to him.  He reported that he was supposed to see an hematologist as an outpatient the day of admission.     24  Hematology/Oncology was consulted for iron deficiency anemia.  At the time of the consultation  patient with a WBC of 4.62, hemoglobin 7.5 g/dL, MCV 73.1, platelets 226,000, iron 13, iron sat 3%, TIBC 416.  Review of the chart shows labs from his PCP office dated 1/3/2024 and showing a WBC 6.2, hemoglobin 10.1 g/dL, MCV 75.2, platelets 171,000, 1+ anisocytosis, 1+ microcytes, 2+ hypochromia, 1+ elliptocytes, 1+ teardrop cells.  A stool for occult blood dated 12/20/2023 was negative.  A baseline CBC from 2020 showed normal hemoglobin.    7/30/2024 anemia workup:  Ferritin 22.80, iron 13, iron sat 3%, TIBC 416  Vitamin B12 569, folate greater than 20  Haptoglobin 241,   No observed M spike    7/30/2024-ferric gluconate 125 mg IV  8/30/2024-ferric gluconate 250 mg IV     He/She  has a past medical history of Atrial fibrillation, Brain bleed (2019), CAD (coronary artery disease), Hyperlipidemia, Hypertension, Myocardial infarction (2006), Obstructive sleep apnea on CPAP, Presence of Watchman left atrial appendage closure device (1/31/2020), Stroke (2019), and TIA (transient ischemic attack) (11/19/2013).    Chinedu is here today for his hospital follow-up.  He reports that he has been improving since his discharge.  He has already began receiving additional IV iron replacement here in our office and reports he has 2 additional IV treatments to go.  He continue to follow-up with his cardiologist and with cardiac rehab for his recent MI.  He notes that he has follow-up scheduled with his gastroenterologist today after this appointment.  He denies any current signs or symptoms of bleeding.  States that he does bruise easily and that he has bruises on his arms from his recent hospital stay.  He does have fatigue but reports this has been improving and he is even been able to get out and do some activity in his yard.      Subjective:  10/22/2024: Chinedu is here today for routine follow up. He has completed IV iron infusions. He reports feeling well.  He continues to follow with cardiology.  He reports  improvement in his fatigue.  He continues to do yard work and is remaining active.  He denies any current signs or symptoms of bleeding.    Past Medical History:   Diagnosis Date    Atrial fibrillation     June, [facility]. Abstracted from [email or web address].    Brain bleed 2019 October 14th fell - Hospital on 17th for 3 weeks    CAD (coronary artery disease)     S/P CABG and PCI. Abstracted from [email or web address].    Diabetes mellitus     Hyperlipidemia     Hypertension     Myocardial infarction 2006    Non-ST elevation MI. Abstracted from [email or web address].    Obstructive sleep apnea on CPAP     At . Abstracted from [facility].    Presence of Watchman left atrial appendage closure device 01/31/2020    Stroke 2019    TIA (transient ischemic attack) 11/19/2013    Possible TIA- MRI shows small tumor/head. Abstracted from [email or web address].       Past Surgical History:   Procedure Laterality Date    ANGIOPLASTY  2006    3 srents were sequentially placed in promimal and mid body of the saphennous vein graft to obtuse marginal branch. Abstracted from [email or web address].    ATRIAL APPENDAGE EXCLUSION LEFT WITH TRANSESOPHAGEAL ECHOCARDIOGRAM N/A 01/30/2020    Procedure: Atrial Appendage Occlusion;  Surgeon: Angel Willams MD;  Location: [facility] CATH INVASIVE LOCATION;  Service: Cardiovascular    ATRIAL APPENDAGE EXCLUSION LEFT WITH TRANSESOPHAGEAL ECHOCARDIOGRAM N/A 01/30/2020    Procedure: Atrial Appendage Occlusion;  Surgeon: John Sheridan MD;  Location: [facility] CATH INVASIVE LOCATION;  Service: Cardiology    CARDIAC CATHETERIZATION  2000 2003, 2006, 2012. Abstracted from [email or web address].    CARDIAC CATHETERIZATION N/A 07/31/2024    Procedure: Left Heart Cath possible PCI;  Surgeon: José Miguel Izquierdo MD;  Location: [facility] CATH INVASIVE LOCATION;  Service: Cardiovascular;  Laterality: N/A;    CARDIAC CATHETERIZATION N/A 07/31/2024    Procedure: Stent ANA coronary;  Surgeon: José Miguel Izquierdo MD;  Location: [facility] CATH INVASIVE  LOCATION;  Service: Cardiovascular;  Laterality: N/A;    CARDIAC CATHETERIZATION N/A 07/31/2024    Procedure: Percutaneous Coronary Intervention;  Surgeon: José Miguel Izquierdo MD;  Location: [facility] CATH INVASIVE LOCATION;  Service: Cardiovascular;  Laterality: N/A;    CORONARY ARTERY BYPASS GRAFT  2000    Triple. Abstracted from [facility].    MUSCLE REPAIR      Tendons and nerves in right lower extremity. Abstracted from [facility].    OTHER SURGICAL HISTORY  08/2012    RCA mid and proximal- Xience stent x3. Abstracted from [facility].    OTHER SURGICAL HISTORY  09/2016    Head trauma/bleed at [facility]. Abstracted from [facility].         Current Outpatient Medications:     amantadine (SYMMETREL) 100 MG capsule, Take 1 capsule by mouth Daily., Disp: , Rfl:     aspirin 81 MG EC tablet, Take 1 tablet by mouth Daily., Disp: , Rfl:     atorvastatin (Lipitor) 40 MG tablet, Take 1 tablet by mouth Daily., Disp: 90 tablet, Rfl: 3    bumetanide (BUMEX) 1 MG tablet, Take 1 tablet by mouth 2 (Two) Times a Day., Disp: 180 tablet, Rfl: 2    Cholecalciferol (VITAMIN D3) 125 MCG (5000 UT) capsule capsule, Take 1 capsule by mouth Daily., Disp: , Rfl:     Cyanocobalamin (VITAMIN B 12) 500 MCG tablet, Take 1 tablet by mouth Daily., Disp: , Rfl:     digoxin (Lanoxin) 125 MCG tablet, Take 1 tablet by mouth Daily., Disp: 30 tablet, Rfl: 0    empagliflozin (JARDIANCE) 10 MG tablet tablet, Take 1 tablet by mouth Daily., Disp: 90 tablet, Rfl: 2    famotidine (PEPCID) 40 MG tablet, Take 1 tablet by mouth Daily., Disp: , Rfl:     folic acid (FOLVITE) 1 MG tablet, Take 1 tablet by mouth Daily., Disp: , Rfl:     guaiFENesin (MUCINEX) 600 MG 12 hr tablet, Take 1 tablet by mouth Daily., Disp: , Rfl:     Magnesium Oxide 400 MG capsule, Take 1 capsule by mouth Daily., Disp: 90 each, Rfl: 2    metFORMIN (GLUCOPHAGE) 1000 MG tablet, Take 1 tablet by mouth 2 (Two) Times a Day With Meals., Disp: , Rfl:     metoprolol succinate XL (Toprol XL) 100 MG  24 hr tablet, Take 1 tablet by mouth Daily. (Patient taking differently: Take 125 mg by mouth Daily.), Disp: 30 tablet, Rfl: 0    nitroglycerin (NITROSTAT) 0.4 MG SL tablet, Place 1 tablet under the tongue Every 5 (Five) Minutes As Needed for Chest Pain. Take no more than 3 doses in 15 minutes., Disp: , Rfl:     pantoprazole (PROTONIX) 40 MG EC tablet, Take 1 tablet by mouth 2 (Two) Times a Day., Disp: , Rfl:     potassium chloride (KLOR-CON M20) 20 MEQ CR tablet, Take 1 tablet by mouth Daily., Disp: 90 tablet, Rfl: 2    sacubitril-valsartan (Entresto) 24-26 MG tablet, Take 1 tablet by mouth 2 (Two) Times a Day. Indications: Cardiac Failure, Disp: 180 tablet, Rfl: 3    spironolactone (ALDACTONE) 25 MG tablet, Take 0.5 tablets by mouth Daily., Disp: 30 tablet, Rfl: 0    ticagrelor (BRILINTA) 90 MG tablet tablet, Take 1 tablet by mouth Every 12 (Twelve) Hours., Disp: 180 tablet, Rfl: 3    No Known Allergies    Family History   Problem Relation Age of Onset    Diabetes Mother     Heart attack Father     Diabetes Father     Other Sister         MRSA    Heart disease Other         Positive for Ischemic    Cancer Other     Hypertension Other        Cancer-related family history includes Cancer in an other family member.    Social History     Tobacco Use    Smoking status: Every Day     Current packs/day: 1.00     Average packs/day: 1 pack/day for 40.8 years (40.8 ttl pk-yrs)     Types: Cigarettes     Start date: 1/1/1984     Passive exposure: Current    Smokeless tobacco: Never    Tobacco comments:     Smoking cessation--encouraged---refuses patch, gum or chantix   Vaping Use    Vaping status: Never Used   Substance Use Topics    Alcohol use: Not Currently    Drug use: No     Social History     Social History Narrative    Not on file          ROS:   Review of Systems   Constitutional:  Positive for fatigue.   HENT: Negative.     Respiratory: Negative.     Cardiovascular: Negative.    Gastrointestinal:  Negative for blood  "in stool.   Genitourinary:  Negative for hematuria.   Hematological:  Bruises/bleeds easily.       Objective:  Vital signs:  Vitals:    10/22/24 1333   BP: 123/74   Pulse: 83   Temp: 98 °F (36.7 °C)   SpO2: 99%   Weight: 88.9 kg (196 lb)   Height: 182.9 cm (72.01\")   PainSc: 0-No pain       Body mass index is 26.58 kg/m².  ECOG  (1) Restricted in physically strenuous activity, ambulatory and able to do work of light nature    Physical Exam:   Physical Exam  Vitals reviewed.   Constitutional:       Appearance: Normal appearance.   HENT:      Head: Normocephalic and atraumatic.   Eyes:      Pupils: Pupils are equal, round, and reactive to light.   Cardiovascular:      Rate and Rhythm: Normal rate and regular rhythm.      Pulses: Normal pulses.      Heart sounds: No murmur heard.  Pulmonary:      Effort: Pulmonary effort is normal.      Breath sounds: Normal breath sounds.   Abdominal:      General: There is no distension.      Palpations: Abdomen is soft. There is no mass.      Tenderness: There is no abdominal tenderness.   Musculoskeletal:         General: Normal range of motion.      Cervical back: Normal range of motion and neck supple.   Skin:     General: Skin is warm.   Neurological:      General: No focal deficit present.      Mental Status: He is alert and oriented to person, place, and time.   Psychiatric:         Mood and Affect: Mood normal.         Lab Results - Last 18 Months   Lab Units 10/22/24  1331 09/09/24  1023 09/03/24  1044   WBC 10*3/mm3 6.45 7.14 6.90   HEMOGLOBIN g/dL 13.8 12.1* 11.9*   HEMATOCRIT % 46.3 48.3 42.4   PLATELETS 10*3/mm3 232 263 244   MCV fL 86.7 88.8 77.8*     Lab Results - Last 18 Months   Lab Units 09/09/24  1023 09/03/24  1044 08/19/24  1043 07/31/24  1155 07/31/24  0508   SODIUM mmol/L 141 142 138  138   < > 139   POTASSIUM mmol/L 4.4 4.6 4.9  4.9   < > 4.0   CHLORIDE mmol/L 103 102 100  99   < > 100   CO2 mmol/L 22.9 23.4 21.8*  24.1   < > 31.0*   BUN mg/dL 14 16 26*  " "26*   < > 9   CREATININE mg/dL 1.38* 1.30* 1.80*  1.77*   < > 1.10   CALCIUM mg/dL 10.1 10.2 10.2  10.3   < > 9.4   BILIRUBIN mg/dL  --  0.5 0.7  --  0.5   ALK PHOS U/L  --  41 46  --  36*   ALT (SGPT) U/L  --  12 11  --  5   AST (SGOT) U/L  --  22 16  --  19   GLUCOSE mg/dL 119* 115* 185*  187*   < > 98    < > = values in this interval not displayed.       Lab Results   Component Value Date    GLUCOSE 119 (H) 09/09/2024    BUN 14 09/09/2024    CREATININE 1.38 (H) 09/09/2024    EGFRIFNONA 93 01/29/2020    BCR 10.1 09/09/2024    K 4.4 09/09/2024    CO2 22.9 09/09/2024    CALCIUM 10.1 09/09/2024    PROTENTOTREF 6.0 07/30/2024    ALBUMIN 4.7 09/03/2024    LABIL2 1.1 07/30/2024    AST 22 09/03/2024    ALT 12 09/03/2024       Lab Results - Last 18 Months   Lab Units 08/19/24  1257 08/03/24  0420 07/31/24  1155   INR  1.05  --   --    APTT seconds 28.4* 30.2* 80.2*       Lab Results   Component Value Date    IRON 29 (L) 10/22/2024    TIBC 389 10/22/2024    FERRITIN 29.70 (L) 10/22/2024       Lab Results   Component Value Date    FOLATE >20.00 07/30/2024       No results found for: \"OCCULTBLD\"    Lab Results   Component Value Date    RETICCTPCT 2.07 (H) 07/30/2024     Lab Results   Component Value Date    [id number] 569 07/30/2024     Lab Results   Component Value Date    SPEP Comment 07/30/2024     LDH   Date Value Ref Range Status   07/30/2024 150 135 - 225 U/L Final     Uric Acid   Date Value Ref Range Status   07/30/2024 5.7 3.4 - 7.0 mg/dL Final     No results found for: \"VIVIANA\", \"RF\", \"SEDRATE\"  Lab Results   Component Value Date    HAPTOGLOBIN 241 (H) 07/30/2024     Lab Results   Component Value Date    PTT 28.4 (L) 08/19/2024    INR 1.05 08/19/2024     No results found for: \"\"  No results found for: \"CEA\"  No components found for: \"CA-19-9\"  No results found for: \"PSA\"  No results found for: \"SEDRATE\"    Assessment & Plan     Iron deficiency anemia: Workup thus far consistent with iron deficiency anemia.  Per " patient report he had an EGD and colonoscopy in May 2024 that was normal.  There are no records for review from the procedure.  There is a stool for occult blood on the chart from his PCP in early 2024 that was negative. UA negative for blood.  Hemoglobin today has improved to 11.9 g/dL, MCV 77.8.  Reviewed anemia workup with the patient that was consistent with iron deficiency anemia.     PLAN  Reviewed anemia workup as above  Patient has completed IV iron replacement  Consulted with GI and no plans for repeat endoscopy at this time but consider video capsule endoscopy for iron deficiency anemia persists  CBC reviewed.  Hemoglobin has normalized.  Iron panel slightly improved, but continued iron deficiency. Will plan for additional IV iron infusions.   Discussed with patient  Follow-up with Dr. Mcfarland in 2-3 months, sooner if condition indicates    I spent 30 minutes caring for Chinedu on this date of service. This time includes time spent by me in the following activities:preparing for the visit, reviewing tests, obtaining and/or reviewing a separately obtained history, performing a medically appropriate examination and/or evaluation , counseling and educating the patient/family/caregiver, ordering medications, tests, or procedures, referring and communicating with other health care professionals , and documenting information in the medical record     Thank you very much for providing the opportunity to participate in this patient’s care. Please do not hesitate to call if there are any other questions.

## 2024-10-23 ENCOUNTER — TREATMENT (OUTPATIENT)
Dept: CARDIAC REHAB | Facility: HOSPITAL | Age: 64
End: 2024-10-23
Payer: OTHER GOVERNMENT

## 2024-10-23 DIAGNOSIS — I50.42 CHRONIC COMBINED SYSTOLIC AND DIASTOLIC CONGESTIVE HEART FAILURE: Primary | ICD-10-CM

## 2024-10-23 PROCEDURE — 93798 PHYS/QHP OP CAR RHAB W/ECG: CPT

## 2024-10-25 ENCOUNTER — TREATMENT (OUTPATIENT)
Dept: CARDIAC REHAB | Facility: HOSPITAL | Age: 64
End: 2024-10-25
Payer: OTHER GOVERNMENT

## 2024-10-25 DIAGNOSIS — I50.42 CHRONIC COMBINED SYSTOLIC AND DIASTOLIC CONGESTIVE HEART FAILURE: Primary | ICD-10-CM

## 2024-10-25 PROCEDURE — 93798 PHYS/QHP OP CAR RHAB W/ECG: CPT

## 2024-10-30 ENCOUNTER — TREATMENT (OUTPATIENT)
Dept: CARDIAC REHAB | Facility: HOSPITAL | Age: 64
End: 2024-10-30
Payer: OTHER GOVERNMENT

## 2024-10-30 DIAGNOSIS — I50.42 CHRONIC COMBINED SYSTOLIC AND DIASTOLIC CONGESTIVE HEART FAILURE: Primary | ICD-10-CM

## 2024-10-30 PROCEDURE — 93798 PHYS/QHP OP CAR RHAB W/ECG: CPT

## 2024-11-01 ENCOUNTER — TREATMENT (OUTPATIENT)
Dept: CARDIAC REHAB | Facility: HOSPITAL | Age: 64
End: 2024-11-01
Payer: OTHER GOVERNMENT

## 2024-11-01 DIAGNOSIS — I50.42 CHRONIC COMBINED SYSTOLIC AND DIASTOLIC CONGESTIVE HEART FAILURE: Primary | ICD-10-CM

## 2024-11-01 PROCEDURE — 93798 PHYS/QHP OP CAR RHAB W/ECG: CPT

## 2024-11-06 ENCOUNTER — TREATMENT (OUTPATIENT)
Dept: CARDIAC REHAB | Facility: HOSPITAL | Age: 64
End: 2024-11-06
Payer: OTHER GOVERNMENT

## 2024-11-06 DIAGNOSIS — I50.42 CHRONIC COMBINED SYSTOLIC AND DIASTOLIC CONGESTIVE HEART FAILURE: Primary | ICD-10-CM

## 2024-11-06 PROCEDURE — 93798 PHYS/QHP OP CAR RHAB W/ECG: CPT

## 2024-11-08 ENCOUNTER — TREATMENT (OUTPATIENT)
Dept: CARDIAC REHAB | Facility: HOSPITAL | Age: 64
End: 2024-11-08
Payer: OTHER GOVERNMENT

## 2024-11-08 DIAGNOSIS — I50.42 CHRONIC COMBINED SYSTOLIC AND DIASTOLIC CONGESTIVE HEART FAILURE: Primary | ICD-10-CM

## 2024-11-08 PROCEDURE — 93798 PHYS/QHP OP CAR RHAB W/ECG: CPT

## 2024-11-13 ENCOUNTER — TREATMENT (OUTPATIENT)
Dept: CARDIAC REHAB | Facility: HOSPITAL | Age: 64
End: 2024-11-13
Payer: OTHER GOVERNMENT

## 2024-11-13 DIAGNOSIS — I50.42 CHRONIC COMBINED SYSTOLIC AND DIASTOLIC CONGESTIVE HEART FAILURE: Primary | ICD-10-CM

## 2024-11-13 PROCEDURE — 93798 PHYS/QHP OP CAR RHAB W/ECG: CPT

## 2024-11-15 ENCOUNTER — TREATMENT (OUTPATIENT)
Dept: CARDIAC REHAB | Facility: HOSPITAL | Age: 64
End: 2024-11-15
Payer: OTHER GOVERNMENT

## 2024-11-15 DIAGNOSIS — I50.42 CHRONIC COMBINED SYSTOLIC AND DIASTOLIC CONGESTIVE HEART FAILURE: Primary | ICD-10-CM

## 2024-11-15 PROCEDURE — 93798 PHYS/QHP OP CAR RHAB W/ECG: CPT

## 2024-11-20 ENCOUNTER — APPOINTMENT (OUTPATIENT)
Dept: CARDIAC REHAB | Facility: HOSPITAL | Age: 64
End: 2024-11-20
Payer: OTHER GOVERNMENT

## 2024-11-22 ENCOUNTER — TREATMENT (OUTPATIENT)
Dept: CARDIAC REHAB | Facility: HOSPITAL | Age: 64
End: 2024-11-22
Payer: OTHER GOVERNMENT

## 2024-11-22 DIAGNOSIS — I50.42 CHRONIC COMBINED SYSTOLIC AND DIASTOLIC CONGESTIVE HEART FAILURE: Primary | ICD-10-CM

## 2024-11-22 PROCEDURE — 93798 PHYS/QHP OP CAR RHAB W/ECG: CPT

## 2024-11-27 ENCOUNTER — APPOINTMENT (OUTPATIENT)
Dept: CARDIAC REHAB | Facility: HOSPITAL | Age: 64
End: 2024-11-27
Payer: OTHER GOVERNMENT

## 2024-11-29 ENCOUNTER — APPOINTMENT (OUTPATIENT)
Dept: CARDIAC REHAB | Facility: HOSPITAL | Age: 64
End: 2024-11-29
Payer: OTHER GOVERNMENT

## 2024-12-04 ENCOUNTER — OFFICE VISIT (OUTPATIENT)
Dept: CARDIOLOGY | Facility: CLINIC | Age: 64
End: 2024-12-04
Payer: OTHER GOVERNMENT

## 2024-12-04 ENCOUNTER — APPOINTMENT (OUTPATIENT)
Dept: CARDIAC REHAB | Facility: HOSPITAL | Age: 64
End: 2024-12-04
Payer: OTHER GOVERNMENT

## 2024-12-04 VITALS
BODY MASS INDEX: 25.47 KG/M2 | SYSTOLIC BLOOD PRESSURE: 150 MMHG | WEIGHT: 188 LBS | HEART RATE: 71 BPM | OXYGEN SATURATION: 97 % | DIASTOLIC BLOOD PRESSURE: 72 MMHG | HEIGHT: 72 IN

## 2024-12-04 DIAGNOSIS — Z95.818 PRESENCE OF WATCHMAN LEFT ATRIAL APPENDAGE CLOSURE DEVICE: Chronic | ICD-10-CM

## 2024-12-04 DIAGNOSIS — I25.810 CORONARY ARTERY DISEASE INVOLVING CORONARY BYPASS GRAFT OF NATIVE HEART, UNSPECIFIED WHETHER ANGINA PRESENT: Primary | Chronic | ICD-10-CM

## 2024-12-04 DIAGNOSIS — I10 PRIMARY HYPERTENSION: Chronic | ICD-10-CM

## 2024-12-04 DIAGNOSIS — Z79.02 LONG TERM (CURRENT) USE OF ANTITHROMBOTICS/ANTIPLATELETS: ICD-10-CM

## 2024-12-04 RX ORDER — METOPROLOL SUCCINATE 25 MG/1
TABLET, EXTENDED RELEASE ORAL
COMMUNITY
Start: 2024-11-10

## 2024-12-04 NOTE — PROGRESS NOTES
Cardiology Office Visit      Encounter Date:  12/04/2024    Patient ID:   Chinedu Lopez is a 64 y.o. male.    Reason For Followup:  Coronary artery disease  Atrial fibrillation    Brief Clinical History:  Dear Dr. Clark, AMAYA Shah    I had the pleasure of seeing Chinedu Lopez today. As you are well aware, this is a 64 y.o. male with a known history of ischemic heart disease. He additionally has a history of paroxysmal atrial fibrillation status post watchman placement, dyslipidemia, hypertension with hypertensive cardiovascular disease,  tobacco abuse disorder and antiplatelet therapy. He presents today for followup on the above conditions.     Interval History:  He denies chest pain, pressure, heaviness or tightness.  He denies any shortness of breath out of character.  He denies any PND, orthopnea or palpitations.  He denies any syncope or near-syncope.  He reports feeling well from a cardiac perspective.      02/28/2024        He did not get stress test done because he had a GI procedure very close to the stress so he cancelled. Now his GI procedure is scheduled out May. Will reschedule stress test. He continues to have issues with his hands being sore and stiff.    08/28/2024        He was hospitalized in July/August 2024 with a NSTEMI. He underwent PCI ANA SVG-OMB. He was found to have ischemic cardiomyopathy and was placed in a lifevest. About 2 weeks after discharge, he returned with rapid atrial fibrillation. He was given negative chonotropic agents and his heart rate was controlled and he was sent home from the ER. There was some confusion about his beta blocker but he is back on metoprolol currently.He has an appointment with EP in the coming weeks for afib management and possible ICD evaluation.    He is on GDMT with Entresto, Toprol, and aldactone. His heart rate is poorly controlled at present even with Toprol and digoxin.     He feels fine today after he had discontinued his Ranexa,  isosorbide, and decreased his diuretic.     12/04/2024        He reports that he has had a couple of episodes of lightheadedness. Got very nauseated, threw up and got diaphoretic. Then he laid down for about 20-30 minutes and got better. Then he was orthostatic afterwards. He is orthostatic today.     His blood pressure is elevated today but he is orthostatic and has had a couple of syncopal episodes so I am reluctant to get much more aggressive with antihypertensives at this point.  His syncopal episodes are about 2 months apart.  Ambulatory monitoring may not be very fruitful.  We did discuss loop recorder but he would like to discuss this further with his primary..     Assessment & Plan     Impressions:  Coronary artery disease status post coronary arterial bypass grafting and subsequent PCI and stenting.      Most recent catheterization in May of 2016 demonstrating no disease amenable to percutaneous or surgical revascularization.  Paroxysmal atrial fibrillation status post watchman implantation  Hypertension with hypertensive cardiovascular disease.  Dyslipidemia.   Antiplatelet therapy.  Tobacco abuse disorder  Renal insufficiency     Recommendations:  Continuation of his current cardiovascular regimen at present time.     This includes antiplatelet, antihypertensive, statin, and antianginals.  Follow-up with nephrology as scheduled  Follow-up with EP as scheduled  Discussed loop recorder with primary and call if want to move forward  Follow-up in 3 months time sooner should there be difficulties  Smoking cessation.    Diagnoses and all orders for this visit:    1. Coronary artery disease involving coronary bypass graft of native heart, unspecified whether angina present (Primary)  Overview:  A.  s/p 2000 CABG X 3   B. subsequent PCI\stents            I. s/p 2006 PCI/stent x 3 sequentially placed promimal and mid SVG - OM1           II  s/p Aug 2012 RCA mid and proximal- Xience stent x3.           III. Had  "NSTEMI 30 July 2024                   1. s/p 7/31/24 PCI and stenting of the midportion of  SVG to OM1                                A. w/ a Xience ANA 3.0 x 18 mm   C.  Significantly reduced biventricular function    Orders:  -     ECG 12 Lead    2. Primary hypertension  -     ECG 12 Lead    3. Presence of Watchman left atrial appendage closure device  -     ECG 12 Lead    4. Long term (current) use of antithrombotics/antiplatelets  -     ECG 12 Lead                Objective:    Vitals:  Vitals:    12/04/24 1332   BP: 150/72   BP Location: Left arm   Patient Position: Sitting   Pulse: 71   SpO2: 97%   Weight: 85.3 kg (188 lb)   Height: 182.9 cm (72\")           Body mass index is 25.5 kg/m².      Physical Exam:    General: Alert, cooperative, no distress, appears stated age  Head:  Normocephalic, atraumatic, mucous membranes moist  Eyes:  Conjunctiva/corneas clear, EOM's intact     Neck:  Supple,  no bruit    Lungs: Clear to auscultation bilaterally, no wheezes rhonchi rales are noted  Chest wall: No tenderness  Heart::  Regular rate and rhythm, S1 and S2 normal, 1/6 holosystolic murmur.  No rub or gallop  Abdomen: Soft, non-tender, nondistended bowel sounds active  Extremities: No cyanosis, clubbing, or edema  Pulses: 2+ and symmetric all extremities  Skin:  No rashes or lesions  Neuro/psych: A&O x3. CN II through XII are grossly intact with appropriate affect      Allergies:  No Known Allergies    Medication Review:     Current Outpatient Medications:     amantadine (SYMMETREL) 100 MG capsule, Take 1 capsule by mouth Daily., Disp: , Rfl:     aspirin 81 MG EC tablet, Take 1 tablet by mouth Daily., Disp: , Rfl:     atorvastatin (Lipitor) 40 MG tablet, Take 1 tablet by mouth Daily., Disp: 90 tablet, Rfl: 3    bumetanide (BUMEX) 1 MG tablet, Take 1 tablet by mouth 2 (Two) Times a Day. (Patient taking differently: Take 0.5 tablets by mouth 2 (Two) Times a Day.), Disp: 180 tablet, Rfl: 2    Cholecalciferol (VITAMIN D3) " 125 MCG (5000 UT) capsule capsule, Take 1 capsule by mouth Daily., Disp: , Rfl:     Cyanocobalamin (VITAMIN B 12) 500 MCG tablet, Take 1 tablet by mouth Daily., Disp: , Rfl:     digoxin (Lanoxin) 125 MCG tablet, Take 1 tablet by mouth Daily., Disp: 30 tablet, Rfl: 0    empagliflozin (JARDIANCE) 10 MG tablet tablet, Take 1 tablet by mouth Daily., Disp: 90 tablet, Rfl: 2    famotidine (PEPCID) 40 MG tablet, Take 1 tablet by mouth Daily., Disp: , Rfl:     folic acid (FOLVITE) 1 MG tablet, Take 1 tablet by mouth Daily., Disp: , Rfl:     guaiFENesin (MUCINEX) 600 MG 12 hr tablet, Take 1 tablet by mouth Daily., Disp: , Rfl:     Magnesium Oxide 400 MG capsule, Take 1 capsule by mouth Daily., Disp: 90 each, Rfl: 2    metFORMIN (GLUCOPHAGE) 1000 MG tablet, Take 1 tablet by mouth 2 (Two) Times a Day With Meals., Disp: , Rfl:     metoprolol succinate XL (Toprol XL) 100 MG 24 hr tablet, Take 1 tablet by mouth Daily. (Patient taking differently: Take 125 mg by mouth Daily.), Disp: 30 tablet, Rfl: 0    metoprolol succinate XL (TOPROL-XL) 25 MG 24 hr tablet, Please see attached for detailed directions, Disp: , Rfl:     nitroglycerin (NITROSTAT) 0.4 MG SL tablet, Place 1 tablet under the tongue Every 5 (Five) Minutes As Needed for Chest Pain. Take no more than 3 doses in 15 minutes., Disp: , Rfl:     pantoprazole (PROTONIX) 40 MG EC tablet, Take 1 tablet by mouth 2 (Two) Times a Day., Disp: , Rfl:     potassium chloride (KLOR-CON M20) 20 MEQ CR tablet, Take 1 tablet by mouth Daily., Disp: 90 tablet, Rfl: 2    sacubitril-valsartan (Entresto) 24-26 MG tablet, Take 1 tablet by mouth 2 (Two) Times a Day. Indications: Cardiac Failure, Disp: 180 tablet, Rfl: 3    spironolactone (ALDACTONE) 25 MG tablet, Take 0.5 tablets by mouth Daily., Disp: 30 tablet, Rfl: 0    ticagrelor (BRILINTA) 90 MG tablet tablet, Take 1 tablet by mouth Every 12 (Twelve) Hours., Disp: 180 tablet, Rfl: 3    Family History:  Family History   Problem Relation Age of  Onset    Diabetes Mother     Heart attack Father     Diabetes Father     Other Sister         MRSA    Heart disease Other         Positive for Ischemic    Cancer Other     Hypertension Other        Past Medical History:  Past Medical History:   Diagnosis Date    Atrial fibrillation     June, CM. Abstracted from [facility].    Brain bleed 2019 October 14th fell - Hospital on 17th for 3 weeks    CAD (coronary artery disease)     S/P CABG and PCI. Abstracted from [facility].    Diabetes mellitus     Hyperlipidemia     Hypertension     Myocardial infarction 2006    Non-ST elevation MI. Abstracted from [facility].    Obstructive sleep apnea on CPAP     At . Abstracted from [facility].    Presence of Watchman left atrial appendage closure device 01/31/2020    Stroke 2019    TIA (transient ischemic attack) 11/19/2013    Possible TIA- MRI shows small tumor/head. Abstracted from [facility].       Past Surgical History:  Past Surgical History:   Procedure Laterality Date    ANGIOPLASTY  2006    3 srents were sequentially placed in promimal and mid body of the saphennous vein graft to obtuse marginal branch. Abstracted from [facility].    ATRIAL APPENDAGE EXCLUSION LEFT WITH TRANSESOPHAGEAL ECHOCARDIOGRAM N/A 01/30/2020    Procedure: Atrial Appendage Occlusion;  Surgeon: Angel Willams MD;  Location: [facility] CATH INVASIVE LOCATION;  Service: Cardiovascular    ATRIAL APPENDAGE EXCLUSION LEFT WITH TRANSESOPHAGEAL ECHOCARDIOGRAM N/A 01/30/2020    Procedure: Atrial Appendage Occlusion;  Surgeon: John Sheridan MD;  Location: [facility] CATH INVASIVE LOCATION;  Service: Cardiology    CARDIAC CATHETERIZATION  2000 2003, 2006, 2012. Abstracted from [facility].    CARDIAC CATHETERIZATION N/A 07/31/2024    Procedure: Left Heart Cath possible PCI;  Surgeon: José Miguel Izquierdo MD;  Location: [facility] CATH INVASIVE LOCATION;  Service: Cardiovascular;  Laterality: N/A;    CARDIAC CATHETERIZATION N/A 07/31/2024     Procedure: Stent ANA coronary;  Surgeon: José Miguel Izquierdo MD;  Location: [facility] CATH INVASIVE LOCATION;  Service: Cardiovascular;  Laterality: N/A;    CARDIAC CATHETERIZATION N/A 07/31/2024    Procedure: Percutaneous Coronary Intervention;  Surgeon: José Miguel Izquierdo MD;  Location: [facility] CATH INVASIVE LOCATION;  Service: Cardiovascular;  Laterality: N/A;    CORONARY ARTERY BYPASS GRAFT  2000    Triple. Abstracted from [email or web address].    MUSCLE REPAIR      Tendons and nerves in right lower extremity. Abstracted from [email or web address].    OTHER SURGICAL HISTORY  08/2012    RCA mid and proximal- Xience stent x3. Abstracted from [facility].    OTHER SURGICAL HISTORY  09/2016    Head trauma/bleed at [facility]. Abstracted from [email or web address].       Social History:  Social History     Socioeconomic History    Marital status:    Tobacco Use    Smoking status: Every Day     Current packs/day: 1.00     Average packs/day: 1 pack/day for 40.9 years (40.9 ttl pk-yrs)     Types: Cigarettes     Start date: 1/1/1984     Passive exposure: Current    Smokeless tobacco: Never    Tobacco comments:     Smoking cessation--encouraged---refuses patch, gum or chantix   Vaping Use    Vaping status: Never Used   Substance and Sexual Activity    Alcohol use: Not Currently    Drug use: No    Sexual activity: Yes     Partners: Female     Birth control/protection: None       Review of Systems:  The following systems were reviewed as they relate to the cardiovascular system: Constitutional, Eyes, ENT, Cardiovascular, Respiratory, Gastrointestinal, Integumentary, Neurological, Psychiatric, Hematologic, Endocrine, Musculoskeletal, and Genitourinary. The pertinent cardiovascular findings are reported above with all other cardiovascular points within those systems being negative.    Diagnostic Study Review:     Current Electrocardiogram:    ECG 12 Lead    Date/Time: 12/4/2024 6:17 PM  Performed by: Renny Mosher DO    Authorized by:  Renny Mosher,   Comparison: not compared with previous ECG   Previous ECG: no previous ECG available  Comments: Atrial fibrillation with a ventricular rate of 80 bpm.  Ventricular premature complex.  Inferior lateral repolarization changes consider ischemia.  Normal QT and QTc intervals.          Laboratory Data:  Lab Results   Component Value Date    GLUCOSE 119 (H) 09/09/2024    BUN 14 09/09/2024    CREATININE 1.38 (H) 09/09/2024    EGFRIFNONA 93 01/29/2020    BCR 10.1 09/09/2024    K 4.4 09/09/2024    CO2 22.9 09/09/2024    CALCIUM 10.1 09/09/2024    PROTENTOTREF 6.0 07/30/2024    ALBUMIN 4.7 09/03/2024    LABIL2 1.1 07/30/2024    AST 22 09/03/2024    ALT 12 09/03/2024     Lab Results   Component Value Date    GLUCOSE 119 (H) 09/09/2024    CALCIUM 10.1 09/09/2024     09/09/2024    K 4.4 09/09/2024    CO2 22.9 09/09/2024     09/09/2024    BUN 14 09/09/2024    CREATININE 1.38 (H) 09/09/2024    EGFRIFNONA 93 01/29/2020    BCR 10.1 09/09/2024    ANIONGAP 15.1 (H) 09/09/2024     Lab Results   Component Value Date    WBC 6.45 10/22/2024    HGB 13.8 10/22/2024    HCT 46.3 10/22/2024    MCV 86.7 10/22/2024     10/22/2024     Lab Results   Component Value Date    CHOL 124 08/01/2024    TRIG 84 08/01/2024    HDL 47 08/01/2024    LDL 61 08/01/2024     Lab Results   Component Value Date    HGBA1C 5.92 (H) 08/14/2024     Lab Results   Component Value Date    INR 1.05 08/19/2024    INR 1.73 (L) 01/31/2020    INR 1.71 (L) 01/29/2020    PROTIME 11.4 08/19/2024    PROTIME 16.8 (L) 01/31/2020    PROTIME 16.6 (L) 01/29/2020       Most Recent Echo:  Results for orders placed during the hospital encounter of 10/15/24    Adult Transesophageal Echo (WILD) W/ Cont if Necessary Per Protocol    Interpretation Summary    Left ventricular systolic function is normal. Estimated left ventricular EF = 55% Left ventricular ejection fraction appears to be 51 - 55%.    Left ventricular wall thickness is  consistent with mild concentric hypertrophy.    Left ventricular diastolic function is consistent with (grade I) impaired relaxation.    The right ventricular cavity is mildly dilated.    The left atrial cavity is moderately dilated.    The right atrial cavity is mildly  dilated.    Estimated right ventricular systolic pressure from tricuspid regurgitation is mildly elevated (35-45 mmHg).    Procedure indication  Permanent AF with Watchman device in situ with ischemic cardiomyopathy    conscious sedation administered by anesthesia  Timeout before procedure    consent obtained before procedure      Procedure note  after obtaining a valid consent patient was sedated by Anesthesia and a WILD probe was easily placed into esophagus with multiplane imaging with 2D, color and Doppler followed by bubble study with agitated saline without any complications    WILD  Findings    LV systolic function normalized with EF more than 50%  Moderate left atrial enlargement without any shunt or clot in the left atrial appendage is occluded by the previously placed Watchman device without any delayed thrombosis and there is no leak  Mild central MR and no effusion  Mild TR noted with mild pulm hypertension      Procedure done  Transesophageal echocardiography    Plan  LV ejection fraction normalized and EF more than 50%  LifeVest can be removed      Electronically signed by Angel Willams MD, 10/15/24, 12:20 PM EDT.       Most Recent Stress Test:  Results for orders placed during the hospital encounter of 03/14/24    Stress Test With Myocardial Perfusion One Day    Interpretation Summary    Myocardial perfusion imaging indicates a small-sized infarct located in the apex with no significant ischemia noted.    Left ventricular ejection fraction is mildly reduced (Calculated EF = 40%).    Impressions are consistent with a low risk study.    Abnormal LV wall motion consistent with moderate hypokinesis and global hypokinesis.    There is  "no prior study available for comparison.    Findings consistent with an indeterminate ECG stress test.       Most Recent Cardiac Catheterization:   No results found for this or any previous visit.       NOTE:     Today,12/04/2024 ,the following portions of the patient's note were reviewed, confirmed and/or updated as appropriate: History of present illness/Interval history, physical examination, assessment & plan, allergies, current medications, past family history, past medical history, past social history, past surgical history and problem list.    Labs pertinent to today's visit on 12/04/2024 (including but not limited to CBC, CMP, and lipid profiles) were requested from the patient's primary care provider/hospital/clinical laboratory.  If the labs were available for the visit, they were reviewed with the patient.  If they were not available, when received, special interest will be made to the labs pertinent to this visit.  The patient's most recent \"in-house\" labs are noted below and have been reviewed.  Outside labs pertinent to this visit are scanned into the record and have been reviewed.    Discussions held today, 12/04/2024,regarding procedures included risk, benefits, and options including but not limited to: Death, MI, stroke, pain, bleeding, infection, and possible need for vascular/thoracic/cardiothoracic surgery.    Copied information within this note was reviewed and is current as of 12/04/2024.    Assessment and plan noted herein represents the current plan of care as of 12/04/2024.    Significant resources from our office and staff are inherent in engaging this patient today,12/04/2024,and in a continuous and active collaborative plan of care related to their chronic cardiovascular conditions outlined below.  The management of these conditions requires the direction of our service with specialized clinical knowledge, skills, and experience.  This collaborative care includes but is not limited to " patient education, expectations and responsibilities, shared decision making around therapeutic goals, and shared commitments to achieve those goals.

## 2024-12-04 NOTE — PATIENT INSTRUCTIONS
Try moving spironolactone to morning to help with nocturnal urination  Follow-up 3 months  Discuss loop recorder with PCP  Discuss blood work with PCP

## 2024-12-05 ENCOUNTER — TELEPHONE (OUTPATIENT)
Dept: CARDIOLOGY | Facility: CLINIC | Age: 64
End: 2024-12-05

## 2024-12-05 DIAGNOSIS — R55 SYNCOPE AND COLLAPSE: Primary | ICD-10-CM

## 2024-12-05 NOTE — TELEPHONE ENCOUNTER
Caller: KISHORE BEASLEY    Relationship: Emergency Contact    Best call back number: [phone]        PATIENTS WIFE CALLED IN ADVISING THEY WISH TO PROCEED WITH LOOP RECORDER

## 2024-12-06 ENCOUNTER — TREATMENT (OUTPATIENT)
Dept: CARDIAC REHAB | Facility: HOSPITAL | Age: 64
End: 2024-12-06
Payer: OTHER GOVERNMENT

## 2024-12-06 DIAGNOSIS — I50.42 CHRONIC COMBINED SYSTOLIC AND DIASTOLIC CONGESTIVE HEART FAILURE: Primary | ICD-10-CM

## 2024-12-06 PROCEDURE — 93798 PHYS/QHP OP CAR RHAB W/ECG: CPT

## 2024-12-11 ENCOUNTER — TREATMENT (OUTPATIENT)
Dept: CARDIAC REHAB | Facility: HOSPITAL | Age: 64
End: 2024-12-11
Payer: OTHER GOVERNMENT

## 2024-12-11 DIAGNOSIS — I50.42 CHRONIC COMBINED SYSTOLIC AND DIASTOLIC CONGESTIVE HEART FAILURE: Primary | ICD-10-CM

## 2024-12-11 PROCEDURE — 93798 PHYS/QHP OP CAR RHAB W/ECG: CPT

## 2024-12-13 ENCOUNTER — APPOINTMENT (OUTPATIENT)
Dept: CARDIAC REHAB | Facility: HOSPITAL | Age: 64
End: 2024-12-13
Payer: OTHER GOVERNMENT

## 2024-12-18 ENCOUNTER — TREATMENT (OUTPATIENT)
Dept: CARDIAC REHAB | Facility: HOSPITAL | Age: 64
End: 2024-12-18
Payer: OTHER GOVERNMENT

## 2024-12-18 DIAGNOSIS — I50.42 CHRONIC COMBINED SYSTOLIC AND DIASTOLIC CONGESTIVE HEART FAILURE: Primary | ICD-10-CM

## 2024-12-18 PROCEDURE — 93798 PHYS/QHP OP CAR RHAB W/ECG: CPT

## 2024-12-20 ENCOUNTER — TREATMENT (OUTPATIENT)
Dept: CARDIAC REHAB | Facility: HOSPITAL | Age: 64
End: 2024-12-20
Payer: OTHER GOVERNMENT

## 2024-12-20 DIAGNOSIS — I50.42 CHRONIC COMBINED SYSTOLIC AND DIASTOLIC CONGESTIVE HEART FAILURE: Primary | ICD-10-CM

## 2024-12-20 PROCEDURE — 93798 PHYS/QHP OP CAR RHAB W/ECG: CPT

## 2024-12-27 ENCOUNTER — APPOINTMENT (OUTPATIENT)
Dept: CARDIAC REHAB | Facility: HOSPITAL | Age: 64
End: 2024-12-27
Payer: OTHER GOVERNMENT

## 2025-01-06 ENCOUNTER — HOSPITAL ENCOUNTER (OUTPATIENT)
Dept: CARDIOLOGY | Facility: HOSPITAL | Age: 65
Discharge: HOME | End: 2025-01-06
Payer: OTHER GOVERNMENT

## 2025-01-07 RX ORDER — BUMETANIDE 0.5 MG/1
0.5 TABLET ORAL DAILY
COMMUNITY

## 2025-01-07 RX ORDER — METOPROLOL SUCCINATE 100 MG/1
100 TABLET, EXTENDED RELEASE ORAL DAILY
COMMUNITY

## 2025-01-08 ENCOUNTER — HOSPITAL ENCOUNTER (OUTPATIENT)
Dept: CARDIOLOGY | Facility: HOSPITAL | Age: 65
Discharge: HOME | End: 2025-01-08
Admitting: INTERNAL MEDICINE
Payer: OTHER GOVERNMENT

## 2025-01-08 ENCOUNTER — TELEPHONE (OUTPATIENT)
Dept: CARDIOLOGY | Facility: CLINIC | Age: 65
End: 2025-01-08
Payer: OTHER GOVERNMENT

## 2025-01-08 VITALS
HEART RATE: 112 BPM | DIASTOLIC BLOOD PRESSURE: 65 MMHG | RESPIRATION RATE: 13 BRPM | HEIGHT: 70 IN | BODY MASS INDEX: 27.3 KG/M2 | OXYGEN SATURATION: 97 % | SYSTOLIC BLOOD PRESSURE: 144 MMHG | WEIGHT: 190.7 LBS

## 2025-01-08 DIAGNOSIS — R55 SYNCOPE AND COLLAPSE: ICD-10-CM

## 2025-01-08 PROCEDURE — 25010000002 LIDOCAINE-EPINEPHRINE 2 %-1:100000 SOLUTION: Performed by: INTERNAL MEDICINE

## 2025-01-08 PROCEDURE — 33285 INSJ SUBQ CAR RHYTHM MNTR: CPT

## 2025-01-08 PROCEDURE — C1764 EVENT RECORDER, CARDIAC: HCPCS

## 2025-01-08 RX ORDER — LIDOCAINE HYDROCHLORIDE AND EPINEPHRINE BITARTRATE 20; .01 MG/ML; MG/ML
INJECTION, SOLUTION SUBCUTANEOUS
Status: COMPLETED | OUTPATIENT
Start: 2025-01-08 | End: 2025-01-08

## 2025-01-08 RX ADMIN — LIDOCAINE HYDROCHLORIDE AND EPINEPHRINE 7 ML: 20; 10 INJECTION, SOLUTION INFILTRATION; PERINEURAL at 10:51

## 2025-01-08 NOTE — DISCHARGE INSTRUCTIONS
Discharge Instructions for: LOOP RECORDER IMPLANT  When you arrive home, plug in your loop recorder's monitor near your bed. This is how data will be transmitted to the cardiologist's office. Do not push any buttons unless instructed to do so.    You may remove the dressing in _____ days.     You may shower in _____ days.     Do not submerge the area under water (bathtub, pools, lakes, etc.) until the incision is completely healed, usually 1-2 weeks.     What to do for pain and swelling: acetaminophen (Tylenol) and ice packs are recommended. Some swelling and discomfort around the incision can be expected for the first day or so. If your pain is unmanageable with over-the-counter methods, call the Cardiologist's office.     Activity:   Avoid strenuous activity for the first 2 days.   Do not pick or rub at the implant site.     You incision may have been closed with stitches, staples, steri-strips or a special type of adhesive.   Stitches and staples will be removed in the cardiologist's office at your follow-up appointment.   White tape strips (steri-strips) will loosen and fall off on their own within a week or two. Do not peel them off.   If adhesive was used, it will come away from the skin naturally within 10 days, just like a normal scab. By this time the wound should be healed.     Contact your cardiologist's office if you experience any of the following:  The incision has increased swelling, redness, bleeding, severe pain, or signs of infection.  The edges of the incision show signs of separation.

## 2025-01-08 NOTE — TELEPHONE ENCOUNTER
Caller: Chinedu Lopez    Relationship: Self    Best call back number:  [phone]    PATIENT HAD LOOP RECORDER PLACED 1.8.25 AND WAS ADVISED TO FOLLOW UP.  THERE ARE NO NOTES, PLEASE REACH OUT TO THE PATIENT TO SCHEDULE

## 2025-01-09 ENCOUNTER — TELEPHONE (OUTPATIENT)
Dept: CARDIAC REHAB | Facility: HOSPITAL | Age: 65
End: 2025-01-09
Payer: OTHER GOVERNMENT

## 2025-01-09 ENCOUNTER — TELEPHONE (OUTPATIENT)
Dept: CARDIOLOGY | Facility: CLINIC | Age: 65
End: 2025-01-09
Payer: OTHER GOVERNMENT

## 2025-01-09 NOTE — TELEPHONE ENCOUNTER
Patient's wife notified of insurance information for 2025. Patient will be discharged from phase 2, and call back to start phase 3.

## 2025-01-09 NOTE — TELEPHONE ENCOUNTER
Caller: KISHORE BEASLEY    Relationship: Emergency Contact    Best call back number: [phone]    What is the best time to reach you: ANYTIME    Who are you requesting to speak with (clinical staff, provider,  specific staff member): CLINICAL        What was the call regarding: PT HAD  LOOP INSERTION YESTERDAY.  TODAY BANDAGE IS SATURATED WITH BLOOD.  DOES HE NEED TO PUT NEW BANDAGE ON?   THEY WERE GIVEN 2 EXTRA BAND-AIDS. PLEASE CALL TO ADVISE

## 2025-01-09 NOTE — TELEPHONE ENCOUNTER
Spoke with spouse she will remove bandage and replace with new one. If pt is still draining she will call the office back for further instructions.

## 2025-01-11 PROCEDURE — 93298 REM INTERROG DEV EVAL SCRMS: CPT | Performed by: INTERNAL MEDICINE

## 2025-01-16 ENCOUNTER — OFFICE VISIT (OUTPATIENT)
Dept: CARDIOLOGY | Facility: CLINIC | Age: 65
End: 2025-01-16
Payer: OTHER GOVERNMENT

## 2025-01-16 DIAGNOSIS — R42 EPISODIC LIGHTHEADEDNESS: Primary | ICD-10-CM

## 2025-01-16 DIAGNOSIS — R55 NEAR SYNCOPE: ICD-10-CM

## 2025-01-16 NOTE — PROGRESS NOTES
Patient presents today for wound check and suture removals following implantable loop recorder.  Device transmission received on 1/15/2023 suggestive of 3-second pause, however the patient reports no symptoms.  This finding may be due to his atrial fibrillation and irregular rhythm.  Suture was removed x 1 without difficulty.  Incision is well approximated, well-healed with bruising noted distally.  No hematoma.  Follow-up was confirmed.

## 2025-01-31 NOTE — TELEPHONE ENCOUNTER
Follow up call made to patient's neurosurgeon Dr. Sparkle Patino.  I spoke to Dr. Patino's nurse;  She states that Dr. Patino will call Dr. Willams directly after she evaluates the patient tomorrow morning at his scheduled appointment at 8:45am.   never used

## 2025-03-12 ENCOUNTER — OFFICE VISIT (OUTPATIENT)
Dept: CARDIOLOGY | Facility: CLINIC | Age: 65
End: 2025-03-12
Payer: MEDICARE

## 2025-03-12 VITALS
HEART RATE: 86 BPM | HEIGHT: 70 IN | BODY MASS INDEX: 28.2 KG/M2 | OXYGEN SATURATION: 98 % | SYSTOLIC BLOOD PRESSURE: 113 MMHG | DIASTOLIC BLOOD PRESSURE: 82 MMHG | WEIGHT: 197 LBS

## 2025-03-12 DIAGNOSIS — I50.42 CHRONIC COMBINED SYSTOLIC AND DIASTOLIC CHF (CONGESTIVE HEART FAILURE): Chronic | ICD-10-CM

## 2025-03-12 DIAGNOSIS — I25.810 CORONARY ARTERY DISEASE INVOLVING CORONARY BYPASS GRAFT OF NATIVE HEART, UNSPECIFIED WHETHER ANGINA PRESENT: Primary | Chronic | ICD-10-CM

## 2025-03-12 DIAGNOSIS — I10 PRIMARY HYPERTENSION: Chronic | ICD-10-CM

## 2025-03-12 DIAGNOSIS — I48.21 PERMANENT ATRIAL FIBRILLATION: ICD-10-CM

## 2025-03-12 DIAGNOSIS — Z95.818 PRESENCE OF WATCHMAN LEFT ATRIAL APPENDAGE CLOSURE DEVICE: Chronic | ICD-10-CM

## 2025-03-12 RX ORDER — AMANTADINE HYDROCHLORIDE 100 MG/1
100 TABLET ORAL DAILY
COMMUNITY
Start: 2025-02-09

## 2025-03-12 RX ORDER — BUMETANIDE 1 MG/1
1 TABLET ORAL EVERY 12 HOURS SCHEDULED
COMMUNITY
Start: 2025-02-28

## 2025-03-12 RX ORDER — MECLIZINE HYDROCHLORIDE 25 MG/1
25 TABLET ORAL EVERY 8 HOURS PRN
COMMUNITY
Start: 2024-12-31

## 2025-03-12 NOTE — PROGRESS NOTES
Cardiology Office Visit      Encounter Date:  03/12/2025    Patient ID:   Chinedu Lopez is a 64 y.o. male.    Reason For Followup:  Coronary artery disease  Atrial fibrillation    Brief Clinical History:  Dear Dr. Clark, AMAYA Shah    I had the pleasure of seeing Chinedu Lopez today. As you are well aware, this is a 64 y.o. male with a known history of ischemic heart disease. He additionally has a history of paroxysmal atrial fibrillation status post watchman placement, dyslipidemia, hypertension with hypertensive cardiovascular disease,  tobacco abuse disorder and antiplatelet therapy. He presents today for followup on the above conditions.     Interval History:  He denies chest pain, pressure, heaviness or tightness.  He denies any shortness of breath out of character.  He denies any PND, orthopnea or palpitations.  He denies any syncope or near-syncope.  He reports feeling well from a cardiac perspective.      02/28/2024        He did not get stress test done because he had a GI procedure very close to the stress so he cancelled. Now his GI procedure is scheduled out May. Will reschedule stress test. He continues to have issues with his hands being sore and stiff.    08/28/2024        He was hospitalized in July/August 2024 with a NSTEMI. He underwent PCI ANA SVG-OMB. He was found to have ischemic cardiomyopathy and was placed in a lifevest. About 2 weeks after discharge, he returned with rapid atrial fibrillation. He was given negative chonotropic agents and his heart rate was controlled and he was sent home from the ER. There was some confusion about his beta blocker but he is back on metoprolol currently.He has an appointment with EP in the coming weeks for afib management and possible ICD evaluation.    He is on GDMT with Entresto, Toprol, and aldactone. His heart rate is poorly controlled at present even with Toprol and digoxin.     He feels fine today after he had discontinued his Ranexa,  isosorbide, and decreased his diuretic.     12/04/2024        He reports that he has had a couple of episodes of lightheadedness. Got very nauseated, threw up and got diaphoretic. Then he laid down for about 20-30 minutes and got better. Then he was orthostatic afterwards. He is orthostatic today.     His blood pressure is elevated today but he is orthostatic and has had a couple of syncopal episodes so I am reluctant to get much more aggressive with antihypertensives at this point.  His syncopal episodes are about 2 months apart.  Ambulatory monitoring may not be very fruitful.  We did discuss loop recorder but he would like to discuss this further with his primary..    03/12/2025        He reports that he had a couple of episodes of vertigo. He saw PCP and he was given some medication but he has not had to use it. He had no injuries or illnessnes associated with it.  He denies any chest pain pressure heaviness or tightness.  He has no shortness of breath out of character.  He reports feeling well today.     Assessment & Plan     Impressions:  Coronary artery disease status post coronary arterial bypass grafting and subsequent PCI and stenting.      Most recent catheterization in July 2024 with PCI ANA SVG-OMB  Paroxysmal atrial fibrillation status post watchman implantation  Hypertension with hypertensive cardiovascular disease.  Dyslipidemia.   Antiplatelet therapy.  Tobacco abuse disorder  Renal insufficiency  Loop recorder in situ     Recommendations:  Continuation of his current cardiovascular regimen at present time.     This includes antiplatelet, antihypertensive, statin, and antianginals.  Follow-up with nephrology as scheduled  Follow-up with EP as scheduled  Follow-up in 6 months time sooner should there be difficulties.  Smoking cessation.    Diagnoses and all orders for this visit:    1. Coronary artery disease involving coronary bypass graft of native heart, unspecified whether angina present  "(Primary)  Overview:  A.  s/p 2000 CABG X 3   B. subsequent PCI\stents            I. s/p 2006 PCI/stent x 3 sequentially placed promimal and mid SVG - OM1           II  s/p Aug 2012 RCA mid and proximal- Xience stent x3.           III. Had NSTEMI 30 July 2024                   1. s/p 7/31/24 PCI and stenting of the midportion of  SVG to OM1                                A. w/ a Xience ANA 3.0 x 18 mm   C.  Significantly reduced biventricular function      2. Chronic combined systolic and diastolic HF (HFrEF)  Overview:  A.  (TTE 7/30/24) EF of 26-30% and grade 1 DD.  B. No devices      3. Primary hypertension    4. Presence of Watchman left atrial appendage closure device    5. Permanent atrial fibrillation                  Objective:    Vitals:  Vitals:    03/12/25 1406   BP: 113/82   BP Location: Left arm   Patient Position: Sitting   Pulse: 86   SpO2: 98%   Weight: 89.4 kg (197 lb)   Height: 177.8 cm (70\")             Body mass index is 28.27 kg/m².      Physical Exam:    General: Alert, cooperative, no distress, appears stated age  Head:  Normocephalic, atraumatic, mucous membranes moist  Eyes:  Conjunctiva/corneas clear, EOM's intact     Neck:  Supple,  no bruit    Lungs: Clear to auscultation bilaterally, no wheezes rhonchi rales are noted  Chest wall: No tenderness  Heart::  Regular rate and rhythm, S1 and S2 normal, 1/6 holosystolic murmur.  No rub or gallop  Abdomen: Soft, non-tender, nondistended bowel sounds active  Extremities: No cyanosis, clubbing, or edema  Pulses: 2+ and symmetric all extremities  Skin:  No rashes or lesions  Neuro/psych: A&O x3. CN II through XII are grossly intact with appropriate affect      Allergies:  No Known Allergies    Medication Review:     Current Outpatient Medications:     amantadine (SYMMETREL) 100 MG tablet, Take 1 tablet by mouth Daily., Disp: , Rfl:     aspirin 81 MG EC tablet, Take 1 tablet by mouth Daily., Disp: , Rfl:     atorvastatin (Lipitor) 40 MG tablet, Take " 1 tablet by mouth Daily., Disp: 90 tablet, Rfl: 3    Cholecalciferol (VITAMIN D3) 125 MCG (5000 UT) capsule capsule, Take 1 capsule by mouth Daily., Disp: , Rfl:     Cyanocobalamin (VITAMIN B 12) 500 MCG tablet, Take 1 tablet by mouth Daily., Disp: , Rfl:     digoxin (Lanoxin) 125 MCG tablet, Take 1 tablet by mouth Daily., Disp: 30 tablet, Rfl: 0    empagliflozin (JARDIANCE) 10 MG tablet tablet, Take 1 tablet by mouth Daily., Disp: 90 tablet, Rfl: 2    famotidine (PEPCID) 40 MG tablet, Take 1 tablet by mouth Daily., Disp: , Rfl:     folic acid (FOLVITE) 1 MG tablet, Take 1 tablet by mouth Daily., Disp: , Rfl:     guaiFENesin (MUCINEX) 600 MG 12 hr tablet, Take 1 tablet by mouth Daily., Disp: , Rfl:     Magnesium Oxide 400 MG capsule, Take 1 capsule by mouth Daily., Disp: 90 each, Rfl: 2    meclizine (ANTIVERT) 25 MG tablet, Take 1 tablet by mouth Every 8 (Eight) Hours As Needed for Dizziness., Disp: , Rfl:     metFORMIN (GLUCOPHAGE) 1000 MG tablet, Take 1 tablet by mouth 2 (Two) Times a Day With Meals., Disp: , Rfl:     metoprolol succinate XL (TOPROL-XL) 100 MG 24 hr tablet, Take 1 tablet by mouth Daily., Disp: , Rfl:     metoprolol succinate XL (TOPROL-XL) 25 MG 24 hr tablet, Take 1 tablet by mouth Daily. Please see attached for detailed directions, Disp: , Rfl:     nitroglycerin (NITROSTAT) 0.4 MG SL tablet, Place 1 tablet under the tongue Every 5 (Five) Minutes As Needed for Chest Pain. Take no more than 3 doses in 15 minutes., Disp: , Rfl:     pantoprazole (PROTONIX) 40 MG EC tablet, Take 1 tablet by mouth 2 (Two) Times a Day., Disp: , Rfl:     potassium chloride (KLOR-CON M20) 20 MEQ CR tablet, Take 1 tablet by mouth Daily., Disp: 90 tablet, Rfl: 2    sacubitril-valsartan (Entresto) 24-26 MG tablet, Take 1 tablet by mouth 2 (Two) Times a Day. Indications: Cardiac Failure, Disp: 180 tablet, Rfl: 3    spironolactone (ALDACTONE) 25 MG tablet, Take 0.5 tablets by mouth Daily., Disp: 30 tablet, Rfl: 0    ticagrelor  (BRILINTA) 90 MG tablet tablet, Take 1 tablet by mouth Every 12 (Twelve) Hours., Disp: 180 tablet, Rfl: 3    bumetanide (BUMEX) 1 MG tablet, Take 1 tablet by mouth Every 12 (Twelve) Hours. (Patient not taking: Reported on 3/12/2025), Disp: , Rfl:     Family History:  Family History   Problem Relation Age of Onset    Diabetes Mother     Heart attack Father     Diabetes Father     Other Sister         MRSA    Heart disease Other         Positive for Ischemic    Cancer Other     Hypertension Other        Past Medical History:  Past Medical History:   Diagnosis Date    Atrial fibrillation     June, [facility]. Abstracted from [facility].    Brain bleed 2019 October 14th [facility] on 17th for 3 weeks    CAD (coronary artery disease)     S/P CABG and PCI. Abstracted from [facility].    Diabetes mellitus     Hyperlipidemia     Hypertension     Myocardial infarction 2006    Non-ST elevation MI. Abstracted from [facility].    Obstructive sleep apnea on CPAP     At . Abstracted from [facility].    Presence of Watchman left atrial appendage closure device 01/31/2020    Stroke 2019    TIA (transient ischemic attack) 11/19/2013    Possible TIA- MRI shows small tumor/head. Abstracted from [facility].       Past Surgical History:  Past Surgical History:   Procedure Laterality Date    ANGIOPLASTY  2006    3 srents were sequentially placed in promimal and mid body of the saphennous vein graft to obtuse marginal branch. Abstracted from [facility].    ATRIAL APPENDAGE EXCLUSION LEFT WITH TRANSESOPHAGEAL ECHOCARDIOGRAM N/A 01/30/2020    Procedure: Atrial Appendage Occlusion;  Surgeon: Angel Willams MD;  Location: [facility] CATH INVASIVE LOCATION;  Service: Cardiovascular    ATRIAL APPENDAGE EXCLUSION LEFT WITH TRANSESOPHAGEAL ECHOCARDIOGRAM N/A 01/30/2020    Procedure: Atrial Appendage Occlusion;  Surgeon: John Sheridan MD;  Location: [facility] CATH INVASIVE LOCATION;  Service: Cardiology    CARDIAC CATHETERIZATION   2000 2003, 2006, 2012. Abstracted from [facility].    CARDIAC CATHETERIZATION N/A 07/31/2024    Procedure: Left Heart Cath possible PCI;  Surgeon: José Miguel Izquierdo MD;  Location: [facility] CATH INVASIVE LOCATION;  Service: Cardiovascular;  Laterality: N/A;    CARDIAC CATHETERIZATION N/A 07/31/2024    Procedure: Stent ANA coronary;  Surgeon: José Miguel Izquierdo MD;  Location: [facility] CATH INVASIVE LOCATION;  Service: Cardiovascular;  Laterality: N/A;    CARDIAC CATHETERIZATION N/A 07/31/2024    Procedure: Percutaneous Coronary Intervention;  Surgeon: José Miguel Izquierdo MD;  Location: [facility] CATH INVASIVE LOCATION;  Service: Cardiovascular;  Laterality: N/A;    CORONARY ARTERY BYPASS GRAFT  2000    Triple. Abstracted from [facility].    MUSCLE REPAIR      Tendons and nerves in right lower extremity. Abstracted from [facility].    OTHER SURGICAL HISTORY  08/2012    RCA mid and proximal- Xience stent x3. Abstracted from [facility].    OTHER SURGICAL HISTORY  09/2016    Head trauma/bleed at [facility]. Abstracted from [facility].       Social History:  Social History     Socioeconomic History    Marital status:    Tobacco Use    Smoking status: Every Day     Current packs/day: 1.00     Average packs/day: 1 pack/day for 41.2 years (41.2 ttl pk-yrs)     Types: Cigarettes     Start date: 1/1/1984     Passive exposure: Current    Smokeless tobacco: Never    Tobacco comments:     Smoking cessation--encouraged---refuses patch, gum or chantix   Vaping Use    Vaping status: Never Used   Substance and Sexual Activity    Alcohol use: Not Currently    Drug use: No    Sexual activity: Yes     Partners: Female     Birth control/protection: None       Review of Systems:  The following systems were reviewed as they relate to the cardiovascular system: Constitutional, Eyes, ENT, Cardiovascular, Respiratory, Gastrointestinal, Integumentary, Neurological, Psychiatric, Hematologic, Endocrine, Musculoskeletal, and  Genitourinary. The pertinent cardiovascular findings are reported above with all other cardiovascular points within those systems being negative.    Diagnostic Study Review:     Current Electrocardiogram:  Procedures no new EKG.  EKG dated 12/4/2024 demonstrates atrial fibrillation with a ventricular rate of 80 bpm.    Laboratory Data:  Lab Results   Component Value Date    GLUCOSE 119 (H) 09/09/2024    BUN 14 09/09/2024    CREATININE 1.38 (H) 09/09/2024    EGFRIFNONA 93 01/29/2020    BCR 10.1 09/09/2024    K 4.4 09/09/2024    CO2 22.9 09/09/2024    CALCIUM 10.1 09/09/2024    ALBUMIN 4.7 09/03/2024    AST 22 09/03/2024    ALT 12 09/03/2024     Lab Results   Component Value Date    GLUCOSE 119 (H) 09/09/2024    CALCIUM 10.1 09/09/2024     09/09/2024    K 4.4 09/09/2024    CO2 22.9 09/09/2024     09/09/2024    BUN 14 09/09/2024    CREATININE 1.38 (H) 09/09/2024    EGFRIFNONA 93 01/29/2020    BCR 10.1 09/09/2024    ANIONGAP 15.1 (H) 09/09/2024     Lab Results   Component Value Date    WBC 6.45 10/22/2024    HGB 13.8 10/22/2024    HCT 46.3 10/22/2024    MCV 86.7 10/22/2024     10/22/2024     Lab Results   Component Value Date    CHOL 124 08/01/2024    TRIG 84 08/01/2024    HDL 47 08/01/2024    LDL 61 08/01/2024     Lab Results   Component Value Date    HGBA1C 5.92 (H) 08/14/2024     Lab Results   Component Value Date    INR 1.05 08/19/2024    INR 1.73 (L) 01/31/2020    INR 1.71 (L) 01/29/2020    PROTIME 11.4 08/19/2024    PROTIME 16.8 (L) 01/31/2020    PROTIME 16.6 (L) 01/29/2020       Most Recent Echo:  Results for orders placed during the hospital encounter of 10/15/24    Adult Transesophageal Echo (WILD) W/ Cont if Necessary Per Protocol    Interpretation Summary    Left ventricular systolic function is normal. Estimated left ventricular EF = 55% Left ventricular ejection fraction appears to be 51 - 55%.    Left ventricular wall thickness is consistent with mild concentric hypertrophy.    Left  ventricular diastolic function is consistent with (grade I) impaired relaxation.    The right ventricular cavity is mildly dilated.    The left atrial cavity is moderately dilated.    The right atrial cavity is mildly  dilated.    Estimated right ventricular systolic pressure from tricuspid regurgitation is mildly elevated (35-45 mmHg).    Procedure indication  Permanent AF with Watchman device in situ with ischemic cardiomyopathy    conscious sedation administered by anesthesia  Timeout before procedure    consent obtained before procedure      Procedure note  after obtaining a valid consent patient was sedated by Anesthesia and a WILD probe was easily placed into esophagus with multiplane imaging with 2D, color and Doppler followed by bubble study with agitated saline without any complications    WILD  Findings    LV systolic function normalized with EF more than 50%  Moderate left atrial enlargement without any shunt or clot in the left atrial appendage is occluded by the previously placed Watchman device without any delayed thrombosis and there is no leak  Mild central MR and no effusion  Mild TR noted with mild pulm hypertension      Procedure done  Transesophageal echocardiography    Plan  LV ejection fraction normalized and EF more than 50%  LifeVest can be removed      Electronically signed by Angel Willams MD, 10/15/24, 12:20 PM EDT.       Most Recent Stress Test:  Results for orders placed during the hospital encounter of 03/14/24    Stress Test With Myocardial Perfusion One Day    Interpretation Summary    Myocardial perfusion imaging indicates a small-sized infarct located in the apex with no significant ischemia noted.    Left ventricular ejection fraction is mildly reduced (Calculated EF = 40%).    Impressions are consistent with a low risk study.    Abnormal LV wall motion consistent with moderate hypokinesis and global hypokinesis.    There is no prior study available for comparison.    Findings  "consistent with an indeterminate ECG stress test.       Most Recent Cardiac Catheterization:   No results found for this or any previous visit.       NOTE:     Today,03/12/2025 ,the following portions of the patient's note were reviewed, confirmed and/or updated as appropriate: History of present illness/Interval history, physical examination, assessment & plan, allergies, current medications, past family history, past medical history, past social history, past surgical history and problem list.    Labs pertinent to today's visit on 03/12/2025 (including but not limited to CBC, CMP, and lipid profiles) were requested from the patient's primary care provider/hospital/clinical laboratory.  If the labs were available for the visit, they were reviewed with the patient.  If they were not available, when received, special interest will be made to the labs pertinent to this visit.  The patient's most recent \"in-house\" labs are noted below and have been reviewed.  Outside labs pertinent to this visit are scanned into the record and have been reviewed.    Discussions held today, 03/12/2025,regarding procedures included risk, benefits, and options including but not limited to: Death, MI, stroke, pain, bleeding, infection, and possible need for vascular/thoracic/cardiothoracic surgery.    Copied information within this note was reviewed and is current as of 03/12/2025.    Assessment and plan noted herein represents the current plan of care as of 03/12/2025.    Significant resources from our office and staff are inherent in engaging this patient today,03/12/2025,and in a continuous and active collaborative plan of care related to their chronic cardiovascular conditions outlined below.  The management of these conditions requires the direction of our service with specialized clinical knowledge, skills, and experience.  This collaborative care includes but is not limited to patient education, expectations and responsibilities, " shared decision making around therapeutic goals, and shared commitments to achieve those goals.

## 2025-04-02 ENCOUNTER — OFFICE (OUTPATIENT)
Dept: URBAN - METROPOLITAN AREA CLINIC 64 | Facility: CLINIC | Age: 65
End: 2025-04-02
Payer: OTHER GOVERNMENT

## 2025-04-02 VITALS
WEIGHT: 200 LBS | HEIGHT: 72 IN | SYSTOLIC BLOOD PRESSURE: 153 MMHG | DIASTOLIC BLOOD PRESSURE: 74 MMHG | HEART RATE: 82 BPM

## 2025-04-02 DIAGNOSIS — D50.9 IRON DEFICIENCY ANEMIA, UNSPECIFIED: ICD-10-CM

## 2025-04-02 PROCEDURE — 99212 OFFICE O/P EST SF 10 MIN: CPT | Performed by: NURSE PRACTITIONER

## 2025-04-21 ENCOUNTER — OFFICE VISIT (OUTPATIENT)
Dept: CARDIOLOGY | Facility: CLINIC | Age: 65
End: 2025-04-21
Payer: OTHER GOVERNMENT

## 2025-04-21 VITALS
HEIGHT: 70 IN | SYSTOLIC BLOOD PRESSURE: 180 MMHG | BODY MASS INDEX: 28.35 KG/M2 | HEART RATE: 156 BPM | DIASTOLIC BLOOD PRESSURE: 90 MMHG | OXYGEN SATURATION: 99 % | WEIGHT: 198 LBS

## 2025-04-21 DIAGNOSIS — I10 PRIMARY HYPERTENSION: ICD-10-CM

## 2025-04-21 DIAGNOSIS — I50.42 CHRONIC COMBINED SYSTOLIC AND DIASTOLIC CHF (CONGESTIVE HEART FAILURE): ICD-10-CM

## 2025-04-21 DIAGNOSIS — I25.810 CORONARY ARTERY DISEASE INVOLVING CORONARY BYPASS GRAFT OF NATIVE HEART, UNSPECIFIED WHETHER ANGINA PRESENT: ICD-10-CM

## 2025-04-21 DIAGNOSIS — Z95.818 PRESENCE OF WATCHMAN LEFT ATRIAL APPENDAGE CLOSURE DEVICE: ICD-10-CM

## 2025-04-21 DIAGNOSIS — E11.9 TYPE 2 DIABETES MELLITUS WITHOUT COMPLICATION, WITHOUT LONG-TERM CURRENT USE OF INSULIN: ICD-10-CM

## 2025-04-21 DIAGNOSIS — I48.21 PERMANENT ATRIAL FIBRILLATION: Primary | ICD-10-CM

## 2025-04-21 NOTE — PROGRESS NOTES
CC--stroke with intracranial bleed    Sub--64-year-old male patient has history of intracranial bleed and stroke and loop recorder in situ  Patient has permanent AF with history of coronary artery disease and had watchman implantation in the past performed in 2020  He does have a history of hypertension, diabetes and hyperlipidemia and had bypass surgery in the past    My previous note attached below for reference    63-year-old male patient underwent WILD which revealed  WILD  Findings     LV systolic function normalized with EF more than 50%  Moderate left atrial enlargement without any shunt or clot in the left atrial appendage is occluded by the previously placed Watchman device without any delayed thrombosis and there is no leak  Mild central MR and no effusion  Mild TR noted with mild pulm hypertension      Procedure done  Transesophageal echocardiography     Plan  LV ejection fraction normalized and EF more than 50%  LifeVest can be removed    After WILD LifeVest removed and patient came for follow-up and no new symptoms    Recent history attached below for reference    63-year-old male patient well-known to me had progressive coronary artery disease and underwent stenting  Cardiac catheterization done recently revealed  Native severe two-vessel coronary artery disease with 100% occlusion of the ostium of the LAD and 100% occlusion of the ostium of the left circumflex  Patent vein graft to the marginal with severe 90% stenosis involving the midportion of the vein graft succinyl treated with placement of a Xience drug-eluting stent  Patent LIMA to the LAD  Patent stent in the right coronary artery with no significant in-stent restenosis    Patient underwent intervention in July 2024 and developed severe LV dysfunction with EF less than 30% and placed on LifeVest and comes in for follow-up.  Patient feels much better and denies any new symptoms  Patient has permanent AF With watchman implantation 2020 with prior  history of bypass surgery and has history of hypertension diabetes hyperlipidemia and had prior history of stroke intracranial bleed.        Past Medical History:   Diagnosis Date   • Atrial fibrillation (CMS/[facility])     June, [facility]. Abstracted from [facility].   • Brain bleed (CMS/HCC) 2019 [facility] 14th [facility] for [facility]   • CAD (coronary artery disease)     S/P CABG and PCI. Abstracted from [facility].   • Hyperlipidemia    • Hypertension    • Myocardial infarction (CMS/[facility]) 2006    Non-ST elevation MI. Abstracted from [facility].   • Obstructive sleep apnea on CPAP     At . Abstracted from [facility].   • Polycythemia vera (CMS/[facility])    • Presence of Watchman left atrial appendage closure device 1/31/2020   • TIA (transient ischemic attack) 11/19/2013    Possible TIA- MRI shows small tumor/head. Abstracted from [facility].     Past Surgical History:   Procedure Laterality Date   • ANGIOPLASTY  2006    3 srents were sequentially placed in promimal and mid body of the saphennous vein graft to obtuse marginal branch. Abstracted from [facility].   • ATRIAL APPENDAGE EXCLUSION LEFT WITH TRANSESOPHAGEAL ECHOCARDIOGRAM N/A 1/30/2020    Procedure: Atrial Appendage Occlusion;  Surgeon: Angel Willams MD;  Location: [facility] CATH INVASIVE LOCATION;  Service: Cardiovascular   • ATRIAL APPENDAGE EXCLUSION LEFT WITH TRANSESOPHAGEAL ECHOCARDIOGRAM N/A 1/30/2020    Procedure: Atrial Appendage Occlusion;  Surgeon: John Sheridan MD;  Location: [facility] CATH INVASIVE LOCATION;  Service: Cardiology   • CARDIAC CATHETERIZATION  2000    2003, 2006, 2012. Abstracted from [facility].   • CORONARY ARTERY BYPASS GRAFT  2000    Triple. Abstracted from [facility].   • MUSCLE REPAIR      Tendons and nerves in right lower extremity. Abstracted from [facility].   • OTHER SURGICAL HISTORY  08/2012    RCA mid and proximal- Xience stent x3. Abstracted from [facility].   • OTHER SURGICAL HISTORY  09/2016    Head  trauma/bleed at [facility]. Abstracted from [facility].       Physical Exam    General:      well developed, well nourished, in no acute distress.    Head:      normocephalic and atraumatic.    Eyes:      PERRL/EOM intact, conjunctivae and sclerae clear without nystagmus.    Neck:      no  thyromegaly, trachea central with normal respiratory effort  Lungs:      clear bilaterally to auscultation.    Heart:      irregular rate and rhythm, S1, S2 without murmurs, rubs, or gallops  Skin:      intact without lesions or rashes.    Psych:      alert and cooperative; normal mood and affect; normal attention span and concentration.            Assessment plan    Permanent AF currently on metoprolol and digoxin for rate control  Loop recorder in situ  Watchman device in 2020  Remote history of stroke or intracranial bleed without recurrence  Hypertension currently on metoprolol/Entresto/Aldactone  Progressive coronary artery disease with medical management and stenting currently on aspirin and ticagrelor  Diabetes on metformin and Jardiance  Medications reviewed and follow-up appointments made    Patient significantly upset with social situation and denies any cardiovascular symptoms  Patient educated to check his blood pressure at home and monitor his heart rate and be compliant with beta-blockers      ECG 12 Lead    Date/Time: 4/21/2025 11:57 AM  Performed by: Angel Willams MD    Authorized by: Angel Willams MD  Comparison: compared with previous ECG   Comparison to previous ECG: Atrial fibrillation with rapid ventricular rate and ventricular rate increased compared to previous EKG      Electronically signed by Angel Willams MD, 04/21/25, 11:57 AM EDT.

## 2025-05-13 RX ORDER — POTASSIUM CHLORIDE 1500 MG/1
20 TABLET, EXTENDED RELEASE ORAL DAILY
Qty: 90 TABLET | Refills: 2 | Status: SHIPPED | OUTPATIENT
Start: 2025-05-13

## 2025-06-04 RX ORDER — BUMETANIDE 1 MG/1
1 TABLET ORAL EVERY 12 HOURS SCHEDULED
Qty: 60 TABLET | Refills: 0 | Status: SHIPPED | OUTPATIENT
Start: 2025-06-04

## 2025-06-10 ENCOUNTER — TELEPHONE (OUTPATIENT)
Dept: CARDIOLOGY | Facility: CLINIC | Age: 65
End: 2025-06-10
Payer: OTHER GOVERNMENT

## 2025-06-10 NOTE — TELEPHONE ENCOUNTER
Patient had an episode 5/8/25 1:53 am 2 second, complex than 3 second pause. Please see [facility] report. Thanks

## 2025-06-12 NOTE — TELEPHONE ENCOUNTER
Please look at [facility], 3.3 second pause on 6/12/25 1:32 am. Reports signed under media in [facility], cardiologist reviewed.

## 2025-07-22 LAB
MC_CV_MDC_IDC_RATE_1: 180
MC_CV_MDC_IDC_RATE_1: 3
MC_CV_MDC_IDC_RATE_1: 40
MC_CV_MDC_IDC_ZONE_ID: 1
MC_CV_MDC_IDC_ZONE_ID: 2
MC_CV_MDC_IDC_ZONE_ID: 3
MC_CV_MDC_IDC_ZONE_ID: 4
MC_CV_MDC_IDC_ZONE_ID: 5
MDC_IDC_MSMT_BATTERY_STATUS: NORMAL
MDC_IDC_PG_IMPLANT_DTM: NORMAL
MDC_IDC_PG_MFG: NORMAL
MDC_IDC_PG_MODEL: NORMAL
MDC_IDC_PG_SERIAL: NORMAL
MDC_IDC_PG_TYPE: NORMAL
MDC_IDC_SESS_DTM: NORMAL
MDC_IDC_SESS_TYPE: NORMAL
MDC_IDC_SET_ZONE_DETECTION_DETAILS: 16
MDC_IDC_SET_ZONE_STATUS: NORMAL
MDC_IDC_SET_ZONE_TYPE: NORMAL
MDC_IDC_STAT_AT_BURDEN_PERCENT: 89

## 2025-08-20 LAB
MC_CV_MDC_IDC_RATE_1: 180
MC_CV_MDC_IDC_RATE_1: 3
MC_CV_MDC_IDC_RATE_1: 40
MC_CV_MDC_IDC_ZONE_ID: 1
MC_CV_MDC_IDC_ZONE_ID: 2
MC_CV_MDC_IDC_ZONE_ID: 3
MC_CV_MDC_IDC_ZONE_ID: 4
MC_CV_MDC_IDC_ZONE_ID: 5
MDC_IDC_MSMT_BATTERY_STATUS: NORMAL
MDC_IDC_PG_IMPLANT_DTM: NORMAL
MDC_IDC_PG_MFG: NORMAL
MDC_IDC_PG_MODEL: NORMAL
MDC_IDC_PG_SERIAL: NORMAL
MDC_IDC_PG_TYPE: NORMAL
MDC_IDC_SESS_DTM: NORMAL
MDC_IDC_SESS_TYPE: NORMAL
MDC_IDC_SET_ZONE_DETECTION_DETAILS: 16
MDC_IDC_SET_ZONE_STATUS: NORMAL
MDC_IDC_SET_ZONE_TYPE: NORMAL
MDC_IDC_STAT_AT_BURDEN_PERCENT: 92

## 2025-08-22 RX ORDER — ATORVASTATIN CALCIUM 40 MG/1
40 TABLET, FILM COATED ORAL DAILY
Qty: 90 TABLET | Refills: 3 | Status: SHIPPED | OUTPATIENT
Start: 2025-08-22

## (undated) DEVICE — STPCK 3WY HP ROT

## (undated) DEVICE — GUIDELINER CATHETERS ARE INTENDED TO BE USED IN CONJUNCTION WITH GUIDE CATHETERS TO ACCESS DISCRETE REGIONS OF THE CORONARY AND/OR PERIPHERAL VASCULATURE, AND TO FACILITATE PLACEMENT OF INTERVENTIONAL DEVICES.: Brand: GUIDELINER® V3 CATHETER

## (undated) DEVICE — DGW .035 FC J3MM 260CM TEF: Brand: EMERALD

## (undated) DEVICE — CATH DIAG IMPULSE FL4 6F 100CM

## (undated) DEVICE — CATH DIAG IMPULSE PIG .056 6F 110CM

## (undated) DEVICE — PAD E/S GRND SGL/FOIL 9FT/CORD DISP

## (undated) DEVICE — TBG NAMIC PRESS MONTR A/ F/M 12IN

## (undated) DEVICE — MINI TREK CORONARY DILATATION CATHETER 2.0 MM X 15 MM / RAPID-EXCHANGE: Brand: MINI TREK

## (undated) DEVICE — ST ACC MICROPUNCTURE STFF/CANN PLAT/TP 4F 21G 40CM

## (undated) DEVICE — KT PK ANGIOPLASTY ACC 9 MERIT

## (undated) DEVICE — ELECTRD DEFIB M/FUNC PROPADZ RADIOL 2PK

## (undated) DEVICE — CONTRST ISOVUE300 61PCT 50ML

## (undated) DEVICE — DEV INFL COMPAK W/ACCESSPLUS IN4530

## (undated) DEVICE — PK TRY HEART CATH 50

## (undated) DEVICE — PREF.GUIDING SHEATH W/MULT.CRV: Brand: PREFACE

## (undated) DEVICE — DGW .035 FC J3MM 150CM TEF HEP: Brand: EMERALD

## (undated) DEVICE — INTRO PERFORMER CHECKFLO/LG RAD/BND NO/GW 14F .038IN 30CM

## (undated) DEVICE — MINI TREK CORONARY DILATATION CATHETER 1.50 MM X 15 MM / RAPID-EXCHANGE: Brand: MINI TREK

## (undated) DEVICE — CATH DIAG IMPULSE FR4 6F 100CM

## (undated) DEVICE — TREK CORONARY DILATATION CATHETER 3.0 MM X 15 MM / RAPID-EXCHANGE: Brand: TREK

## (undated) DEVICE — PINNACLE INTRODUCER SHEATH: Brand: PINNACLE

## (undated) DEVICE — BOWL PLSTC MD 16OZ BLU STRL

## (undated) DEVICE — Device: Brand: RFP-100A CONNECTOR CABLE

## (undated) DEVICE — ACCESS SHEATH WITH DILATOR: Brand: WATCHMAN® ACCESS SYSTEM

## (undated) DEVICE — CVR PROB ULTRASND CIVFLEX GEN/PURP TELESCP/FOLD 5.5X96IN LF

## (undated) DEVICE — GUIDE CATHETER: Brand: MACH1™

## (undated) DEVICE — CAP SYS PROC ACC WATCHMAN LAA

## (undated) DEVICE — CATH DIAG IMPULSE IMT 6F 100CM

## (undated) DEVICE — NC TREK NEO™ CORONARY DILATATION CATHETER 4.00 MM X 12 MM / RAPID-EXCHANGE: Brand: NC TREK NEO™

## (undated) DEVICE — HI-TORQUE BALANCE MIDDLEWEIGHT UNIVERSAL II GUIDE WIRE STRAIGHT TIP PAK  190 CM: Brand: HI-TORQUE BALANCE MIDDLEWEIGHT UNIVERSAL II

## (undated) DEVICE — Device: Brand: NRG TRANSSEPTAL NEEDLE

## (undated) DEVICE — GW XCHG AMPLTZ XSTIF PTFE CRV .035IN 3X180CM

## (undated) DEVICE — CATH DIAG IMPULSE PIG 5F 100CM

## (undated) DEVICE — CATH DIAG IMPULSE AR2 5F 100CM